# Patient Record
Sex: FEMALE | Race: WHITE | NOT HISPANIC OR LATINO
[De-identification: names, ages, dates, MRNs, and addresses within clinical notes are randomized per-mention and may not be internally consistent; named-entity substitution may affect disease eponyms.]

---

## 2018-01-10 ENCOUNTER — APPOINTMENT (OUTPATIENT)
Dept: PSYCHIATRY | Facility: CLINIC | Age: 53
End: 2018-01-10

## 2018-07-01 ENCOUNTER — OUTPATIENT (OUTPATIENT)
Dept: OUTPATIENT SERVICES | Facility: HOSPITAL | Age: 53
LOS: 1 days | End: 2018-07-01
Payer: MEDICARE

## 2018-07-10 DIAGNOSIS — Z71.89 OTHER SPECIFIED COUNSELING: ICD-10-CM

## 2018-12-01 PROCEDURE — G9005: CPT

## 2019-01-01 ENCOUNTER — OUTPATIENT (OUTPATIENT)
Dept: OUTPATIENT SERVICES | Facility: HOSPITAL | Age: 54
LOS: 1 days | End: 2019-01-01
Payer: MEDICARE

## 2019-02-01 PROCEDURE — G9005: CPT

## 2019-04-12 DIAGNOSIS — Z71.89 OTHER SPECIFIED COUNSELING: ICD-10-CM

## 2020-11-03 ENCOUNTER — INPATIENT (INPATIENT)
Facility: HOSPITAL | Age: 55
LOS: 15 days | Discharge: ANOTHER HOSPITAL | End: 2020-11-19
Attending: PSYCHIATRY & NEUROLOGY | Admitting: PSYCHIATRY & NEUROLOGY
Payer: MEDICARE

## 2020-11-03 VITALS
HEART RATE: 115 BPM | HEIGHT: 65 IN | RESPIRATION RATE: 18 BRPM | OXYGEN SATURATION: 100 % | DIASTOLIC BLOOD PRESSURE: 63 MMHG | TEMPERATURE: 98 F | SYSTOLIC BLOOD PRESSURE: 144 MMHG

## 2020-11-03 DIAGNOSIS — F20.1 DISORGANIZED SCHIZOPHRENIA: ICD-10-CM

## 2020-11-03 DIAGNOSIS — F25.9 SCHIZOAFFECTIVE DISORDER, UNSPECIFIED: ICD-10-CM

## 2020-11-03 LAB
ALBUMIN SERPL ELPH-MCNC: 5 G/DL — SIGNIFICANT CHANGE UP (ref 3.3–5)
ALP SERPL-CCNC: 97 U/L — SIGNIFICANT CHANGE UP (ref 40–120)
ALT FLD-CCNC: 19 U/L — SIGNIFICANT CHANGE UP (ref 4–33)
ANION GAP SERPL CALC-SCNC: 18 MMO/L — HIGH (ref 7–14)
APAP SERPL-MCNC: < 15 UG/ML — LOW (ref 15–25)
AST SERPL-CCNC: 12 U/L — SIGNIFICANT CHANGE UP (ref 4–32)
BASOPHILS # BLD AUTO: 0.03 K/UL — SIGNIFICANT CHANGE UP (ref 0–0.2)
BASOPHILS NFR BLD AUTO: 0.3 % — SIGNIFICANT CHANGE UP (ref 0–2)
BILIRUB SERPL-MCNC: 0.8 MG/DL — SIGNIFICANT CHANGE UP (ref 0.2–1.2)
BUN SERPL-MCNC: 13 MG/DL — SIGNIFICANT CHANGE UP (ref 7–23)
CALCIUM SERPL-MCNC: 10.1 MG/DL — SIGNIFICANT CHANGE UP (ref 8.4–10.5)
CHLORIDE SERPL-SCNC: 96 MMOL/L — LOW (ref 98–107)
CO2 SERPL-SCNC: 22 MMOL/L — SIGNIFICANT CHANGE UP (ref 22–31)
CREAT SERPL-MCNC: 0.5 MG/DL — SIGNIFICANT CHANGE UP (ref 0.5–1.3)
EOSINOPHIL # BLD AUTO: 0 K/UL — SIGNIFICANT CHANGE UP (ref 0–0.5)
EOSINOPHIL NFR BLD AUTO: 0 % — SIGNIFICANT CHANGE UP (ref 0–6)
ETHANOL BLD-MCNC: < 10 MG/DL — SIGNIFICANT CHANGE UP
GLUCOSE SERPL-MCNC: 296 MG/DL — HIGH (ref 70–99)
HCT VFR BLD CALC: 40.6 % — SIGNIFICANT CHANGE UP (ref 34.5–45)
HGB BLD-MCNC: 13 G/DL — SIGNIFICANT CHANGE UP (ref 11.5–15.5)
IMM GRANULOCYTES NFR BLD AUTO: 0.7 % — SIGNIFICANT CHANGE UP (ref 0–1.5)
LYMPHOCYTES # BLD AUTO: 2.12 K/UL — SIGNIFICANT CHANGE UP (ref 1–3.3)
LYMPHOCYTES # BLD AUTO: 20.3 % — SIGNIFICANT CHANGE UP (ref 13–44)
MCHC RBC-ENTMCNC: 27 PG — SIGNIFICANT CHANGE UP (ref 27–34)
MCHC RBC-ENTMCNC: 32 % — SIGNIFICANT CHANGE UP (ref 32–36)
MCV RBC AUTO: 84.2 FL — SIGNIFICANT CHANGE UP (ref 80–100)
MONOCYTES # BLD AUTO: 0.44 K/UL — SIGNIFICANT CHANGE UP (ref 0–0.9)
MONOCYTES NFR BLD AUTO: 4.2 % — SIGNIFICANT CHANGE UP (ref 2–14)
NEUTROPHILS # BLD AUTO: 7.8 K/UL — HIGH (ref 1.8–7.4)
NEUTROPHILS NFR BLD AUTO: 74.5 % — SIGNIFICANT CHANGE UP (ref 43–77)
NRBC # FLD: 0 K/UL — SIGNIFICANT CHANGE UP (ref 0–0)
PLATELET # BLD AUTO: 304 K/UL — SIGNIFICANT CHANGE UP (ref 150–400)
PMV BLD: 9.2 FL — SIGNIFICANT CHANGE UP (ref 7–13)
POTASSIUM SERPL-MCNC: 4 MMOL/L — SIGNIFICANT CHANGE UP (ref 3.5–5.3)
POTASSIUM SERPL-SCNC: 4 MMOL/L — SIGNIFICANT CHANGE UP (ref 3.5–5.3)
PROT SERPL-MCNC: 8.4 G/DL — HIGH (ref 6–8.3)
RBC # BLD: 4.82 M/UL — SIGNIFICANT CHANGE UP (ref 3.8–5.2)
RBC # FLD: 13 % — SIGNIFICANT CHANGE UP (ref 10.3–14.5)
SALICYLATES SERPL-MCNC: < 5 MG/DL — LOW (ref 15–30)
SARS-COV-2 RNA SPEC QL NAA+PROBE: SIGNIFICANT CHANGE UP
SODIUM SERPL-SCNC: 136 MMOL/L — SIGNIFICANT CHANGE UP (ref 135–145)
T3 SERPL-MCNC: 88.3 NG/DL — SIGNIFICANT CHANGE UP (ref 80–200)
T4 AB SER-ACNC: 8.19 UG/DL — SIGNIFICANT CHANGE UP (ref 5.1–13)
TSH SERPL-MCNC: 4.86 UIU/ML — HIGH (ref 0.27–4.2)
VALPROATE SERPL-MCNC: 33.9 UG/ML — LOW (ref 50–100)
WBC # BLD: 10.46 K/UL — SIGNIFICANT CHANGE UP (ref 3.8–10.5)
WBC # FLD AUTO: 10.46 K/UL — SIGNIFICANT CHANGE UP (ref 3.8–10.5)

## 2020-11-03 PROCEDURE — 99285 EMERGENCY DEPT VISIT HI MDM: CPT

## 2020-11-03 RX ORDER — CLOZAPINE 150 MG/1
250 TABLET, ORALLY DISINTEGRATING ORAL AT BEDTIME
Refills: 0 | Status: DISCONTINUED | OUTPATIENT
Start: 2020-11-04 | End: 2020-11-04

## 2020-11-03 RX ORDER — HALOPERIDOL DECANOATE 100 MG/ML
5 INJECTION INTRAMUSCULAR EVERY 6 HOURS
Refills: 0 | Status: DISCONTINUED | OUTPATIENT
Start: 2020-11-04 | End: 2020-11-19

## 2020-11-03 RX ORDER — RISPERIDONE 4 MG/1
2 TABLET ORAL AT BEDTIME
Refills: 0 | Status: DISCONTINUED | OUTPATIENT
Start: 2020-11-04 | End: 2020-11-06

## 2020-11-03 RX ORDER — BENZTROPINE MESYLATE 1 MG
1 TABLET ORAL AT BEDTIME
Refills: 0 | Status: DISCONTINUED | OUTPATIENT
Start: 2020-11-04 | End: 2020-11-09

## 2020-11-03 RX ORDER — DIVALPROEX SODIUM 500 MG/1
1000 TABLET, DELAYED RELEASE ORAL AT BEDTIME
Refills: 0 | Status: DISCONTINUED | OUTPATIENT
Start: 2020-11-04 | End: 2020-11-04

## 2020-11-03 RX ORDER — ATORVASTATIN CALCIUM 80 MG/1
80 TABLET, FILM COATED ORAL AT BEDTIME
Refills: 0 | Status: DISCONTINUED | OUTPATIENT
Start: 2020-11-04 | End: 2020-11-19

## 2020-11-03 RX ORDER — METFORMIN HYDROCHLORIDE 850 MG/1
1000 TABLET ORAL
Refills: 0 | Status: DISCONTINUED | OUTPATIENT
Start: 2020-11-04 | End: 2020-11-19

## 2020-11-03 RX ADMIN — Medication 1 MILLIGRAM(S): at 17:44

## 2020-11-03 NOTE — ED BEHAVIORAL HEALTH ASSESSMENT NOTE - PSYCHIATRIC ISSUES AND PLAN (INCLUDE STANDING AND PRN MEDICATION)
Defer medication changes to inpatient team; continue Clozaril 250 mg po QHS, Risperdal 2 mg po QHS, Depakote ER 1000 mg po QHS, Cogentin 1 mg po QHS; use Haldol 5 mg, Ativan 2 mg PO/IM prn agitation

## 2020-11-03 NOTE — ED BEHAVIORAL HEALTH NOTE - BEHAVIORAL HEALTH NOTE
.        *In the past 14 days, have you been around anyone with a positive COVID-19 test?*     (  ) Yes   (  x) No   (  ) Unknown- Reason (e.g. patient uncertain, sedated, refusing to answer, etc.):  ______    IF YES PROCEED TO QUESTION #2. IF NO or UNKNOWN, PLEASE SKIP TO QUESTION #7    2.        Were you within 6 feet of them for at least 15 minutes? (  ) Yes   (  ) No   (  ) Unknown- Reason: ______      3.        Have you provided care for them? (  ) Yes   (  ) No   (  ) Unknown- Reason: ______      4.        Have you had direct physical contact with them (touched, hugged, or kissed them)? (  ) Yes   (  ) No    (  ) Unknown- Reason: ______      5.        Have you shared eating or drinking utensils with them? (  ) Yes   (  ) No    (  ) Unknown- Reason: ______      6.        Have they sneezed, coughed, or somehow got respiratory droplets on you? (  ) Yes   (  ) No    (  ) Unknown- Reason: ______      7.        *Have you been out of [place] within the past 14 days?*    (  ) Yes   (x  ) No   (  ) Unknown- Reason (e.g. patient uncertain, sedated, refusing to answer, etc.): _______    IF YES PLEASE ANSWER THE FOLLOWING QUESTIONS:    8.        Which state/country have you been to? ______     9.        Were you there over 24 hours? (  ) Yes   (  ) No    (  ) Unknown- Reason: ______      10.     What date did you return to [facility]? ______

## 2020-11-03 NOTE — ED BEHAVIORAL HEALTH ASSESSMENT NOTE - CURRENT MEDICATION
Clozapine 250 mg po QHS, Risperdal 2 mg po QHS, Cogentin 1 mg po QHS, Metformin 1000 mg po BID, Depakote 1000 mg po QHS, Atorvastatin 80 mg po QHS

## 2020-11-03 NOTE — ED PROVIDER NOTE - PROGRESS NOTE DETAILS
NP Lula- pt during her er stay , calm and cooperative with care, no out burst, po fluid encouraged and tolerated well, psych inpatient tx.

## 2020-11-03 NOTE — ED PROVIDER NOTE - CLINICAL SUMMARY MEDICAL DECISION MAKING FREE TEXT BOX
This is a 55 yr old F, pmh schizoaffective disorder with c/ o depression , bizarre behaviour. 911 activated by . As per ems pt was depressed.  Pt appears disheveled, very disorganized, derealization, poor historian. Pt states her  got lost today and called 911, but she can not engage why she is here.

## 2020-11-03 NOTE — ED ADULT TRIAGE NOTE - CHIEF COMPLAINT QUOTE
Pt was brought in because  called EMS as pt has been depressed, PMH: Depression, Pt refusing to speak, shakes her head no when asked if she has SI:HI, Unable to obtain hx from pt.

## 2020-11-03 NOTE — ED BEHAVIORAL HEALTH ASSESSMENT NOTE - NS ED BHA MED ROS GENITOURINARY
Pt arrived to unit via transport with belongings. Settled into bed and oriented to room/call light. Dual skin assessment completed with MAYURI Zuluaga.   No complaints

## 2020-11-03 NOTE — ED BEHAVIORAL HEALTH ASSESSMENT NOTE - SUMMARY
54 y/o female, single, noncaregiver, disabled, lives with lizy, history of Schizophrenia, multiple past admissions at [facility] (most recent 2015), most recent psych admission at [facility] several months ago for psychosis, followed by psychiatrist Dr. Love at [facility] (pt has no h/o developmental disability), prescribed Clozaril, Risperdal, Depakote, Cogentin, no h/o self-injurious behavior or suicide attempts, no h/o violence or legal issues, PMH Type II DM, HLD, asthma, no h/o substance abuse, BIBEMS activated by francisco (Rahat) for acute psychosis.   Pt not tending to ADL's, exhibits disorganized thought process, talking to self, paranoid. She is unable to care for self at this time due to severity of psychotic symptoms. She requires inpatient psychiatric admission for safety and stabilization.

## 2020-11-03 NOTE — ED ADULT NURSE NOTE - NSIMPLEMENTINTERV_GEN_ALL_ED
Implemented All Universal Safety Interventions:  [place] to call system. Call bell, personal items and telephone within reach. Instruct patient to call for assistance. Room bathroom lighting operational. Non-slip footwear when patient is off stretcher. Physically safe environment: no spills, clutter or unnecessary equipment. Stretcher in lowest position, wheels locked, appropriate side rails in place.

## 2020-11-03 NOTE — ED ADULT NURSE NOTE - OBJECTIVE STATEMENT
A & O to place only. Denies SI/HI, VH.  Endorses AH at the moment.  Reports that she was brought here because her " got lost on the way [facility]." Denies pain, drug use, and alcohol.

## 2020-11-03 NOTE — ED BEHAVIORAL HEALTH ASSESSMENT NOTE - OTHER
francisco CVM superficially cooperative did not assess "I don't remember" impoverished paranoid denies but fiance states pt has been talking to herself chronic mental illness

## 2020-11-03 NOTE — ED ADULT NURSE NOTE - PRIMARY CARE PROVIDER
Size Of Lesion In Cm (Optional): 2.2 Detail Level: Detailed unknown X Size Of Lesion In Cm (Optional): 0

## 2020-11-03 NOTE — ED BEHAVIORAL HEALTH ASSESSMENT NOTE - HPI (INCLUDE ILLNESS QUALITY, SEVERITY, DURATION, TIMING, CONTEXT, MODIFYING FACTORS, ASSOCIATED SIGNS AND SYMPTOMS)
Rahat (francisco): [phone]  Pt was so depressed, lying in bed for days, urinating on herself in bed. She won't shower, keeps staring at floor and muttering things in broken Scottish and Khmer. No SI. About 1 month, got worse. Hypersomnia, reduced appetite. Not making sense at times. "I'm scared." She says she doesn't know. No h/o violence. Most recent admission at [facility] (a few months ago). At baseline, upbeat, she cooks.   Hasn't been able to see her grandchildren for a while.   Psychiatrist is Dr. Love at [facility] but has no developmental disability. Has Schizophrenia.   Meds: Clozapine 250 mg po QHS (she skipped two nights about but otherwise is compliant), Risperdal 2 mg po QHS, Cogentin 1 mg po QHS, Metformin 1000 mg po BID, Depakote 1000 mg po QHS, Atorvastatin 80 mg po QHS  DM II, HLD 56 y/o female, single, noncaregiver, disabled, lives with francisco, history of Schizophrenia, multiple past admissions at [facility] (most recent 2015), most recent psych admission at [facility] several months ago for psychosis, followed by psychiatrist Dr. Love at [facility] (pt has no h/o developmental disability), prescribed Clozaril, Risperdal, Depakote, Cogentin, no h/o self-injurious behavior or suicide attempts, no h/o violence or legal issues, PMH Type II DM, HLD, asthma, no h/o substance abuse, BIBEMS activated by francisco (Rahat) for acute psychosis.     Collateral obtained from pt's Rahat singh. Pt carries diagnosis of Schizophrenia. Informant called 911 today because pt has been decompensating for the past month, with acute worsening over the past few days.  He states that pt appears depressed (she has not reported feeling depressed), has been lying in bed for days, urinated on herself in bed, refusing to shower. She keeps staring at the floor and muttering things in broken Guamanian and Kazakh. No SI. Hypersomnia, reduced appetite. She is not making sense at times. She repeatedly states "I'm scared" but doesn't know what she is scared of. Multiple past psych admissions. Most recent admission was at [facility] a few months ago. This was first hospitalization since 2015 [facility] admission. At baseline, pt is "upbeat", cooks, not disorganized, not paranoid. No h/o suicide attempts, no h/o violence.   Psychiatrist is Dr. Love at [facility] (she has no developmental disability).   Meds: Clozapine 250 mg po QHS (she skipped dose two nights about but otherwise is compliant--she took last night's dose), Risperdal 2 mg po QHS, Cogentin 1 mg po QHS, Metformin 1000 mg po BID, Depakote 1000 mg po QHS, Atorvastatin 80 mg po QHS. She has DM II, HLD, asthma. No substance use. Informant advocates for psychiatric admission. 56 y/o female, single, noncaregiver, disabled, lives with francisco, history of Schizophrenia, multiple past admissions at [facility] (most recent 2015), most recent psych admission at [facility] several months ago for psychosis, followed by psychiatrist Dr. Love at [facility] (pt has no h/o developmental disability), prescribed Clozaril, Risperdal, Depakote, Cogentin, no h/o self-injurious behavior or suicide attempts, no h/o violence or legal issues, PMH Type II DM, HLD, asthma, no h/o substance abuse, BIBEMS activated by francisco (Rahat) for acute psychosis.     Interview is limited significantly by disorganized/impoverished thought process. Pt says "I don't remember" in response to multiple questions. She is disheveled with poor eye contact. Pt states her "" Rahat sent her to the ED but she doesn't remember why. She says "I don't remember" when asked how she is feeling/if anything is bothering her. She then says that the sound of ambulances scares her. She says "I feel safe here." She says she doesn't feel safe at home but is unable to say why. When asked if anyone is trying to harm her, she responds "I watch TV but people are jealous of me." She denies SI/HI, denies AH/VH. States she takes medications but responds "I don't remember" when asked for names of meds. She responds "I don't remember" when asked about her mood. She denies substance use.   Collateral obtained from pt's Rahat singh. Pt carries diagnosis of Schizophrenia. Informant called 911 today because pt has been decompensating for the past month, with acute worsening over the past few days.  He states that pt appears depressed (she has not reported feeling depressed), has been lying in bed for days, urinated on herself in bed, refusing to shower. She keeps staring at the floor and muttering things in broken Sinhala and Citizen of [place]. No SI. Hypersomnia, reduced appetite. She is not making sense at times. She repeatedly states "I'm scared" but doesn't know what she is scared of. Multiple past psych admissions. Most recent admission was at [facility] a few months ago. This was first hospitalization since 2015 [facility] admission. At baseline, pt is "upbeat", cooks, not disorganized, not paranoid. No h/o suicide attempts, no h/o violence.   Psychiatrist is Dr. Love at [facility] (she has no developmental disability).   Meds: Clozapine 250 mg po QHS (she skipped dose two nights about but otherwise is compliant--she took last night's dose), Risperdal 2 mg po QHS, Cogentin 1 mg po QHS, Metformin 1000 mg po BID, Depakote 1000 mg po QHS, Atorvastatin 80 mg po QHS. Informant gives pt her medications. She has DM II, HLD, asthma. No substance use. Informant advocates for psychiatric admission.

## 2020-11-03 NOTE — ED ADULT NURSE REASSESSMENT NOTE - NS ED NURSE REASSESS COMMENT FT1
Pt admitted to [place] [id number], escorted by EMS.  Pt still oddly related but calm and cooperative.

## 2020-11-03 NOTE — ED BEHAVIORAL HEALTH ASSESSMENT NOTE - DESCRIPTION
superficially cooperative, remains in good behavioral control    Vital Signs Last 24 Hrs  T(C): 36.8 (03 Nov 2020 15:05), Max: 36.8 (03 Nov 2020 15:05)  T(F): 98.2 (03 Nov 2020 15:05), Max: 98.2 (03 Nov 2020 15:05)  HR: 115 (03 Nov 2020 15:05) (115 - 115)  BP: 144/63 (03 Nov 2020 15:05) (144/63 - 144/63)  BP(mean): --  RR: 18 (03 Nov 2020 15:05) (18 - 18)  SpO2: 100% (03 Nov 2020 15:05) (100% - 100%) Type II DM, HLD, asthma see HPI

## 2020-11-04 LAB
GLUCOSE BLDC GLUCOMTR-MCNC: 296 MG/DL — HIGH (ref 70–99)
SARS-COV-2 IGG SERPL QL IA: POSITIVE
SARS-COV-2 IGM SERPL IA-ACNC: 5.25 INDEX — HIGH

## 2020-11-04 PROCEDURE — 99222 1ST HOSP IP/OBS MODERATE 55: CPT | Mod: GC

## 2020-11-04 RX ORDER — DIVALPROEX SODIUM 500 MG/1
1000 TABLET, DELAYED RELEASE ORAL AT BEDTIME
Refills: 0 | Status: DISCONTINUED | OUTPATIENT
Start: 2020-11-04 | End: 2020-11-07

## 2020-11-04 RX ORDER — INSULIN LISPRO 100/ML
VIAL (ML) SUBCUTANEOUS
Refills: 0 | Status: DISCONTINUED | OUTPATIENT
Start: 2020-11-04 | End: 2020-11-19

## 2020-11-04 RX ORDER — INSULIN LISPRO 100/ML
VIAL (ML) SUBCUTANEOUS AT BEDTIME
Refills: 0 | Status: DISCONTINUED | OUTPATIENT
Start: 2020-11-04 | End: 2020-11-19

## 2020-11-04 RX ORDER — DEXTROSE 50 % IN WATER 50 %
15 SYRINGE (ML) INTRAVENOUS ONCE
Refills: 0 | Status: DISCONTINUED | OUTPATIENT
Start: 2020-11-04 | End: 2020-11-19

## 2020-11-04 RX ORDER — GLUCAGON INJECTION, SOLUTION 0.5 MG/.1ML
1 INJECTION, SOLUTION SUBCUTANEOUS ONCE
Refills: 0 | Status: DISCONTINUED | OUTPATIENT
Start: 2020-11-04 | End: 2020-11-19

## 2020-11-04 RX ORDER — CLOZAPINE 150 MG/1
25 TABLET, ORALLY DISINTEGRATING ORAL AT BEDTIME
Refills: 0 | Status: DISCONTINUED | OUTPATIENT
Start: 2020-11-04 | End: 2020-11-06

## 2020-11-04 RX ORDER — HALOPERIDOL DECANOATE 100 MG/ML
5 INJECTION INTRAMUSCULAR ONCE
Refills: 0 | Status: DISCONTINUED | OUTPATIENT
Start: 2020-11-04 | End: 2020-11-04

## 2020-11-04 RX ORDER — DIVALPROEX SODIUM 500 MG/1
1500 TABLET, DELAYED RELEASE ORAL AT BEDTIME
Refills: 0 | Status: DISCONTINUED | OUTPATIENT
Start: 2020-11-04 | End: 2020-11-04

## 2020-11-04 RX ADMIN — METFORMIN HYDROCHLORIDE 1000 MILLIGRAM(S): 850 TABLET ORAL at 20:35

## 2020-11-04 RX ADMIN — ATORVASTATIN CALCIUM 80 MILLIGRAM(S): 80 TABLET, FILM COATED ORAL at 20:35

## 2020-11-04 RX ADMIN — RISPERIDONE 2 MILLIGRAM(S): 4 TABLET ORAL at 20:35

## 2020-11-04 RX ADMIN — Medication 1 MILLIGRAM(S): at 20:35

## 2020-11-04 RX ADMIN — METFORMIN HYDROCHLORIDE 1000 MILLIGRAM(S): 850 TABLET ORAL at 08:10

## 2020-11-04 RX ADMIN — ATORVASTATIN CALCIUM 80 MILLIGRAM(S): 80 TABLET, FILM COATED ORAL at 01:24

## 2020-11-04 RX ADMIN — HALOPERIDOL DECANOATE 5 MILLIGRAM(S): 100 INJECTION INTRAMUSCULAR at 01:24

## 2020-11-04 RX ADMIN — Medication 2 MILLIGRAM(S): at 01:24

## 2020-11-04 RX ADMIN — CLOZAPINE 25 MILLIGRAM(S): 150 TABLET, ORALLY DISINTEGRATING ORAL at 20:35

## 2020-11-04 RX ADMIN — HALOPERIDOL DECANOATE 5 MILLIGRAM(S): 100 INJECTION INTRAMUSCULAR at 08:10

## 2020-11-04 RX ADMIN — DIVALPROEX SODIUM 1000 MILLIGRAM(S): 500 TABLET, DELAYED RELEASE ORAL at 20:35

## 2020-11-05 LAB
CHOLEST SERPL-MCNC: 146 MG/DL — SIGNIFICANT CHANGE UP (ref 120–199)
DIRECT LDL: 79 MG/DL — SIGNIFICANT CHANGE UP
GLUCOSE BLDC GLUCOMTR-MCNC: 244 MG/DL — HIGH (ref 70–99)
GLUCOSE BLDC GLUCOMTR-MCNC: 259 MG/DL — HIGH (ref 70–99)
GLUCOSE BLDC GLUCOMTR-MCNC: 280 MG/DL — HIGH (ref 70–99)
GLUCOSE BLDC GLUCOMTR-MCNC: 281 MG/DL — HIGH (ref 70–99)
HBA1C BLD-MCNC: 9.5 % — HIGH (ref 4–5.6)
HDLC SERPL-MCNC: 31 MG/DL — LOW (ref 45–65)
TRIGL SERPL-MCNC: 355 MG/DL — HIGH (ref 10–149)

## 2020-11-05 PROCEDURE — 99232 SBSQ HOSP IP/OBS MODERATE 35: CPT | Mod: GC

## 2020-11-05 RX ADMIN — METFORMIN HYDROCHLORIDE 1000 MILLIGRAM(S): 850 TABLET ORAL at 08:45

## 2020-11-05 RX ADMIN — Medication 3: at 17:08

## 2020-11-05 RX ADMIN — Medication 1 MILLIGRAM(S): at 20:40

## 2020-11-05 RX ADMIN — METFORMIN HYDROCHLORIDE 1000 MILLIGRAM(S): 850 TABLET ORAL at 20:41

## 2020-11-05 RX ADMIN — DIVALPROEX SODIUM 1000 MILLIGRAM(S): 500 TABLET, DELAYED RELEASE ORAL at 20:40

## 2020-11-05 RX ADMIN — Medication 3: at 12:11

## 2020-11-05 RX ADMIN — CLOZAPINE 25 MILLIGRAM(S): 150 TABLET, ORALLY DISINTEGRATING ORAL at 20:40

## 2020-11-05 RX ADMIN — RISPERIDONE 2 MILLIGRAM(S): 4 TABLET ORAL at 20:41

## 2020-11-05 RX ADMIN — Medication 3: at 08:08

## 2020-11-05 RX ADMIN — ATORVASTATIN CALCIUM 80 MILLIGRAM(S): 80 TABLET, FILM COATED ORAL at 20:40

## 2020-11-06 LAB
GLUCOSE BLDC GLUCOMTR-MCNC: 224 MG/DL — HIGH (ref 70–99)
GLUCOSE BLDC GLUCOMTR-MCNC: 237 MG/DL — HIGH (ref 70–99)
GLUCOSE BLDC GLUCOMTR-MCNC: 269 MG/DL — HIGH (ref 70–99)

## 2020-11-06 PROCEDURE — 99232 SBSQ HOSP IP/OBS MODERATE 35: CPT | Mod: GC

## 2020-11-06 RX ORDER — CLOZAPINE 150 MG/1
50 TABLET, ORALLY DISINTEGRATING ORAL AT BEDTIME
Refills: 0 | Status: COMPLETED | OUTPATIENT
Start: 2020-11-06 | End: 2020-11-07

## 2020-11-06 RX ORDER — CLOZAPINE 150 MG/1
75 TABLET, ORALLY DISINTEGRATING ORAL AT BEDTIME
Refills: 0 | Status: DISCONTINUED | OUTPATIENT
Start: 2020-11-08 | End: 2020-11-10

## 2020-11-06 RX ORDER — RISPERIDONE 4 MG/1
3 TABLET ORAL AT BEDTIME
Refills: 0 | Status: DISCONTINUED | OUTPATIENT
Start: 2020-11-06 | End: 2020-11-19

## 2020-11-06 RX ORDER — GLIMEPIRIDE 1 MG
2 TABLET ORAL DAILY
Refills: 0 | Status: DISCONTINUED | OUTPATIENT
Start: 2020-11-06 | End: 2020-11-13

## 2020-11-06 RX ADMIN — Medication 3: at 08:09

## 2020-11-06 RX ADMIN — METFORMIN HYDROCHLORIDE 1000 MILLIGRAM(S): 850 TABLET ORAL at 08:35

## 2020-11-06 RX ADMIN — Medication 3: at 12:49

## 2020-11-06 RX ADMIN — RISPERIDONE 3 MILLIGRAM(S): 4 TABLET ORAL at 20:24

## 2020-11-06 RX ADMIN — METFORMIN HYDROCHLORIDE 1000 MILLIGRAM(S): 850 TABLET ORAL at 20:24

## 2020-11-06 RX ADMIN — DIVALPROEX SODIUM 1000 MILLIGRAM(S): 500 TABLET, DELAYED RELEASE ORAL at 20:24

## 2020-11-06 RX ADMIN — Medication 1 MILLIGRAM(S): at 20:24

## 2020-11-06 RX ADMIN — CLOZAPINE 50 MILLIGRAM(S): 150 TABLET, ORALLY DISINTEGRATING ORAL at 20:24

## 2020-11-06 RX ADMIN — Medication 2: at 17:07

## 2020-11-06 RX ADMIN — ATORVASTATIN CALCIUM 80 MILLIGRAM(S): 80 TABLET, FILM COATED ORAL at 20:24

## 2020-11-07 LAB
GLUCOSE BLDC GLUCOMTR-MCNC: 146 MG/DL — HIGH (ref 70–99)
GLUCOSE BLDC GLUCOMTR-MCNC: 170 MG/DL — HIGH (ref 70–99)
GLUCOSE BLDC GLUCOMTR-MCNC: 241 MG/DL — HIGH (ref 70–99)
GLUCOSE BLDC GLUCOMTR-MCNC: 269 MG/DL — HIGH (ref 70–99)
VALPROATE SERPL-MCNC: 41.7 UG/ML — LOW (ref 50–100)

## 2020-11-07 PROCEDURE — 99232 SBSQ HOSP IP/OBS MODERATE 35: CPT

## 2020-11-07 RX ORDER — DIVALPROEX SODIUM 500 MG/1
1500 TABLET, DELAYED RELEASE ORAL AT BEDTIME
Refills: 0 | Status: DISCONTINUED | OUTPATIENT
Start: 2020-11-07 | End: 2020-11-19

## 2020-11-07 RX ADMIN — ATORVASTATIN CALCIUM 80 MILLIGRAM(S): 80 TABLET, FILM COATED ORAL at 20:59

## 2020-11-07 RX ADMIN — METFORMIN HYDROCHLORIDE 1000 MILLIGRAM(S): 850 TABLET ORAL at 08:13

## 2020-11-07 RX ADMIN — Medication 2 MILLIGRAM(S): at 08:13

## 2020-11-07 RX ADMIN — RISPERIDONE 3 MILLIGRAM(S): 4 TABLET ORAL at 20:59

## 2020-11-07 RX ADMIN — Medication 1 MILLIGRAM(S): at 20:59

## 2020-11-07 RX ADMIN — Medication 1: at 12:08

## 2020-11-07 RX ADMIN — CLOZAPINE 50 MILLIGRAM(S): 150 TABLET, ORALLY DISINTEGRATING ORAL at 20:59

## 2020-11-07 RX ADMIN — DIVALPROEX SODIUM 1500 MILLIGRAM(S): 500 TABLET, DELAYED RELEASE ORAL at 20:59

## 2020-11-07 RX ADMIN — Medication 3: at 07:50

## 2020-11-07 RX ADMIN — METFORMIN HYDROCHLORIDE 1000 MILLIGRAM(S): 850 TABLET ORAL at 20:59

## 2020-11-08 LAB
GLUCOSE BLDC GLUCOMTR-MCNC: 153 MG/DL — HIGH (ref 70–99)
GLUCOSE BLDC GLUCOMTR-MCNC: 193 MG/DL — HIGH (ref 70–99)
GLUCOSE BLDC GLUCOMTR-MCNC: 235 MG/DL — HIGH (ref 70–99)
GLUCOSE BLDC GLUCOMTR-MCNC: 242 MG/DL — HIGH (ref 70–99)

## 2020-11-08 PROCEDURE — 99232 SBSQ HOSP IP/OBS MODERATE 35: CPT

## 2020-11-08 RX ADMIN — Medication 2: at 08:02

## 2020-11-08 RX ADMIN — METFORMIN HYDROCHLORIDE 1000 MILLIGRAM(S): 850 TABLET ORAL at 20:43

## 2020-11-08 RX ADMIN — Medication 1: at 16:25

## 2020-11-08 RX ADMIN — Medication 1 MILLIGRAM(S): at 20:43

## 2020-11-08 RX ADMIN — Medication 1: at 11:48

## 2020-11-08 RX ADMIN — CLOZAPINE 75 MILLIGRAM(S): 150 TABLET, ORALLY DISINTEGRATING ORAL at 20:43

## 2020-11-08 RX ADMIN — Medication 2 MILLIGRAM(S): at 08:43

## 2020-11-08 RX ADMIN — ATORVASTATIN CALCIUM 80 MILLIGRAM(S): 80 TABLET, FILM COATED ORAL at 20:43

## 2020-11-08 RX ADMIN — METFORMIN HYDROCHLORIDE 1000 MILLIGRAM(S): 850 TABLET ORAL at 08:43

## 2020-11-08 RX ADMIN — RISPERIDONE 3 MILLIGRAM(S): 4 TABLET ORAL at 20:43

## 2020-11-08 RX ADMIN — Medication 0: at 20:34

## 2020-11-08 RX ADMIN — DIVALPROEX SODIUM 1500 MILLIGRAM(S): 500 TABLET, DELAYED RELEASE ORAL at 20:43

## 2020-11-09 LAB
GLUCOSE BLDC GLUCOMTR-MCNC: 165 MG/DL — HIGH (ref 70–99)
GLUCOSE BLDC GLUCOMTR-MCNC: 192 MG/DL — HIGH (ref 70–99)
GLUCOSE BLDC GLUCOMTR-MCNC: 198 MG/DL — HIGH (ref 70–99)
GLUCOSE BLDC GLUCOMTR-MCNC: 212 MG/DL — HIGH (ref 70–99)

## 2020-11-09 PROCEDURE — 99232 SBSQ HOSP IP/OBS MODERATE 35: CPT | Mod: GC

## 2020-11-09 RX ADMIN — METFORMIN HYDROCHLORIDE 1000 MILLIGRAM(S): 850 TABLET ORAL at 20:04

## 2020-11-09 RX ADMIN — Medication 2: at 11:44

## 2020-11-09 RX ADMIN — Medication 1: at 16:49

## 2020-11-09 RX ADMIN — CLOZAPINE 75 MILLIGRAM(S): 150 TABLET, ORALLY DISINTEGRATING ORAL at 20:04

## 2020-11-09 RX ADMIN — METFORMIN HYDROCHLORIDE 1000 MILLIGRAM(S): 850 TABLET ORAL at 08:18

## 2020-11-09 RX ADMIN — Medication 2 MILLIGRAM(S): at 08:18

## 2020-11-09 RX ADMIN — DIVALPROEX SODIUM 1500 MILLIGRAM(S): 500 TABLET, DELAYED RELEASE ORAL at 20:04

## 2020-11-09 RX ADMIN — ATORVASTATIN CALCIUM 80 MILLIGRAM(S): 80 TABLET, FILM COATED ORAL at 20:04

## 2020-11-09 RX ADMIN — RISPERIDONE 3 MILLIGRAM(S): 4 TABLET ORAL at 20:04

## 2020-11-09 RX ADMIN — Medication 0: at 20:24

## 2020-11-09 RX ADMIN — Medication 1: at 08:04

## 2020-11-10 LAB
GLUCOSE BLDC GLUCOMTR-MCNC: 181 MG/DL — HIGH (ref 70–99)
GLUCOSE BLDC GLUCOMTR-MCNC: 191 MG/DL — HIGH (ref 70–99)
GLUCOSE BLDC GLUCOMTR-MCNC: 198 MG/DL — HIGH (ref 70–99)
GLUCOSE BLDC GLUCOMTR-MCNC: 199 MG/DL — HIGH (ref 70–99)

## 2020-11-10 PROCEDURE — 99232 SBSQ HOSP IP/OBS MODERATE 35: CPT | Mod: GC

## 2020-11-10 RX ORDER — HALOPERIDOL DECANOATE 100 MG/ML
5 INJECTION INTRAMUSCULAR ONCE
Refills: 0 | Status: DISCONTINUED | OUTPATIENT
Start: 2020-11-10 | End: 2020-11-19

## 2020-11-10 RX ORDER — CLOZAPINE 150 MG/1
100 TABLET, ORALLY DISINTEGRATING ORAL AT BEDTIME
Refills: 0 | Status: DISCONTINUED | OUTPATIENT
Start: 2020-11-10 | End: 2020-11-11

## 2020-11-10 RX ADMIN — CLOZAPINE 100 MILLIGRAM(S): 150 TABLET, ORALLY DISINTEGRATING ORAL at 20:32

## 2020-11-10 RX ADMIN — Medication 1: at 17:14

## 2020-11-10 RX ADMIN — ATORVASTATIN CALCIUM 80 MILLIGRAM(S): 80 TABLET, FILM COATED ORAL at 20:32

## 2020-11-10 RX ADMIN — DIVALPROEX SODIUM 1500 MILLIGRAM(S): 500 TABLET, DELAYED RELEASE ORAL at 20:31

## 2020-11-10 RX ADMIN — METFORMIN HYDROCHLORIDE 1000 MILLIGRAM(S): 850 TABLET ORAL at 08:12

## 2020-11-10 RX ADMIN — Medication 0: at 20:59

## 2020-11-10 RX ADMIN — Medication 1: at 12:23

## 2020-11-10 RX ADMIN — RISPERIDONE 3 MILLIGRAM(S): 4 TABLET ORAL at 20:32

## 2020-11-10 RX ADMIN — METFORMIN HYDROCHLORIDE 1000 MILLIGRAM(S): 850 TABLET ORAL at 20:32

## 2020-11-10 RX ADMIN — Medication 2 MILLIGRAM(S): at 08:12

## 2020-11-10 RX ADMIN — Medication 1: at 07:49

## 2020-11-11 LAB
BASOPHILS # BLD AUTO: 0.03 K/UL — SIGNIFICANT CHANGE UP (ref 0–0.2)
BASOPHILS NFR BLD AUTO: 0.3 % — SIGNIFICANT CHANGE UP (ref 0–2)
EOSINOPHIL # BLD AUTO: 0 K/UL — SIGNIFICANT CHANGE UP (ref 0–0.5)
EOSINOPHIL NFR BLD AUTO: 0 % — SIGNIFICANT CHANGE UP (ref 0–6)
GLUCOSE BLDC GLUCOMTR-MCNC: 149 MG/DL — HIGH (ref 70–99)
GLUCOSE BLDC GLUCOMTR-MCNC: 165 MG/DL — HIGH (ref 70–99)
GLUCOSE BLDC GLUCOMTR-MCNC: 172 MG/DL — HIGH (ref 70–99)
GLUCOSE BLDC GLUCOMTR-MCNC: 200 MG/DL — HIGH (ref 70–99)
HCT VFR BLD CALC: 36 % — SIGNIFICANT CHANGE UP (ref 34.5–45)
HGB BLD-MCNC: 11.6 G/DL — SIGNIFICANT CHANGE UP (ref 11.5–15.5)
IMM GRANULOCYTES NFR BLD AUTO: 1 % — SIGNIFICANT CHANGE UP (ref 0–1.5)
LYMPHOCYTES # BLD AUTO: 2.4 K/UL — SIGNIFICANT CHANGE UP (ref 1–3.3)
LYMPHOCYTES # BLD AUTO: 24.7 % — SIGNIFICANT CHANGE UP (ref 13–44)
MCHC RBC-ENTMCNC: 26.9 PG — LOW (ref 27–34)
MCHC RBC-ENTMCNC: 32.2 % — SIGNIFICANT CHANGE UP (ref 32–36)
MCV RBC AUTO: 83.3 FL — SIGNIFICANT CHANGE UP (ref 80–100)
MONOCYTES # BLD AUTO: 0.53 K/UL — SIGNIFICANT CHANGE UP (ref 0–0.9)
MONOCYTES NFR BLD AUTO: 5.5 % — SIGNIFICANT CHANGE UP (ref 2–14)
NEUTROPHILS # BLD AUTO: 6.64 K/UL — SIGNIFICANT CHANGE UP (ref 1.8–7.4)
NEUTROPHILS NFR BLD AUTO: 68.5 % — SIGNIFICANT CHANGE UP (ref 43–77)
NRBC # FLD: 0 K/UL — SIGNIFICANT CHANGE UP (ref 0–0)
PLATELET # BLD AUTO: 275 K/UL — SIGNIFICANT CHANGE UP (ref 150–400)
PMV BLD: 8.7 FL — SIGNIFICANT CHANGE UP (ref 7–13)
RBC # BLD: 4.32 M/UL — SIGNIFICANT CHANGE UP (ref 3.8–5.2)
RBC # FLD: 13.6 % — SIGNIFICANT CHANGE UP (ref 10.3–14.5)
WBC # BLD: 9.7 K/UL — SIGNIFICANT CHANGE UP (ref 3.8–10.5)
WBC # FLD AUTO: 9.7 K/UL — SIGNIFICANT CHANGE UP (ref 3.8–10.5)

## 2020-11-11 PROCEDURE — 99232 SBSQ HOSP IP/OBS MODERATE 35: CPT | Mod: GC

## 2020-11-11 RX ORDER — CLOZAPINE 150 MG/1
125 TABLET, ORALLY DISINTEGRATING ORAL AT BEDTIME
Refills: 0 | Status: DISCONTINUED | OUTPATIENT
Start: 2020-11-11 | End: 2020-11-12

## 2020-11-11 RX ADMIN — Medication 1: at 17:14

## 2020-11-11 RX ADMIN — METFORMIN HYDROCHLORIDE 1000 MILLIGRAM(S): 850 TABLET ORAL at 20:28

## 2020-11-11 RX ADMIN — RISPERIDONE 3 MILLIGRAM(S): 4 TABLET ORAL at 20:28

## 2020-11-11 RX ADMIN — Medication 1: at 08:34

## 2020-11-11 RX ADMIN — CLOZAPINE 125 MILLIGRAM(S): 150 TABLET, ORALLY DISINTEGRATING ORAL at 20:28

## 2020-11-11 RX ADMIN — ATORVASTATIN CALCIUM 80 MILLIGRAM(S): 80 TABLET, FILM COATED ORAL at 20:28

## 2020-11-11 RX ADMIN — DIVALPROEX SODIUM 1500 MILLIGRAM(S): 500 TABLET, DELAYED RELEASE ORAL at 20:28

## 2020-11-11 RX ADMIN — Medication 2 MILLIGRAM(S): at 08:48

## 2020-11-11 RX ADMIN — Medication 1: at 12:14

## 2020-11-11 RX ADMIN — METFORMIN HYDROCHLORIDE 1000 MILLIGRAM(S): 850 TABLET ORAL at 08:48

## 2020-11-12 LAB
GLUCOSE BLDC GLUCOMTR-MCNC: 153 MG/DL — HIGH (ref 70–99)
GLUCOSE BLDC GLUCOMTR-MCNC: 161 MG/DL — HIGH (ref 70–99)
GLUCOSE BLDC GLUCOMTR-MCNC: 171 MG/DL — HIGH (ref 70–99)
GLUCOSE BLDC GLUCOMTR-MCNC: 239 MG/DL — HIGH (ref 70–99)

## 2020-11-12 PROCEDURE — 99232 SBSQ HOSP IP/OBS MODERATE 35: CPT | Mod: GC

## 2020-11-12 RX ORDER — CLOZAPINE 150 MG/1
150 TABLET, ORALLY DISINTEGRATING ORAL AT BEDTIME
Refills: 0 | Status: DISCONTINUED | OUTPATIENT
Start: 2020-11-12 | End: 2020-11-13

## 2020-11-12 RX ADMIN — HALOPERIDOL DECANOATE 5 MILLIGRAM(S): 100 INJECTION INTRAMUSCULAR at 12:14

## 2020-11-12 RX ADMIN — RISPERIDONE 3 MILLIGRAM(S): 4 TABLET ORAL at 20:31

## 2020-11-12 RX ADMIN — METFORMIN HYDROCHLORIDE 1000 MILLIGRAM(S): 850 TABLET ORAL at 20:31

## 2020-11-12 RX ADMIN — ATORVASTATIN CALCIUM 80 MILLIGRAM(S): 80 TABLET, FILM COATED ORAL at 20:31

## 2020-11-12 RX ADMIN — DIVALPROEX SODIUM 1500 MILLIGRAM(S): 500 TABLET, DELAYED RELEASE ORAL at 20:31

## 2020-11-12 RX ADMIN — Medication 1: at 07:57

## 2020-11-12 RX ADMIN — METFORMIN HYDROCHLORIDE 1000 MILLIGRAM(S): 850 TABLET ORAL at 09:12

## 2020-11-12 RX ADMIN — CLOZAPINE 150 MILLIGRAM(S): 150 TABLET, ORALLY DISINTEGRATING ORAL at 20:31

## 2020-11-12 RX ADMIN — Medication 2 MILLIGRAM(S): at 09:13

## 2020-11-13 LAB
GLUCOSE BLDC GLUCOMTR-MCNC: 148 MG/DL — HIGH (ref 70–99)
GLUCOSE BLDC GLUCOMTR-MCNC: 183 MG/DL — HIGH (ref 70–99)
GLUCOSE BLDC GLUCOMTR-MCNC: 200 MG/DL — HIGH (ref 70–99)
GLUCOSE BLDC GLUCOMTR-MCNC: 224 MG/DL — HIGH (ref 70–99)

## 2020-11-13 PROCEDURE — 99232 SBSQ HOSP IP/OBS MODERATE 35: CPT | Mod: GC

## 2020-11-13 RX ORDER — CLOZAPINE 150 MG/1
200 TABLET, ORALLY DISINTEGRATING ORAL AT BEDTIME
Refills: 0 | Status: COMPLETED | OUTPATIENT
Start: 2020-11-14 | End: 2020-11-14

## 2020-11-13 RX ORDER — CLOZAPINE 150 MG/1
225 TABLET, ORALLY DISINTEGRATING ORAL AT BEDTIME
Refills: 0 | Status: DISCONTINUED | OUTPATIENT
Start: 2020-11-15 | End: 2020-11-16

## 2020-11-13 RX ORDER — GLIMEPIRIDE 1 MG
4 TABLET ORAL DAILY
Refills: 0 | Status: DISCONTINUED | OUTPATIENT
Start: 2020-11-14 | End: 2020-11-19

## 2020-11-13 RX ORDER — CLOZAPINE 150 MG/1
175 TABLET, ORALLY DISINTEGRATING ORAL AT BEDTIME
Refills: 0 | Status: COMPLETED | OUTPATIENT
Start: 2020-11-13 | End: 2020-11-13

## 2020-11-13 RX ADMIN — CLOZAPINE 175 MILLIGRAM(S): 150 TABLET, ORALLY DISINTEGRATING ORAL at 21:03

## 2020-11-13 RX ADMIN — Medication 1: at 12:28

## 2020-11-13 RX ADMIN — Medication 2 MILLIGRAM(S): at 08:13

## 2020-11-13 RX ADMIN — ATORVASTATIN CALCIUM 80 MILLIGRAM(S): 80 TABLET, FILM COATED ORAL at 21:03

## 2020-11-13 RX ADMIN — Medication 1: at 08:13

## 2020-11-13 RX ADMIN — METFORMIN HYDROCHLORIDE 1000 MILLIGRAM(S): 850 TABLET ORAL at 21:03

## 2020-11-13 RX ADMIN — METFORMIN HYDROCHLORIDE 1000 MILLIGRAM(S): 850 TABLET ORAL at 08:13

## 2020-11-13 RX ADMIN — RISPERIDONE 3 MILLIGRAM(S): 4 TABLET ORAL at 21:03

## 2020-11-13 RX ADMIN — DIVALPROEX SODIUM 1500 MILLIGRAM(S): 500 TABLET, DELAYED RELEASE ORAL at 21:03

## 2020-11-14 LAB
GLUCOSE BLDC GLUCOMTR-MCNC: 152 MG/DL — HIGH (ref 70–99)
GLUCOSE BLDC GLUCOMTR-MCNC: 177 MG/DL — HIGH (ref 70–99)
GLUCOSE BLDC GLUCOMTR-MCNC: 191 MG/DL — HIGH (ref 70–99)
GLUCOSE BLDC GLUCOMTR-MCNC: 212 MG/DL — HIGH (ref 70–99)

## 2020-11-14 RX ADMIN — Medication 1: at 12:22

## 2020-11-14 RX ADMIN — RISPERIDONE 3 MILLIGRAM(S): 4 TABLET ORAL at 21:25

## 2020-11-14 RX ADMIN — Medication 1: at 16:45

## 2020-11-14 RX ADMIN — CLOZAPINE 200 MILLIGRAM(S): 150 TABLET, ORALLY DISINTEGRATING ORAL at 21:25

## 2020-11-14 RX ADMIN — DIVALPROEX SODIUM 1500 MILLIGRAM(S): 500 TABLET, DELAYED RELEASE ORAL at 21:25

## 2020-11-14 RX ADMIN — ATORVASTATIN CALCIUM 80 MILLIGRAM(S): 80 TABLET, FILM COATED ORAL at 21:25

## 2020-11-14 RX ADMIN — METFORMIN HYDROCHLORIDE 1000 MILLIGRAM(S): 850 TABLET ORAL at 08:33

## 2020-11-14 RX ADMIN — Medication 1: at 08:14

## 2020-11-14 RX ADMIN — METFORMIN HYDROCHLORIDE 1000 MILLIGRAM(S): 850 TABLET ORAL at 21:25

## 2020-11-14 RX ADMIN — Medication 4 MILLIGRAM(S): at 08:33

## 2020-11-15 LAB
GLUCOSE BLDC GLUCOMTR-MCNC: 151 MG/DL — HIGH (ref 70–99)
GLUCOSE BLDC GLUCOMTR-MCNC: 175 MG/DL — HIGH (ref 70–99)
GLUCOSE BLDC GLUCOMTR-MCNC: 179 MG/DL — HIGH (ref 70–99)
GLUCOSE BLDC GLUCOMTR-MCNC: 279 MG/DL — HIGH (ref 70–99)

## 2020-11-15 RX ADMIN — Medication 1: at 20:21

## 2020-11-15 RX ADMIN — ATORVASTATIN CALCIUM 80 MILLIGRAM(S): 80 TABLET, FILM COATED ORAL at 20:25

## 2020-11-15 RX ADMIN — Medication 1: at 07:41

## 2020-11-15 RX ADMIN — Medication 4 MILLIGRAM(S): at 08:12

## 2020-11-15 RX ADMIN — METFORMIN HYDROCHLORIDE 1000 MILLIGRAM(S): 850 TABLET ORAL at 08:12

## 2020-11-15 RX ADMIN — METFORMIN HYDROCHLORIDE 1000 MILLIGRAM(S): 850 TABLET ORAL at 20:25

## 2020-11-15 RX ADMIN — Medication 1: at 16:46

## 2020-11-15 RX ADMIN — RISPERIDONE 3 MILLIGRAM(S): 4 TABLET ORAL at 20:25

## 2020-11-15 RX ADMIN — DIVALPROEX SODIUM 1500 MILLIGRAM(S): 500 TABLET, DELAYED RELEASE ORAL at 20:25

## 2020-11-15 RX ADMIN — CLOZAPINE 225 MILLIGRAM(S): 150 TABLET, ORALLY DISINTEGRATING ORAL at 20:25

## 2020-11-15 RX ADMIN — Medication 1: at 11:52

## 2020-11-16 DIAGNOSIS — E78.5 HYPERLIPIDEMIA, UNSPECIFIED: ICD-10-CM

## 2020-11-16 DIAGNOSIS — E11.9 TYPE 2 DIABETES MELLITUS WITHOUT COMPLICATIONS: ICD-10-CM

## 2020-11-16 LAB
GLUCOSE BLDC GLUCOMTR-MCNC: 204 MG/DL — HIGH (ref 70–99)
GLUCOSE BLDC GLUCOMTR-MCNC: 207 MG/DL — HIGH (ref 70–99)
GLUCOSE BLDC GLUCOMTR-MCNC: 227 MG/DL — HIGH (ref 70–99)
GLUCOSE BLDC GLUCOMTR-MCNC: 245 MG/DL — HIGH (ref 70–99)

## 2020-11-16 PROCEDURE — 99223 1ST HOSP IP/OBS HIGH 75: CPT

## 2020-11-16 PROCEDURE — 99232 SBSQ HOSP IP/OBS MODERATE 35: CPT | Mod: GC

## 2020-11-16 RX ORDER — CLOZAPINE 150 MG/1
250 TABLET, ORALLY DISINTEGRATING ORAL AT BEDTIME
Refills: 0 | Status: DISCONTINUED | OUTPATIENT
Start: 2020-11-16 | End: 2020-11-18

## 2020-11-16 RX ADMIN — Medication 2: at 08:12

## 2020-11-16 RX ADMIN — METFORMIN HYDROCHLORIDE 1000 MILLIGRAM(S): 850 TABLET ORAL at 09:04

## 2020-11-16 RX ADMIN — DIVALPROEX SODIUM 1500 MILLIGRAM(S): 500 TABLET, DELAYED RELEASE ORAL at 20:25

## 2020-11-16 RX ADMIN — Medication 4 MILLIGRAM(S): at 09:04

## 2020-11-16 RX ADMIN — CLOZAPINE 250 MILLIGRAM(S): 150 TABLET, ORALLY DISINTEGRATING ORAL at 20:26

## 2020-11-16 RX ADMIN — RISPERIDONE 3 MILLIGRAM(S): 4 TABLET ORAL at 20:26

## 2020-11-16 RX ADMIN — Medication 0: at 20:25

## 2020-11-16 RX ADMIN — METFORMIN HYDROCHLORIDE 1000 MILLIGRAM(S): 850 TABLET ORAL at 20:26

## 2020-11-16 RX ADMIN — Medication 2: at 17:04

## 2020-11-16 RX ADMIN — ATORVASTATIN CALCIUM 80 MILLIGRAM(S): 80 TABLET, FILM COATED ORAL at 20:27

## 2020-11-16 RX ADMIN — Medication 2: at 11:52

## 2020-11-16 NOTE — CONSULT NOTE ADULT - SUBJECTIVE AND OBJECTIVE BOX
This is a 55 yr old F, [facility] schizoaffective disorder with c/ o depression , bizarre behaviour. currently at [facility], being seen for uncontrolled DM2 - pt is poor historian can not provide any details about her DM2, was seen by diabetes educator and felt unable to manage insulin, currently on oral hypoglycemics and Amaryl was added lynnette metformin for uncontrolled FS on 11/6. no hypoglycemic events, compliant with meds     PMHX: DM2, HLD, schizo  Social: NC  FHX: Denies     Allergies: NKDA      ROS:  No HA/DZ  No Vision changes   No CP, SOB  No N/V/D  No Edema  No Rash  NO weakness, numbness    MEDICATIONS  (STANDING):  atorvastatin 80 milliGRAM(s) Oral at bedtime  cloZAPine 225 milliGRAM(s) Oral at bedtime  diVALproex ER 1500 milliGRAM(s) Oral at bedtime  glimepiride. 4 milliGRAM(s) Oral daily  insulin lispro (ADMELOG) corrective regimen sliding scale   SubCutaneous three times a day before meals  insulin lispro (ADMELOG) corrective regimen sliding scale   SubCutaneous at bedtime  metFORMIN 1000 milliGRAM(s) Oral two times a day  risperiDONE   Tablet 3 milliGRAM(s) Oral at bedtime    MEDICATIONS  (PRN):  dextrose 40% Gel 15 Gram(s) Oral once PRN Blood Glucose LESS THAN 70 milliGRAM(s)/deciliter  glucagon  Injectable 1 milliGRAM(s) IntraMuscular once PRN Glucose LESS THAN 70 milligrams/deciliter  haloperidol     Tablet 5 milliGRAM(s) Oral every 6 hours PRN agitation  haloperidol    Injectable 5 milliGRAM(s) IntraMuscular once PRN severe agitation  LORazepam     Tablet 2 milliGRAM(s) Oral every 6 hours PRN Agitation  LORazepam   Injectable 2 milliGRAM(s) IntraMuscular once PRN severe agitation      T(C): 36.4 (11-16-20 @ 08:34)  HR: 96 (11-16-20 @ 08:34)  BP: 139/63 (11-16-20 @ 08:34)  RR: --  SpO2: --12  CAPILLARY BLOOD GLUCOSE      POCT Blood Glucose.: 207 mg/dL (16 Nov 2020 11:24)  POCT Blood Glucose.: 204 mg/dL (16 Nov 2020 07:57)  POCT Blood Glucose.: 279 mg/dL (15 Nov 2020 20:11)  POCT Blood Glucose.: 175 mg/dL (15 Nov 2020 16:34)    I&O's Summary      PHYSICAL EXAM:  GENERAL: NAD, well-developed  HEAD:  Atraumatic, Normocephalic  EYES: EOMI, PERRL, conjunctiva and sclera clear  NECK: Supple, No JVD  CHEST/LUNG: Clear to auscultation bilaterally  HEART: Regular rate and rhythm; No murmurs, rubs, or gallops, No Edema  ABDOMEN: Soft, Nontender, Nondistended; Bowel sounds present  EXTREMITIES:  2+ Peripheral Pulses, No clubbing, cyanosis  PSYCH: Disorganized   NEUROLOGY: non-focal  SKIN: No rashes or lesions    LABS:                        RADIOLOGY & ADDITIONAL TESTS:    Imaging Personally Reviewed:    Consultant(s) Notes Reviewed:      Care Discussed with Consultants/Other Providers:

## 2020-11-16 NOTE — CONSULT NOTE ADULT - ASSESSMENT
This is a 55 yr old F, [facility] schizoaffective disorder with c/ o depression , bizarre behaviour. currently at [facility], being seen for uncontrolled DM2 - pt is poor historian can not provide any details about her DM2, was seen by diabetes educator and felt unable to manage insulin, currently on oral hypoglycemics and Amaryl was added lynnette metformin for uncontrolled FS on 11/6. no hypoglycemic events, compliant with meds

## 2020-11-16 NOTE — CONSULT NOTE ADULT - PROBLEM SELECTOR RECOMMENDATION 9
FS improved on Amaryl  on metformin  sporadic FS in 200s. if indicated will add Januvia  cw sliding scale and diabetic diet Clear

## 2020-11-17 LAB
GLUCOSE BLDC GLUCOMTR-MCNC: 153 MG/DL — HIGH (ref 70–99)
GLUCOSE BLDC GLUCOMTR-MCNC: 190 MG/DL — HIGH (ref 70–99)
GLUCOSE BLDC GLUCOMTR-MCNC: 255 MG/DL — HIGH (ref 70–99)
GLUCOSE BLDC GLUCOMTR-MCNC: 81 MG/DL — SIGNIFICANT CHANGE UP (ref 70–99)

## 2020-11-17 PROCEDURE — 99233 SBSQ HOSP IP/OBS HIGH 50: CPT

## 2020-11-17 PROCEDURE — 99232 SBSQ HOSP IP/OBS MODERATE 35: CPT | Mod: GC

## 2020-11-17 RX ADMIN — CLOZAPINE 250 MILLIGRAM(S): 150 TABLET, ORALLY DISINTEGRATING ORAL at 20:28

## 2020-11-17 RX ADMIN — Medication 1: at 12:11

## 2020-11-17 RX ADMIN — ATORVASTATIN CALCIUM 80 MILLIGRAM(S): 80 TABLET, FILM COATED ORAL at 20:28

## 2020-11-17 RX ADMIN — Medication 4 MILLIGRAM(S): at 09:10

## 2020-11-17 RX ADMIN — METFORMIN HYDROCHLORIDE 1000 MILLIGRAM(S): 850 TABLET ORAL at 20:28

## 2020-11-17 RX ADMIN — RISPERIDONE 3 MILLIGRAM(S): 4 TABLET ORAL at 20:28

## 2020-11-17 RX ADMIN — Medication 1: at 09:03

## 2020-11-17 RX ADMIN — Medication 1: at 20:40

## 2020-11-17 RX ADMIN — DIVALPROEX SODIUM 1500 MILLIGRAM(S): 500 TABLET, DELAYED RELEASE ORAL at 20:28

## 2020-11-17 RX ADMIN — METFORMIN HYDROCHLORIDE 1000 MILLIGRAM(S): 850 TABLET ORAL at 09:10

## 2020-11-17 NOTE — PROGRESS NOTE ADULT - SUBJECTIVE AND OBJECTIVE BOX
Patient is a 55y old  Female who presents with a chief complaint of DM2     SUBJECTIVE / OVERNIGHT EVENTS: no events     ROS:  No HA/DZ  No Vision changes   No CP, SOB  No N/V/D  No Edema  No Rash  NO weakness, numbness    MEDICATIONS  (STANDING):  atorvastatin 80 milliGRAM(s) Oral at bedtime  cloZAPine 250 milliGRAM(s) Oral at bedtime  diVALproex ER 1500 milliGRAM(s) Oral at bedtime  glimepiride. 4 milliGRAM(s) Oral daily  insulin lispro (ADMELOG) corrective regimen sliding scale   SubCutaneous at bedtime  insulin lispro (ADMELOG) corrective regimen sliding scale   SubCutaneous three times a day before meals  metFORMIN 1000 milliGRAM(s) Oral two times a day  risperiDONE   Tablet 3 milliGRAM(s) Oral at bedtime    MEDICATIONS  (PRN):  dextrose 40% Gel 15 Gram(s) Oral once PRN Blood Glucose LESS THAN 70 milliGRAM(s)/deciliter  glucagon  Injectable 1 milliGRAM(s) IntraMuscular once PRN Glucose LESS THAN 70 milligrams/deciliter  haloperidol     Tablet 5 milliGRAM(s) Oral every 6 hours PRN agitation  haloperidol    Injectable 5 milliGRAM(s) IntraMuscular once PRN severe agitation  LORazepam     Tablet 2 milliGRAM(s) Oral every 6 hours PRN Agitation  LORazepam   Injectable 2 milliGRAM(s) IntraMuscular once PRN severe agitation      T(C): 37.2 (11-16-20 @ 19:02)  HR: 118 (11-16-20 @ 19:02)  BP: 137/87 (11-16-20 @ 19:02)  RR: --12  SpO2: --  CAPILLARY BLOOD GLUCOSE      POCT Blood Glucose.: 153 mg/dL (17 Nov 2020 08:25)  POCT Blood Glucose.: 245 mg/dL (16 Nov 2020 20:17)  POCT Blood Glucose.: 227 mg/dL (16 Nov 2020 16:31)  POCT Blood Glucose.: 207 mg/dL (16 Nov 2020 11:24)    I&O's Summary      PHYSICAL EXAM:  GENERAL: NAD, well-developed  HEAD:  Atraumatic, Normocephalic  EYES: EOMI, PERRL, conjunctiva and sclera clear  NECK: Supple, No JVD  CHEST/LUNG: Clear to auscultation bilaterally  HEART: Regular rate and rhythm; No murmurs, rubs, or gallops, No Edema  ABDOMEN: Soft, Nontender, Nondistended; Bowel sounds present  EXTREMITIES:  2+ Peripheral Pulses, No clubbing, cyanosis  PSYCH: No SI/HI  NEUROLOGY: non-focal  SKIN: No rashes or lesions    LABS:                        RADIOLOGY & ADDITIONAL TESTS:    Imaging Personally Reviewed:    Consultant(s) Notes Reviewed:      Care Discussed with Consultants/Other Providers:

## 2020-11-17 NOTE — PROGRESS NOTE ADULT - PROBLEM SELECTOR PLAN 1
reading in mid 200s noted  will add Januvia 50 mg daily  cw metformin and Amaryl - monitor FS - diet control

## 2020-11-17 NOTE — PROGRESS NOTE ADULT - ASSESSMENT
This is a 55 yr old F, [facility] schizoaffective disorder with c/ o depression , bizarre behaviour. currently at [facility], being seen for uncontrolled DM2 - pt is poor historian can not provide any details about her DM2, was seen by diabetes educator and felt unable to manage insulin, currently on oral hypoglycemics and Amaryl was added lynnette metformin for uncontrolled FS on 11/6. no hypoglycemic events, compliant with meds

## 2020-11-18 LAB
ALBUMIN SERPL ELPH-MCNC: 3.7 G/DL — SIGNIFICANT CHANGE UP (ref 3.3–5)
ALP SERPL-CCNC: 62 U/L — SIGNIFICANT CHANGE UP (ref 40–120)
ALT FLD-CCNC: 10 U/L — SIGNIFICANT CHANGE UP (ref 4–33)
AMMONIA BLD-MCNC: 32 UMOL/L — SIGNIFICANT CHANGE UP (ref 11–55)
ANION GAP SERPL CALC-SCNC: 15 MMO/L — HIGH (ref 7–14)
AST SERPL-CCNC: 25 U/L — SIGNIFICANT CHANGE UP (ref 4–32)
B PERT DNA SPEC QL NAA+PROBE: SIGNIFICANT CHANGE UP
BASOPHILS # BLD AUTO: 0.02 K/UL — SIGNIFICANT CHANGE UP (ref 0–0.2)
BASOPHILS NFR BLD AUTO: 0.2 % — SIGNIFICANT CHANGE UP (ref 0–2)
BILIRUB SERPL-MCNC: 0.6 MG/DL — SIGNIFICANT CHANGE UP (ref 0.2–1.2)
BUN SERPL-MCNC: 8 MG/DL — SIGNIFICANT CHANGE UP (ref 7–23)
C PNEUM DNA SPEC QL NAA+PROBE: SIGNIFICANT CHANGE UP
CALCIUM SERPL-MCNC: 10 MG/DL — SIGNIFICANT CHANGE UP (ref 8.4–10.5)
CHLORIDE SERPL-SCNC: 96 MMOL/L — LOW (ref 98–107)
CK MB BLD-MCNC: 0.6 — SIGNIFICANT CHANGE UP (ref 0–2.5)
CK MB BLD-MCNC: 1.43 NG/ML — SIGNIFICANT CHANGE UP (ref 1–4.7)
CK SERPL-CCNC: 231 U/L — HIGH (ref 25–170)
CO2 SERPL-SCNC: 26 MMOL/L — SIGNIFICANT CHANGE UP (ref 22–31)
CREAT SERPL-MCNC: 0.63 MG/DL — SIGNIFICANT CHANGE UP (ref 0.5–1.3)
CRP SERPL-MCNC: 194.5 MG/L — HIGH
EOSINOPHIL # BLD AUTO: 0 K/UL — SIGNIFICANT CHANGE UP (ref 0–0.5)
EOSINOPHIL NFR BLD AUTO: 0 % — SIGNIFICANT CHANGE UP (ref 0–6)
FLUAV H1 2009 PAND RNA SPEC QL NAA+PROBE: SIGNIFICANT CHANGE UP
FLUAV H1 RNA SPEC QL NAA+PROBE: SIGNIFICANT CHANGE UP
FLUAV H3 RNA SPEC QL NAA+PROBE: SIGNIFICANT CHANGE UP
FLUAV SUBTYP SPEC NAA+PROBE: SIGNIFICANT CHANGE UP
FLUBV RNA SPEC QL NAA+PROBE: SIGNIFICANT CHANGE UP
GLUCOSE BLDC GLUCOMTR-MCNC: 146 MG/DL — HIGH (ref 70–99)
GLUCOSE BLDC GLUCOMTR-MCNC: 193 MG/DL — HIGH (ref 70–99)
GLUCOSE BLDC GLUCOMTR-MCNC: 196 MG/DL — HIGH (ref 70–99)
GLUCOSE BLDC GLUCOMTR-MCNC: 240 MG/DL — HIGH (ref 70–99)
GLUCOSE SERPL-MCNC: 180 MG/DL — HIGH (ref 70–99)
HADV DNA SPEC QL NAA+PROBE: SIGNIFICANT CHANGE UP
HCOV PNL SPEC NAA+PROBE: SIGNIFICANT CHANGE UP
HCT VFR BLD CALC: 36.7 % — SIGNIFICANT CHANGE UP (ref 34.5–45)
HGB BLD-MCNC: 11.6 G/DL — SIGNIFICANT CHANGE UP (ref 11.5–15.5)
HMPV RNA SPEC QL NAA+PROBE: SIGNIFICANT CHANGE UP
HPIV1 RNA SPEC QL NAA+PROBE: SIGNIFICANT CHANGE UP
HPIV2 RNA SPEC QL NAA+PROBE: SIGNIFICANT CHANGE UP
HPIV3 RNA SPEC QL NAA+PROBE: SIGNIFICANT CHANGE UP
HPIV4 RNA SPEC QL NAA+PROBE: SIGNIFICANT CHANGE UP
IMM GRANULOCYTES NFR BLD AUTO: 1 % — SIGNIFICANT CHANGE UP (ref 0–1.5)
LYMPHOCYTES # BLD AUTO: 0.99 K/UL — LOW (ref 1–3.3)
LYMPHOCYTES # BLD AUTO: 9.3 % — LOW (ref 13–44)
MCHC RBC-ENTMCNC: 26.8 PG — LOW (ref 27–34)
MCHC RBC-ENTMCNC: 31.6 % — LOW (ref 32–36)
MCV RBC AUTO: 84.8 FL — SIGNIFICANT CHANGE UP (ref 80–100)
MONOCYTES # BLD AUTO: 1.2 K/UL — HIGH (ref 0–0.9)
MONOCYTES NFR BLD AUTO: 11.3 % — SIGNIFICANT CHANGE UP (ref 2–14)
NEUTROPHILS # BLD AUTO: 8.29 K/UL — HIGH (ref 1.8–7.4)
NEUTROPHILS NFR BLD AUTO: 78.2 % — HIGH (ref 43–77)
NRBC # FLD: 0 K/UL — SIGNIFICANT CHANGE UP (ref 0–0)
PLATELET # BLD AUTO: 328 K/UL — SIGNIFICANT CHANGE UP (ref 150–400)
PMV BLD: 8.9 FL — SIGNIFICANT CHANGE UP (ref 7–13)
POTASSIUM SERPL-MCNC: 4.4 MMOL/L — SIGNIFICANT CHANGE UP (ref 3.5–5.3)
POTASSIUM SERPL-SCNC: 4.4 MMOL/L — SIGNIFICANT CHANGE UP (ref 3.5–5.3)
PROT SERPL-MCNC: 7.2 G/DL — SIGNIFICANT CHANGE UP (ref 6–8.3)
RAPID RVP RESULT: SIGNIFICANT CHANGE UP
RBC # BLD: 4.33 M/UL — SIGNIFICANT CHANGE UP (ref 3.8–5.2)
RBC # FLD: 13.7 % — SIGNIFICANT CHANGE UP (ref 10.3–14.5)
RSV RNA SPEC QL NAA+PROBE: SIGNIFICANT CHANGE UP
RV+EV RNA SPEC QL NAA+PROBE: SIGNIFICANT CHANGE UP
SARS-COV-2 RNA SPEC QL NAA+PROBE: SIGNIFICANT CHANGE UP
SODIUM SERPL-SCNC: 137 MMOL/L — SIGNIFICANT CHANGE UP (ref 135–145)
TROPONIN T, HIGH SENSITIVITY: 17 NG/L — SIGNIFICANT CHANGE UP (ref ?–14)
WBC # BLD: 10.61 K/UL — HIGH (ref 3.8–10.5)
WBC # FLD AUTO: 10.61 K/UL — HIGH (ref 3.8–10.5)

## 2020-11-18 PROCEDURE — 93010 ELECTROCARDIOGRAM REPORT: CPT

## 2020-11-18 RX ORDER — ACETAMINOPHEN 500 MG
650 TABLET ORAL EVERY 6 HOURS
Refills: 0 | Status: DISCONTINUED | OUTPATIENT
Start: 2020-11-18 | End: 2020-11-19

## 2020-11-18 RX ADMIN — DIVALPROEX SODIUM 1500 MILLIGRAM(S): 500 TABLET, DELAYED RELEASE ORAL at 20:25

## 2020-11-18 RX ADMIN — METFORMIN HYDROCHLORIDE 1000 MILLIGRAM(S): 850 TABLET ORAL at 20:25

## 2020-11-18 RX ADMIN — Medication 650 MILLIGRAM(S): at 17:58

## 2020-11-18 RX ADMIN — ATORVASTATIN CALCIUM 80 MILLIGRAM(S): 80 TABLET, FILM COATED ORAL at 20:25

## 2020-11-18 RX ADMIN — Medication 650 MILLIGRAM(S): at 08:26

## 2020-11-18 RX ADMIN — Medication 1: at 08:00

## 2020-11-18 RX ADMIN — METFORMIN HYDROCHLORIDE 1000 MILLIGRAM(S): 850 TABLET ORAL at 08:03

## 2020-11-18 RX ADMIN — RISPERIDONE 3 MILLIGRAM(S): 4 TABLET ORAL at 20:25

## 2020-11-18 RX ADMIN — Medication 1: at 11:59

## 2020-11-18 RX ADMIN — Medication 4 MILLIGRAM(S): at 08:03

## 2020-11-18 NOTE — CHART NOTE - NSCHARTNOTEFT_GEN_A_CORE
Called by primary team around 4PM as pt seemingly more lethargic past 2 days. Upon assessment, pt rigoring, says she has chills and stuffy nose. Otherwise denies cp/sob, palpitations, urinary or GI symptoms. Oral temp 103.1.   Exam: NAD, non-toxic appearing female, clear lungs, tachycardic, regular rhythm, extremities warm, well perfused.   Labs from earlier today with WBC 10.9, AG 15, HsT 17, ,   Recommended fever workup with repeat COVID-19 PCR and RVP, UA, CXR when possible. Tylenol STAT.   IF pt hypotensive, tachycardic, poor PO intake, would send to ER for further eval. Called by primary team around 4PM as pt seemingly more lethargic past 2 days. Upon assessment, pt rigoring, says she has chills and stuffy nose. Otherwise denies cp/sob, palpitations, urinary or GI symptoms. Oral temp 103.1.   Exam: NAD, non-toxic appearing female, clear lungs, tachycardic, regular rhythm, extremities warm, well perfused.   Labs from earlier today with WBC 10.9, AG 15, HsT 17, .   Unclear source of infection, staff reports pts  did visit a few days ago.   Recommended fever workup with repeat COVID-19 PCR and RVP, UA, CXR when possible. Tylenol STAT.   Low threshold to transfer to ED IF pt hypotensive, tachycardic, poor PO intake.     Discussed with Dr. Bui.

## 2020-11-19 ENCOUNTER — INPATIENT (INPATIENT)
Facility: HOSPITAL | Age: 55
LOS: 7 days | Discharge: PSYCHIATRIC OR SUBSTANCE USE FACILITY | End: 2020-11-27
Attending: INTERNAL MEDICINE | Admitting: INTERNAL MEDICINE
Payer: MEDICARE

## 2020-11-19 VITALS
RESPIRATION RATE: 16 BRPM | HEART RATE: 118 BPM | OXYGEN SATURATION: 95 % | DIASTOLIC BLOOD PRESSURE: 77 MMHG | TEMPERATURE: 103 F | SYSTOLIC BLOOD PRESSURE: 120 MMHG

## 2020-11-19 VITALS
SYSTOLIC BLOOD PRESSURE: 139 MMHG | TEMPERATURE: 103 F | HEIGHT: 65 IN | DIASTOLIC BLOOD PRESSURE: 76 MMHG | HEART RATE: 111 BPM | RESPIRATION RATE: 16 BRPM | OXYGEN SATURATION: 96 %

## 2020-11-19 DIAGNOSIS — F29 UNSPECIFIED PSYCHOSIS NOT DUE TO A SUBSTANCE OR KNOWN PHYSIOLOGICAL CONDITION: ICD-10-CM

## 2020-11-19 DIAGNOSIS — E11.9 TYPE 2 DIABETES MELLITUS WITHOUT COMPLICATIONS: ICD-10-CM

## 2020-11-19 DIAGNOSIS — E78.5 HYPERLIPIDEMIA, UNSPECIFIED: ICD-10-CM

## 2020-11-19 DIAGNOSIS — N12 TUBULO-INTERSTITIAL NEPHRITIS, NOT SPECIFIED AS ACUTE OR CHRONIC: ICD-10-CM

## 2020-11-19 DIAGNOSIS — F25.9 SCHIZOAFFECTIVE DISORDER, UNSPECIFIED: ICD-10-CM

## 2020-11-19 DIAGNOSIS — Z02.9 ENCOUNTER FOR ADMINISTRATIVE EXAMINATIONS, UNSPECIFIED: ICD-10-CM

## 2020-11-19 DIAGNOSIS — R74.8 ABNORMAL LEVELS OF OTHER SERUM ENZYMES: ICD-10-CM

## 2020-11-19 DIAGNOSIS — A41.9 SEPSIS, UNSPECIFIED ORGANISM: ICD-10-CM

## 2020-11-19 DIAGNOSIS — R50.9 FEVER, UNSPECIFIED: ICD-10-CM

## 2020-11-19 DIAGNOSIS — R41.0 DISORIENTATION, UNSPECIFIED: ICD-10-CM

## 2020-11-19 DIAGNOSIS — Z29.9 ENCOUNTER FOR PROPHYLACTIC MEASURES, UNSPECIFIED: ICD-10-CM

## 2020-11-19 LAB
ALBUMIN SERPL ELPH-MCNC: 3.6 G/DL — SIGNIFICANT CHANGE UP (ref 3.3–5)
ALP SERPL-CCNC: 63 U/L — SIGNIFICANT CHANGE UP (ref 40–120)
ALT FLD-CCNC: 12 U/L — SIGNIFICANT CHANGE UP (ref 4–33)
ANION GAP SERPL CALC-SCNC: 17 MMO/L — HIGH (ref 7–14)
ANISOCYTOSIS BLD QL: SLIGHT — SIGNIFICANT CHANGE UP
APPEARANCE UR: SIGNIFICANT CHANGE UP
AST SERPL-CCNC: 31 U/L — SIGNIFICANT CHANGE UP (ref 4–32)
B PERT DNA SPEC QL NAA+PROBE: SIGNIFICANT CHANGE UP
BACTERIA # UR AUTO: HIGH
BASE EXCESS BLDV CALC-SCNC: 0.5 MMOL/L — SIGNIFICANT CHANGE UP
BASE EXCESS BLDV CALC-SCNC: 4.5 MMOL/L — SIGNIFICANT CHANGE UP
BASOPHILS # BLD AUTO: 0.02 K/UL — SIGNIFICANT CHANGE UP (ref 0–0.2)
BASOPHILS NFR BLD AUTO: 0.2 % — SIGNIFICANT CHANGE UP (ref 0–2)
BASOPHILS NFR SPEC: 0 % — SIGNIFICANT CHANGE UP (ref 0–2)
BILIRUB SERPL-MCNC: 0.5 MG/DL — SIGNIFICANT CHANGE UP (ref 0.2–1.2)
BILIRUB UR-MCNC: NEGATIVE — SIGNIFICANT CHANGE UP
BLOOD GAS VENOUS - CREATININE: 0.6 MG/DL — SIGNIFICANT CHANGE UP (ref 0.5–1.3)
BLOOD GAS VENOUS - CREATININE: 0.71 MG/DL — SIGNIFICANT CHANGE UP (ref 0.5–1.3)
BLOOD GAS VENOUS - FIO2: 21 — SIGNIFICANT CHANGE UP
BLOOD UR QL VISUAL: HIGH
BUN SERPL-MCNC: 12 MG/DL — SIGNIFICANT CHANGE UP (ref 7–23)
C PNEUM DNA SPEC QL NAA+PROBE: SIGNIFICANT CHANGE UP
CALCIUM SERPL-MCNC: 10 MG/DL — SIGNIFICANT CHANGE UP (ref 8.4–10.5)
CHLORIDE BLDV-SCNC: 102 MMOL/L — SIGNIFICANT CHANGE UP (ref 96–108)
CHLORIDE BLDV-SCNC: 108 MMOL/L — SIGNIFICANT CHANGE UP (ref 96–108)
CHLORIDE SERPL-SCNC: 94 MMOL/L — LOW (ref 98–107)
CK SERPL-CCNC: 924 U/L — HIGH (ref 25–170)
CO2 SERPL-SCNC: 25 MMOL/L — SIGNIFICANT CHANGE UP (ref 22–31)
COLOR SPEC: YELLOW — SIGNIFICANT CHANGE UP
CREAT SERPL-MCNC: 0.7 MG/DL — SIGNIFICANT CHANGE UP (ref 0.5–1.3)
E COLI DNA BLD POS QL NAA+NON-PROBE: SIGNIFICANT CHANGE UP
EOSINOPHIL # BLD AUTO: 0 K/UL — SIGNIFICANT CHANGE UP (ref 0–0.5)
EOSINOPHIL NFR BLD AUTO: 0 % — SIGNIFICANT CHANGE UP (ref 0–6)
EOSINOPHIL NFR FLD: 0 % — SIGNIFICANT CHANGE UP (ref 0–6)
FLUAV H1 2009 PAND RNA SPEC QL NAA+PROBE: SIGNIFICANT CHANGE UP
FLUAV H1 RNA SPEC QL NAA+PROBE: SIGNIFICANT CHANGE UP
FLUAV H3 RNA SPEC QL NAA+PROBE: SIGNIFICANT CHANGE UP
FLUAV SUBTYP SPEC NAA+PROBE: SIGNIFICANT CHANGE UP
FLUBV RNA SPEC QL NAA+PROBE: SIGNIFICANT CHANGE UP
GAS PNL BLDV: 133 MMOL/L — LOW (ref 136–146)
GAS PNL BLDV: 133 MMOL/L — LOW (ref 136–146)
GLUCOSE BLDC GLUCOMTR-MCNC: 148 MG/DL — HIGH (ref 70–99)
GLUCOSE BLDC GLUCOMTR-MCNC: 167 MG/DL — HIGH (ref 70–99)
GLUCOSE BLDC GLUCOMTR-MCNC: 171 MG/DL — HIGH (ref 70–99)
GLUCOSE BLDC GLUCOMTR-MCNC: 199 MG/DL — HIGH (ref 70–99)
GLUCOSE BLDV-MCNC: 177 MG/DL — HIGH (ref 70–99)
GLUCOSE BLDV-MCNC: 186 MG/DL — HIGH (ref 70–99)
GLUCOSE SERPL-MCNC: 179 MG/DL — HIGH (ref 70–99)
GLUCOSE UR-MCNC: NEGATIVE — SIGNIFICANT CHANGE UP
GRAM STN FLD: SIGNIFICANT CHANGE UP
HADV DNA SPEC QL NAA+PROBE: SIGNIFICANT CHANGE UP
HCO3 BLDV-SCNC: 25 MMOL/L — SIGNIFICANT CHANGE UP (ref 20–27)
HCO3 BLDV-SCNC: 28 MMOL/L — HIGH (ref 20–27)
HCOV PNL SPEC NAA+PROBE: SIGNIFICANT CHANGE UP
HCT VFR BLD CALC: 33 % — LOW (ref 34.5–45)
HCT VFR BLDV CALC: 26.9 % — LOW (ref 34.5–45)
HCT VFR BLDV CALC: 33.8 % — LOW (ref 34.5–45)
HGB BLD-MCNC: 10.2 G/DL — LOW (ref 11.5–15.5)
HGB BLDV-MCNC: 11 G/DL — LOW (ref 11.5–15.5)
HGB BLDV-MCNC: 8.7 G/DL — LOW (ref 11.5–15.5)
HMPV RNA SPEC QL NAA+PROBE: SIGNIFICANT CHANGE UP
HPIV1 RNA SPEC QL NAA+PROBE: SIGNIFICANT CHANGE UP
HPIV2 RNA SPEC QL NAA+PROBE: SIGNIFICANT CHANGE UP
HPIV3 RNA SPEC QL NAA+PROBE: SIGNIFICANT CHANGE UP
HPIV4 RNA SPEC QL NAA+PROBE: SIGNIFICANT CHANGE UP
HYALINE CASTS # UR AUTO: NEGATIVE — SIGNIFICANT CHANGE UP
IMM GRANULOCYTES NFR BLD AUTO: 1.1 % — SIGNIFICANT CHANGE UP (ref 0–1.5)
KETONES UR-MCNC: SIGNIFICANT CHANGE UP
LACTATE BLDV-MCNC: 2.7 MMOL/L — HIGH (ref 0.5–2)
LACTATE BLDV-MCNC: 3.5 MMOL/L — HIGH (ref 0.5–2)
LEUKOCYTE ESTERASE UR-ACNC: SIGNIFICANT CHANGE UP
LYMPHOCYTES # BLD AUTO: 0.65 K/UL — LOW (ref 1–3.3)
LYMPHOCYTES # BLD AUTO: 5 % — LOW (ref 13–44)
LYMPHOCYTES NFR SPEC AUTO: 3.6 % — LOW (ref 13–44)
MCHC RBC-ENTMCNC: 26.4 PG — LOW (ref 27–34)
MCHC RBC-ENTMCNC: 30.9 % — LOW (ref 32–36)
MCV RBC AUTO: 85.5 FL — SIGNIFICANT CHANGE UP (ref 80–100)
METHOD TYPE: SIGNIFICANT CHANGE UP
MICROCYTES BLD QL: SLIGHT — SIGNIFICANT CHANGE UP
MONOCYTES # BLD AUTO: 1.69 K/UL — HIGH (ref 0–0.9)
MONOCYTES NFR BLD AUTO: 13 % — SIGNIFICANT CHANGE UP (ref 2–14)
MONOCYTES NFR BLD: 13.4 % — HIGH (ref 2–9)
NEUTROPHIL AB SER-ACNC: 75 % — SIGNIFICANT CHANGE UP (ref 43–77)
NEUTROPHILS # BLD AUTO: 10.54 K/UL — HIGH (ref 1.8–7.4)
NEUTROPHILS NFR BLD AUTO: 80.7 % — HIGH (ref 43–77)
NEUTS BAND # BLD: 6.2 % — HIGH (ref 0–6)
NITRITE UR-MCNC: POSITIVE — HIGH
NRBC # FLD: 0 K/UL — SIGNIFICANT CHANGE UP (ref 0–0)
OVALOCYTES BLD QL SMEAR: SLIGHT — SIGNIFICANT CHANGE UP
PCO2 BLDV: 33 MMHG — LOW (ref 41–51)
PCO2 BLDV: 40 MMHG — LOW (ref 41–51)
PH BLDV: 7.46 PH — HIGH (ref 7.32–7.43)
PH BLDV: 7.48 PH — HIGH (ref 7.32–7.43)
PH UR: 6 — SIGNIFICANT CHANGE UP (ref 5–8)
PLATELET # BLD AUTO: 270 K/UL — SIGNIFICANT CHANGE UP (ref 150–400)
PLATELET COUNT - ESTIMATE: NORMAL — SIGNIFICANT CHANGE UP
PMV BLD: 8.9 FL — SIGNIFICANT CHANGE UP (ref 7–13)
PO2 BLDV: 136 MMHG — HIGH (ref 35–40)
PO2 BLDV: 47 MMHG — HIGH (ref 35–40)
POIKILOCYTOSIS BLD QL AUTO: SLIGHT — SIGNIFICANT CHANGE UP
POTASSIUM BLDV-SCNC: 3 MMOL/L — LOW (ref 3.4–4.5)
POTASSIUM BLDV-SCNC: 4.2 MMOL/L — SIGNIFICANT CHANGE UP (ref 3.4–4.5)
POTASSIUM SERPL-MCNC: 3.9 MMOL/L — SIGNIFICANT CHANGE UP (ref 3.5–5.3)
POTASSIUM SERPL-SCNC: 3.9 MMOL/L — SIGNIFICANT CHANGE UP (ref 3.5–5.3)
PROT SERPL-MCNC: 7.2 G/DL — SIGNIFICANT CHANGE UP (ref 6–8.3)
PROT UR-MCNC: 300 — HIGH
RAPID RVP RESULT: SIGNIFICANT CHANGE UP
RBC # BLD: 3.86 M/UL — SIGNIFICANT CHANGE UP (ref 3.8–5.2)
RBC # FLD: 14 % — SIGNIFICANT CHANGE UP (ref 10.3–14.5)
RBC CASTS # UR COMP ASSIST: HIGH (ref 0–?)
RSV RNA SPEC QL NAA+PROBE: SIGNIFICANT CHANGE UP
RV+EV RNA SPEC QL NAA+PROBE: SIGNIFICANT CHANGE UP
SAO2 % BLDV: 81.2 % — SIGNIFICANT CHANGE UP (ref 60–85)
SAO2 % BLDV: 99 % — HIGH (ref 60–85)
SARS-COV-2 RNA SPEC QL NAA+PROBE: SIGNIFICANT CHANGE UP
SARS-COV-2 RNA SPEC QL NAA+PROBE: SIGNIFICANT CHANGE UP
SODIUM SERPL-SCNC: 136 MMOL/L — SIGNIFICANT CHANGE UP (ref 135–145)
SP GR SPEC: 1.03 — SIGNIFICANT CHANGE UP (ref 1–1.04)
SPECIMEN SOURCE: SIGNIFICANT CHANGE UP
SQUAMOUS # UR AUTO: SIGNIFICANT CHANGE UP
UROBILINOGEN FLD QL: NORMAL — SIGNIFICANT CHANGE UP
VALPROATE SERPL-MCNC: 93.5 UG/ML — SIGNIFICANT CHANGE UP (ref 50–100)
VARIANT LYMPHS # BLD: 1.8 % — SIGNIFICANT CHANGE UP
WBC # BLD: 13.04 K/UL — HIGH (ref 3.8–10.5)
WBC # FLD AUTO: 13.04 K/UL — HIGH (ref 3.8–10.5)
WBC UR QL: >50 — HIGH (ref 0–?)

## 2020-11-19 PROCEDURE — 93306 TTE W/DOPPLER COMPLETE: CPT | Mod: 26

## 2020-11-19 PROCEDURE — 99285 EMERGENCY DEPT VISIT HI MDM: CPT

## 2020-11-19 PROCEDURE — 71045 X-RAY EXAM CHEST 1 VIEW: CPT | Mod: 26

## 2020-11-19 PROCEDURE — 99223 1ST HOSP IP/OBS HIGH 75: CPT | Mod: AI

## 2020-11-19 PROCEDURE — 90792 PSYCH DIAG EVAL W/MED SRVCS: CPT

## 2020-11-19 PROCEDURE — 71250 CT THORAX DX C-: CPT | Mod: 26

## 2020-11-19 RX ORDER — RISPERIDONE 4 MG/1
2 TABLET ORAL AT BEDTIME
Refills: 0 | Status: DISCONTINUED | OUTPATIENT
Start: 2020-11-19 | End: 2020-11-20

## 2020-11-19 RX ORDER — CLOZAPINE 150 MG/1
250 TABLET, ORALLY DISINTEGRATING ORAL AT BEDTIME
Refills: 0 | Status: DISCONTINUED | OUTPATIENT
Start: 2020-11-19 | End: 2020-11-19

## 2020-11-19 RX ORDER — DIVALPROEX SODIUM 500 MG/1
1000 TABLET, DELAYED RELEASE ORAL AT BEDTIME
Refills: 0 | Status: DISCONTINUED | OUTPATIENT
Start: 2020-11-19 | End: 2020-11-21

## 2020-11-19 RX ORDER — SODIUM CHLORIDE 9 MG/ML
1000 INJECTION, SOLUTION INTRAVENOUS
Refills: 0 | Status: DISCONTINUED | OUTPATIENT
Start: 2020-11-19 | End: 2020-11-27

## 2020-11-19 RX ORDER — INSULIN LISPRO 100/ML
VIAL (ML) SUBCUTANEOUS
Refills: 0 | Status: DISCONTINUED | OUTPATIENT
Start: 2020-11-19 | End: 2020-11-27

## 2020-11-19 RX ORDER — DEXTROSE 50 % IN WATER 50 %
12.5 SYRINGE (ML) INTRAVENOUS ONCE
Refills: 0 | Status: DISCONTINUED | OUTPATIENT
Start: 2020-11-19 | End: 2020-11-27

## 2020-11-19 RX ORDER — ENOXAPARIN SODIUM 100 MG/ML
40 INJECTION SUBCUTANEOUS DAILY
Refills: 0 | Status: DISCONTINUED | OUTPATIENT
Start: 2020-11-19 | End: 2020-11-27

## 2020-11-19 RX ORDER — CEFTRIAXONE 500 MG/1
1000 INJECTION, POWDER, FOR SOLUTION INTRAMUSCULAR; INTRAVENOUS EVERY 24 HOURS
Refills: 0 | Status: DISCONTINUED | OUTPATIENT
Start: 2020-11-19 | End: 2020-11-20

## 2020-11-19 RX ORDER — INSULIN LISPRO 100/ML
VIAL (ML) SUBCUTANEOUS AT BEDTIME
Refills: 0 | Status: DISCONTINUED | OUTPATIENT
Start: 2020-11-19 | End: 2020-11-27

## 2020-11-19 RX ORDER — DEXTROSE 50 % IN WATER 50 %
25 SYRINGE (ML) INTRAVENOUS ONCE
Refills: 0 | Status: DISCONTINUED | OUTPATIENT
Start: 2020-11-19 | End: 2020-11-27

## 2020-11-19 RX ORDER — ACETAMINOPHEN 500 MG
1000 TABLET ORAL EVERY 8 HOURS
Refills: 0 | Status: COMPLETED | OUTPATIENT
Start: 2020-11-19 | End: 2020-11-19

## 2020-11-19 RX ORDER — PIPERACILLIN AND TAZOBACTAM 4; .5 G/20ML; G/20ML
3.38 INJECTION, POWDER, LYOPHILIZED, FOR SOLUTION INTRAVENOUS ONCE
Refills: 0 | Status: COMPLETED | OUTPATIENT
Start: 2020-11-19 | End: 2020-11-19

## 2020-11-19 RX ORDER — SODIUM CHLORIDE 9 MG/ML
1000 INJECTION INTRAMUSCULAR; INTRAVENOUS; SUBCUTANEOUS ONCE
Refills: 0 | Status: COMPLETED | OUTPATIENT
Start: 2020-11-19 | End: 2020-11-19

## 2020-11-19 RX ORDER — ACETAMINOPHEN 500 MG
650 TABLET ORAL EVERY 6 HOURS
Refills: 0 | Status: DISCONTINUED | OUTPATIENT
Start: 2020-11-19 | End: 2020-11-27

## 2020-11-19 RX ORDER — ATORVASTATIN CALCIUM 80 MG/1
80 TABLET, FILM COATED ORAL AT BEDTIME
Refills: 0 | Status: DISCONTINUED | OUTPATIENT
Start: 2020-11-19 | End: 2020-11-21

## 2020-11-19 RX ORDER — BENZTROPINE MESYLATE 1 MG
1 TABLET ORAL AT BEDTIME
Refills: 0 | Status: DISCONTINUED | OUTPATIENT
Start: 2020-11-19 | End: 2020-11-19

## 2020-11-19 RX ORDER — ACETAMINOPHEN 500 MG
650 TABLET ORAL ONCE
Refills: 0 | Status: COMPLETED | OUTPATIENT
Start: 2020-11-19 | End: 2020-11-19

## 2020-11-19 RX ORDER — DEXTROSE 50 % IN WATER 50 %
15 SYRINGE (ML) INTRAVENOUS ONCE
Refills: 0 | Status: DISCONTINUED | OUTPATIENT
Start: 2020-11-19 | End: 2020-11-27

## 2020-11-19 RX ORDER — VANCOMYCIN HCL 1 G
1000 VIAL (EA) INTRAVENOUS ONCE
Refills: 0 | Status: COMPLETED | OUTPATIENT
Start: 2020-11-19 | End: 2020-11-19

## 2020-11-19 RX ORDER — GLUCAGON INJECTION, SOLUTION 0.5 MG/.1ML
1 INJECTION, SOLUTION SUBCUTANEOUS ONCE
Refills: 0 | Status: DISCONTINUED | OUTPATIENT
Start: 2020-11-19 | End: 2020-11-27

## 2020-11-19 RX ADMIN — Medication 650 MILLIGRAM(S): at 09:00

## 2020-11-19 RX ADMIN — SODIUM CHLORIDE 1000 MILLILITER(S): 9 INJECTION INTRAMUSCULAR; INTRAVENOUS; SUBCUTANEOUS at 04:12

## 2020-11-19 RX ADMIN — RISPERIDONE 2 MILLIGRAM(S): 4 TABLET ORAL at 22:24

## 2020-11-19 RX ADMIN — Medication 650 MILLIGRAM(S): at 20:43

## 2020-11-19 RX ADMIN — Medication 1: at 14:00

## 2020-11-19 RX ADMIN — CEFTRIAXONE 100 MILLIGRAM(S): 500 INJECTION, POWDER, FOR SOLUTION INTRAMUSCULAR; INTRAVENOUS at 11:47

## 2020-11-19 RX ADMIN — Medication 650 MILLIGRAM(S): at 02:38

## 2020-11-19 RX ADMIN — Medication 400 MILLIGRAM(S): at 09:21

## 2020-11-19 RX ADMIN — Medication 1000 MILLIGRAM(S): at 09:38

## 2020-11-19 RX ADMIN — Medication 650 MILLIGRAM(S): at 00:15

## 2020-11-19 RX ADMIN — PIPERACILLIN AND TAZOBACTAM 200 GRAM(S): 4; .5 INJECTION, POWDER, LYOPHILIZED, FOR SOLUTION INTRAVENOUS at 04:10

## 2020-11-19 RX ADMIN — DIVALPROEX SODIUM 1000 MILLIGRAM(S): 500 TABLET, DELAYED RELEASE ORAL at 22:25

## 2020-11-19 RX ADMIN — ENOXAPARIN SODIUM 40 MILLIGRAM(S): 100 INJECTION SUBCUTANEOUS at 11:47

## 2020-11-19 RX ADMIN — Medication 250 MILLIGRAM(S): at 06:00

## 2020-11-19 RX ADMIN — Medication 650 MILLIGRAM(S): at 21:46

## 2020-11-19 RX ADMIN — ATORVASTATIN CALCIUM 80 MILLIGRAM(S): 80 TABLET, FILM COATED ORAL at 22:25

## 2020-11-19 RX ADMIN — PIPERACILLIN AND TAZOBACTAM 3.38 GRAM(S): 4; .5 INJECTION, POWDER, LYOPHILIZED, FOR SOLUTION INTRAVENOUS at 06:00

## 2020-11-19 RX ADMIN — Medication 650 MILLIGRAM(S): at 00:10

## 2020-11-19 RX ADMIN — SODIUM CHLORIDE 1000 MILLILITER(S): 9 INJECTION INTRAMUSCULAR; INTRAVENOUS; SUBCUTANEOUS at 02:38

## 2020-11-19 RX ADMIN — Medication 1: at 18:00

## 2020-11-19 RX ADMIN — Medication 650 MILLIGRAM(S): at 04:35

## 2020-11-19 NOTE — H&P ADULT - PROBLEM SELECTOR PLAN 6
Dispo:   1.  Name of PCP:   2.  PCP Contacted on Admission: [ ] Y    [ ] N    3.  PCP contacted at Discharge: [ ] Y    [ ] N    [ ] N/A  4.  Post-Discharge Appointment Date and Location:  5.  Summary of Handoff given to PCP: DVT ppx: PPS score of 4: Lovenox 40mg QD  Meal: Consistent carb

## 2020-11-19 NOTE — H&P ADULT - NSHPSOCIALHISTORY_GEN_ALL_CORE
Smoking -   Alcohol -   Drugs -   Occupation -   Living Disposition - 188.9 Smoking - denies  Alcohol - denies  Drugs - denies  Living Disposition - currently at [facility]

## 2020-11-19 NOTE — H&P ADULT - PROBLEM SELECTOR PLAN 5
DVT ppx:  Meal: DVT ppx: PPS score of 4: Lovenox 40mg QD  Meal: Consistent carb Patient with history of schizoaffective disorder and bizarre behaviour.  -c/xw inpatient meds  - Benztropine 1mg, Clozapine 250mg, Depakote 1000mg, Risperidone 2mg. Patient with history of schizoaffective disorder and bizarre behaviour.  -c/w [facility] inpatient meds  - c/w Benztropine 1mg, Depakote 1000mg, Risperidone 2mg.  -hold Clonazepam as per Psychiatry until myocarditis can be r/o

## 2020-11-19 NOTE — H&P ADULT - NSHPLABSRESULTS_GEN_ALL_CORE
10.2   13.04 )-----------( 270      ( 2020 03:00 )             33.0   11-    136  |  94<L>  |  12  ----------------------------<  179<H>  3.9   |  25  |  0.70    Ca    10.0      2020 02:25    TPro  7.2  /  Alb  3.6  /  TBili  0.5  /  DBili  x   /  AST  31  /  ALT  12  /  AlkPhos  63  11-19  Urinalysis Basic - ( 2020 05:36 )    Color: YELLOW / Appearance: Lt TURBID / S.026 / pH: 6.0  Gluc: NEGATIVE / Ketone: SMALL  / Bili: NEGATIVE / Urobili: NORMAL   Blood: LARGE / Protein: 300 / Nitrite: POSITIVE   Leuk Esterase: LARGE / RBC: 6-10 / WBC >50   Sq Epi: OCC / Non Sq Epi: x / Bacteria: MANY 10.2   13.04 )-----------( 270      ( 2020 03:00 )             33.0       136  |  94<L>  |  12  ----------------------------<  179<H>  3.9   |  25  |  0.70    Ca    10.0      2020 02:25    TPro  7.2  /  Alb  3.6  /  TBili  0.5  /  DBili  x   /  AST  31  /  ALT  12  /  AlkPhos  63    Urinalysis Basic - ( 2020 05:36 )    Color: YELLOW / Appearance: Lt TURBID / S.026 / pH: 6.0  Gluc: NEGATIVE / Ketone: SMALL  / Bili: NEGATIVE / Urobili: NORMAL   Blood: LARGE / Protein: 300 / Nitrite: POSITIVE   Leuk Esterase: LARGE / RBC: 6-10 / WBC >50   Sq Epi: OCC / Non Sq Epi: x / Bacteria: MANY    < from: CT Chest No Cont (20 @ 04:29) >    IMPRESSION:  No evidence for pneumonia or effusion.    Partially visualized somewhat asymmetric left perinephric stranding in the upper abdomen. Please correlate clinically with urinalysis and urine culture as infectious etiology cannot be excluded.      < from: Xray Chest 1 View- PORTABLE-Urgent (20 @ 03:32) >    IMPRESSION:  No focal consolidation.

## 2020-11-19 NOTE — ED ADULT NURSE NOTE - CHIEF COMPLAINT QUOTE
from [facility] for fevers/chills x 2 days and vomiting. Denies CP/SOB/Cough/HA/dysuria. Aid at bedside. Patient states had covid in past, +COVID antibodies 11/3. Hx Schizoaffective

## 2020-11-19 NOTE — H&P ADULT - PROBLEM SELECTOR PLAN 1
Patient presenting from [facility] with >24h of fever, rigors, and chills. UA+ and perinephric stranding visualized near right kidney.   -likely 2/2 to UTI/pyelonephritis  -UCx pending  -S/p zosyn, vanc, 2L fluid in ED  -will start Ceftriaxone 1g for 7 days.   - will consider Kidney US if symptoms don't resolve. Patient presenting from [facility] with >24h of fever, rigors, and chills. UA+ and perinephric stranding visualized near right kidney.   -likely 2/2 to UTI/pyelonephritis  -UCx pending  -S/p zosyn, vanc, 2L fluid in ED  -will start Ceftriaxone 1g for 7 days.   -Switch to carbapenem if not responsive  - will consider Kidney US if symptoms don't resolve. Patient presenting from [facility] with >24h of fever, rigors, and chills. UA+ and perinephric stranding visualized near right kidney.   -likely 2/2 to UTI/pyelonephritis  -UCx pending  -S/p zosyn, vanc, 2L fluid in ED  -will start Ceftriaxone 1g for 7 days.   -Switch to carbapenem if not responsive  -will consider Kidney US if symptoms don't resolve.  -trend lactate

## 2020-11-19 NOTE — BEHAVIORAL HEALTH ASSESSMENT NOTE - NSBHCHARTREVIEWLAB_PSY_A_CORE FT
10.2   13.04 )-----------( 270      ( 2020 03:00 )             33.0     11-19    136  |  94<L>  |  12  ----------------------------<  179<H>  3.9   |  25  |  0.70    Ca    10.0      2020 02:25    TPro  7.2  /  Alb  3.6  /  TBili  0.5  /  DBili  x   /  AST  31  /  ALT  12  /  AlkPhos  63  11-19    Urinalysis Basic - ( 2020 05:36 )    Color: YELLOW / Appearance: Lt TURBID / S.026 / pH: 6.0  Gluc: NEGATIVE / Ketone: SMALL  / Bili: NEGATIVE / Urobili: NORMAL   Blood: LARGE / Protein: 300 / Nitrite: POSITIVE   Leuk Esterase: LARGE / RBC: 6-10 / WBC >50   Sq Epi: OCC / Non Sq Epi: x / Bacteria: MANY      LIVER FUNCTIONS - ( 2020 02:25 )  Alb: 3.6 g/dL / Pro: 7.2 g/dL / ALK PHOS: 63 u/L / ALT: 12 u/L / AST: 31 u/L / GGT: x

## 2020-11-19 NOTE — ED PROVIDER NOTE - PHYSICAL EXAMINATION
Gen: NAD, alert  HEENT: No pharyngeal erythema or exudates. Normocephalic and atraumatic. No nasal discharge, mucous membranes moist, no scleral icterus.  CV: Regular rate and rhythm, +S1/S2, no M/R/G. + 1 LE edema bilaterally. Radial and DP pulses present and symmetrical. Capillary refill less than 2 seconds.  Resp: CTAB, no rales, rhonchi, or wheezes.  GI: Abdomen soft non-distended, non tender to palpation. No masses appreciated. + bowel sounds.  MSK: Moving extremities spontaneously. No ulcers, open wounds, or excessive bruising.  No obvious skin infections appreciated.  Neuro: Moving extremities spontaneously. EOMI. No facial droop.  Psych: Appropriate mood. Confused.

## 2020-11-19 NOTE — BEHAVIORAL HEALTH ASSESSMENT NOTE - RISK ASSESSMENT
Low Acute Suicide Risk Risks: Schizophrenia, lack of insight, acute medical condition  Protective: Good family support (fiance), domiciled

## 2020-11-19 NOTE — H&P ADULT - ASSESSMENT
YOSHI MCCOY is a 55y with pmhx schizoaffective disorder, DMT2, HLD, now presenting with fever for 24hours duration from [facility] likely 2/2 to UTI.

## 2020-11-19 NOTE — BEHAVIORAL HEALTH ASSESSMENT NOTE - CASE SUMMARY
Chart reviewed. I personally saw and examined the patient with DANNI Fraire. I agree with his history, MSE, A/P with below additions. Patient shows poor attention. She has increased speech latency, decreased spontaneity and is lethargic. She is not rigid, not cataleptic, myoclonic, or negative. Her thought process is impoverished and does not appear internally preoccupied.  She likely is delirious.  Would ensure patient does not have cardiomyopathy and therefore workup with echo, trop and daily CPK. Continue to hold clozapine. Furthermore, would check NH3 and lower VPA if needed (and start l-carnitine). For now can continue VPA 1000mg HS if can take PO- or switch to 250mg IV q8h. Can use ativan prn for now- careful monitoring for resp depression. Continue risperidone as per above. Psych to continue to closely follow.

## 2020-11-19 NOTE — BEHAVIORAL HEALTH ASSESSMENT NOTE - OTHER
not assessed - pt in bed tired impoverished transferred to [facility] for infection

## 2020-11-19 NOTE — H&P ADULT - PROBLEM SELECTOR PLAN 2
-on metformin, hayes Matamoros at [facility], will hold  -will continue inpatient with LDSS. Pt with elevated Ck to 924 on admission.  -likely 2/2 to uncontrolled rigors for >24h  -will trend in am

## 2020-11-19 NOTE — H&P ADULT - HISTORY OF PRESENT ILLNESS
YOSHI MCCOY is a 55y with pmhx schizoaffective disorder, DMT2, HLD, now presenting with fever for 24hours duration from [facility]. Patient is currently unable to answer questions and is AAOX1. As per [facility] notes she was seen on 11/18 after being found more lethargic for 2 days duration. Upon assessment she had rigors, chills, and nasal congestion with an oral temperature of 103.1. She was w/u with RVP, COVID-19 PCR, UA, and CXR. Negative throughout aside from UA which late returned grossly positive. She was treated with Tylenol and transferred to the ED when resolution of her symptoms did not occur.   Throughout her stay at [facility] she has been a poor historian and unable to communicate details about her illness.    Denies any nausea, vomiting, seizures, lightheadedness, weakness, dizziness, headache, chest pain, SOB, abdominal pain or changes in urinary or bowel habits. Denies any recent sick contacts or travel history.     Of noter she initially presented to the ED on 11/3 with bizarre behavior depression, disorganization, derealization, leading to 911 activation by her                     YOSHI MCCOY is a 55y with pmhx schizoaffective disorder, DMT2, HLD, now presenting with fever for 24hours duration from [facility]. Patient is currently unable to answer questions and is AAOX1. As per [facility] notes she was seen on 11/18 after being found more lethargic for 2 days duration. Upon assessment she had rigors, chills, and nasal congestion with an oral temperature of 103.1. She was w/u with RVP, COVID-19 PCR, UA, and CXR. Negative throughout aside from UA which late returned grossly positive. She was treated with Tylenol and transferred to the ED when resolution of her symptoms did not occur.   Throughout her stay at [facility] she has been a poor historian and unable to communicate details about her illness.  As per her [facility] notes and reported symptoms she denies any nausea, vomiting, seizures, lightheadedness, weakness, dizziness, headache, chest pain, SOB, abdominal pain or changes in urinary or bowel habits. Denies any recent sick contacts or travel history.     In the ED: she presented febrile to 102.7 with rigors and diaphoresis. She was treated with Zosyn 3.375, Vancomycin 1g, and Tylenol. CT chest showed No evidence for pneumonia or effusion and partially visualized somewhat asymmetric left perinephric stranding in the upper abdomen.      Of noter she initially presented to the ED on 11/3 with bizarre behavior depression, disorganization, derealization, leading to 911 activation by her

## 2020-11-19 NOTE — ED ADULT NURSE NOTE - OBJECTIVE STATEMENT
56 y/o female arrives to E.D. from [facility] for fever, weakness, nausea x1 day. A&Ox4, poor historian, neg SOB, respirations even & unlabored, denies chest pain, endorses nausea, denies difficulty urinating. Denies SI/HI, denies auditory/visual hallucinations. [facility] aide at bedside. Awaiting orders. Will continue to monitor. -Break RN

## 2020-11-19 NOTE — H&P ADULT - PROBLEM SELECTOR PLAN 4
Patient with history of schizoaffective disorder and bizarre behaviour.  -c/xw inpatient meds  - Benztropine 1mg, Clozapine 250mg, Depakote 1000mg, Risperidone 2mg. -c/w atorvastatin 80mg

## 2020-11-19 NOTE — BEHAVIORAL HEALTH ASSESSMENT NOTE - PAST PSYCHOTROPIC MEDICATION
-Clozapine 250mg PO qHS  -Risperdal 2mg PO qHS  -Cogentin 1mg PO qHS  -Depakote 1000mg PO qHS  -Divalproex Sodium ER 1500mg qHS

## 2020-11-19 NOTE — ED PROVIDER NOTE - OBJECTIVE STATEMENT
56 yo F w/ hx of schizoaffective disorder, A&Ox1 at baseline, DM, seen here in ED previously on 11/3 w/ bizzare behavior and admitted to [facility], now presenting for 24 hrs of fever of unknown etiology (Tmax 103) and episode of N/V today. Pt able to answer questions, but unable to provide coherent description of events preceding ED arrival, and does not seen to be a reliable historian. Per review of pt chart from [facility], pt denies cough, SOB, and CP. Pt states she is not having problems with urination or BM's. Chart further states pt had negative flu and covid PCR prior to ED arrival tonight.

## 2020-11-19 NOTE — ED ADULT TRIAGE NOTE - CHIEF COMPLAINT QUOTE
from [facility] for fevers/chills x 2 days and vomiting. Denies CP/SOB/Cough/HA/dysuria. Aid at bedside. Patient states had covid in past, +COVID antibodies 11/3. Hx Schizoaffective

## 2020-11-19 NOTE — BEHAVIORAL HEALTH ASSESSMENT NOTE - VIOLENCE RISK FACTORS:
Lack of insight into violence risk/need for treatment Feeling of being under threat and being unable to control threat

## 2020-11-19 NOTE — ED PROVIDER NOTE - ATTENDING CONTRIBUTION TO CARE
55F with hx of schizoaffective disorder, aaox1 at baseline, dm, sent from [facility] for fever and vomiting. patient states she is feeling better but currently febrile. admitted to [facility] for bizarre behavior on 11/3. limited hx, patient is a poor historian.    ***GEN - NAD; well appearing; A+O x1 ***HEAD - NC/AT ***EYES/NOSE - PERRL, EOMI, mucous membranes moist, no discharge ***THROAT: Oral cavity and pharynx normal. No inflammation, swelling, exudate, or lesions.  ***NECK: Neck supple, non-tender without lymphadenopathy, no masses, no thyromegaly.   ***PULMONARY - CTA b/l, symmetric breath sounds. ***CARDIAC -s1s2, tachycardia, no M,G,R  ***ABDOMEN - +BS, ND, NT, soft, no guarding, no rebound, no masses   ***BACK - no CVA tenderness, Normal  spine ***EXTREMITIES - symmetric pulses, 2+ dp, capillary refill < 2 seconds, no clubbing, no cyanosis, no edema ***SKIN - no rash or bruising   ***NEUROLOGIC - alert, CN 2-12 intact, gait nl, ***PSYCH - cooperative, calm    MDM: 55F with fever and vomiting. sepsis w/u

## 2020-11-19 NOTE — CHART NOTE - NSCHARTNOTEFT_GEN_A_CORE
[facility] Inpatient to ED Transfer Summary    Reason for Transfer/Medical Summary:  55 year old female with admitting diagnosis of Schizoaffective disorder with PMh of DM and HLD presenting today febrile 103 oral temp and one episode of vomiting. Pt has been feeling sick (malaise, sleeping a lot, not eating much. Denies cough, chest pain, shortness of breath. Pt noted to be febrile to 103, and vomiting. Recent Labs WBC 10.61, negative influenza flu panel, negative COVID19 PCR.  Pt being sent to ED to r/o infectious etiology and IV fluids for dehydration.    PAST MEDICAL & SURGICAL HISTORY:  Schizoaffective disorder        Allergies    No Known Allergies    Intolerances        MEDICATIONS  (STANDING):  atorvastatin 80 milliGRAM(s) Oral at bedtime  diVALproex ER 1500 milliGRAM(s) Oral at bedtime  glimepiride. 4 milliGRAM(s) Oral daily  insulin lispro (ADMELOG) corrective regimen sliding scale   SubCutaneous three times a day before meals  insulin lispro (ADMELOG) corrective regimen sliding scale   SubCutaneous at bedtime  metFORMIN 1000 milliGRAM(s) Oral two times a day  risperiDONE   Tablet 3 milliGRAM(s) Oral at bedtime  sitaGLIPtin 50 milliGRAM(s) Oral daily    MEDICATIONS  (PRN):  acetaminophen   Tablet .. 650 milliGRAM(s) Oral every 6 hours PRN Temp greater or equal to 38C (100.4F), Mild Pain (1 - 3), Moderate Pain (4 - 6), Severe Pain (7 - 10)  dextrose 40% Gel 15 Gram(s) Oral once PRN Blood Glucose LESS THAN 70 milliGRAM(s)/deciliter  glucagon  Injectable 1 milliGRAM(s) IntraMuscular once PRN Glucose LESS THAN 70 milligrams/deciliter  haloperidol     Tablet 5 milliGRAM(s) Oral every 6 hours PRN agitation  haloperidol    Injectable 5 milliGRAM(s) IntraMuscular once PRN severe agitation  LORazepam     Tablet 2 milliGRAM(s) Oral every 6 hours PRN Agitation  LORazepam   Injectable 2 milliGRAM(s) IntraMuscular once PRN severe agitation      Vital Signs Last 24 Hrs  T(C): 37.6 (18 Nov 2020 21:00), Max: 39.5 (18 Nov 2020 16:15)  T(F): 99.7 (18 Nov 2020 21:00), Max: 103.1 (18 Nov 2020 16:15)  HR: 105 (18 Nov 2020 19:00) (99 - 112)  BP: 102/81 (18 Nov 2020 19:00) (102/81 - 138/82)  BP(mean): --  RR: --  SpO2: 99% (18 Nov 2020 16:15) (99% - 99%)  CAPILLARY BLOOD GLUCOSE      POCT Blood Glucose.: 148 mg/dL (19 Nov 2020 00:23)  POCT Blood Glucose.: 240 mg/dL (18 Nov 2020 20:13)  POCT Blood Glucose.: 146 mg/dL (18 Nov 2020 17:51)  POCT Blood Glucose.: 193 mg/dL (18 Nov 2020 11:27)  POCT Blood Glucose.: 196 mg/dL (18 Nov 2020 07:42)        PHYSICAL EXAM:  GENERAL: NAD, well-developed  HEAD:  Atraumatic, Normocephalic  EYES: EOMI, PERRLA, conjunctiva and sclera clear  NECK: Supple, No JVD  CHEST/LUNG: Clear to auscultation bilaterally; No wheeze  HEART: Regular rate and rhythm; No murmurs, rubs, or gallops  ABDOMEN: Soft, Nontender, Nondistended; Bowel sounds present  EXTREMITIES:  2+ Peripheral Pulses, No clubbing, cyanosis, or edema  PSYCH: AAOx3  NEUROLOGY: non-focal  SKIN: No rashes or lesions    LABS:                        11.6   10.61 )-----------( 328      ( 18 Nov 2020 10:03 )             36.7     11-18    137  |  96<L>  |  8   ----------------------------<  180<H>  4.4   |  26  |  0.63    Ca    10.0      18 Nov 2020 10:03    TPro  7.2  /  Alb  3.7  /  TBili  0.6  /  DBili  x   /  AST  25  /  ALT  10  /  AlkPhos  62  11-18      CARDIAC MARKERS ( 18 Nov 2020 10:03 )  x     / x     / 231 u/L / 1.43 ng/mL / x              Psychiatry Section:  Psychiatric Summary/[facility] admitting diagnosis:    Psychiatric Recommendations:    Observation status (check one):   ( ) Constant Observation  ( ) Enhanced care  ( ) Routine checks    Risk Status (check all that apply if present):  ( ) at risk for suicide/self-injury  ( ) at risk for aggressive behavior  ( ) at risk for elopement  ( ) other risk: [facility] Inpatient to ED Transfer Summary    Reason for Transfer/Medical Summary:  55 year old female baseline AOx1 with admitting diagnosis of Schizoaffective disorder with PMH of DM and HLD presenting today febrile 103 temp and one episode of vomiting. Pt has been feeling sick (malaise, sleeping a lot, not eating much. Denies cough, chest pain, shortness of breath. Pt noted to be febrile to 103, tachy 118, and pulse ox 95% and vomiting 4oz of undigested food. Tylenol given at 12:10 but vomited 5 minutes after. Recent Labs WBC 10.61, negative influenza flu panel, negative COVID19 PCR.  Pt being sent to ED for chest x-ray to r/o infectious etiology. IV fluids for dehydration.    PAST MEDICAL & SURGICAL HISTORY:  Schizoaffective disorder        Allergies    No Known Allergies    Intolerances        MEDICATIONS  (STANDING):  atorvastatin 80 milliGRAM(s) Oral at bedtime  diVALproex ER 1500 milliGRAM(s) Oral at bedtime  glimepiride. 4 milliGRAM(s) Oral daily  insulin lispro (ADMELOG) corrective regimen sliding scale   SubCutaneous three times a day before meals  insulin lispro (ADMELOG) corrective regimen sliding scale   SubCutaneous at bedtime  metFORMIN 1000 milliGRAM(s) Oral two times a day  risperiDONE   Tablet 3 milliGRAM(s) Oral at bedtime  sitaGLIPtin 50 milliGRAM(s) Oral daily    MEDICATIONS  (PRN):  acetaminophen   Tablet .. 650 milliGRAM(s) Oral every 6 hours PRN Temp greater or equal to 38C (100.4F), Mild Pain (1 - 3), Moderate Pain (4 - 6), Severe Pain (7 - 10)  dextrose 40% Gel 15 Gram(s) Oral once PRN Blood Glucose LESS THAN 70 milliGRAM(s)/deciliter  glucagon  Injectable 1 milliGRAM(s) IntraMuscular once PRN Glucose LESS THAN 70 milligrams/deciliter  haloperidol     Tablet 5 milliGRAM(s) Oral every 6 hours PRN agitation  haloperidol    Injectable 5 milliGRAM(s) IntraMuscular once PRN severe agitation  LORazepam     Tablet 2 milliGRAM(s) Oral every 6 hours PRN Agitation  LORazepam   Injectable 2 milliGRAM(s) IntraMuscular once PRN severe agitation      Vital Signs Last 24 Hrs  T(C): 37.6 (18 Nov 2020 21:00), Max: 39.5 (18 Nov 2020 16:15)  T(F): 103  HR: 118  BP: 120/77  BP(mean): --  RR: 20  SpO2: 95%%  CAPILLARY BLOOD GLUCOSE    POCT Blood Glucose.: 148 mg/dL (19 Nov 2020 00:23)  POCT Blood Glucose.: 240 mg/dL (18 Nov 2020 20:13)  POCT Blood Glucose.: 146 mg/dL (18 Nov 2020 17:51)  POCT Blood Glucose.: 193 mg/dL (18 Nov 2020 11:27)  POCT Blood Glucose.: 196 mg/dL (18 Nov 2020 07:42)        PHYSICAL EXAM:  GENERAL: NAD, well-developed  HEAD:  Atraumatic, Normocephalic  EYES: EOMI, PERRLA, conjunctiva and sclera clear  NECK: Supple.  CHEST/LUNG: wheezes noted to LLL.  HEART: Regular rate and rhythm; No murmurs, rubs, or gallops  ABDOMEN: Soft, Nontender, Nondistended; Bowel sounds present  EXTREMITIES:  2+ Peripheral Pulses, No clubbing, cyanosis, or edema  PSYCH: AAOx3  NEUROLOGY: non-focal  SKIN: No rashes or lesions    LABS:                        11.6   10.61 )-----------( 328      ( 18 Nov 2020 10:03 )             36.7     11-18    137  |  96<L>  |  8   ----------------------------<  180<H>  4.4   |  26  |  0.63    Ca    10.0      18 Nov 2020 10:03    TPro  7.2  /  Alb  3.7  /  TBili  0.6  /  DBili  x   /  AST  25  /  ALT  10  /  AlkPhos  62  11-18      CARDIAC MARKERS ( 18 Nov 2020 10:03 )  x     / x     / 231 u/L / 1.43 ng/mL / x              Psychiatry Section:  Psychiatric Summary/[facility] admitting diagnosis: Schizoaffective disorder    Psychiatric Recommendations:    Observation status (check one):   (x) Constant Observation  ( ) Enhanced care  ( ) Routine checks    Risk Status (check all that apply if present):  (x) at risk for suicide/self-injury  ( ) at risk for aggressive behavior  ( ) at risk for elopement  ( ) other risk:

## 2020-11-19 NOTE — BEHAVIORAL HEALTH ASSESSMENT NOTE - NSBHCHARTREVIEWVS_PSY_A_CORE FT
Vital Signs Last 24 Hrs  T(C): 37.8 (19 Nov 2020 10:30), Max: 39.5 (18 Nov 2020 16:15)  T(F): 100 (19 Nov 2020 10:30), Max: 103.1 (18 Nov 2020 16:15)  HR: 95 (19 Nov 2020 10:30) (86 - 118)  BP: 114/55 (19 Nov 2020 10:30) (102/81 - 141/60)  BP(mean): --  RR: 18 (19 Nov 2020 10:30) (16 - 18)  SpO2: 95% (19 Nov 2020 10:30) (95% - 100%)

## 2020-11-19 NOTE — H&P ADULT - NSHPPHYSICALEXAM_GEN_ALL_CORE
T(C): 37.1 (11-19-20 @ 07:24), Max: 39.5 (11-18-20 @ 16:15)  HR: 96 (11-19-20 @ 07:24) (86 - 118)  BP: 122/65 (11-19-20 @ 07:24) (102/81 - 139/76)  RR: 18 (11-19-20 @ 07:24) (16 - 18)  SpO2: 100% (11-19-20 @ 07:24) (95% - 100%)    PHYSICAL EXAM:  GENERAL: NAD, well-groomed, well-developed  HEAD:  Atraumatic, Normocephalic  EYES: EOMI, PERRLA, conjunctiva and sclera clear, no jaundice   ENMT: No tonsillar erythema, exudates, or enlargement; Moist mucous membranes  NECK: Supple, non tender, No JVD, Normal thyroid  HEART: Regular rate and rhythm; No murmurs, rubs, or gallops. S1/S2 present  RESPIRATORY: CTA B/L, No W/R/R  ABDOMEN: Soft, Nontender, Nondistended; Bowel sounds present. No hepatosplenomegally  NEUROLOGY: A&Ox3, nonfocal, moving all extremities,  EXTREMITIES:  2+ Peripheral Pulses, No clubbing, cyanosis, or edema. 5/5 muscle tone in UE/LE  SKIN: warm, dry, normal color, no rash or abnormal lesions T(C): 37.1 (11-19-20 @ 07:24), Max: 39.5 (11-18-20 @ 16:15)  HR: 96 (11-19-20 @ 07:24) (86 - 118)  BP: 122/65 (11-19-20 @ 07:24) (102/81 - 139/76)  RR: 18 (11-19-20 @ 07:24) (16 - 18)  SpO2: 100% (11-19-20 @ 07:24) (95% - 100%)    PHYSICAL EXAM:  GENERAL: appears lethagic, rigoring, diphoretic but otherwise well-groomed, well-developed  HEAD:  Atraumatic, Normocephalic  EYES: EOMI, PERRLA, conjunctiva and sclera clear, no jaundice   ENMT: No tonsillar erythema, exudates, or enlargement; Moist mucous membranes  NECK: Supple, non tender, No JVD, Normal thyroid  HEART: Regular rate and rhythm; No murmurs, rubs, or gallops. S1/S2 present  RESPIRATORY: CTA B/L, No W/R/R  ABDOMEN: Soft, Nontender, Nondistended; Bowel sounds present. No hepatosplenomegally. No CVA tenderness or suprapubic tenderness.   NEUROLOGY: A&Ox3, nonfocal, moving all extremities,  EXTREMITIES:  2+ Peripheral Pulses, No clubbing, cyanosis, or edema. 5/5 muscle tone in UE/LE  SKIN: warm, pallor, dry, normal color, no rash or abnormal lesions

## 2020-11-19 NOTE — BEHAVIORAL HEALTH ASSESSMENT NOTE - HPI (INCLUDE ILLNESS QUALITY, SEVERITY, DURATION, TIMING, CONTEXT, MODIFYING FACTORS, ASSOCIATED SIGNS AND SYMPTOMS)
55F with PPH of Schizophrenia and PMH of DMT2, HLD, presented on 11/19/20 with fever for 24hours duration from [facility].     Chart reviewed. Patient seen at bedside, lethargic, AAOx1. As per [facility] notes she was seen on 11/18 after being found more lethargic for 2 days duration. Upon assessment she had rigors, chills, and nasal congestion with an oral temperature of 103.1. She was w/u with RVP, COVID-19 PCR, UA, and CXR. UA grossly positive. Patient lethargic, although arousable. Patient shows no signs of catatonia or rigidity. Patient shows no signs of negativism, myoclonus, or hyperreflexia. No asterixis present on exam. Patient shows evidence of poor attention, as evidenced by gazing off during interview, falling asleep during interview, and inability to follow simple commands. Patient denies any GI discomfort. Denies SI/I/P. Denies AH/VH, paranoias, or delusions. Patient feels safe in the hospital. 55F with PPH of Schizophrenia and PMH of DMT2, HLD, presented on 11/19/20 with fever for 24hours duration from [facility].     Chart reviewed. Patient seen at bedside, lethargic, AAOx1. As per [facility] notes, she was seen on 11/18 after being found more lethargic for 2 days duration. Upon assessment she had rigors, chills, and nasal congestion with an oral temperature of 103.1. She was w/u with RVP, COVID-19 PCR, UA, and CXR. UA grossly positive. Patient lethargic, although arousable. Patient shows no signs of catatonia or rigidity. Patient shows no signs of negativism, myoclonus, or hyperreflexia. No asterixis present on exam. Patient demonstrates poor attention, as evidenced by gazing off during interview, falling asleep during interview, and inability to follow simple commands. Patient denies any GI discomfort. Denies SI/I/P. Denies AH/VH, paranoias, or delusions. Patient feels safe in the hospital. 55F with PPH of Schizophrenia and PMH of DMT2, HLD, presented on 11/19/20 with fever for 24hours duration from [facility]. Early workup suggesting infection as UA is positive and there is concern for possible pyelonephritis. CPK is elevated. Psych CL consulted for continuity of care as patient transferred from [facility] for psychosis.    Chart reviewed. Patient seen at bedside. Patient lethargic, although arousable. Patient shows no signs of catatonia or rigidity. Patient shows no signs of negativism, myoclonus, or hyperreflexia. No asterixis present on exam. Patient demonstrates poor attention, as evidenced by gazing off during interview, falling asleep during interview, and inability to follow simple commands. Patient denies any GI discomfort. Denies SI/I/P. Denies AH/VH, paranoias, or delusions. Patient feels safe in the hospital.    Interview limited due to poor attention.

## 2020-11-19 NOTE — H&P ADULT - PROBLEM SELECTOR PLAN 3
-c/w atorvastatin 80mg -on metformin, hayes Matamoros at [facility], will hold  -will continue inpatient with LDSS.

## 2020-11-19 NOTE — BEHAVIORAL HEALTH ASSESSMENT NOTE - NSBHCHARTREVIEWIMAGING_PSY_A_CORE FT
< from: CT Chest No Cont (11.19.20 @ 04:29) >    IMPRESSION:  No evidence for pneumonia or effusion.    Partially visualized somewhat asymmetric left perinephric stranding in the upper abdomen. Please correlate clinically with urinalysis and urine culture as infectious etiology cannot be excluded.    < end of copied text >    < from: Xray Chest 1 View- PORTABLE-Urgent (11.19.20 @ 03:32) >    IMPRESSION:  No focal consolidation.    < end of copied text >

## 2020-11-19 NOTE — H&P ADULT - NSHPREVIEWOFSYSTEMS_GEN_ALL_CORE
REVIEW OF SYSTEMS:    CONSTITUTIONAL: + weakness, fevers and chills  EYES/ENT: No visual changes;  No vertigo or throat pain   NECK: No pain or stiffness  RESPIRATORY: No cough, wheezing, hemoptysis; No shortness of breath  CARDIOVASCULAR: No chest pain or palpitations  GASTROINTESTINAL: No abdominal or epigastric pain. No nausea, vomiting, or hematemesis; No diarrhea or constipation. No melena or hematochezia.  GENITOURINARY: No dysuria, frequency or hematuria  NEUROLOGICAL: No numbness or weakness  SKIN: No itching, rashes  ENDO: No polydipsia or increased thirst  HEME: no increased bruising or bleeding

## 2020-11-19 NOTE — BEHAVIORAL HEALTH ASSESSMENT NOTE - SUMMARY
55F with PPH of Schizophrenia and PMH of DMT2, HLD, presented on 11/19/20 with fever for 24hours duration from [facility].     Patient lethargic and AAOx1. Patient demonstrates poor attention, likely d/t delirium 2/2 infection. Although patient is febrile, diaphoretic, on 2 antipsychotics (risperidone and clozapine), was rigid at [facility], and has a most recent CK of 924, patient does not currently demonstrate rigidity; therefore, NMS is less likely on the differential. However, if patient becomes rigid, stopping both antipsychotics may be necessary. Additionally, patient does not show signs of catatonia. Patient also does not show signs of myoclonus or hyperreflexia, which makes serotonin syndrome less likely. Although pyelonephritis vs UTI is likely cause of delirium, clozapine may cause myocarditis leading to fever, so clozapine should be discontinued in hospital setting. If clozapine discontinued for greater than 48 hours, clozapine regimen must be slowly titrated to current dose. Patient does not appear to be in acute psychosis, as evidenced by the lack of AH/VH, paranoias or delusions.     Plan:  -DECREASE Risperidone 2mg PO qHS to 1mg PO qHS (hold if QTc >500)  -Begin Ativan 2mg PO/IV  q6 PRN for Agitation   -CONTINUE Depakote 1000mg PO qHS  -CONTINUE Cogentin 1mg PO qHS   -Echo to identify possible myocarditis 2/2 clozapine   -CO 1:1  -Monitor EKG regularly 55F with PPH of Schizophrenia and PMH of DMT2, HLD, presented on 11/19/20 with fever for 24hours duration from [facility].     Patient lethargic and AAOx1. Patient demonstrates poor attention, likely d/t delirium 2/2 infection. Although patient is febrile, diaphoretic, was on 2 antipsychotics (risperidone and clozapine), was rigid at [facility], and has a most recent CK of 924, patient does not currently demonstrate rigidity; therefore, NMS is a less likely diagnosis; however, still must be on the differential. However, if patient becomes rigid, stopping risperidone may be necessary. Additionally, patient does not show signs of catatonia. Although infection is likely cause of delirium, clozapine may cause myocarditis leading to fever, so clozapine should be discontinued in hospital setting. If clozapine discontinued for greater than 48 hours, clozapine regimen must be slowly titrated to current dose. Patient does not appear to be in acute psychosis, as evidenced by the lack of AH/VH, paranoias or delusions.     Plan:  -CONTINUE Risperidone 2mg PO qHS (hold if QTc >500)  -Begin Ativan 2mg PO/IV q4 PRN for Agitation (hold for sedation or respiratory depression)   -CONTINUE Depakote 1000mg PO qHS  -STOP Cogentin 1mg PO qHS   -Echo to r/o cardiomyopathy 2/2 clozapine   -CO 1:1 for AMS  -Monitor EKG regularly for QTc >500   -Monitor LFTs daily d/t Depakote usage  -Monitor ammonia daily, if elevated, decrease Depakote to 500mg PO qHS  -Monitor CPK, troponin daily d/t possibility of cardiomyopathy 2/2 clozapine use  -DO NOT restart clozapine 55F with PPH of Schizophrenia and PMH of DMT2, HLD, presented on 11/19/20 with fever for 24hours duration from [facility].     Patient lethargic and AAOx1. Patient demonstrates poor attention, likely d/t delirium 2/2 infection. Although patient is febrile, diaphoretic, was on 2 antipsychotics (risperidone and clozapine), was rigid at [facility], and has a most recent CK of 924, patient does not currently demonstrate rigidity; therefore, NMS is a less likely diagnosis. However, if patient becomes rigid, stopping risperidone may be necessary. Additionally, patient does not show signs of catatonia. Although infection is likely cause of delirium, clozapine may cause myocarditis leading to fever, so clozapine should be discontinued in hospital setting. until myocarditis and cardiomyopathy are ruled out.    Plan:  -CONTINUE Risperidone 2mg PO qHS (hold if QTc >500) (of note, her dose at [facility] was 3mg HS, however can continue with written 2mg HS for now)  -Begin Ativan 1mg PO/IV q4 PRN for Agitation (hold for sedation or respiratory depression)       appreciate that we usually avoid ativan in delirious patients, but given rise of CPK, would like to ensure NMS is completely out of the picture and/or patient does not develop NMS  -CONTINUE Depakote 1000mg PO qHS if can take PO- otherwise switch to VPA 250mg IV q8h      - please check NH3, if elevated, would further reduce VPA to 500mg HS PO (or 150mg q8h IV) and start L-carnitine  -STOP Cogentin 1mg PO qHS   -Echo to r/o cardiomyopathy 2/2 clozapine   -CO 1:1 for AMS  -Monitor EKG regularly for QTc >500   -Monitor LFTs daily d/t Depakote usage  -Monitor CPK, troponin daily d/t possibility of cardiomyopathy 2/2 clozapine use  -DO NOT restart clozapine

## 2020-11-20 LAB
-  AMIKACIN: SIGNIFICANT CHANGE UP
-  AMOXICILLIN/CLAVULANIC ACID: SIGNIFICANT CHANGE UP
-  AMPICILLIN/SULBACTAM: SIGNIFICANT CHANGE UP
-  AMPICILLIN: SIGNIFICANT CHANGE UP
-  AZTREONAM: SIGNIFICANT CHANGE UP
-  CEFAZOLIN: SIGNIFICANT CHANGE UP
-  CEFEPIME: SIGNIFICANT CHANGE UP
-  CEFOXITIN: SIGNIFICANT CHANGE UP
-  CEFTRIAXONE: SIGNIFICANT CHANGE UP
-  CIPROFLOXACIN: SIGNIFICANT CHANGE UP
-  ERTAPENEM: SIGNIFICANT CHANGE UP
-  GENTAMICIN: SIGNIFICANT CHANGE UP
-  IMIPENEM: SIGNIFICANT CHANGE UP
-  LEVOFLOXACIN: SIGNIFICANT CHANGE UP
-  MEROPENEM: SIGNIFICANT CHANGE UP
-  NITROFURANTOIN: SIGNIFICANT CHANGE UP
-  PIPERACILLIN/TAZOBACTAM: SIGNIFICANT CHANGE UP
-  TIGECYCLINE: SIGNIFICANT CHANGE UP
-  TOBRAMYCIN: SIGNIFICANT CHANGE UP
-  TRIMETHOPRIM/SULFAMETHOXAZOLE: SIGNIFICANT CHANGE UP
ALBUMIN SERPL ELPH-MCNC: 3.3 G/DL — SIGNIFICANT CHANGE UP (ref 3.3–5)
ALP SERPL-CCNC: 67 U/L — SIGNIFICANT CHANGE UP (ref 40–120)
ALT FLD-CCNC: 25 U/L — SIGNIFICANT CHANGE UP (ref 4–33)
AMMONIA BLD-MCNC: 42 UMOL/L — SIGNIFICANT CHANGE UP (ref 11–55)
ANION GAP SERPL CALC-SCNC: 12 MMO/L — SIGNIFICANT CHANGE UP (ref 7–14)
AST SERPL-CCNC: 38 U/L — HIGH (ref 4–32)
BASOPHILS # BLD AUTO: 0.02 K/UL — SIGNIFICANT CHANGE UP (ref 0–0.2)
BASOPHILS NFR BLD AUTO: 0.2 % — SIGNIFICANT CHANGE UP (ref 0–2)
BILIRUB SERPL-MCNC: 0.4 MG/DL — SIGNIFICANT CHANGE UP (ref 0.2–1.2)
BUN SERPL-MCNC: 15 MG/DL — SIGNIFICANT CHANGE UP (ref 7–23)
CALCIUM SERPL-MCNC: 8.9 MG/DL — SIGNIFICANT CHANGE UP (ref 8.4–10.5)
CHLORIDE SERPL-SCNC: 99 MMOL/L — SIGNIFICANT CHANGE UP (ref 98–107)
CK SERPL-CCNC: 583 U/L — HIGH (ref 25–170)
CO2 SERPL-SCNC: 27 MMOL/L — SIGNIFICANT CHANGE UP (ref 22–31)
CREAT SERPL-MCNC: 0.58 MG/DL — SIGNIFICANT CHANGE UP (ref 0.5–1.3)
CULTURE RESULTS: SIGNIFICANT CHANGE UP
EOSINOPHIL # BLD AUTO: 0 K/UL — SIGNIFICANT CHANGE UP (ref 0–0.5)
EOSINOPHIL NFR BLD AUTO: 0 % — SIGNIFICANT CHANGE UP (ref 0–6)
GLUCOSE BLDC GLUCOMTR-MCNC: 170 MG/DL — HIGH (ref 70–99)
GLUCOSE BLDC GLUCOMTR-MCNC: 192 MG/DL — HIGH (ref 70–99)
GLUCOSE BLDC GLUCOMTR-MCNC: 196 MG/DL — HIGH (ref 70–99)
GLUCOSE BLDC GLUCOMTR-MCNC: 199 MG/DL — HIGH (ref 70–99)
GLUCOSE SERPL-MCNC: 149 MG/DL — HIGH (ref 70–99)
GRAM STN FLD: SIGNIFICANT CHANGE UP
HCT VFR BLD CALC: 30.1 % — LOW (ref 34.5–45)
HCV AB S/CO SERPL IA: 0.46 S/CO — SIGNIFICANT CHANGE UP (ref 0–0.99)
HCV AB SERPL-IMP: SIGNIFICANT CHANGE UP
HGB BLD-MCNC: 9.7 G/DL — LOW (ref 11.5–15.5)
IMM GRANULOCYTES NFR BLD AUTO: 1 % — SIGNIFICANT CHANGE UP (ref 0–1.5)
LYMPHOCYTES # BLD AUTO: 0.76 K/UL — LOW (ref 1–3.3)
LYMPHOCYTES # BLD AUTO: 7.6 % — LOW (ref 13–44)
MAGNESIUM SERPL-MCNC: 1.9 MG/DL — SIGNIFICANT CHANGE UP (ref 1.6–2.6)
MCHC RBC-ENTMCNC: 27.2 PG — SIGNIFICANT CHANGE UP (ref 27–34)
MCHC RBC-ENTMCNC: 32.2 % — SIGNIFICANT CHANGE UP (ref 32–36)
MCV RBC AUTO: 84.3 FL — SIGNIFICANT CHANGE UP (ref 80–100)
METHOD TYPE: SIGNIFICANT CHANGE UP
MONOCYTES # BLD AUTO: 1.28 K/UL — HIGH (ref 0–0.9)
MONOCYTES NFR BLD AUTO: 12.8 % — SIGNIFICANT CHANGE UP (ref 2–14)
NEUTROPHILS # BLD AUTO: 7.82 K/UL — HIGH (ref 1.8–7.4)
NEUTROPHILS NFR BLD AUTO: 78.4 % — HIGH (ref 43–77)
NRBC # FLD: 0 K/UL — SIGNIFICANT CHANGE UP (ref 0–0)
ORGANISM # SPEC MICROSCOPIC CNT: SIGNIFICANT CHANGE UP
ORGANISM # SPEC MICROSCOPIC CNT: SIGNIFICANT CHANGE UP
PHOSPHATE SERPL-MCNC: 2.4 MG/DL — LOW (ref 2.5–4.5)
PLATELET # BLD AUTO: 241 K/UL — SIGNIFICANT CHANGE UP (ref 150–400)
PMV BLD: 9.1 FL — SIGNIFICANT CHANGE UP (ref 7–13)
POTASSIUM SERPL-MCNC: 3.4 MMOL/L — LOW (ref 3.5–5.3)
POTASSIUM SERPL-SCNC: 3.4 MMOL/L — LOW (ref 3.5–5.3)
PROT SERPL-MCNC: 6.6 G/DL — SIGNIFICANT CHANGE UP (ref 6–8.3)
RBC # BLD: 3.57 M/UL — LOW (ref 3.8–5.2)
RBC # FLD: 14.2 % — SIGNIFICANT CHANGE UP (ref 10.3–14.5)
SODIUM SERPL-SCNC: 138 MMOL/L — SIGNIFICANT CHANGE UP (ref 135–145)
SPECIMEN SOURCE: SIGNIFICANT CHANGE UP
SPECIMEN SOURCE: SIGNIFICANT CHANGE UP
TROPONIN T, HIGH SENSITIVITY: 26 NG/L — SIGNIFICANT CHANGE UP (ref ?–14)
WBC # BLD: 9.98 K/UL — SIGNIFICANT CHANGE UP (ref 3.8–10.5)
WBC # FLD AUTO: 9.98 K/UL — SIGNIFICANT CHANGE UP (ref 3.8–10.5)

## 2020-11-20 PROCEDURE — 71045 X-RAY EXAM CHEST 1 VIEW: CPT | Mod: 26

## 2020-11-20 PROCEDURE — 99233 SBSQ HOSP IP/OBS HIGH 50: CPT

## 2020-11-20 RX ORDER — POTASSIUM CHLORIDE 20 MEQ
20 PACKET (EA) ORAL
Refills: 0 | Status: COMPLETED | OUTPATIENT
Start: 2020-11-20 | End: 2020-11-20

## 2020-11-20 RX ORDER — RISPERIDONE 4 MG/1
3 TABLET ORAL AT BEDTIME
Refills: 0 | Status: DISCONTINUED | OUTPATIENT
Start: 2020-11-20 | End: 2020-11-27

## 2020-11-20 RX ORDER — SODIUM,POTASSIUM PHOSPHATES 278-250MG
1 POWDER IN PACKET (EA) ORAL ONCE
Refills: 0 | Status: COMPLETED | OUTPATIENT
Start: 2020-11-20 | End: 2020-11-20

## 2020-11-20 RX ORDER — CEFTRIAXONE 500 MG/1
2000 INJECTION, POWDER, FOR SOLUTION INTRAMUSCULAR; INTRAVENOUS EVERY 24 HOURS
Refills: 0 | Status: DISCONTINUED | OUTPATIENT
Start: 2020-11-20 | End: 2020-11-22

## 2020-11-20 RX ADMIN — Medication 1: at 09:01

## 2020-11-20 RX ADMIN — RISPERIDONE 3 MILLIGRAM(S): 4 TABLET ORAL at 22:11

## 2020-11-20 RX ADMIN — ATORVASTATIN CALCIUM 80 MILLIGRAM(S): 80 TABLET, FILM COATED ORAL at 21:34

## 2020-11-20 RX ADMIN — Medication 20 MILLIEQUIVALENT(S): at 10:58

## 2020-11-20 RX ADMIN — ENOXAPARIN SODIUM 40 MILLIGRAM(S): 100 INJECTION SUBCUTANEOUS at 12:43

## 2020-11-20 RX ADMIN — Medication 650 MILLIGRAM(S): at 06:19

## 2020-11-20 RX ADMIN — Medication 650 MILLIGRAM(S): at 05:16

## 2020-11-20 RX ADMIN — DIVALPROEX SODIUM 1000 MILLIGRAM(S): 500 TABLET, DELAYED RELEASE ORAL at 21:34

## 2020-11-20 RX ADMIN — Medication 20 MILLIEQUIVALENT(S): at 13:58

## 2020-11-20 RX ADMIN — Medication 1: at 17:21

## 2020-11-20 RX ADMIN — Medication 1 TABLET(S): at 10:58

## 2020-11-20 RX ADMIN — Medication 20 MILLIEQUIVALENT(S): at 12:43

## 2020-11-20 RX ADMIN — CEFTRIAXONE 100 MILLIGRAM(S): 500 INJECTION, POWDER, FOR SOLUTION INTRAMUSCULAR; INTRAVENOUS at 12:43

## 2020-11-20 NOTE — PROGRESS NOTE ADULT - PROBLEM SELECTOR PLAN 1
Patient presenting from [facility] with >24h of fever, rigors, and chills. UA+ and perinephric stranding visualized near right kidney.   -likely 2/2 to UTI/pyelonephritis  -UCx shows E.coli  -BCx - E.coli  -S/p zosyn, vanc, 2L fluid in ED  -will start Ceftriaxone 1g for 7 days.   -Switch to carbapenem if not responsive  -will consider Kidney US if symptoms don't resolve.  -trend lactate Patient presenting from [facility] with >24h of fever, rigors, and chills. UA+ and perinephric stranding visualized near right kidney.   -likely 2/2 to UTI/pyelonephritis  -UCx shows E.coli  -BCx - E.coli  -S/p zosyn, vanc, 2L fluid in ED  -will start Ceftriaxone 2g for 7 days.   -Switch to carbapenem if not responsive  -will consider Kidney US if symptoms don't resolve.  -trend lactate

## 2020-11-20 NOTE — PROGRESS NOTE ADULT - SUBJECTIVE AND OBJECTIVE BOX
Shreyas Carpenter, PGY-1  Pager [phone] (NS), [phone](DEYANIRAJ)    CHIEF COMPLAINT: Patient is a 55y old  Female who presents with a chief complaint of Fever (2020 07:48)    INTERVAL HPI/OVERNIGHT EVENTS:  -no acute events overnight  patient continues to be febrile  -BCx returned with G - Rods. PCR indicating E. Coli  -UCx returning with E.Coli  -Awaiting sensitivities will continue with Ceftriaxone       MEDICATIONS (STANDING):  atorvastatin 80 milliGRAM(s) Oral at bedtime  cefTRIAXone   IVPB 1000 milliGRAM(s) IV Intermittent every 24 hours  dextrose 40% Gel 15 Gram(s) Oral once  dextrose 5%. 1000 milliLiter(s) IV Continuous <Continuous>  dextrose 5%. 1000 milliLiter(s) IV Continuous <Continuous>  dextrose 50% Injectable 25 Gram(s) IV Push once  dextrose 50% Injectable 12.5 Gram(s) IV Push once  dextrose 50% Injectable 25 Gram(s) IV Push once  diVALproex ER 1000 milliGRAM(s) Oral at bedtime  enoxaparin Injectable 40 milliGRAM(s) SubCutaneous daily  glucagon  Injectable 1 milliGRAM(s) IntraMuscular once  insulin lispro (ADMELOG) corrective regimen sliding scale   SubCutaneous three times a day before meals  insulin lispro (ADMELOG) corrective regimen sliding scale   SubCutaneous at bedtime  risperiDONE   Tablet 2 milliGRAM(s) Oral at bedtime    MEDICATIONS  (PRN):  acetaminophen   Tablet .. 650 milliGRAM(s) Oral every 6 hours PRN  LORazepam   Injectable 1 milliGRAM(s) IV Push every 4 hours PRN      REVIEW OF SYSTEMS:  CONSTITUTIONAL: + fever  EYES: No eye pain  ENMT: No sinus or throat pain  NECK: No pain  RESPIRATORY: No shortness of breath  CARDIOVASCULAR: No chest pain, dizziness  GASTROINTESTINAL: No abdominal or epigastric pain. No diarrhea or constipation. No melena or hematochezia.  GENITOURINARY: No dysuria, hematuria  NEUROLOGICAL: No headaches  SKIN: No itching, burning, rashes, or lesions   MUSCULOSKELETAL: No muscle or joint pain    T(F): 98.6 (20 @ 06:41), Max: 103.1 (20 @ 20:25)  HR: 92 (20 @ 04:43) (90 - 98)  BP: 105/57 (20 @ 04:43) (103/60 - 141/60)  RR: 17 (20 @ 04:43) (17 - 18)  SpO2: 99% (20 @ 04:43) (94% - 100%)  Wt(kg): --  CAPILLARY BLOOD GLUCOSE      POCT Blood Glucose.: 199 mg/dL (2020 22:41)  POCT Blood Glucose.: 167 mg/dL (2020 13:56)  POCT Blood Glucose.: 171 mg/dL (2020 12:11)    I&O's Summary    2020 07:01  -  2020 07:00  --------------------------------------------------------  IN: 720 mL / OUT: 0 mL / NET: 720 mL        PHYSICAL EXAM:  GENERAL: NAD, well-groomed, well-developed  HEAD:  Atraumatic, Normocephalic  EYES: PERRLA, conjunctiva and sclera clear  ENMT: No tonsillar erythema, exudates, or enlargement; Moist mucous membranes  NECK: Supple  NERVOUS SYSTEM:  Alert & Oriented X3, Good concentration; Motor Strength 5/5 B/L upper and lower extremities  CHEST/LUNG: Clear to auscultation bilaterally; No rales, rhonchi, wheezing, or rubs  HEART: Regular rate and rhythm; No murmurs, rubs, or gallops  ABDOMEN: Soft, Nontender, Nondistended; Bowel sounds present  EXTREMITIES:  2+ Peripheral Pulses, No edema  SKIN: No rashes or lesions    LABS:                        x      9.98  )-----------( x        ( 2020 05:43 )             x        11-19    136  |  94<L>  |  12  ----------------------------<  179<H>  3.9   |  25  |  0.70    Ca    10.0      2020 02:25    TPro  7.2  /  Alb  3.6  /  TBili  0.5  /  DBili  x   /  AST  31  /  ALT  12  /  AlkPhos  63        Urinalysis Basic - ( 2020 05:36 )    Color: YELLOW / Appearance: Lt TURBID / S.026 / pH: 6.0  Gluc: NEGATIVE / Ketone: SMALL  / Bili: NEGATIVE / Urobili: NORMAL   Blood: LARGE / Protein: 300 / Nitrite: POSITIVE   Leuk Esterase: LARGE / RBC: 6-10 / WBC >50   Sq Epi: OCC / Non Sq Epi: x / Bacteria: MANY    RADIOLOGY & ADDITIONAL TESTS:  < from: CT Chest No Cont (20 @ 04:29) >    IMPRESSION:  No evidence for pneumonia or effusion.< from: Xray Chest 1 View- PORTABLE-Urgent (20 @ 03:32) >  IMPRESSION:  No focal consolidation.    CXR:  Partially visualized somewhat asymmetric left perinephric stranding in the upper abdomen. Please correlate clinically with urinalysis and urine culture as infectious etiology cannot be excluded.       Shreyas Carpenter, PGY-1  Pager [phone] (NS), [phone](DEYANIRAJ)    CHIEF COMPLAINT: Patient is a 55y old  Female who presents with a chief complaint of Fever (2020 07:48)    INTERVAL HPI/OVERNIGHT EVENTS:  -no acute events overnight  patient continues to be febrile  -BCx returned with G - Rods. PCR indicating E. Coli  -UCx returning with E.Coli  -Awaiting sensitivities will continue with Ceftriaxone       MEDICATIONS (STANDING):  atorvastatin 80 milliGRAM(s) Oral at bedtime  cefTRIAXone   IVPB 1000 milliGRAM(s) IV Intermittent every 24 hours  dextrose 40% Gel 15 Gram(s) Oral once  dextrose 5%. 1000 milliLiter(s) IV Continuous <Continuous>  dextrose 5%. 1000 milliLiter(s) IV Continuous <Continuous>  dextrose 50% Injectable 25 Gram(s) IV Push once  dextrose 50% Injectable 12.5 Gram(s) IV Push once  dextrose 50% Injectable 25 Gram(s) IV Push once  diVALproex ER 1000 milliGRAM(s) Oral at bedtime  enoxaparin Injectable 40 milliGRAM(s) SubCutaneous daily  glucagon  Injectable 1 milliGRAM(s) IntraMuscular once  insulin lispro (ADMELOG) corrective regimen sliding scale   SubCutaneous three times a day before meals  insulin lispro (ADMELOG) corrective regimen sliding scale   SubCutaneous at bedtime  risperiDONE   Tablet 2 milliGRAM(s) Oral at bedtime    MEDICATIONS  (PRN):  acetaminophen   Tablet .. 650 milliGRAM(s) Oral every 6 hours PRN  LORazepam   Injectable 1 milliGRAM(s) IV Push every 4 hours PRN      REVIEW OF SYSTEMS:  CONSTITUTIONAL: + fever  EYES: No eye pain  ENMT: No sinus or throat pain  NECK: No pain  RESPIRATORY: No shortness of breath  CARDIOVASCULAR: No chest pain, dizziness  GASTROINTESTINAL: No abdominal or epigastric pain. No diarrhea or constipation. No melena or hematochezia.  GENITOURINARY: No dysuria, hematuria  NEUROLOGICAL: No headaches  SKIN: No itching, burning, rashes, or lesions   MUSCULOSKELETAL: No muscle or joint pain    T(F): 98.6 (20 @ 06:41), Max: 103.1 (20 @ 20:25)  HR: 92 (20 @ 04:43) (90 - 98)  BP: 105/57 (20 @ 04:43) (103/60 - 141/60)  RR: 17 (20 @ 04:43) (17 - 18)  SpO2: 99% (20 @ 04:43) (94% - 100%)  Wt(kg): --  CAPILLARY BLOOD GLUCOSE      POCT Blood Glucose.: 199 mg/dL (2020 22:41)  POCT Blood Glucose.: 167 mg/dL (2020 13:56)  POCT Blood Glucose.: 171 mg/dL (2020 12:11)    I&O's Summary    2020 07:01  -  2020 07:00  --------------------------------------------------------  IN: 720 mL / OUT: 0 mL / NET: 720 mL    PHYSICAL EXAM:  GENERAL: NAD, well-groomed, well-developed  HEAD:  Atraumatic, Normocephalic  EYES: PERRLA, conjunctiva and sclera clear  ENMT: No tonsillar erythema, exudates, or enlargement; Moist mucous membranes  NECK: Supple  NERVOUS SYSTEM:  Alert & Oriented X3, Good concentration; Motor Strength 5/5 B/L upper and lower extremities  CHEST/LUNG: Clear to auscultation bilaterally; No rales, rhonchi, wheezing, or rubs  HEART: Regular rate and rhythm; No murmurs, rubs, or gallops  ABDOMEN: Soft, Nontender, Nondistended; Bowel sounds present  EXTREMITIES:  2+ Peripheral Pulses, No edema  SKIN: No rashes or lesions    LABS:                        x      9.98  )-----------( x        ( 2020 05:43 )             x        11-19    136  |  94<L>  |  12  ----------------------------<  179<H>  3.9   |  25  |  0.70    Ca    10.0      2020 02:25    TPro  7.2  /  Alb  3.6  /  TBili  0.5  /  DBili  x   /  AST  31  /  ALT  12  /  AlkPhos  63        Urinalysis Basic - ( 2020 05:36 )    Color: YELLOW / Appearance: Lt TURBID / S.026 / pH: 6.0  Gluc: NEGATIVE / Ketone: SMALL  / Bili: NEGATIVE / Urobili: NORMAL   Blood: LARGE / Protein: 300 / Nitrite: POSITIVE   Leuk Esterase: LARGE / RBC: 6-10 / WBC >50   Sq Epi: OCC / Non Sq Epi: x / Bacteria: MANY    RADIOLOGY & ADDITIONAL TESTS:  < from: CT Chest No Cont (20 @ 04:29) >    IMPRESSION:  No evidence for pneumonia or effusion.< from: Xray Chest 1 View- PORTABLE-Urgent (20 @ 03:32) >  IMPRESSION:  No focal consolidation.    CXR:  Partially visualized somewhat asymmetric left perinephric stranding in the upper abdomen. Please correlate clinically with urinalysis and urine culture as infectious etiology cannot be excluded.

## 2020-11-20 NOTE — PROGRESS NOTE BEHAVIORAL HEALTH - OTHER
possible mild cogwheel in right wrist- unclear if volitional resistance played a part. no cogwheel in left. no rigidity, no stiffness not assessed - pt in bed goal directed improving

## 2020-11-20 NOTE — PROGRESS NOTE ADULT - PROBLEM SELECTOR PLAN 2
Pt with elevated Ck to 924 on admission.  -likely 2/2 to uncontrolled rigors for >24h  -will trend in am

## 2020-11-20 NOTE — PROGRESS NOTE ADULT - PROBLEM SELECTOR PLAN 3
-on metformin, hayes Matamoros at [facility], will hold  -will continue inpatient with LDSS.

## 2020-11-20 NOTE — PROGRESS NOTE BEHAVIORAL HEALTH - NSBHFUPINTERVALHXFT_PSY_A_CORE
Chart reviewed. Patient found sleeping this AM but easily awoke to voice.  Her attention and alertness is improved- though still has some difficulty following commands (demonstrating whether she has asterixis). She denies SI/HI. Denies AH/VH. Knows that it is November - and week before thanksgiving. Discussed importance of repeat echo.

## 2020-11-20 NOTE — PROGRESS NOTE ADULT - PROBLEM SELECTOR PLAN 5
Patient with history of schizoaffective disorder and bizarre behaviour.  -c/w [facility] inpatient meds  - c/w Benztropine 1mg, Depakote 1000mg, Risperidone 2mg.  -hold Clonazepam as per Psychiatry until myocarditis can be r/o Patient with history of schizoaffective disorder and bizarre behaviour.  -c/w [facility] inpatient meds  -c/w Benztropine 1mg, Depakote 1000mg, Risperidone 3mg.  -hold Clozapine as per Psychiatry until myocarditis can be r/o via echo  -if ECHO clear then can start 12.5 Clozapine

## 2020-11-20 NOTE — PROGRESS NOTE ADULT - ATTENDING COMMENTS
Patient reports feeling better overall. Reports mild suprapubic pain/discomfort. Denies other complaints. Requesting soda.    F/u UCx/BCx for full sensitivities.

## 2020-11-21 ENCOUNTER — TRANSCRIPTION ENCOUNTER (OUTPATIENT)
Age: 55
End: 2020-11-21

## 2020-11-21 DIAGNOSIS — R74.8 ABNORMAL LEVELS OF OTHER SERUM ENZYMES: ICD-10-CM

## 2020-11-21 DIAGNOSIS — R06.02 SHORTNESS OF BREATH: ICD-10-CM

## 2020-11-21 LAB
-  AMIKACIN: SIGNIFICANT CHANGE UP
-  AMPICILLIN/SULBACTAM: SIGNIFICANT CHANGE UP
-  AMPICILLIN: SIGNIFICANT CHANGE UP
-  AZTREONAM: SIGNIFICANT CHANGE UP
-  CEFAZOLIN: SIGNIFICANT CHANGE UP
-  CEFEPIME: SIGNIFICANT CHANGE UP
-  CEFOXITIN: SIGNIFICANT CHANGE UP
-  CEFTRIAXONE: SIGNIFICANT CHANGE UP
-  CIPROFLOXACIN: SIGNIFICANT CHANGE UP
-  ERTAPENEM: SIGNIFICANT CHANGE UP
-  GENTAMICIN: SIGNIFICANT CHANGE UP
-  IMIPENEM: SIGNIFICANT CHANGE UP
-  LEVOFLOXACIN: SIGNIFICANT CHANGE UP
-  MEROPENEM: SIGNIFICANT CHANGE UP
-  PIPERACILLIN/TAZOBACTAM: SIGNIFICANT CHANGE UP
-  TOBRAMYCIN: SIGNIFICANT CHANGE UP
-  TRIMETHOPRIM/SULFAMETHOXAZOLE: SIGNIFICANT CHANGE UP
ALBUMIN SERPL ELPH-MCNC: 3.1 G/DL — LOW (ref 3.3–5)
ALP SERPL-CCNC: 141 U/L — HIGH (ref 40–120)
ALT FLD-CCNC: 550 U/L — HIGH (ref 4–33)
ANION GAP SERPL CALC-SCNC: 11 MMO/L — SIGNIFICANT CHANGE UP (ref 7–14)
AST SERPL-CCNC: 686 U/L — HIGH (ref 4–32)
BASOPHILS # BLD AUTO: 0.01 K/UL — SIGNIFICANT CHANGE UP (ref 0–0.2)
BASOPHILS NFR BLD AUTO: 0.1 % — SIGNIFICANT CHANGE UP (ref 0–2)
BILIRUB SERPL-MCNC: 0.3 MG/DL — SIGNIFICANT CHANGE UP (ref 0.2–1.2)
BUN SERPL-MCNC: 13 MG/DL — SIGNIFICANT CHANGE UP (ref 7–23)
CALCIUM SERPL-MCNC: 8.7 MG/DL — SIGNIFICANT CHANGE UP (ref 8.4–10.5)
CHLORIDE SERPL-SCNC: 99 MMOL/L — SIGNIFICANT CHANGE UP (ref 98–107)
CK SERPL-CCNC: 334 U/L — HIGH (ref 25–170)
CO2 SERPL-SCNC: 26 MMOL/L — SIGNIFICANT CHANGE UP (ref 22–31)
CREAT SERPL-MCNC: 0.53 MG/DL — SIGNIFICANT CHANGE UP (ref 0.5–1.3)
CULTURE RESULTS: SIGNIFICANT CHANGE UP
EOSINOPHIL # BLD AUTO: 0 K/UL — SIGNIFICANT CHANGE UP (ref 0–0.5)
EOSINOPHIL NFR BLD AUTO: 0 % — SIGNIFICANT CHANGE UP (ref 0–6)
GLUCOSE BLDC GLUCOMTR-MCNC: 147 MG/DL — HIGH (ref 70–99)
GLUCOSE BLDC GLUCOMTR-MCNC: 152 MG/DL — HIGH (ref 70–99)
GLUCOSE BLDC GLUCOMTR-MCNC: 182 MG/DL — HIGH (ref 70–99)
GLUCOSE BLDC GLUCOMTR-MCNC: 197 MG/DL — HIGH (ref 70–99)
GLUCOSE SERPL-MCNC: 151 MG/DL — HIGH (ref 70–99)
HCT VFR BLD CALC: 27.5 % — LOW (ref 34.5–45)
HGB BLD-MCNC: 8.7 G/DL — LOW (ref 11.5–15.5)
IMM GRANULOCYTES NFR BLD AUTO: 1.5 % — SIGNIFICANT CHANGE UP (ref 0–1.5)
LYMPHOCYTES # BLD AUTO: 1.33 K/UL — SIGNIFICANT CHANGE UP (ref 1–3.3)
LYMPHOCYTES # BLD AUTO: 18.3 % — SIGNIFICANT CHANGE UP (ref 13–44)
MAGNESIUM SERPL-MCNC: 2.2 MG/DL — SIGNIFICANT CHANGE UP (ref 1.6–2.6)
MCHC RBC-ENTMCNC: 26.9 PG — LOW (ref 27–34)
MCHC RBC-ENTMCNC: 31.6 % — LOW (ref 32–36)
MCV RBC AUTO: 84.9 FL — SIGNIFICANT CHANGE UP (ref 80–100)
METHOD TYPE: SIGNIFICANT CHANGE UP
MONOCYTES # BLD AUTO: 0.72 K/UL — SIGNIFICANT CHANGE UP (ref 0–0.9)
MONOCYTES NFR BLD AUTO: 9.9 % — SIGNIFICANT CHANGE UP (ref 2–14)
NEUTROPHILS # BLD AUTO: 5.1 K/UL — SIGNIFICANT CHANGE UP (ref 1.8–7.4)
NEUTROPHILS NFR BLD AUTO: 70.2 % — SIGNIFICANT CHANGE UP (ref 43–77)
NRBC # FLD: 0 K/UL — SIGNIFICANT CHANGE UP (ref 0–0)
ORGANISM # SPEC MICROSCOPIC CNT: SIGNIFICANT CHANGE UP
PHOSPHATE SERPL-MCNC: 1.9 MG/DL — LOW (ref 2.5–4.5)
PLATELET # BLD AUTO: 274 K/UL — SIGNIFICANT CHANGE UP (ref 150–400)
PMV BLD: 9.5 FL — SIGNIFICANT CHANGE UP (ref 7–13)
POTASSIUM SERPL-MCNC: 3.6 MMOL/L — SIGNIFICANT CHANGE UP (ref 3.5–5.3)
POTASSIUM SERPL-SCNC: 3.6 MMOL/L — SIGNIFICANT CHANGE UP (ref 3.5–5.3)
PROT SERPL-MCNC: 6.4 G/DL — SIGNIFICANT CHANGE UP (ref 6–8.3)
RBC # BLD: 3.24 M/UL — LOW (ref 3.8–5.2)
RBC # FLD: 14.4 % — SIGNIFICANT CHANGE UP (ref 10.3–14.5)
SODIUM SERPL-SCNC: 136 MMOL/L — SIGNIFICANT CHANGE UP (ref 135–145)
SPECIMEN SOURCE: SIGNIFICANT CHANGE UP
WBC # BLD: 7.27 K/UL — SIGNIFICANT CHANGE UP (ref 3.8–10.5)
WBC # FLD AUTO: 7.27 K/UL — SIGNIFICANT CHANGE UP (ref 3.8–10.5)

## 2020-11-21 PROCEDURE — 99233 SBSQ HOSP IP/OBS HIGH 50: CPT | Mod: GC

## 2020-11-21 PROCEDURE — 99231 SBSQ HOSP IP/OBS SF/LOW 25: CPT

## 2020-11-21 RX ORDER — FUROSEMIDE 40 MG
20 TABLET ORAL ONCE
Refills: 0 | Status: COMPLETED | OUTPATIENT
Start: 2020-11-21 | End: 2020-11-21

## 2020-11-21 RX ORDER — SODIUM,POTASSIUM PHOSPHATES 278-250MG
1 POWDER IN PACKET (EA) ORAL
Refills: 0 | Status: COMPLETED | OUTPATIENT
Start: 2020-11-21 | End: 2020-11-22

## 2020-11-21 RX ADMIN — Medication 1 TABLET(S): at 17:38

## 2020-11-21 RX ADMIN — Medication 1: at 12:44

## 2020-11-21 RX ADMIN — Medication 1: at 17:38

## 2020-11-21 RX ADMIN — CEFTRIAXONE 100 MILLIGRAM(S): 500 INJECTION, POWDER, FOR SOLUTION INTRAMUSCULAR; INTRAVENOUS at 11:59

## 2020-11-21 RX ADMIN — Medication 1: at 08:43

## 2020-11-21 RX ADMIN — ENOXAPARIN SODIUM 40 MILLIGRAM(S): 100 INJECTION SUBCUTANEOUS at 11:59

## 2020-11-21 RX ADMIN — Medication 1 TABLET(S): at 10:15

## 2020-11-21 RX ADMIN — Medication 20 MILLIGRAM(S): at 11:59

## 2020-11-21 RX ADMIN — RISPERIDONE 3 MILLIGRAM(S): 4 TABLET ORAL at 21:06

## 2020-11-21 NOTE — PROGRESS NOTE ADULT - PROBLEM SELECTOR PLAN 3
-on metformin, hayes Matamoros at [facility], will hold  -will continue inpatient with LDSS. Patient had an episode of desaturation to 88% yesterday during vital check.  -spontaneously resolved and is now sating well on RA  -CXR showed b/l pulmonary edema  -IV Lasix 20mg if SOB again Patient had an episode of desaturation to 88% yesterday during vital check.  -spontaneously resolved and is now sating well on RA  -CXR showed b/l pulmonary edema  -IV Lasix 20mg given. Can repeat again if needed

## 2020-11-21 NOTE — PROGRESS NOTE ADULT - PROBLEM SELECTOR PLAN 5
Patient with history of schizoaffective disorder and bizarre behaviour.  -c/w [facility] inpatient meds  -c/w Benztropine 1mg, Depakote 1000mg, Risperidone 3mg.  -hold Clozapine as per Psychiatry until myocarditis can be r/o via echo  -if ECHO clear then can start 12.5 Clozapine -on metformin, Januvia, amryl at [facility], will hold  -will continue inpatient with LDSS.

## 2020-11-21 NOTE — DISCHARGE NOTE PROVIDER - NSDCMRMEDTOKEN_GEN_ALL_CORE_FT
atorvastatin 80 mg oral tablet: 1 tab(s) orally once a day  Clozaril: 250 milligram(s) orally once a day (at bedtime)  Cogentin 1 mg oral tablet: 1 tab(s) orally once a day (at bedtime)  divalproex sodium 500 mg oral tablet, extended release: 2 tab(s) orally once a day (at bedtime)  metFORMIN 1000 mg oral tablet: 1 tab(s) orally 2 times a day  RisperDAL 2 mg oral tablet: 1 tab(s) orally once a day (at bedtime)   atorvastatin 80 mg oral tablet: 1 tab(s) orally once a day  benztropine 2 mg oral tablet: 1 tab(s) orally once a day  cloZAPine 25 mg oral tablet: 1 tab(s) orally once a day (at bedtime)  cloZAPine 25 mg oral tablet: 1 tab(s) orally   insulin glargine: 5 unit(s) subcutaneous once a day (at bedtime)  metFORMIN 1000 mg oral tablet: 1 tab(s) orally 2 times a day  polyethylene glycol 3350 oral powder for reconstitution: 17 gram(s) orally once a day  risperiDONE 3 mg oral tablet: 1 tab(s) orally once a day (at bedtime)  senna oral tablet: 2 tab(s) orally once a day (at bedtime)

## 2020-11-21 NOTE — DISCHARGE NOTE PROVIDER - NSCORESITESY/N_GEN_A_CORE_RD
After Visit Summary   8/10/2018    Ginger Abreu    MRN: [id number]           Patient Information     Date Of Birth          1942        Visit Information        Provider Department      8/10/2018 10:15 AM Karla Novoa PA-C [facility]        Today's Diagnoses     AK (actinic keratosis)    -  1       Follow-ups after your visit        Your next 10 appointments already scheduled     Aug 20, 2018  1:30 PM CDT   NM THYroid with SHNMINJ   [facility] ([facility])    [address] [id number] [phone]            Aug 21, 2018  1:30 PM CDT   NM THYROID UPTAKE AND SCAN with SHNM2   [facility] ([facility])    [address] [id number] [phone]           Please bring a list of your medicines to the hospital. Include vitamins, minerals and over-the-counter drugs.  If you take a thyroid hormone, your doctor will tell you when to stop taking it.  Your doctor may tell you to stop taking it 3 to 6 weeks before your test or treatment.  For 3 to 4 weeks before your test or treatment, avoid foods or medicines that contain large amounts of iodine. These include seafood, iodized salt, cold medicine and IV dye (used during medical exams). Tell your doctor if you have had any of these in the last two months.  Stop eating 2 hours before your first visit. You may drink water.  No iodinated contrast for 30 days prior to exam.  Tell your doctor if you are breastfeeding or if there s any chance you may be pregnant.  Please call your Imaging Department at your exam site with any questions.            Oct 22, 2018  2:00 PM CDT   LAB with VIDAL LAB   [facility] ([facility])    [address]  [facility] [id number] [phone]           Please do not eat 10-12 hours before your appointment if you  are coming in fasting for labs on lipids, cholesterol, or glucose (sugar). This does not apply to pregnant women. Water, hot tea and black coffee (with nothing added) are okay. Do not drink other fluids, diet soda or chew gum.            Oct 22, 2018  2:45 PM CDT   Return Visit with Vaibhav Maldonado MD   [facility] ([facility])    [address] [place], [place] [id number]  [facility] [address] [phone] OPT 2              Who to contact     If you have questions or need follow up information about today's clinic visit or your schedule please contact [facility] directly at [phone].  Normal or non-critical lab and imaging results will be communicated to you by [employer], letter or phone within 4 business days after the clinic has received the results. If you do not hear from us within 7 days, please contact the clinic through [employer] or phone. If you have a critical or abnormal lab result, we will notify you by phone as soon as possible.  Submit refill requests through [employer] or call your pharmacy and they will forward the refill request to us. Please allow 3 business days for your refill to be completed.          Additional Information About Your Visit        [employer] Information     [employer] gives you secure access to your electronic health record. If you see a primary care provider, you can also send messages to your care team and make appointments. If you have questions, please call your primary care clinic.  If you do not have a primary care provider, please call [phone] and they will assist you.        Care EveryWhere ID     This is your Care EveryWhere ID. This could be used by other organizations to access your [place] medical records  [id number]        Your Vitals Were     Pulse Pulse Oximetry Breastfeeding?             57 95% No          Blood Pressure from Last 3 Encounters:   08/10/18 132/65   07/10/18 126/65   02/02/18  137/80    Weight from Last 3 Encounters:   02/02/18 75.3 kg (166 lb)   01/03/18 74.8 kg (165 lb)   12/21/17 74.4 kg (164 lb)              We Performed the Following     DESTRUCT PREMALIGNANT LESION, 2-14     DESTRUCT PREMALIGNANT LESION, FIRST        Primary Care Provider Office Phone # Fax #    Steven Fonseca [phone]       [facility] [address] [address]        Equal Access to Services     [facility]: Hadii aad ku hadasho Soomaali, waaxda luqadaha, qaybta kaalmada adeegyada, waxay fartunin hayaan kirsten ham . So [facility] [phone].    ATENCIÓN: Si habla español, tiene a decker disposición servicios gratuitos de asistencia lingüística. [facility] [phone].    We comply with applicable federal civil rights laws and [place] laws. We do not discriminate on the basis of race, color, national origin, age, disability, sex, sexual orientation, or gender identity.            Thank you!     Thank you for choosing [facility]  for your care. Our goal is always to provide you with excellent care. Hearing back from our patients is one way we can continue to improve our services. Please take a few minutes to complete the written survey that you may receive in the mail after your visit with us. Thank you!             Your Updated Medication List - Protect others around you: Learn how to safely use, store and throw away your medicines at www.disposemymeds.org.          This list is accurate as of 8/10/18 12:39 PM.  Always use your most recent med list.                   Brand Name Dispense Instructions for use Diagnosis    albuterol 108 (90 Base) MCG/ACT inhaler    PROAIR HFA/PROVENTIL HFA/VENTOLIN HFA    1 Inhaler    Inhale 2 puffs into the lungs every 6 hours INDICATION: RESCUE INHALER FOR SHORTNESS OF BREATH, CHEST TIGHTNESS AND COUGH    Mild persistent asthma without complication       ALPRAZolam 0.25 MG tablet    XANAX    30 tablet    TAKE  ONE TABLET BY MOUTH ONCE DAILY AT  NIGHT  AS  NEEDED  FOR  SEVERE  ANXIETY  ONLY    Anxiety       ascorbic acid 1000 MG Tabs    vitamin C    100 tablet    Take 1 tablet (1,000 mg) by mouth daily VITAMIN C REPLACEMENT    Scurvy       aspirin 81 MG EC tablet      Take 1 tablet (81 mg) by mouth daily    Renal artery stenosis (H)       BETA BLOCKER NOT PRESCRIBED (INTENTIONAL)      Beta Blocker not prescribed intentionally due to Intolerance    Type 2 diabetes, HbA1c goal < 7% (H), Hypertension goal BP (blood pressure) < 130/80       butalbital-acetaminophen-caffeine -40 MG per tablet    FIORICET/ESGIC    30 tablet    TAKE ONE TABLET BY MOUTH EVERY 8 HOURS AS NEEDED FOR  SEVERE  MIGRAINE  HEADACHE  ONLY    Migraine with aura and without status migrainosus, not intractable       cephALEXin 500 MG capsule    KEFLEX     Take 1 capsule by mouth 2 times daily        cloNIDine 0.1 MG tablet    CATAPRES    540 tablet    Take 2 tablets (0.2 mg) by mouth 3 times daily (with meals) INDICATION: TO LOWER BLOOD PRESSURE    Essential hypertension, benign       conjugated estrogens cream    PREMARIN    30 g    Place 0.5 g vaginally twice a week    Recurrent urinary tract infection       esomeprazole 40 MG CR capsule    nexIUM    180 capsule    Take 1 capsule (40 mg) by mouth every morning (before breakfast) TAKE  30'-60' BEFORE 1ST MEAL    Gastroesophageal reflux disease without esophagitis       furosemide 20 MG tablet    LASIX    30 tablet    Take 1 tablet (20 mg) by mouth daily    Benign essential hypertension       glimepiride 2 MG tablet    AMARYL    90 tablet    TAKE ONE TABLET (2 MG) BY MOUTH IN THE MORNING BEFORE BREAKFAST TO TREAT DIABETES    Type 2 diabetes mellitus without complication, without long-term current use of insulin (H)       HERBALS      alphalithiod - 600 mg twice daily        LACTOBACILLUS EXTRA STRENGTH Caps     30 capsule    Take 1 capsule by mouth daily INDICATION: TO RESTORE GOOD INTESTINAL BACTERIA     Dyspepsia       losartan 50 MG tablet    COZAAR    30 tablet    Take 1 tablet (50 mg) by mouth daily    Benign essential hypertension       melatonin 5 MG Caps      Take by mouth At Bedtime        nitroGLYcerin 0.4 MG sublingual tablet    NITROSTAT    25 tablet    Place 1 tablet (0.4 mg) under the tongue every 5 minutes as needed for chest pain    Exertional dyspnea       order for DME     1 Units    Equipment being ordered: Wheeled walker with basket and seat attachment    Spinal stenosis, lumbar region, without neurogenic claudication       pravastatin 80 MG tablet    PRAVACHOL    45 tablet    Take 0.5 tablets (40 mg) by mouth every evening    Hyperlipidemia LDL goal <100       triamterene-hydrochlorothiazide 37.5-25 MG per tablet    MAXZIDE-25    90 tablet    Take 1 tablet by mouth every morning INDICATION:TO LOWER BLOOD PRESSURE AND CONTROL EDEMA    Benign essential hypertension          No

## 2020-11-21 NOTE — PROGRESS NOTE BEHAVIORAL HEALTH - NSBHFUPINTERVALHXFT_PSY_A_CORE
Chart reviewed. Patient awake, seems disorganized, hospital gown up revealing her private parts. Pt denies physical complaints. She reports fair mood, sleep, appetite.. She denies SI/HI. Denies AH/VH. Tangential, delusional , thought disordered. Knows that it is November that she was in [facility]  prior to transfer.

## 2020-11-21 NOTE — DISCHARGE NOTE PROVIDER - NSFOLLOWUPCLINICS_GEN_ALL_ED_FT
[facility] at [place]  Internal Medicine  [address] [place] [address]  Phone: [phone]  Fax: [phone]  Follow Up Time:      [facility] Specialties at [place]  Internal Medicine  [address] [place] [address]  Phone: [phone]  Fax: [phone]    [facility]  Urology  [address] [place] [address] [address] [place] [address]  Phone: [phone]  Fax:   Follow Up Time:

## 2020-11-21 NOTE — PROGRESS NOTE ADULT - PROBLEM SELECTOR PLAN 1
Patient presenting from [facility] with >24h of fever, rigors, and chills. UA+ and perinephric stranding visualized near right kidney.   -likely 2/2 to UTI/pyelonephritis  -UCx shows E.coli  -BCx - E.coli  -S/p zosyn, vanc, 2L fluid in ED  -will start Ceftriaxone 2g for 7 days.   -Switch to carbapenem if not responsive  -will consider Kidney US if symptoms don't resolve.

## 2020-11-21 NOTE — PROGRESS NOTE ADULT - ATTENDING COMMENTS
Patient seen and examined, chart and labs reviewed. Case discussed with house staff.     Patient has no complaints, HPI unreliable.     #SOB - CXR showing pulmonary edema, on my exam with diffuse bibasilar crackles, will give lasix 20mg IVP x1 and reassess. Awaiting TTE for further eval. Monitor volume status. Is/Os, daily weights.    #Transaminitis - new LFT elevation, unclear etiology. Will stop statin, check Abd US, hep panel and acetaminophen level. Psych recs appreciated, hold Depakote. Trend CMP in AM.

## 2020-11-21 NOTE — PROGRESS NOTE ADULT - SUBJECTIVE AND OBJECTIVE BOX
Shreyas Carpenter, PGY-1  Pager [phone] (NS), [phone](DEYANIRAJ)    CHIEF COMPLAINT: Patient is a 55y old  Female who presents with a chief complaint of Fever (20 Nov 2020 07:55)    INTERVAL HPI/OVERNIGHT EVENTS:  -no acute events overnight       MEDICATIONS (STANDING):  atorvastatin 80 milliGRAM(s) Oral at bedtime  cefTRIAXone   IVPB 2000 milliGRAM(s) IV Intermittent every 24 hours  dextrose 40% Gel 15 Gram(s) Oral once  dextrose 5%. 1000 milliLiter(s) IV Continuous <Continuous>  dextrose 5%. 1000 milliLiter(s) IV Continuous <Continuous>  dextrose 50% Injectable 25 Gram(s) IV Push once  dextrose 50% Injectable 12.5 Gram(s) IV Push once  dextrose 50% Injectable 25 Gram(s) IV Push once  diVALproex ER 1000 milliGRAM(s) Oral at bedtime  enoxaparin Injectable 40 milliGRAM(s) SubCutaneous daily  glucagon  Injectable 1 milliGRAM(s) IntraMuscular once  insulin lispro (ADMELOG) corrective regimen sliding scale   SubCutaneous three times a day before meals  insulin lispro (ADMELOG) corrective regimen sliding scale   SubCutaneous at bedtime  risperiDONE   Tablet 3 milliGRAM(s) Oral at bedtime    MEDICATIONS  (PRN):  acetaminophen   Tablet .. 650 milliGRAM(s) Oral every 6 hours PRN  LORazepam   Injectable 1 milliGRAM(s) IV Push every 4 hours PRN      REVIEW OF SYSTEMS:  CONSTITUTIONAL: No fever  EYES: No eye pain  ENMT: No sinus or throat pain  NECK: No pain  RESPIRATORY: No shortness of breath  CARDIOVASCULAR: No chest pain, dizziness  GASTROINTESTINAL: No abdominal or epigastric pain. No diarrhea or constipation. No melena or hematochezia.  GENITOURINARY: No dysuria, hematuria  NEUROLOGICAL: No headaches  SKIN: No itching, burning, rashes, or lesions   MUSCULOSKELETAL: No muscle or joint pain    T(F): 98.3 (11-21-20 @ 05:11), Max: 100.2 (11-20-20 @ 20:49)  HR: 83 (11-21-20 @ 05:11) (83 - 97)  BP: 105/63 (11-21-20 @ 05:11) (105/63 - 117/66)  RR: 17 (11-21-20 @ 05:11) (17 - 18)  SpO2: 94% (11-21-20 @ 05:11) (91% - 95%)  Wt(kg): --  CAPILLARY BLOOD GLUCOSE  138 (20 Nov 2020 12:18)  170 (20 Nov 2020 08:17)      POCT Blood Glucose.: 192 mg/dL (20 Nov 2020 21:43)  POCT Blood Glucose.: 199 mg/dL (20 Nov 2020 17:05)  POCT Blood Glucose.: 170 mg/dL (20 Nov 2020 08:19)    I&O's Summary    20 Nov 2020 07:01  -  21 Nov 2020 07:00  --------------------------------------------------------  IN: 480 mL / OUT: 0 mL / NET: 480 mL        PHYSICAL EXAM:  GENERAL: NAD, well-groomed, well-developed  HEAD:  Atraumatic, Normocephalic  EYES: PERRLA, conjunctiva and sclera clear  ENMT: No tonsillar erythema, exudates, or enlargement; Moist mucous membranes  NECK: Supple  NERVOUS SYSTEM:  Alert & Oriented X3, Good concentration; Motor Strength 5/5 B/L upper and lower extremities  CHEST/LUNG: Clear to auscultation bilaterally; No rales, rhonchi, wheezing, or rubs  HEART: Regular rate and rhythm; No murmurs, rubs, or gallops  ABDOMEN: Soft, Nontender, Nondistended; Bowel sounds present  EXTREMITIES:  2+ Peripheral Pulses, No edema  SKIN: No rashes or lesions    LABS:                        9.7    9.98  )-----------( 241      ( 20 Nov 2020 05:43 )             30.1     11-20    138  |  99  |  15  ----------------------------<  149<H>  3.4<L>   |  27  |  0.58    Ca    8.9      20 Nov 2020 05:43  Phos  2.4     11-20  Mg     1.9     11-20    TPro  6.6  /  Alb  3.3  /  TBili  0.4  /  DBili  x   /  AST  38<H>  /  ALT  25  /  AlkPhos  67  11-20    RADIOLOGY & ADDITIONAL TESTS:  < from: CT Chest No Cont (11.19.20 @ 04:29) >    IMPRESSION:  No evidence for pneumonia or effusion.    Partially visualized somewhat asymmetric left perinephric stranding in the upper abdomen. Please correlate clinically with urinalysis and urine culture as infectious etiology cannot be excluded.      < from: Xray Chest 1 View- PORTABLE-Routine (Xray Chest 1 View- PORTABLE-Routine .) (11.20.20 @ 14:44) >  IMPRESSION:    Bilateral perihilar opacities representing pulmonary edema. No consolidation, pleural effusion or pneumothorax.       Shreyas Carpenter, PGY-1  Pager [phone] (NS), [phone](SILVANO)    CHIEF COMPLAINT: Patient is a 55y old  Female who presents with a chief complaint of Fever (20 Nov 2020 07:55)    INTERVAL HPI/OVERNIGHT EVENTS:  -no acute events overnight   -patient appeared less lethargic this morning but could maintain concentration or train of though.  -pending ECHO      MEDICATIONS (STANDING):  atorvastatin 80 milliGRAM(s) Oral at bedtime  cefTRIAXone   IVPB 2000 milliGRAM(s) IV Intermittent every 24 hours  dextrose 40% Gel 15 Gram(s) Oral once  dextrose 5%. 1000 milliLiter(s) IV Continuous <Continuous>  dextrose 5%. 1000 milliLiter(s) IV Continuous <Continuous>  dextrose 50% Injectable 25 Gram(s) IV Push once  dextrose 50% Injectable 12.5 Gram(s) IV Push once  dextrose 50% Injectable 25 Gram(s) IV Push once  diVALproex ER 1000 milliGRAM(s) Oral at bedtime  enoxaparin Injectable 40 milliGRAM(s) SubCutaneous daily  glucagon  Injectable 1 milliGRAM(s) IntraMuscular once  insulin lispro (ADMELOG) corrective regimen sliding scale   SubCutaneous three times a day before meals  insulin lispro (ADMELOG) corrective regimen sliding scale   SubCutaneous at bedtime  risperiDONE   Tablet 3 milliGRAM(s) Oral at bedtime    MEDICATIONS  (PRN):  acetaminophen   Tablet .. 650 milliGRAM(s) Oral every 6 hours PRN  LORazepam   Injectable 1 milliGRAM(s) IV Push every 4 hours PRN      REVIEW OF SYSTEMS:  CONSTITUTIONAL: No fever  EYES: No eye pain  ENMT: No sinus or throat pain  NECK: No pain  RESPIRATORY: No shortness of breath  CARDIOVASCULAR: No chest pain, dizziness  GASTROINTESTINAL: No abdominal or epigastric pain. No diarrhea or constipation. No melena or hematochezia.  GENITOURINARY: No dysuria, hematuria  NEUROLOGICAL: No headaches  SKIN: No itching, burning, rashes, or lesions   MUSCULOSKELETAL: No muscle or joint pain    T(F): 98.3 (11-21-20 @ 05:11), Max: 100.2 (11-20-20 @ 20:49)  HR: 83 (11-21-20 @ 05:11) (83 - 97)  BP: 105/63 (11-21-20 @ 05:11) (105/63 - 117/66)  RR: 17 (11-21-20 @ 05:11) (17 - 18)  SpO2: 94% (11-21-20 @ 05:11) (91% - 95%)  Wt(kg): --  CAPILLARY BLOOD GLUCOSE  138 (20 Nov 2020 12:18)  170 (20 Nov 2020 08:17)      POCT Blood Glucose.: 192 mg/dL (20 Nov 2020 21:43)  POCT Blood Glucose.: 199 mg/dL (20 Nov 2020 17:05)  POCT Blood Glucose.: 170 mg/dL (20 Nov 2020 08:19)    I&O's Summary    20 Nov 2020 07:01  -  21 Nov 2020 07:00  --------------------------------------------------------  IN: 480 mL / OUT: 0 mL / NET: 480 mL        PHYSICAL EXAM:  GENERAL: NAD, well-groomed, well-developed  HEAD:  Atraumatic, Normocephalic  EYES: PERRLA, conjunctiva and sclera clear  ENMT: No tonsillar erythema, exudates, or enlargement; Moist mucous membranes  NECK: Supple  NERVOUS SYSTEM:  Alert & Oriented X2, poor concentration; Motor Strength 5/5 B/L upper and lower extremities  CHEST/LUNG: Clear to auscultation bilaterally; No rales, rhonchi, wheezing, or rubs  HEART: Regular rate and rhythm; No murmurs, rubs, or gallops  ABDOMEN: Soft, Nontender, Nondistended; Bowel sounds present  EXTREMITIES:  2+ Peripheral Pulses, No edema  SKIN: No rashes or lesions    LABS:                        9.7    9.98  )-----------( 241      ( 20 Nov 2020 05:43 )             30.1     11-20    138  |  99  |  15  ----------------------------<  149<H>  3.4<L>   |  27  |  0.58    Ca    8.9      20 Nov 2020 05:43  Phos  2.4     11-20  Mg     1.9     11-20    TPro  6.6  /  Alb  3.3  /  TBili  0.4  /  DBili  x   /  AST  38<H>  /  ALT  25  /  AlkPhos  67  11-20    RADIOLOGY & ADDITIONAL TESTS:  < from: CT Chest No Cont (11.19.20 @ 04:29) >    IMPRESSION:  No evidence for pneumonia or effusion.    Partially visualized somewhat asymmetric left perinephric stranding in the upper abdomen. Please correlate clinically with urinalysis and urine culture as infectious etiology cannot be excluded.      < from: Xray Chest 1 View- PORTABLE-Routine (Xray Chest 1 View- PORTABLE-Routine .) (11.20.20 @ 14:44) >  IMPRESSION:    Bilateral perihilar opacities representing pulmonary edema. No consolidation, pleural effusion or pneumothorax.       Shreyas Carpenter, PGY-1  Pager [phone] (NS), [phone](DEYANIRAJ)    CHIEF COMPLAINT: Patient is a 55y old  Female who presents with a chief complaint of Fever (20 Nov 2020 07:55)    INTERVAL HPI/OVERNIGHT EVENTS:  -no acute events overnight   -patient appeared less lethargic this morning but could maintain concentration or train of though.  -pending ECHO  -abd US ordered for increasing LFTS and statin held        MEDICATIONS (STANDING):  atorvastatin 80 milliGRAM(s) Oral at bedtime  cefTRIAXone   IVPB 2000 milliGRAM(s) IV Intermittent every 24 hours  dextrose 40% Gel 15 Gram(s) Oral once  dextrose 5%. 1000 milliLiter(s) IV Continuous <Continuous>  dextrose 5%. 1000 milliLiter(s) IV Continuous <Continuous>  dextrose 50% Injectable 25 Gram(s) IV Push once  dextrose 50% Injectable 12.5 Gram(s) IV Push once  dextrose 50% Injectable 25 Gram(s) IV Push once  diVALproex ER 1000 milliGRAM(s) Oral at bedtime  enoxaparin Injectable 40 milliGRAM(s) SubCutaneous daily  glucagon  Injectable 1 milliGRAM(s) IntraMuscular once  insulin lispro (ADMELOG) corrective regimen sliding scale   SubCutaneous three times a day before meals  insulin lispro (ADMELOG) corrective regimen sliding scale   SubCutaneous at bedtime  risperiDONE   Tablet 3 milliGRAM(s) Oral at bedtime    MEDICATIONS  (PRN):  acetaminophen   Tablet .. 650 milliGRAM(s) Oral every 6 hours PRN  LORazepam   Injectable 1 milliGRAM(s) IV Push every 4 hours PRN      REVIEW OF SYSTEMS:  CONSTITUTIONAL: +fever  EYES: No eye pain  ENMT: No sinus or throat pain  NECK: No pain  RESPIRATORY: No shortness of breath  CARDIOVASCULAR: No chest pain, dizziness  GASTROINTESTINAL: No abdominal or epigastric pain. No diarrhea or constipation. No melena or hematochezia.  GENITOURINARY: No dysuria, hematuria  NEUROLOGICAL: No headaches  SKIN: No itching, burning, rashes, or lesions   MUSCULOSKELETAL: No muscle or joint pain    T(F): 98.3 (11-21-20 @ 05:11), Max: 100.2 (11-20-20 @ 20:49)  HR: 83 (11-21-20 @ 05:11) (83 - 97)  BP: 105/63 (11-21-20 @ 05:11) (105/63 - 117/66)  RR: 17 (11-21-20 @ 05:11) (17 - 18)  SpO2: 94% (11-21-20 @ 05:11) (91% - 95%)  Wt(kg): --  CAPILLARY BLOOD GLUCOSE  138 (20 Nov 2020 12:18)  170 (20 Nov 2020 08:17)      POCT Blood Glucose.: 192 mg/dL (20 Nov 2020 21:43)  POCT Blood Glucose.: 199 mg/dL (20 Nov 2020 17:05)  POCT Blood Glucose.: 170 mg/dL (20 Nov 2020 08:19)    I&O's Summary    20 Nov 2020 07:01  -  21 Nov 2020 07:00  --------------------------------------------------------  IN: 480 mL / OUT: 0 mL / NET: 480 mL        PHYSICAL EXAM:  GENERAL: NAD, well-groomed, well-developed  HEAD:  Atraumatic, Normocephalic  EYES: PERRLA, conjunctiva and sclera clear  ENMT: No tonsillar erythema, exudates, or enlargement; Moist mucous membranes  NECK: Supple  NERVOUS SYSTEM:  Alert & Oriented X2, poor concentration; Motor Strength 5/5 B/L upper and lower extremities  CHEST/LUNG: Clear to auscultation bilaterally; No rales, rhonchi, wheezing, or rubs  HEART: Regular rate and rhythm; No murmurs, rubs, or gallops  ABDOMEN: Soft, Nontender, Nondistended; Bowel sounds present  EXTREMITIES:  2+ Peripheral Pulses, No edema  SKIN: No rashes or lesions    LABS:                        9.7    9.98  )-----------( 241      ( 20 Nov 2020 05:43 )             30.1     11-20    138  |  99  |  15  ----------------------------<  149<H>  3.4<L>   |  27  |  0.58    Ca    8.9      20 Nov 2020 05:43  Phos  2.4     11-20  Mg     1.9     11-20    TPro  6.6  /  Alb  3.3  /  TBili  0.4  /  DBili  x   /  AST  38<H>  /  ALT  25  /  AlkPhos  67  11-20    RADIOLOGY & ADDITIONAL TESTS:  < from: CT Chest No Cont (11.19.20 @ 04:29) >    IMPRESSION:  No evidence for pneumonia or effusion.    Partially visualized somewhat asymmetric left perinephric stranding in the upper abdomen. Please correlate clinically with urinalysis and urine culture as infectious etiology cannot be excluded.      < from: Xray Chest 1 View- PORTABLE-Routine (Xray Chest 1 View- PORTABLE-Routine .) (11.20.20 @ 14:44) >  IMPRESSION:    Bilateral perihilar opacities representing pulmonary edema. No consolidation, pleural effusion or pneumothorax.       Shreyas Carpenter, PGY-1  Pager [phone] (NS), [phone](SILVANO)    CHIEF COMPLAINT: Patient is a 55y old  Female who presents with a chief complaint of Fever (20 Nov 2020 07:55)    INTERVAL HPI/OVERNIGHT EVENTS:  -no acute events overnight   -patient appeared less lethargic this morning but could maintain concentration or train of though.  -pending ECHO  -abd US ordered for increasing LFTS. Depakote ( as per psych) and statin held. AM ammonia ordered  -20 IV lasix given for pulmonary edema on CXR and crackles on exam         MEDICATIONS (STANDING):  atorvastatin 80 milliGRAM(s) Oral at bedtime  cefTRIAXone   IVPB 2000 milliGRAM(s) IV Intermittent every 24 hours  dextrose 40% Gel 15 Gram(s) Oral once  dextrose 5%. 1000 milliLiter(s) IV Continuous <Continuous>  dextrose 5%. 1000 milliLiter(s) IV Continuous <Continuous>  dextrose 50% Injectable 25 Gram(s) IV Push once  dextrose 50% Injectable 12.5 Gram(s) IV Push once  dextrose 50% Injectable 25 Gram(s) IV Push once  diVALproex ER 1000 milliGRAM(s) Oral at bedtime  enoxaparin Injectable 40 milliGRAM(s) SubCutaneous daily  glucagon  Injectable 1 milliGRAM(s) IntraMuscular once  insulin lispro (ADMELOG) corrective regimen sliding scale   SubCutaneous three times a day before meals  insulin lispro (ADMELOG) corrective regimen sliding scale   SubCutaneous at bedtime  risperiDONE   Tablet 3 milliGRAM(s) Oral at bedtime    MEDICATIONS  (PRN):  acetaminophen   Tablet .. 650 milliGRAM(s) Oral every 6 hours PRN  LORazepam   Injectable 1 milliGRAM(s) IV Push every 4 hours PRN      REVIEW OF SYSTEMS:  CONSTITUTIONAL: +fever  EYES: No eye pain  ENMT: No sinus or throat pain  NECK: No pain  RESPIRATORY: No shortness of breath  CARDIOVASCULAR: No chest pain, dizziness  GASTROINTESTINAL: No abdominal or epigastric pain. No diarrhea or constipation. No melena or hematochezia.  GENITOURINARY: No dysuria, hematuria  NEUROLOGICAL: No headaches  SKIN: No itching, burning, rashes, or lesions   MUSCULOSKELETAL: No muscle or joint pain    T(F): 98.3 (11-21-20 @ 05:11), Max: 100.2 (11-20-20 @ 20:49)  HR: 83 (11-21-20 @ 05:11) (83 - 97)  BP: 105/63 (11-21-20 @ 05:11) (105/63 - 117/66)  RR: 17 (11-21-20 @ 05:11) (17 - 18)  SpO2: 94% (11-21-20 @ 05:11) (91% - 95%)  Wt(kg): --  CAPILLARY BLOOD GLUCOSE  138 (20 Nov 2020 12:18)  170 (20 Nov 2020 08:17)      POCT Blood Glucose.: 192 mg/dL (20 Nov 2020 21:43)  POCT Blood Glucose.: 199 mg/dL (20 Nov 2020 17:05)  POCT Blood Glucose.: 170 mg/dL (20 Nov 2020 08:19)    I&O's Summary    20 Nov 2020 07:01  -  21 Nov 2020 07:00  --------------------------------------------------------  IN: 480 mL / OUT: 0 mL / NET: 480 mL        PHYSICAL EXAM:  GENERAL: NAD, well-groomed, well-developed  HEAD:  Atraumatic, Normocephalic  EYES: PERRLA, conjunctiva and sclera clear  ENMT: No tonsillar erythema, exudates, or enlargement; Moist mucous membranes  NECK: Supple  NERVOUS SYSTEM:  Alert & Oriented X2, poor concentration; Motor Strength 5/5 B/L upper and lower extremities  CHEST/LUNG: Crackles present b/l in posterior lung fields No rales, rhonchi, wheezing, or rubs  HEART: Regular rate and rhythm; No murmurs, rubs, or gallops  ABDOMEN: Soft, Nontender, Nondistended; Bowel sounds present  EXTREMITIES:  2+ Peripheral Pulses, No edema  SKIN: No rashes or lesions    LABS:                        9.7    9.98  )-----------( 241      ( 20 Nov 2020 05:43 )             30.1     11-20    138  |  99  |  15  ----------------------------<  149<H>  3.4<L>   |  27  |  0.58    Ca    8.9      20 Nov 2020 05:43  Phos  2.4     11-20  Mg     1.9     11-20    TPro  6.6  /  Alb  3.3  /  TBili  0.4  /  DBili  x   /  AST  38<H>  /  ALT  25  /  AlkPhos  67  11-20    RADIOLOGY & ADDITIONAL TESTS:  < from: CT Chest No Cont (11.19.20 @ 04:29) >    IMPRESSION:  No evidence for pneumonia or effusion.    Partially visualized somewhat asymmetric left perinephric stranding in the upper abdomen. Please correlate clinically with urinalysis and urine culture as infectious etiology cannot be excluded.      < from: Xray Chest 1 View- PORTABLE-Routine (Xray Chest 1 View- PORTABLE-Routine .) (11.20.20 @ 14:44) >  IMPRESSION:    Bilateral perihilar opacities representing pulmonary edema. No consolidation, pleural effusion or pneumothorax.

## 2020-11-21 NOTE — PROGRESS NOTE ADULT - PROBLEM SELECTOR PLAN 2
Pt with elevated Ck to 924 on admission.  -likely 2/2 to uncontrolled rigors for >24h  -will trend in am New elevated liver enzymes. , , .  -yesterday Risperidone and ceftriaxone were increased  -abd US ordered  -Atorvastatin held  -Hep panel pending  -Tylenol lvl pending. Will keep below 2g daily until back New elevated liver enzymes. , , .  -yesterday Risperidone and ceftriaxone were increased  -abd US ordered  -Atorvastatin and Depakote held  -Hep panel pending  -Tylenol lvl pending. Will keep below 2g daily until back

## 2020-11-21 NOTE — DISCHARGE NOTE PROVIDER - REASON FOR ADMISSION
Had faxed Rx in earlier, confirmation was not received. Did confirm with pharmacy they did not receive. Called in Rx for Soma (Carisoprodol) with read back. Fever

## 2020-11-21 NOTE — DISCHARGE NOTE PROVIDER - HOSPITAL COURSE
YOSHI MCCOY is a 55y with pmhx schizoaffective disorder, DMT2, HLD, now presenting with fever for 24hours duration from [facility]. Patient is currently unable to answer questions and is AAOX1. As per [facility] notes she was seen on 11/18 after being found more lethargic for 2 days duration. Upon assessment she had rigors, chills, and nasal congestion with an oral temperature of 103.1. UA and BCx returned positive for E.coli sensitive to CTX. CT chest managed to capture stranding of the left kidney 2/2 to pyelonephritis.  She was started on CTX 2g QD. During her admission she was seen desaturating and CXR showed b/l pulmonary edema. She was given 20IV Lasix. Echo showed_____ YOSHI MCCOY is a 55y with pmhx schizoaffective disorder, DMT2, HLD, now presenting with fever for 24hours duration from [facility]. Patient is currently unable to answer questions and is AAOX1. As per [facility] notes she was seen on 11/18 after being found more lethargic for 2 days duration. Upon assessment she had rigors, chills, and nasal congestion with an oral temperature of 103.1. UA and BCx returned positive for E.coli sensitive to CTX. CT chest managed to capture stranding of the left kidney 2/2 to pyelonephritis.  She was started on CTX 2g QD but due to rising LFTs and concern for hepatic injury she was switched to Zosyn. During her admission she was seen desaturating and CXR showed b/l pulmonary edema. She was given 20IV Lasix. Echo was difficult to perform 2/2 to non-compliance however he exam that was visible looked grossly normal.  Clozapine was initially held during admission and then restarted  Depakote was held due to rising LFTS.  She was started on Lantus 2u at bedtime which will be continued upon dc with her Metformin 1000mg QD to be further managed by [facility] team.   QTC was 432 but should be monitored closely. YOSHI MCCOY is a 55y with pmhx schizoaffective disorder, DMT2, HLD, now presenting with fever for 24hours duration from [facility]. Patient is currently unable to answer questions and is AAOX1. As per [facility] notes she was seen on 11/18 after being found more lethargic for 2 days duration. Upon assessment she had rigors, chills, and nasal congestion with an oral temperature of 103.1. UA and BCx returned positive for E.coli sensitive to CTX. CT chest managed to capture stranding of the left kidney 2/2 to pyelonephritis.  She was started on CTX 2g QD but due to rising LFTs and concern for hepatic injury she was switched to Zosyn. During her admission she was seen desaturating and CXR showed b/l pulmonary edema. She was given 20IV Lasix. Echo was difficult to perform 2/2 to non-compliance however he exam that was visible looked grossly normal. However, this exam should be repeated formally outpatient for a proper assessment.   Clozapine was initially held during admission and then restarted  Depakote was held due to rising LFTS.  She was started on Lantus 5u at bedtime which will be continued upon dc with her Metformin 1000mg QD to be further managed by [facility] team.   QTC was 432 but should be monitored closely.

## 2020-11-21 NOTE — DISCHARGE NOTE PROVIDER - NSDCCPCAREPLAN_GEN_ALL_CORE_FT
PRINCIPAL DISCHARGE DIAGNOSIS  Diagnosis: Pyelonephritis  Assessment and Plan of Treatment: You presented to the hospital as a transfer from [facility]. You were found to have a UTI that spread to your kideny and blood. You were started on antibiotics to treat your infection. It is important that you maintain proper hygiene and have your diapers changed frequently especially after urinating to prevent this happening again.       PRINCIPAL DISCHARGE DIAGNOSIS  Diagnosis: Pyelonephritis  Assessment and Plan of Treatment: You presented to the hospital as a transfer from [facility]. You were found to have a UTI that spread to your kideny and blood. You were started on antibiotics to treat your infection. It is important that you maintain proper hygiene and have your diapers changed frequently especially after urinating to prevent this happening again.      SECONDARY DISCHARGE DIAGNOSES  Diagnosis: Shortness of breath  Assessment and Plan of Treatment: During your addmission it was noticed you were a bit short of breath. Chest xray showed some swelling in your lungs with some fluid. We gave you Lasix which helped resolve your symptoms. You should report to your doctor if you notice shortness of breath again.        PRINCIPAL DISCHARGE DIAGNOSIS  Diagnosis: Pyelonephritis  Assessment and Plan of Treatment: You presented to the hospital as a transfer from [facility]. You were found to have a UTI that spread to your kideny and blood. You were started on antibiotics to treat your infection. It is important that you maintain proper hygiene and have your diapers changed frequently especially after urinating and defacating to prevent this from happening again.      SECONDARY DISCHARGE DIAGNOSES  Diagnosis: Shortness of breath  Assessment and Plan of Treatment: During your addmission it was noticed you were a bit short of breath. Chest xray showed some swelling in your lungs with some fluid. We gave you Lasix which helped resolve your symptoms. You should report to your doctor if you notice shortness of breath again.

## 2020-11-21 NOTE — PROGRESS NOTE ADULT - PROBLEM SELECTOR PLAN 7
Dispo:   1.  Name of PCP:   2.  PCP Contacted on Admission: [ ] Y    [ ] N    3.  PCP contacted at Discharge: [ ] Y    [ ] N    [ ] N/A  4.  Post-Discharge Appointment Date and Location:  5.  Summary of Handoff given to PCP: Patient with history of schizoaffective disorder and bizarre behaviour.  -appreciate psych recs   -c/w [facility] inpatient meds  -c/w Benztropine 1mg, Depakote 1000mg, Risperidone 3mg.  -hold Clozapine as per Psychiatry until myocarditis can be r/o via echo  -if ECHO clear then can start 12.5 Clozapine

## 2020-11-21 NOTE — PROGRESS NOTE ADULT - PROBLEM SELECTOR PLAN 6
DVT ppx: PPS score of 4: Lovenox 40mg QD  Meal: Consistent carb -home meds; atorvastatin 80mg. Will hold in light of rising LFTs

## 2020-11-22 LAB
ALBUMIN SERPL ELPH-MCNC: 2.9 G/DL — LOW (ref 3.3–5)
ALP SERPL-CCNC: 284 U/L — HIGH (ref 40–120)
ALT FLD-CCNC: 713 U/L — HIGH (ref 4–33)
AMMONIA BLD-MCNC: 50 UMOL/L — SIGNIFICANT CHANGE UP (ref 11–55)
ANION GAP SERPL CALC-SCNC: 11 MMO/L — SIGNIFICANT CHANGE UP (ref 7–14)
APAP SERPL-MCNC: < 15 UG/ML — LOW (ref 15–25)
APTT BLD: 39.7 SEC — HIGH (ref 27–36.3)
AST SERPL-CCNC: 801 U/L — HIGH (ref 4–32)
BASOPHILS # BLD AUTO: 0.04 K/UL — SIGNIFICANT CHANGE UP (ref 0–0.2)
BASOPHILS NFR BLD AUTO: 0.7 % — SIGNIFICANT CHANGE UP (ref 0–2)
BILIRUB SERPL-MCNC: 0.4 MG/DL — SIGNIFICANT CHANGE UP (ref 0.2–1.2)
BUN SERPL-MCNC: 10 MG/DL — SIGNIFICANT CHANGE UP (ref 7–23)
CALCIUM SERPL-MCNC: 8.7 MG/DL — SIGNIFICANT CHANGE UP (ref 8.4–10.5)
CHLORIDE SERPL-SCNC: 99 MMOL/L — SIGNIFICANT CHANGE UP (ref 98–107)
CO2 SERPL-SCNC: 26 MMOL/L — SIGNIFICANT CHANGE UP (ref 22–31)
CREAT SERPL-MCNC: 0.45 MG/DL — LOW (ref 0.5–1.3)
EOSINOPHIL # BLD AUTO: 0.05 K/UL — SIGNIFICANT CHANGE UP (ref 0–0.5)
EOSINOPHIL NFR BLD AUTO: 0.9 % — SIGNIFICANT CHANGE UP (ref 0–6)
GLUCOSE BLDC GLUCOMTR-MCNC: 173 MG/DL — HIGH (ref 70–99)
GLUCOSE BLDC GLUCOMTR-MCNC: 218 MG/DL — HIGH (ref 70–99)
GLUCOSE BLDC GLUCOMTR-MCNC: 250 MG/DL — HIGH (ref 70–99)
GLUCOSE BLDC GLUCOMTR-MCNC: 274 MG/DL — HIGH (ref 70–99)
GLUCOSE SERPL-MCNC: 162 MG/DL — HIGH (ref 70–99)
HAV IGM SER-ACNC: NONREACTIVE — SIGNIFICANT CHANGE UP
HBV CORE IGM SER-ACNC: NONREACTIVE — SIGNIFICANT CHANGE UP
HBV SURFACE AG SER-ACNC: NONREACTIVE — SIGNIFICANT CHANGE UP
HCT VFR BLD CALC: 30.5 % — LOW (ref 34.5–45)
HCV AB S/CO SERPL IA: 0.46 S/CO — SIGNIFICANT CHANGE UP (ref 0–0.99)
HCV AB SERPL-IMP: SIGNIFICANT CHANGE UP
HGB BLD-MCNC: 9.7 G/DL — LOW (ref 11.5–15.5)
IMM GRANULOCYTES NFR BLD AUTO: 2.9 % — HIGH (ref 0–1.5)
INR BLD: 1.23 — HIGH (ref 0.88–1.16)
LYMPHOCYTES # BLD AUTO: 1.12 K/UL — SIGNIFICANT CHANGE UP (ref 1–3.3)
LYMPHOCYTES # BLD AUTO: 19.2 % — SIGNIFICANT CHANGE UP (ref 13–44)
MAGNESIUM SERPL-MCNC: 2.1 MG/DL — SIGNIFICANT CHANGE UP (ref 1.6–2.6)
MCHC RBC-ENTMCNC: 26.6 PG — LOW (ref 27–34)
MCHC RBC-ENTMCNC: 31.8 % — LOW (ref 32–36)
MCV RBC AUTO: 83.8 FL — SIGNIFICANT CHANGE UP (ref 80–100)
MONOCYTES # BLD AUTO: 0.54 K/UL — SIGNIFICANT CHANGE UP (ref 0–0.9)
MONOCYTES NFR BLD AUTO: 9.3 % — SIGNIFICANT CHANGE UP (ref 2–14)
NEUTROPHILS # BLD AUTO: 3.9 K/UL — SIGNIFICANT CHANGE UP (ref 1.8–7.4)
NEUTROPHILS NFR BLD AUTO: 67 % — SIGNIFICANT CHANGE UP (ref 43–77)
NRBC # FLD: 0 K/UL — SIGNIFICANT CHANGE UP (ref 0–0)
PHOSPHATE SERPL-MCNC: 2.6 MG/DL — SIGNIFICANT CHANGE UP (ref 2.5–4.5)
PLATELET # BLD AUTO: 334 K/UL — SIGNIFICANT CHANGE UP (ref 150–400)
PMV BLD: 8.9 FL — SIGNIFICANT CHANGE UP (ref 7–13)
POTASSIUM SERPL-MCNC: 3.5 MMOL/L — SIGNIFICANT CHANGE UP (ref 3.5–5.3)
POTASSIUM SERPL-SCNC: 3.5 MMOL/L — SIGNIFICANT CHANGE UP (ref 3.5–5.3)
PROT SERPL-MCNC: 6.7 G/DL — SIGNIFICANT CHANGE UP (ref 6–8.3)
PROTHROM AB SERPL-ACNC: 13.9 SEC — HIGH (ref 10.6–13.6)
RBC # BLD: 3.64 M/UL — LOW (ref 3.8–5.2)
RBC # FLD: 14.2 % — SIGNIFICANT CHANGE UP (ref 10.3–14.5)
SODIUM SERPL-SCNC: 136 MMOL/L — SIGNIFICANT CHANGE UP (ref 135–145)
WBC # BLD: 5.82 K/UL — SIGNIFICANT CHANGE UP (ref 3.8–10.5)
WBC # FLD AUTO: 5.82 K/UL — SIGNIFICANT CHANGE UP (ref 3.8–10.5)

## 2020-11-22 PROCEDURE — 99233 SBSQ HOSP IP/OBS HIGH 50: CPT | Mod: GC

## 2020-11-22 PROCEDURE — 76705 ECHO EXAM OF ABDOMEN: CPT | Mod: 26

## 2020-11-22 PROCEDURE — 99232 SBSQ HOSP IP/OBS MODERATE 35: CPT

## 2020-11-22 PROCEDURE — 99232 SBSQ HOSP IP/OBS MODERATE 35: CPT | Mod: GC

## 2020-11-22 RX ORDER — PIPERACILLIN AND TAZOBACTAM 4; .5 G/20ML; G/20ML
3.38 INJECTION, POWDER, LYOPHILIZED, FOR SOLUTION INTRAVENOUS EVERY 8 HOURS
Refills: 0 | Status: DISCONTINUED | OUTPATIENT
Start: 2020-11-22 | End: 2020-11-27

## 2020-11-22 RX ORDER — PIPERACILLIN AND TAZOBACTAM 4; .5 G/20ML; G/20ML
3.38 INJECTION, POWDER, LYOPHILIZED, FOR SOLUTION INTRAVENOUS ONCE
Refills: 0 | Status: COMPLETED | OUTPATIENT
Start: 2020-11-22 | End: 2020-11-22

## 2020-11-22 RX ORDER — SENNA PLUS 8.6 MG/1
2 TABLET ORAL AT BEDTIME
Refills: 0 | Status: DISCONTINUED | OUTPATIENT
Start: 2020-11-22 | End: 2020-11-27

## 2020-11-22 RX ORDER — POLYETHYLENE GLYCOL 3350 17 G/17G
17 POWDER, FOR SOLUTION ORAL DAILY
Refills: 0 | Status: DISCONTINUED | OUTPATIENT
Start: 2020-11-22 | End: 2020-11-27

## 2020-11-22 RX ORDER — LACTULOSE 10 G/15ML
20 SOLUTION ORAL ONCE
Refills: 0 | Status: COMPLETED | OUTPATIENT
Start: 2020-11-22 | End: 2020-11-22

## 2020-11-22 RX ADMIN — Medication 1 TABLET(S): at 05:14

## 2020-11-22 RX ADMIN — CEFTRIAXONE 100 MILLIGRAM(S): 500 INJECTION, POWDER, FOR SOLUTION INTRAMUSCULAR; INTRAVENOUS at 11:00

## 2020-11-22 RX ADMIN — Medication 2: at 12:44

## 2020-11-22 RX ADMIN — LACTULOSE 20 GRAM(S): 10 SOLUTION ORAL at 11:52

## 2020-11-22 RX ADMIN — SENNA PLUS 2 TABLET(S): 8.6 TABLET ORAL at 21:45

## 2020-11-22 RX ADMIN — RISPERIDONE 3 MILLIGRAM(S): 4 TABLET ORAL at 21:45

## 2020-11-22 RX ADMIN — PIPERACILLIN AND TAZOBACTAM 200 GRAM(S): 4; .5 INJECTION, POWDER, LYOPHILIZED, FOR SOLUTION INTRAVENOUS at 18:02

## 2020-11-22 RX ADMIN — ENOXAPARIN SODIUM 40 MILLIGRAM(S): 100 INJECTION SUBCUTANEOUS at 11:00

## 2020-11-22 RX ADMIN — Medication 1: at 09:01

## 2020-11-22 RX ADMIN — POLYETHYLENE GLYCOL 3350 17 GRAM(S): 17 POWDER, FOR SOLUTION ORAL at 11:00

## 2020-11-22 RX ADMIN — Medication 3: at 18:02

## 2020-11-22 NOTE — PROGRESS NOTE ADULT - PROBLEM SELECTOR PLAN 5
-on metformin, Januvia, amryl at [facility], will hold  -will continue inpatient with LDSS. -SSI

## 2020-11-22 NOTE — PROGRESS NOTE ADULT - PROBLEM SELECTOR PLAN 4
Pt with elevated Ck to 924 on admission.  -likely 2/2 to uncontrolled rigors for >24h  -trending downwards

## 2020-11-22 NOTE — PROGRESS NOTE ADULT - ATTENDING COMMENTS
Patient seen and examined, chart and labs reviewed. Case discussed with house staff.     Patient has no complaints, HPI unreliable as she is very tangential, delusional.   #Transaminitis - new LFT elevation, unclear etiology. Continues to uptrend today. Discontinued statin and depakote. Abd US is within normal limits and acute viral hep panel and acetaminophen level are normal. Hepatology consulted, will follow recs. Trend CMP. Check Coags.     #SOB - CXR showing pulmonary edema s/p lasix 20mg IVP x1 on 11/21 with improvement in pulmonary exam today. Awaiting TTE for further eval. Monitor volume status. Is/Os, daily weights.

## 2020-11-22 NOTE — PROGRESS NOTE ADULT - PROBLEM SELECTOR PLAN 7
Patient with history of schizoaffective disorder and bizarre behaviour.  -appreciate psych recs   -c/w [facility] inpatient meds  -c/w Benztropine 1mg, Depakote 1000mg, Risperidone 3mg.  -hold Clozapine as per Psychiatry until myocarditis can be r/o via echo  -if ECHO clear then can start 12.5 Clozapine -appreciate psych recs   -c/w Benztropine 1mg, Depakote 1000mg, Risperidone 3mg.  -hold Clozapine as per Psychiatry until myocarditis can be r/o via echo  -if ECHO clear then can start 12.5 Clozapine

## 2020-11-22 NOTE — CONSULT NOTE ADULT - ASSESSMENT
55F w/ hx of schizoaffective disorder, HTN, HLD, presenting w/ fever x 1 day, being tx for pyelonephritis w/ CTX, found to have uptrending liver enzymes in hepatocellular pattern.    Impression:  #Hepatocellular liver injury - likely iatrogenic given timing, possibly due to CTX (though pattern is atypical) vs tylenol vs other medication (though pt does not appear to have received any new medications other than those); RUQ U/S wnl, viral hepatitis panel neg  #Elevated CK - attributed to rigors, downtrending    Recommendations:  - please check INR ASAP to gauge liver function  - trend liver enzymes  - 55F w/ hx of schizoaffective disorder, HTN, HLD, presenting w/ fever x 1 day, being tx for pyelonephritis w/ CTX, found to have uptrending liver enzymes in hepatocellular pattern.    Impression:  #Hepatocellular liver injury - likely iatrogenic given timing, possibly due to CTX (though pattern is atypical) vs tylenol vs other medication (though pt does not appear to have received any new medications other than those); RUQ U/S wnl, viral hepatitis panel neg  #Elevated CK - attributed to rigors, downtrending    Recommendations:  - please check INR ASAP to gauge liver function  - trend liver enzymes  - consider switching antibiotics to zosyn  - hold statin  - discontinue all non-essential medications  - f/u BCx  - hepatology will follow    Guanako Shay  Gastroenterology Fellow  NON-URGENT CONSULTS:  Please email [email or web address]  [email or web address]  AT NIGHT AND ON WEEKENDS:  Contact on-call GI fellow via answering service ([phone]) from 5pm-8am and on weekends/holidays  MONDAY-FRIDAY 8AM-5PM:  Available via [employer] Teams  Call [phone] ([facility]) or Page [phone] ([facility]) from 8am-5pm M-F

## 2020-11-22 NOTE — PROGRESS NOTE ADULT - PROBLEM SELECTOR PLAN 2
-abd US ordered  -Atorvastatin and Depakote held  -Hep C negative, rest of panel pending -abd US ordered  -ammonia elevated this AM; miralax ordered for bowel regimen to reduce levels  -Atorvastatin and Depakote held  -Hep C negative, rest of panel pending

## 2020-11-22 NOTE — PROGRESS NOTE BEHAVIORAL HEALTH - NSBHFUPINTERVALHXFT_PSY_A_CORE
Pt was disoriented to time and situation. She was able to tell the team that she was in a hospital but was unable to say which hospital. She also was unaware who the president was. Pt was tangential and difficult to interrupt. She started off the interview by stating [place] and that she couldn't understand that team because the team was from countries "where people don't take showers."     Per treatment team, pt had no episodes of aggression or agitation overnight. She had one episode of desaturation but was able to receive supplemental oxygen without removing it.     Of note pt was flat on her back, attempting to drink hot chocolate and needed to be redirected for her safety.

## 2020-11-22 NOTE — PROGRESS NOTE ADULT - PROBLEM SELECTOR PLAN 1
-likely 2/2 to UTI/pyelonephritis  -UCx/BCx with E coli, mostly pan-sensitive  -on ceftriaxone, day 4/7

## 2020-11-22 NOTE — CONSULT NOTE ADULT - SUBJECTIVE AND OBJECTIVE BOX
Chief Complaint:  Patient is a 55y old  Female who presents with a chief complaint of Fever (2020 08:37)    HPI:YOSHI MCCOY is a 55y Female w/ hx of schizoaffective disorder, DMII, HLD, presented from [facility] for fever x 24h. Pt unable to provide history due to disorganized behavior. She was diagnosed w/ pyelonephritis and started on CTX on .     Her liver enzymes were normal on admission. Pt has no hx of liver disease.     She received 3.6g of tylenol on .     Her liver enzymes were normal on , and are now T bili 0.4, AlkP 284, /. No INR.     Tylenol level neg.     PMHX/PSHX:  Diabetes mellitus  HLD (hyperlipidemia)  Schizoaffective disorder  No significant past surgical history    Allergies:  No Known Allergies    Home Medications: reviewed    Hospital Medications:  acetaminophen   Tablet .. 650 milliGRAM(s) Oral every 6 hours PRN  cefTRIAXone   IVPB 2000 milliGRAM(s) IV Intermittent every 24 hours  dextrose 40% Gel 15 Gram(s) Oral once  dextrose 5%. 1000 milliLiter(s) IV Continuous <Continuous>  dextrose 5%. 1000 milliLiter(s) IV Continuous <Continuous>  dextrose 50% Injectable 25 Gram(s) IV Push once  dextrose 50% Injectable 12.5 Gram(s) IV Push once  dextrose 50% Injectable 25 Gram(s) IV Push once  enoxaparin Injectable 40 milliGRAM(s) SubCutaneous daily  glucagon  Injectable 1 milliGRAM(s) IntraMuscular once  insulin lispro (ADMELOG) corrective regimen sliding scale   SubCutaneous three times a day before meals  insulin lispro (ADMELOG) corrective regimen sliding scale   SubCutaneous at bedtime  LORazepam   Injectable 1 milliGRAM(s) IV Push every 4 hours PRN  polyethylene glycol 3350 17 Gram(s) Oral daily  risperiDONE   Tablet 3 milliGRAM(s) Oral at bedtime  senna 2 Tablet(s) Oral at bedtime    Social History:   Tob: Denies  EtOH: Denies  Illicit Drugs: Denies    Family history:  No pertinent family history in first degree relatives    Denies family history of colon cancer/polyps, stomach cancer/polyps, pancreatic cancer/masses, liver cancer/disease, ovarian cancer and endometrial cancer.    ROS:   General:  No  fevers, chills, night sweats, fatigue  Eyes:  Good vision, no reported pain  ENT:  No sore throat, pain, runny nose  CV:  No pain, palpitations  Pulm:  No dyspnea, cough  GI:  See HPI, otherwise negative  :  No  incontinence, nocturia  Muscle:  No pain, weakness  Neuro:  No memory problems  Psych:  No insomnia, mood problems, depression  Endocrine:  No polyuria, polydipsia, cold/heat intolerance  Heme:  No petechiae, ecchymosis, easy bruisability  Skin:  No rash    PHYSICAL EXAM:   Vital Signs:  Vital Signs Last 24 Hrs  T(C): 36.6 (2020 12:42), Max: 37.1 (2020 21:03)  T(F): 97.8 (2020 12:42), Max: 98.8 (2020 21:03)  HR: 82 (2020 12:42) (80 - 93)  BP: 135/62 (2020 12:42) (126/76 - 135/62)  BP(mean): --  RR: 16 (2020 12:42) (16 - 17)  SpO2: 94% (2020 12:42) (93% - 94%)  Daily     Daily Weight in k.9 (2020 05:12)    GENERAL: no acute distress  NEURO: alert, did not cooperate w/ exam  HEENT: anicteric sclera, no conjunctival pallor appreciated  CHEST: no respiratory distress, no accessory muscle use  CARDIAC: regular rate, rhythm  ABDOMEN: soft, non-tender, non-distended, no rebound or guarding  EXTREMITIES: warm, well perfused, no edema  SKIN: no lesions noted    LABS: reviewed                        9.7    5.82  )-----------( 334      ( 2020 06:35 )             30.5         136  |  99  |  10  ----------------------------<  162<H>  3.5   |  26  |  0.45<L>    Ca    8.7      2020 06:35  Phos  2.6       Mg     2.1         TPro  6.7  /  Alb  2.9<L>  /  TBili  0.4  /  DBili  x   /  AST  801<H>  /  ALT  713<H>  /  AlkPhos  284<H>  11-22    LIVER FUNCTIONS - ( 2020 06:35 )  Alb: 2.9 g/dL / Pro: 6.7 g/dL / ALK PHOS: 284 u/L / ALT: 713 u/L / AST: 801 u/L / GGT: x               Diagnostic Studies: see sunrise for full report

## 2020-11-22 NOTE — PROGRESS NOTE ADULT - PROBLEM SELECTOR PLAN 8
DVT ppx: PPS score of 4: Lovenox 40mg QD  Meal: Consistent carb DVT: lovenox  Diet: CC  Dispo: back to [facility] when clinically improved

## 2020-11-22 NOTE — PROGRESS NOTE ADULT - SUBJECTIVE AND OBJECTIVE BOX
PROGRESS NOTE:     Anjana Estes MD/PhD PGY-2  Internal Medicine [facility]  Pager: [phone]  **After 7pm, please contact night float**    Patient is a 55y old  Female who presents with a chief complaint of Fever (21 Nov 2020 12:31)      SUBJECTIVE / OVERNIGHT EVENTS: No acute events ON.     ADDITIONAL REVIEW OF SYSTEMS:    MEDICATIONS  (STANDING):  cefTRIAXone   IVPB 2000 milliGRAM(s) IV Intermittent every 24 hours  dextrose 40% Gel 15 Gram(s) Oral once  dextrose 5%. 1000 milliLiter(s) (50 mL/Hr) IV Continuous <Continuous>  dextrose 5%. 1000 milliLiter(s) (100 mL/Hr) IV Continuous <Continuous>  dextrose 50% Injectable 25 Gram(s) IV Push once  dextrose 50% Injectable 12.5 Gram(s) IV Push once  dextrose 50% Injectable 25 Gram(s) IV Push once  enoxaparin Injectable 40 milliGRAM(s) SubCutaneous daily  glucagon  Injectable 1 milliGRAM(s) IntraMuscular once  insulin lispro (ADMELOG) corrective regimen sliding scale   SubCutaneous three times a day before meals  insulin lispro (ADMELOG) corrective regimen sliding scale   SubCutaneous at bedtime  risperiDONE   Tablet 3 milliGRAM(s) Oral at bedtime    MEDICATIONS  (PRN):  acetaminophen   Tablet .. 650 milliGRAM(s) Oral every 6 hours PRN Temp greater or equal to 38C (100.4F)  LORazepam   Injectable 1 milliGRAM(s) IV Push every 4 hours PRN Agitation      CAPILLARY BLOOD GLUCOSE      POCT Blood Glucose.: 147 mg/dL (21 Nov 2020 22:13)  POCT Blood Glucose.: 182 mg/dL (21 Nov 2020 17:20)  POCT Blood Glucose.: 197 mg/dL (21 Nov 2020 12:24)    I&O's Summary    21 Nov 2020 07:01  -  22 Nov 2020 07:00  --------------------------------------------------------  IN: 500 mL / OUT: 0 mL / NET: 500 mL        PHYSICAL EXAM:  Vital Signs Last 24 Hrs  T(C): 36.5 (22 Nov 2020 05:12), Max: 37.1 (21 Nov 2020 21:03)  T(F): 97.7 (22 Nov 2020 05:12), Max: 98.8 (21 Nov 2020 21:03)  HR: 80 (22 Nov 2020 05:12) (80 - 93)  BP: 126/76 (22 Nov 2020 05:12) (118/65 - 133/70)  BP(mean): --  RR: 17 (22 Nov 2020 05:12) (17 - 18)  SpO2: 93% (22 Nov 2020 05:12) (92% - 94%)    CONSTITUTIONAL: NAD, well-developed  RESPIRATORY: CTAB, no wheezes, rales, rhonchi. Good inspiratory effort  CARDIOVASCULAR: RRR, no murmurs, rubs, gallops. 2+ brachioradial and pedal pulses. Limbs warm and well-perfused. No edema.  ABDOMEN: Soft, nontender, nondistended. Normoactive bowel sounds. No HSM.  MUSCULOSKELETAL: Normal tone and strength. No trauma.  PSYCH: AOx3. Appropriate affect.    LABS:                        9.7    5.82  )-----------( 334      ( 22 Nov 2020 06:35 )             30.5     11-22    136  |  99  |  10  ----------------------------<  162<H>  3.5   |  26  |  0.45<L>    Ca    8.7      22 Nov 2020 06:35  Phos  2.6     11-22  Mg     2.1     11-22    TPro  6.7  /  Alb  2.9<L>  /  TBili  0.4  /  DBili  x   /  AST  x   /  ALT  x   /  AlkPhos  284<H>  11-22      CARDIAC MARKERS ( 21 Nov 2020 08:15 )  x     / x     / 334 u/L / x     / x            COORDINATION OF CARE:  Care Discussed with Consultants/Other Providers [Y/N]: Y  Prior or Outpatient Records Reviewed [Y/N]: MULU   PROGRESS NOTE:     Anjana Estes MD/PhD PGY-2  Internal Medicine [facility]  Pager: [phone]  **After 7pm, please contact night float**    Patient is a 55y old  Female who presents with a chief complaint of Fever (21 Nov 2020 12:31)      SUBJECTIVE / OVERNIGHT EVENTS: No acute events ON.     ADDITIONAL REVIEW OF SYSTEMS: Reports feeling well, tolerating PO. Denies pain.     MEDICATIONS  (STANDING):  cefTRIAXone   IVPB 2000 milliGRAM(s) IV Intermittent every 24 hours  dextrose 40% Gel 15 Gram(s) Oral once  dextrose 5%. 1000 milliLiter(s) (50 mL/Hr) IV Continuous <Continuous>  dextrose 5%. 1000 milliLiter(s) (100 mL/Hr) IV Continuous <Continuous>  dextrose 50% Injectable 25 Gram(s) IV Push once  dextrose 50% Injectable 12.5 Gram(s) IV Push once  dextrose 50% Injectable 25 Gram(s) IV Push once  enoxaparin Injectable 40 milliGRAM(s) SubCutaneous daily  glucagon  Injectable 1 milliGRAM(s) IntraMuscular once  insulin lispro (ADMELOG) corrective regimen sliding scale   SubCutaneous three times a day before meals  insulin lispro (ADMELOG) corrective regimen sliding scale   SubCutaneous at bedtime  risperiDONE   Tablet 3 milliGRAM(s) Oral at bedtime    MEDICATIONS  (PRN):  acetaminophen   Tablet .. 650 milliGRAM(s) Oral every 6 hours PRN Temp greater or equal to 38C (100.4F)  LORazepam   Injectable 1 milliGRAM(s) IV Push every 4 hours PRN Agitation      CAPILLARY BLOOD GLUCOSE      POCT Blood Glucose.: 147 mg/dL (21 Nov 2020 22:13)  POCT Blood Glucose.: 182 mg/dL (21 Nov 2020 17:20)  POCT Blood Glucose.: 197 mg/dL (21 Nov 2020 12:24)    I&O's Summary    21 Nov 2020 07:01  -  22 Nov 2020 07:00  --------------------------------------------------------  IN: 500 mL / OUT: 0 mL / NET: 500 mL        PHYSICAL EXAM:  Vital Signs Last 24 Hrs  T(C): 36.5 (22 Nov 2020 05:12), Max: 37.1 (21 Nov 2020 21:03)  T(F): 97.7 (22 Nov 2020 05:12), Max: 98.8 (21 Nov 2020 21:03)  HR: 80 (22 Nov 2020 05:12) (80 - 93)  BP: 126/76 (22 Nov 2020 05:12) (118/65 - 133/70)  BP(mean): --  RR: 17 (22 Nov 2020 05:12) (17 - 18)  SpO2: 93% (22 Nov 2020 05:12) (92% - 94%)    CONSTITUTIONAL: NAD, well-developed  RESPIRATORY: CTAB, no wheezes, rales, rhonchi. Good inspiratory effort  CARDIOVASCULAR: RRR, no murmurs, rubs, gallops. 2+ brachioradial and pedal pulses. Limbs warm and well-perfused. No edema.  ABDOMEN: Soft, nontender, nondistended. Normoactive bowel sounds. No HSM.  MUSCULOSKELETAL: Normal tone and strength. No trauma.  PSYCH: Highly tangential and delusional in answering, but alert and can answer basic yes/no questions about health status.     LABS:                        9.7    5.82  )-----------( 334      ( 22 Nov 2020 06:35 )             30.5     11-22    136  |  99  |  10  ----------------------------<  162<H>  3.5   |  26  |  0.45<L>    Ca    8.7      22 Nov 2020 06:35  Phos  2.6     11-22  Mg     2.1     11-22    TPro  6.7  /  Alb  2.9<L>  /  TBili  0.4  /  DBili  x   /  AST  x   /  ALT  x   /  AlkPhos  284<H>  11-22      CARDIAC MARKERS ( 21 Nov 2020 08:15 )  x     / x     / 334 u/L / x     / x            COORDINATION OF CARE:  Care Discussed with Consultants/Other Providers [Y/N]: Y  Prior or Outpatient Records Reviewed [Y/N]: Y

## 2020-11-22 NOTE — CONSULT NOTE ADULT - ATTENDING COMMENTS
Liver enzyme elevation in the setting of gram negative bacteremia now improving. Continue to monitor

## 2020-11-22 NOTE — PROGRESS NOTE ADULT - PROBLEM SELECTOR PLAN 3
Patient had an episode of desaturation to 88% yesterday during vital check.  -spontaneously resolved and is now sating well on RA  -CXR showed b/l pulmonary edema  -IV Lasix 20mg given. Can repeat again if needed -prior episode, self-resolving  -CXR with pulmonary edema, s/p lasix  -lasix PRN for SOB

## 2020-11-23 LAB
ALBUMIN SERPL ELPH-MCNC: 3.1 G/DL — LOW (ref 3.3–5)
ALP SERPL-CCNC: 302 U/L — HIGH (ref 40–120)
ALT FLD-CCNC: 593 U/L — HIGH (ref 4–33)
ANION GAP SERPL CALC-SCNC: 15 MMO/L — HIGH (ref 7–14)
APTT BLD: 38.1 SEC — HIGH (ref 27–36.3)
AST SERPL-CCNC: 198 U/L — HIGH (ref 4–32)
BASOPHILS # BLD AUTO: 0.05 K/UL — SIGNIFICANT CHANGE UP (ref 0–0.2)
BASOPHILS NFR BLD AUTO: 0.6 % — SIGNIFICANT CHANGE UP (ref 0–2)
BILIRUB SERPL-MCNC: 0.4 MG/DL — SIGNIFICANT CHANGE UP (ref 0.2–1.2)
BUN SERPL-MCNC: 8 MG/DL — SIGNIFICANT CHANGE UP (ref 7–23)
CALCIUM SERPL-MCNC: 8.9 MG/DL — SIGNIFICANT CHANGE UP (ref 8.4–10.5)
CHLORIDE SERPL-SCNC: 97 MMOL/L — LOW (ref 98–107)
CO2 SERPL-SCNC: 26 MMOL/L — SIGNIFICANT CHANGE UP (ref 22–31)
CREAT SERPL-MCNC: 0.52 MG/DL — SIGNIFICANT CHANGE UP (ref 0.5–1.3)
EOSINOPHIL # BLD AUTO: 0 K/UL — SIGNIFICANT CHANGE UP (ref 0–0.5)
EOSINOPHIL NFR BLD AUTO: 0 % — SIGNIFICANT CHANGE UP (ref 0–6)
GLUCOSE BLDC GLUCOMTR-MCNC: 139 MG/DL — HIGH (ref 70–99)
GLUCOSE BLDC GLUCOMTR-MCNC: 241 MG/DL — HIGH (ref 70–99)
GLUCOSE BLDC GLUCOMTR-MCNC: 242 MG/DL — HIGH (ref 70–99)
GLUCOSE BLDC GLUCOMTR-MCNC: 303 MG/DL — HIGH (ref 70–99)
GLUCOSE BLDC GLUCOMTR-MCNC: 327 MG/DL — HIGH (ref 70–99)
GLUCOSE SERPL-MCNC: 209 MG/DL — HIGH (ref 70–99)
HCT VFR BLD CALC: 32.1 % — LOW (ref 34.5–45)
HGB BLD-MCNC: 10.1 G/DL — LOW (ref 11.5–15.5)
IMM GRANULOCYTES NFR BLD AUTO: 3.6 % — HIGH (ref 0–1.5)
INR BLD: 1.2 — HIGH (ref 0.88–1.16)
LYMPHOCYTES # BLD AUTO: 1.72 K/UL — SIGNIFICANT CHANGE UP (ref 1–3.3)
LYMPHOCYTES # BLD AUTO: 20.7 % — SIGNIFICANT CHANGE UP (ref 13–44)
MAGNESIUM SERPL-MCNC: 2 MG/DL — SIGNIFICANT CHANGE UP (ref 1.6–2.6)
MCHC RBC-ENTMCNC: 26.4 PG — LOW (ref 27–34)
MCHC RBC-ENTMCNC: 31.5 % — LOW (ref 32–36)
MCV RBC AUTO: 83.8 FL — SIGNIFICANT CHANGE UP (ref 80–100)
MONOCYTES # BLD AUTO: 0.78 K/UL — SIGNIFICANT CHANGE UP (ref 0–0.9)
MONOCYTES NFR BLD AUTO: 9.4 % — SIGNIFICANT CHANGE UP (ref 2–14)
NEUTROPHILS # BLD AUTO: 5.45 K/UL — SIGNIFICANT CHANGE UP (ref 1.8–7.4)
NEUTROPHILS NFR BLD AUTO: 65.7 % — SIGNIFICANT CHANGE UP (ref 43–77)
NRBC # FLD: 0 K/UL — SIGNIFICANT CHANGE UP (ref 0–0)
PHOSPHATE SERPL-MCNC: 2.7 MG/DL — SIGNIFICANT CHANGE UP (ref 2.5–4.5)
PLATELET # BLD AUTO: 370 K/UL — SIGNIFICANT CHANGE UP (ref 150–400)
PMV BLD: 8.9 FL — SIGNIFICANT CHANGE UP (ref 7–13)
POTASSIUM SERPL-MCNC: 3.4 MMOL/L — LOW (ref 3.5–5.3)
POTASSIUM SERPL-SCNC: 3.4 MMOL/L — LOW (ref 3.5–5.3)
PROT SERPL-MCNC: 6.8 G/DL — SIGNIFICANT CHANGE UP (ref 6–8.3)
PROTHROM AB SERPL-ACNC: 13.6 SEC — SIGNIFICANT CHANGE UP (ref 10.6–13.6)
RBC # BLD: 3.83 M/UL — SIGNIFICANT CHANGE UP (ref 3.8–5.2)
RBC # FLD: 14.2 % — SIGNIFICANT CHANGE UP (ref 10.3–14.5)
SODIUM SERPL-SCNC: 138 MMOL/L — SIGNIFICANT CHANGE UP (ref 135–145)
WBC # BLD: 8.3 K/UL — SIGNIFICANT CHANGE UP (ref 3.8–10.5)
WBC # FLD AUTO: 8.3 K/UL — SIGNIFICANT CHANGE UP (ref 3.8–10.5)

## 2020-11-23 PROCEDURE — 93306 TTE W/DOPPLER COMPLETE: CPT | Mod: 26

## 2020-11-23 PROCEDURE — 99233 SBSQ HOSP IP/OBS HIGH 50: CPT

## 2020-11-23 PROCEDURE — 99233 SBSQ HOSP IP/OBS HIGH 50: CPT | Mod: GC

## 2020-11-23 PROCEDURE — 99232 SBSQ HOSP IP/OBS MODERATE 35: CPT | Mod: GC

## 2020-11-23 RX ORDER — POTASSIUM CHLORIDE 20 MEQ
20 PACKET (EA) ORAL
Refills: 0 | Status: COMPLETED | OUTPATIENT
Start: 2020-11-23 | End: 2020-11-23

## 2020-11-23 RX ORDER — FUROSEMIDE 40 MG
20 TABLET ORAL DAILY
Refills: 0 | Status: COMPLETED | OUTPATIENT
Start: 2020-11-23 | End: 2020-11-23

## 2020-11-23 RX ADMIN — Medication 20 MILLIEQUIVALENT(S): at 11:44

## 2020-11-23 RX ADMIN — PIPERACILLIN AND TAZOBACTAM 25 GRAM(S): 4; .5 INJECTION, POWDER, LYOPHILIZED, FOR SOLUTION INTRAVENOUS at 17:30

## 2020-11-23 RX ADMIN — Medication 20 MILLIEQUIVALENT(S): at 13:11

## 2020-11-23 RX ADMIN — SENNA PLUS 2 TABLET(S): 8.6 TABLET ORAL at 21:18

## 2020-11-23 RX ADMIN — Medication 4: at 12:29

## 2020-11-23 RX ADMIN — Medication 1 MILLIGRAM(S): at 12:42

## 2020-11-23 RX ADMIN — ENOXAPARIN SODIUM 40 MILLIGRAM(S): 100 INJECTION SUBCUTANEOUS at 11:01

## 2020-11-23 RX ADMIN — RISPERIDONE 3 MILLIGRAM(S): 4 TABLET ORAL at 21:18

## 2020-11-23 RX ADMIN — PIPERACILLIN AND TAZOBACTAM 25 GRAM(S): 4; .5 INJECTION, POWDER, LYOPHILIZED, FOR SOLUTION INTRAVENOUS at 10:08

## 2020-11-23 RX ADMIN — PIPERACILLIN AND TAZOBACTAM 25 GRAM(S): 4; .5 INJECTION, POWDER, LYOPHILIZED, FOR SOLUTION INTRAVENOUS at 01:53

## 2020-11-23 RX ADMIN — Medication 20 MILLIGRAM(S): at 11:44

## 2020-11-23 RX ADMIN — Medication 2: at 17:30

## 2020-11-23 RX ADMIN — Medication 2: at 21:37

## 2020-11-23 RX ADMIN — Medication 2: at 08:53

## 2020-11-23 NOTE — PROGRESS NOTE ADULT - PROBLEM SELECTOR PLAN 2
-abd US ordered  -ammonia elevated this AM; miralax ordered for bowel regimen to reduce levels  -Atorvastatin and Depakote held  -Hep panel negative -abd US ordered  -ammonia elevated this AM; miralax ordered for bowel regimen to reduce levels  -Atorvastatin and Depakote held  -Hep panel negative  -appreciate hep recs

## 2020-11-23 NOTE — PROGRESS NOTE ADULT - PROBLEM SELECTOR PLAN 7
-appreciate psych recs   -c/w Benztropine 1mg, Depakote 1000mg, Risperidone 3mg.  -hold Clozapine as per Psychiatry until myocarditis can be r/o via echo  -if ECHO clear then can start 12.5 Clozapine

## 2020-11-23 NOTE — PROGRESS NOTE BEHAVIORAL HEALTH - NSBHFUPINTERVALHXFT_PSY_A_CORE
Chart reviewed.   Patient seen this AM. She is more alert, with pressured speech and disorganized thought content. She believes that she is in [place]. She talks about people wanting to kill her. She is unable to be redirected. When asked if she wants to hurt herself or hurt others, she perseverates on wanting to hurt people that are trying to hurt her. When asked if she has chest pain- she said yes because she gave birth from her chest.

## 2020-11-23 NOTE — PROGRESS NOTE ADULT - SUBJECTIVE AND OBJECTIVE BOX
Chief Complaint:  Patient is a 55y old  Female who presents with a chief complaint of Fever (2020 09:06)    Interval Events:   No acute overnight events  Confused  No specific complaints this morning    Allergies:  No Known Allergies      Hospital Medications:  acetaminophen   Tablet .. 650 milliGRAM(s) Oral every 6 hours PRN  dextrose 40% Gel 15 Gram(s) Oral once  dextrose 5%. 1000 milliLiter(s) IV Continuous <Continuous>  dextrose 5%. 1000 milliLiter(s) IV Continuous <Continuous>  dextrose 50% Injectable 25 Gram(s) IV Push once  dextrose 50% Injectable 12.5 Gram(s) IV Push once  dextrose 50% Injectable 25 Gram(s) IV Push once  enoxaparin Injectable 40 milliGRAM(s) SubCutaneous daily  glucagon  Injectable 1 milliGRAM(s) IntraMuscular once  insulin lispro (ADMELOG) corrective regimen sliding scale   SubCutaneous three times a day before meals  insulin lispro (ADMELOG) corrective regimen sliding scale   SubCutaneous at bedtime  LORazepam   Injectable 1 milliGRAM(s) IV Push every 4 hours PRN  piperacillin/tazobactam IVPB.. 3.375 Gram(s) IV Intermittent every 8 hours  polyethylene glycol 3350 17 Gram(s) Oral daily  potassium chloride    Tablet ER 20 milliEquivalent(s) Oral every 2 hours  risperiDONE   Tablet 3 milliGRAM(s) Oral at bedtime  senna 2 Tablet(s) Oral at bedtime      PMHX/PSHX:  Diabetes mellitus    HLD (hyperlipidemia)    Schizoaffective disorder    No significant past surgical history        ROS:   General:  No fevers, chills or night sweats  ENT:  No sore throat or dysphagia  CV:  No pain or palpitations  Resp:  No dyspnea, cough or  wheezing  GI:  No pain, No nausea, No vomiting, No diarrhea, No rectal bleeding, No tarry stools,  Skin:  No rash or edema    PHYSICAL EXAM:   Vital Signs:  Vital Signs Last 24 Hrs  T(C): 36.8 (2020 12:30), Max: 36.8 (2020 12:30)  T(F): 98.3 (2020 12:30), Max: 98.3 (2020 12:30)  HR: 82 (2020 12:30) (82 - 85)  BP: 136/92 (2020 12:30) (129/69 - 136/92)  BP(mean): --  RR: 18 (2020 12:30) (16 - 18)  SpO2: 95% (2020 12:30) (94% - 98%)  Daily     Daily Weight in k.7 (2020 05:24)    GENERAL:  NAD, Appears stated age  HEENT:  NC/AT,  conjunctivae clear and pink, sclera -anicteric  CHEST:  Normal Effort, Breath sounds clear  HEART:  RRR, S1 + S2, no murmurs  ABDOMEN:  Soft, non-tender, non-distended, BS+  EXTEREMITIES:  no cyanosis  SKIN:  Warm & Dry.  NEURO:  Alert, oriented x 1    LABS:                        10.1   8.30  )-----------( 370      ( 2020 06:00 )             32.1     Mean Cell Volume: 83.8 fL (- @ 06:00)        138  |  97<L>  |  8   ----------------------------<  209<H>  3.4<L>   |  26  |  0.52    Ca    8.9      2020 06:00  Phos  2.7       Mg     2.0         TPro  6.8  /  Alb  3.1<L>  /  TBili  0.4  /  DBili  x   /  AST  198<H>  /  ALT  593<H>  /  AlkPhos  302<H>      LIVER FUNCTIONS - ( 2020 06:00 )  Alb: 3.1 g/dL / Pro: 6.8 g/dL / ALK PHOS: 302 u/L / ALT: 593 u/L / AST: 198 u/L / GGT: x           PT/INR - ( 2020 06:00 )   PT: 13.6 SEC;   INR: 1.20        PTT - ( 2020 06:00 )  PTT:38.1 SEC                          10.1   8.30  )-----------( 370      ( 2020 06:00 )             32.1                         9.7    5.82  )-----------( 334      ( 2020 06:35 )             30.5                         8.7    7.27  )-----------( 274      ( 2020 08:15 )             27.5       Imaging:

## 2020-11-23 NOTE — PROGRESS NOTE ADULT - SUBJECTIVE AND OBJECTIVE BOX
Shreyas Carpenter, PGY-1  Pager [phone] (NS), [phone](DEYANIRAJ)    CHIEF COMPLAINT: Patient is a 55y old  Female who presents with a chief complaint of Fever (22 Nov 2020 15:29)    INTERVAL HPI/OVERNIGHT EVENTS:  - no acute overnight events  -patients LFTs continue to trend upwards  -RUQ US showed no abnormalities     MEDICATIONS (STANDING):  dextrose 40% Gel 15 Gram(s) Oral once  dextrose 5%. 1000 milliLiter(s) IV Continuous <Continuous>  dextrose 5%. 1000 milliLiter(s) IV Continuous <Continuous>  dextrose 50% Injectable 25 Gram(s) IV Push once  dextrose 50% Injectable 12.5 Gram(s) IV Push once  dextrose 50% Injectable 25 Gram(s) IV Push once  enoxaparin Injectable 40 milliGRAM(s) SubCutaneous daily  glucagon  Injectable 1 milliGRAM(s) IntraMuscular once  insulin lispro (ADMELOG) corrective regimen sliding scale   SubCutaneous three times a day before meals  insulin lispro (ADMELOG) corrective regimen sliding scale   SubCutaneous at bedtime  piperacillin/tazobactam IVPB.. 3.375 Gram(s) IV Intermittent every 8 hours  polyethylene glycol 3350 17 Gram(s) Oral daily  risperiDONE   Tablet 3 milliGRAM(s) Oral at bedtime  senna 2 Tablet(s) Oral at bedtime    MEDICATIONS  (PRN):  acetaminophen   Tablet .. 650 milliGRAM(s) Oral every 6 hours PRN  LORazepam   Injectable 1 milliGRAM(s) IV Push every 4 hours PRN      REVIEW OF SYSTEMS:  CONSTITUTIONAL: No fever  EYES: No eye pain  ENMT: No sinus or throat pain  NECK: No pain  RESPIRATORY: No shortness of breath  CARDIOVASCULAR: No chest pain, dizziness  GASTROINTESTINAL: No abdominal or epigastric pain. No diarrhea or constipation. No melena or hematochezia.  GENITOURINARY: No dysuria, hematuria  NEUROLOGICAL: No headaches  SKIN: No itching, burning, rashes, or lesions   MUSCULOSKELETAL: No muscle or joint pain    T(F): 97.9 (11-23-20 @ 05:24), Max: 97.9 (11-23-20 @ 05:24)  HR: 84 (11-23-20 @ 05:24) (82 - 85)  BP: 129/84 (11-23-20 @ 05:24) (129/69 - 135/62)  RR: 18 (11-23-20 @ 05:24) (16 - 18)  SpO2: 98% (11-23-20 @ 05:24) (94% - 98%)  Wt(kg): --  CAPILLARY BLOOD GLUCOSE      POCT Blood Glucose.: 218 mg/dL (22 Nov 2020 21:10)  POCT Blood Glucose.: 274 mg/dL (22 Nov 2020 17:30)  POCT Blood Glucose.: 250 mg/dL (22 Nov 2020 12:43)  POCT Blood Glucose.: 173 mg/dL (22 Nov 2020 08:48)    I&O's Summary    22 Nov 2020 07:01  -  23 Nov 2020 07:00  --------------------------------------------------------  IN: 150 mL / OUT: 0 mL / NET: 150 mL        PHYSICAL EXAM:  GENERAL: NAD,   HEAD:  Atraumatic, Normocephalic  EYES: PERRLA, conjunctiva and sclera clear  ENMT: Moist mucous membranes  NECK: Supple  NERVOUS SYSTEM:  Alert & Oriented X2, poor concentration  CHEST/LUNG: Clear to auscultation bilaterally; No rales, rhonchi, wheezing, or rubs  HEART: Regular rate and rhythm; No murmurs, rubs, or gallops  ABDOMEN: Soft, Nontender, Nondistended; Bowel sounds present  EXTREMITIES:  2+ Peripheral Pulses, No edema  SKIN: No rashes or lesions    LABS:                        9.7    5.82  )-----------( 334      ( 22 Nov 2020 06:35 )             30.5     11-22    136  |  99  |  10  ----------------------------<  162<H>  3.5   |  26  |  0.45<L>    Ca    8.7      22 Nov 2020 06:35  Phos  2.6     11-22  Mg     2.1     11-22    TPro  6.7  /  Alb  2.9<L>  /  TBili  0.4  /  DBili  x   /  AST  801<H>  /  ALT  713<H>  /  AlkPhos  284<H>  11-22    PT/INR - ( 22 Nov 2020 17:02 )   PT: 13.9 SEC;   INR: 1.23          PTT - ( 22 Nov 2020 17:02 )  PTT:39.7 SEC    RADIOLOGY & ADDITIONAL TESTS:  < from: Xray Chest 1 View- PORTABLE-Routine (Xray Chest 1 View- PORTABLE-Routine .) (11.20.20 @ 14:44) >  FINDINGS/  IMPRESSION:    Bilateral perihilar opacities representing pulmonary edema. No consolidation, pleural effusion or pneumothorax.    < from: CT Chest No Cont (11.19.20 @ 04:29) >  IMPRESSION:  No evidence for pneumonia or effusion.    Partially visualized somewhat asymmetric left perinephric stranding in the upper abdomen. Please correlate clinically with urinalysis and urine culture as infectious etiology cannot be excluded.      < from: US Abdomen Limited (11.22.20 @ 15:14) >  IMPRESSION:    Normal right upper quadrant abdominal ultrasound.       Shreyas Carpenter, PGY-1  Pager [phone] (NS), [phone](DEYANIRAJ)    CHIEF COMPLAINT: Patient is a 55y old  Female who presents with a chief complaint of Fever (22 Nov 2020 15:29)    INTERVAL HPI/OVERNIGHT EVENTS:  - no acute overnight events  -patients LFTs continue to trend upwards  -RUQ US showed no abnormalities   - ECHO pending   -IV Lasix 20 today    MEDICATIONS (STANDING):  dextrose 40% Gel 15 Gram(s) Oral once  dextrose 5%. 1000 milliLiter(s) IV Continuous <Continuous>  dextrose 5%. 1000 milliLiter(s) IV Continuous <Continuous>  dextrose 50% Injectable 25 Gram(s) IV Push once  dextrose 50% Injectable 12.5 Gram(s) IV Push once  dextrose 50% Injectable 25 Gram(s) IV Push once  enoxaparin Injectable 40 milliGRAM(s) SubCutaneous daily  glucagon  Injectable 1 milliGRAM(s) IntraMuscular once  insulin lispro (ADMELOG) corrective regimen sliding scale   SubCutaneous three times a day before meals  insulin lispro (ADMELOG) corrective regimen sliding scale   SubCutaneous at bedtime  piperacillin/tazobactam IVPB.. 3.375 Gram(s) IV Intermittent every 8 hours  polyethylene glycol 3350 17 Gram(s) Oral daily  risperiDONE   Tablet 3 milliGRAM(s) Oral at bedtime  senna 2 Tablet(s) Oral at bedtime    MEDICATIONS  (PRN):  acetaminophen   Tablet .. 650 milliGRAM(s) Oral every 6 hours PRN  LORazepam   Injectable 1 milliGRAM(s) IV Push every 4 hours PRN      REVIEW OF SYSTEMS:  CONSTITUTIONAL: No fever  EYES: No eye pain  ENMT: No sinus or throat pain  NECK: No pain  RESPIRATORY: No shortness of breath  CARDIOVASCULAR: No chest pain, dizziness  GASTROINTESTINAL: No abdominal or epigastric pain. No diarrhea or constipation. No melena or hematochezia.  GENITOURINARY: No dysuria, hematuria  NEUROLOGICAL: No headaches  SKIN: No itching, burning, rashes, or lesions   MUSCULOSKELETAL: No muscle or joint pain    T(F): 97.9 (11-23-20 @ 05:24), Max: 97.9 (11-23-20 @ 05:24)  HR: 84 (11-23-20 @ 05:24) (82 - 85)  BP: 129/84 (11-23-20 @ 05:24) (129/69 - 135/62)  RR: 18 (11-23-20 @ 05:24) (16 - 18)  SpO2: 98% (11-23-20 @ 05:24) (94% - 98%)  Wt(kg): --  CAPILLARY BLOOD GLUCOSE      POCT Blood Glucose.: 218 mg/dL (22 Nov 2020 21:10)  POCT Blood Glucose.: 274 mg/dL (22 Nov 2020 17:30)  POCT Blood Glucose.: 250 mg/dL (22 Nov 2020 12:43)  POCT Blood Glucose.: 173 mg/dL (22 Nov 2020 08:48)    I&O's Summary    22 Nov 2020 07:01  -  23 Nov 2020 07:00  --------------------------------------------------------  IN: 150 mL / OUT: 0 mL / NET: 150 mL        PHYSICAL EXAM:  GENERAL: NAD,   HEAD:  Atraumatic, Normocephalic  EYES: PERRLA, conjunctiva and sclera clear  ENMT: Moist mucous membranes  NECK: Supple  NERVOUS SYSTEM:  Alert & Oriented X2, poor concentration, flat affect, word salad,  CHEST/LUNG: Crackles at the posterior bases; No rales, rhonchi, wheezing, or rubs  HEART: Regular rate and rhythm; No murmurs, rubs, or gallops  ABDOMEN: Soft, Nontender, Nondistended; Bowel sounds present  EXTREMITIES:  2+ Peripheral Pulses, No edema  SKIN: No rashes or lesions    LABS:                        9.7    5.82  )-----------( 334      ( 22 Nov 2020 06:35 )             30.5     11-22    136  |  99  |  10  ----------------------------<  162<H>  3.5   |  26  |  0.45<L>    Ca    8.7      22 Nov 2020 06:35  Phos  2.6     11-22  Mg     2.1     11-22    TPro  6.7  /  Alb  2.9<L>  /  TBili  0.4  /  DBili  x   /  AST  801<H>  /  ALT  713<H>  /  AlkPhos  284<H>  11-22    PT/INR - ( 22 Nov 2020 17:02 )   PT: 13.9 SEC;   INR: 1.23          PTT - ( 22 Nov 2020 17:02 )  PTT:39.7 SEC    RADIOLOGY & ADDITIONAL TESTS:  < from: Xray Chest 1 View- PORTABLE-Routine (Xray Chest 1 View- PORTABLE-Routine .) (11.20.20 @ 14:44) >  FINDINGS/  IMPRESSION:    Bilateral perihilar opacities representing pulmonary edema. No consolidation, pleural effusion or pneumothorax.    < from: CT Chest No Cont (11.19.20 @ 04:29) >  IMPRESSION:  No evidence for pneumonia or effusion.    Partially visualized somewhat asymmetric left perinephric stranding in the upper abdomen. Please correlate clinically with urinalysis and urine culture as infectious etiology cannot be excluded.      < from: US Abdomen Limited (11.22.20 @ 15:14) >  IMPRESSION:    Normal right upper quadrant abdominal ultrasound.

## 2020-11-23 NOTE — PROGRESS NOTE ADULT - PROBLEM SELECTOR PLAN 3
-prior episode, self-resolving  -CXR with pulmonary edema, s/p lasix  -lasix PRN for SOB -prior episode, self-resolving  -CXR with pulmonary edema, Lasix today  -lasix PRN for SOB  -incentive spirometry

## 2020-11-23 NOTE — PROGRESS NOTE ADULT - ASSESSMENT
55 F hx of schizoaffective disorder, HTN, HLD presented with fevers (sepsis) likely secondary to pyelonephritis. Hepatology consulted for elevated liver enzymes    Impression:  # Elevated liver enzymes (cholestatic pattern): Given sepsis/rigors, etc. suspect a component of ischemic in the setting of quick resolution. Differential includes DILI (Ceftriaxone, however usually cholestatic) vs Tylenol. Lower suspicion for Viral hepatitis (Acute hepatitis panel negative) and AIH.   # Elevated CK - attributed to rigors, downtrending    Recommendations:  - Trend Liver Enzymes  - Check SAEID, ASMA and immunoglobulin panel  - Supportive care per primary team    Eun Avendano MD  Gastroenterology Fellow  [phone]  Available on [employer] Teams  Please page on call fellow on weekends and after 5pm on weekdays: [phone]   55 F hx of schizoaffective disorder, HTN, HLD presented with fevers (sepsis) likely secondary to pyelonephritis. Hepatology consulted for elevated liver enzymes    Impression:  # Elevated liver enzymes (cholestatic pattern): Given sepsis/rigors, etc. suspect a component of ischemic in the setting of quick resolution. Differential includes DILI (Ceftriaxone, however usually cholestatic) vs Tylenol. Lower suspicion for Viral hepatitis (Acute hepatitis panel negative) and AIH.   # Gram-Negative Selvin Bacteremia  # Elevated CK - attributed to rigors, downtrending    Recommendations:  - Trend Liver Enzymes  - Check SAEID, ASMA and immunoglobulin panel  - Supportive care per primary team    Eun Avendano MD  Gastroenterology Fellow  [phone]  Available on [employer] Teams  Please page on call fellow on weekends and after 5pm on weekdays: [phone]

## 2020-11-23 NOTE — PROGRESS NOTE ADULT - ASSESSMENT
YOSHI MCCOY is a 55y with pmhx schizoaffective disorder, DMT2, HLD, now presenting with fever for 24hours duration from H 2/2 to pyelonephritis.

## 2020-11-23 NOTE — PROGRESS NOTE ADULT - ATTENDING COMMENTS
Patient seen and examined, chart and labs reviewed. Case discussed with house staff.   Patient has no complaints, HPI unreliable as she remains very tangential, delusional.     #Acute Pyelonephritis: now with resolving sepsis. W/ Gram neg bacteremia. Now afebrile, no leukocytosis, UCx/BCx with E coli, mostly pan-sensitive, on Zosyn (2/2 to hepatocellular injury), day 5/7 of antibiotics.    #Transaminitis - new LFT elevation, unclear etiology. Continues to uptrend today. Discontinued Statin and Depakote. Abd US is within normal limits and acute viral hep panel and acetaminophen level are normal. Hepatology consulted, appreciate recs. Trend CMP, and Coags.     #SOB - CXR showing pulmonary edema s/p Lasix 20mg IVP x1 on 11/21 with initial improvement, will consider another dose today. Awaiting TTE for further eval. Monitor volume status. Is/Os, daily weights.     #Schizoaffective d/o- Held Depakote given LFTs, f/u  recs- consider restarting Clozapin tonight if ECHO wnl.    Dispo: likely back to [facility] when acute issues resolve.

## 2020-11-23 NOTE — PROGRESS NOTE ADULT - PROBLEM SELECTOR PLAN 8
DVT: lovenox  Diet: CC  Dispo: back to [facility] when clinically improved

## 2020-11-23 NOTE — PROGRESS NOTE ADULT - PROBLEM SELECTOR PLAN 1
-likely 2/2 to UTI/pyelonephritis  -UCx/BCx with E coli, mostly pan-sensitive  -on ceftriaxone 2g , day 5/7 -likely 2/2 to UTI/pyelonephritis  -UCx/BCx with E coli, mostly pan-sensitive  -on Zosyn (2/2 to hepatocellular injury), day 5/7 of antibiotics. -likely 2/2 to UTI/pyelonephritis  -UCx/BCx with E coli, mostly pan-sensitive  -on Zosyn (2/2 to hepatocellular injury), day 4/7 of antibiotics.

## 2020-11-24 LAB
ALBUMIN SERPL ELPH-MCNC: 3.4 G/DL — SIGNIFICANT CHANGE UP (ref 3.3–5)
ALP SERPL-CCNC: 289 U/L — HIGH (ref 40–120)
ALT FLD-CCNC: 388 U/L — HIGH (ref 4–33)
AMMONIA BLD-MCNC: 70 UMOL/L — HIGH (ref 11–55)
ANION GAP SERPL CALC-SCNC: 12 MMO/L — SIGNIFICANT CHANGE UP (ref 7–14)
APTT BLD: 40.1 SEC — HIGH (ref 27–36.3)
AST SERPL-CCNC: 73 U/L — HIGH (ref 4–32)
BASOPHILS # BLD AUTO: 0.07 K/UL — SIGNIFICANT CHANGE UP (ref 0–0.2)
BASOPHILS NFR BLD AUTO: 0.8 % — SIGNIFICANT CHANGE UP (ref 0–2)
BILIRUB SERPL-MCNC: 0.4 MG/DL — SIGNIFICANT CHANGE UP (ref 0.2–1.2)
BUN SERPL-MCNC: 11 MG/DL — SIGNIFICANT CHANGE UP (ref 7–23)
CALCIUM SERPL-MCNC: 9.6 MG/DL — SIGNIFICANT CHANGE UP (ref 8.4–10.5)
CHLORIDE SERPL-SCNC: 99 MMOL/L — SIGNIFICANT CHANGE UP (ref 98–107)
CK SERPL-CCNC: 34 U/L — SIGNIFICANT CHANGE UP (ref 25–170)
CO2 SERPL-SCNC: 26 MMOL/L — SIGNIFICANT CHANGE UP (ref 22–31)
CREAT SERPL-MCNC: 0.54 MG/DL — SIGNIFICANT CHANGE UP (ref 0.5–1.3)
EOSINOPHIL # BLD AUTO: 0 K/UL — SIGNIFICANT CHANGE UP (ref 0–0.5)
EOSINOPHIL NFR BLD AUTO: 0 % — SIGNIFICANT CHANGE UP (ref 0–6)
GLUCOSE BLDC GLUCOMTR-MCNC: 202 MG/DL — HIGH (ref 70–99)
GLUCOSE BLDC GLUCOMTR-MCNC: 221 MG/DL — HIGH (ref 70–99)
GLUCOSE BLDC GLUCOMTR-MCNC: 254 MG/DL — HIGH (ref 70–99)
GLUCOSE BLDC GLUCOMTR-MCNC: 278 MG/DL — HIGH (ref 70–99)
GLUCOSE SERPL-MCNC: 257 MG/DL — HIGH (ref 70–99)
HCT VFR BLD CALC: 33.2 % — LOW (ref 34.5–45)
HGB BLD-MCNC: 10.4 G/DL — LOW (ref 11.5–15.5)
IMM GRANULOCYTES NFR BLD AUTO: 4.7 % — HIGH (ref 0–1.5)
INR BLD: 1.2 — HIGH (ref 0.88–1.16)
LYMPHOCYTES # BLD AUTO: 1.99 K/UL — SIGNIFICANT CHANGE UP (ref 1–3.3)
LYMPHOCYTES # BLD AUTO: 23 % — SIGNIFICANT CHANGE UP (ref 13–44)
MAGNESIUM SERPL-MCNC: 1.8 MG/DL — SIGNIFICANT CHANGE UP (ref 1.6–2.6)
MCHC RBC-ENTMCNC: 26.5 PG — LOW (ref 27–34)
MCHC RBC-ENTMCNC: 31.3 % — LOW (ref 32–36)
MCV RBC AUTO: 84.5 FL — SIGNIFICANT CHANGE UP (ref 80–100)
MONOCYTES # BLD AUTO: 0.87 K/UL — SIGNIFICANT CHANGE UP (ref 0–0.9)
MONOCYTES NFR BLD AUTO: 10 % — SIGNIFICANT CHANGE UP (ref 2–14)
NEUTROPHILS # BLD AUTO: 5.32 K/UL — SIGNIFICANT CHANGE UP (ref 1.8–7.4)
NEUTROPHILS NFR BLD AUTO: 61.5 % — SIGNIFICANT CHANGE UP (ref 43–77)
NRBC # FLD: 0 K/UL — SIGNIFICANT CHANGE UP (ref 0–0)
NT-PROBNP SERPL-SCNC: 60.23 PG/ML — SIGNIFICANT CHANGE UP
PHOSPHATE SERPL-MCNC: 3.3 MG/DL — SIGNIFICANT CHANGE UP (ref 2.5–4.5)
PLATELET # BLD AUTO: 415 K/UL — HIGH (ref 150–400)
PMV BLD: 8.4 FL — SIGNIFICANT CHANGE UP (ref 7–13)
POTASSIUM SERPL-MCNC: 3.9 MMOL/L — SIGNIFICANT CHANGE UP (ref 3.5–5.3)
POTASSIUM SERPL-SCNC: 3.9 MMOL/L — SIGNIFICANT CHANGE UP (ref 3.5–5.3)
PROT SERPL-MCNC: 7.2 G/DL — SIGNIFICANT CHANGE UP (ref 6–8.3)
PROTHROM AB SERPL-ACNC: 13.7 SEC — HIGH (ref 10.6–13.6)
RBC # BLD: 3.93 M/UL — SIGNIFICANT CHANGE UP (ref 3.8–5.2)
RBC # FLD: 14.1 % — SIGNIFICANT CHANGE UP (ref 10.3–14.5)
SODIUM SERPL-SCNC: 137 MMOL/L — SIGNIFICANT CHANGE UP (ref 135–145)
WBC # BLD: 8.66 K/UL — SIGNIFICANT CHANGE UP (ref 3.8–10.5)
WBC # FLD AUTO: 8.66 K/UL — SIGNIFICANT CHANGE UP (ref 3.8–10.5)

## 2020-11-24 PROCEDURE — 99233 SBSQ HOSP IP/OBS HIGH 50: CPT | Mod: GC

## 2020-11-24 PROCEDURE — 99232 SBSQ HOSP IP/OBS MODERATE 35: CPT

## 2020-11-24 PROCEDURE — 99232 SBSQ HOSP IP/OBS MODERATE 35: CPT | Mod: GC

## 2020-11-24 RX ORDER — INSULIN GLARGINE 100 [IU]/ML
3 INJECTION, SOLUTION SUBCUTANEOUS AT BEDTIME
Refills: 0 | Status: DISCONTINUED | OUTPATIENT
Start: 2020-11-24 | End: 2020-11-27

## 2020-11-24 RX ORDER — INSULIN GLARGINE 100 [IU]/ML
10 INJECTION, SOLUTION SUBCUTANEOUS AT BEDTIME
Refills: 0 | Status: DISCONTINUED | OUTPATIENT
Start: 2020-11-24 | End: 2020-11-24

## 2020-11-24 RX ORDER — CLOZAPINE 150 MG/1
12.5 TABLET, ORALLY DISINTEGRATING ORAL AT BEDTIME
Refills: 0 | Status: DISCONTINUED | OUTPATIENT
Start: 2020-11-24 | End: 2020-11-25

## 2020-11-24 RX ADMIN — SENNA PLUS 2 TABLET(S): 8.6 TABLET ORAL at 22:04

## 2020-11-24 RX ADMIN — CLOZAPINE 12.5 MILLIGRAM(S): 150 TABLET, ORALLY DISINTEGRATING ORAL at 22:04

## 2020-11-24 RX ADMIN — PIPERACILLIN AND TAZOBACTAM 25 GRAM(S): 4; .5 INJECTION, POWDER, LYOPHILIZED, FOR SOLUTION INTRAVENOUS at 17:27

## 2020-11-24 RX ADMIN — Medication 3: at 12:43

## 2020-11-24 RX ADMIN — PIPERACILLIN AND TAZOBACTAM 25 GRAM(S): 4; .5 INJECTION, POWDER, LYOPHILIZED, FOR SOLUTION INTRAVENOUS at 01:53

## 2020-11-24 RX ADMIN — PIPERACILLIN AND TAZOBACTAM 25 GRAM(S): 4; .5 INJECTION, POWDER, LYOPHILIZED, FOR SOLUTION INTRAVENOUS at 09:16

## 2020-11-24 RX ADMIN — Medication 2: at 17:29

## 2020-11-24 RX ADMIN — ENOXAPARIN SODIUM 40 MILLIGRAM(S): 100 INJECTION SUBCUTANEOUS at 11:11

## 2020-11-24 RX ADMIN — INSULIN GLARGINE 3 UNIT(S): 100 INJECTION, SOLUTION SUBCUTANEOUS at 22:14

## 2020-11-24 RX ADMIN — RISPERIDONE 3 MILLIGRAM(S): 4 TABLET ORAL at 22:04

## 2020-11-24 RX ADMIN — Medication 3: at 08:47

## 2020-11-24 NOTE — PROGRESS NOTE ADULT - ASSESSMENT
YOSHI MCCOY is a 55y with pmhx schizoaffective disorder, DMT2, HLD, now presenting with fever for 24hours duration from [facility] 2/2 to pyelonephritis and bacteremia now on CTX.

## 2020-11-24 NOTE — PROGRESS NOTE ADULT - PROBLEM SELECTOR PLAN 2
-abd US ordered  -ammonia elevated this AM; miralax ordered for bowel regimen to reduce levels  -Atorvastatin and Depakote held  -Hep panel negative  -appreciate hep recs  -labs trending down overtime

## 2020-11-24 NOTE — PROGRESS NOTE ADULT - ATTENDING COMMENTS
Patient seen and examined, chart and labs reviewed. Case discussed with house staff.   Patient has no complaints, HPI unreliable as she remains very tangential, delusional.     #Acute Pyelonephritis: now with resolving sepsis. W/ Gram neg bacteremia. Now afebrile, no leukocytosis, UCx/BCx with E coli, mostly pan-sensitive, on Zosyn (2/2 to hepatocellular injury), day 6/7 of antibiotics.    #Transaminitis - new LFT elevation, unclear etiology. Discontinued Statin and Depakote. Abd US is within normal limits and acute viral hep panel and acetaminophen level are normal. Hepatology consulted, appreciate recs. Peaked, and now downtrending. Trend CMP, and Coags.     #SOB since resolved, ECHO limited as patient did not allow complete study- currently euvolemic, no SOB, saturating well.     #Schizoaffective d/o- Held Depakote given LFTs, f/u BH recs- consider restarting Clozapin tonight if ECHO wnl. Echo grossly normal although patient did not allow for complete study.    Dispo: likely back to [facility] when acute issues resolve.

## 2020-11-24 NOTE — PROGRESS NOTE ADULT - PROBLEM SELECTOR PLAN 3
-prior episode, self-resolving  -CXR with pulmonary edema, Lasix today  -lasix PRN for SOB  -incentive spirometry

## 2020-11-24 NOTE — PROGRESS NOTE BEHAVIORAL HEALTH - NSBHFUPINTERVALHXFT_PSY_A_CORE
Patient irritable on exam, does not acknowledge her name as Shirley, confirmed on name tag and by staff that patient has been making up various aliases, irritable, mumbling nonsensical thing    No signs of EPS on exam

## 2020-11-24 NOTE — PROGRESS NOTE ADULT - PROBLEM SELECTOR PLAN 8
DVT: lovenox  Diet: CC  Dispo: back to [facility] when clinically improved

## 2020-11-24 NOTE — PROGRESS NOTE ADULT - PROBLEM SELECTOR PLAN 7
-appreciate psych recs   -c/w Benztropine 1mg, Depakote 1000mg, Risperidone 3mg.  -hold Clozapine as per Psychiatry until myocarditis can be r/o via echo  -if ECHO clear then can start 12.5 Clozapine -appreciate psych recs   -c/w Benztropine 1mg, Depakote 1000mg, Risperidone 3mg.  -CK negative, troponin low, echo without wall motion abnormalities although limited study, EKG without suggestive changes - at this time, unlikely to have myocarditis  -clozapine 12.5 mg (11/24 - )

## 2020-11-24 NOTE — PROGRESS NOTE ADULT - PROBLEM SELECTOR PLAN 1
-likely 2/2 to UTI/pyelonephritis  -UCx/BCx with E coli, mostly pan-sensitive  -on Zosyn (2/2 to hepatocellular injury), day 5/7 of antibiotics. -likely 2/2 to UTI/pyelonephritis  -UCx/BCx with E coli, mostly pan-sensitive  -on Zosyn (2/2 to hepatocellular injury), day 4/7 of antibiotics.

## 2020-11-24 NOTE — PROGRESS NOTE ADULT - SUBJECTIVE AND OBJECTIVE BOX
Chief Complaint:  Patient is a 55y old  Female who presents with a chief complaint of Fever (2020 07:25)    Interval Events:   No acute overnight events  No fevers, chills, nausea, vomiting, diarrhea     Allergies:  No Known Allergies      Hospital Medications:  acetaminophen   Tablet .. 650 milliGRAM(s) Oral every 6 hours PRN  dextrose 40% Gel 15 Gram(s) Oral once  dextrose 5%. 1000 milliLiter(s) IV Continuous <Continuous>  dextrose 5%. 1000 milliLiter(s) IV Continuous <Continuous>  dextrose 50% Injectable 25 Gram(s) IV Push once  dextrose 50% Injectable 12.5 Gram(s) IV Push once  dextrose 50% Injectable 25 Gram(s) IV Push once  enoxaparin Injectable 40 milliGRAM(s) SubCutaneous daily  glucagon  Injectable 1 milliGRAM(s) IntraMuscular once  insulin lispro (ADMELOG) corrective regimen sliding scale   SubCutaneous three times a day before meals  insulin lispro (ADMELOG) corrective regimen sliding scale   SubCutaneous at bedtime  LORazepam   Injectable 1 milliGRAM(s) IV Push every 4 hours PRN  piperacillin/tazobactam IVPB.. 3.375 Gram(s) IV Intermittent every 8 hours  polyethylene glycol 3350 17 Gram(s) Oral daily  risperiDONE   Tablet 3 milliGRAM(s) Oral at bedtime  senna 2 Tablet(s) Oral at bedtime      PMHX/PSHX:  Diabetes mellitus    HLD (hyperlipidemia)    Schizoaffective disorder    No significant past surgical history        ROS:   General:  No fevers, chills or night sweats  ENT:  No sore throat or dysphagia  CV:  No pain or palpitations  Resp:  No dyspnea, cough or  wheezing  GI:  No pain, No nausea, No vomiting, No diarrhea, No rectal bleeding, No tarry stools,  Skin:  No rash or edema    PHYSICAL EXAM:   Vital Signs:  Vital Signs Last 24 Hrs  T(C): 36.5 (2020 05:17), Max: 36.8 (2020 12:30)  T(F): 97.7 (2020 05:17), Max: 98.3 (2020 12:30)  HR: 82 (2020 05:17) (81 - 82)  BP: 133/74 (2020 05:17) (122/76 - 136/82)  BP(mean): --  RR: 18 (2020 05:17) (18 - 18)  SpO2: 95% (2020 05:17) (95% - 95%)  Daily     Daily Weight in k.5 (2020 05:17)    GENERAL:  NAD, Appears stated age  HEENT:  NC/AT,  conjunctivae clear and pink, sclera -anicteric  CHEST:  Normal Effort, Breath sounds clear  HEART:  RRR, S1 + S2, no murmurs  ABDOMEN:  Soft, non-tender, non-distended, BS+  EXTEREMITIES:  no cyanosis  SKIN:  Warm & Dry.  NEURO:  Alert, oriented    LABS:                        10.4   8.66  )-----------( 415      ( 2020 05:45 )             33.2     Mean Cell Volume: 84.5 fL (- @ 05:45)        137  |  99  |  11  ----------------------------<  257<H>  3.9   |  26  |  0.54    Ca    9.6      2020 05:45  Phos  3.3       Mg     1.8         TPro  7.2  /  Alb  3.4  /  TBili  0.4  /  DBili  x   /  AST  73<H>  /  ALT  388<H>  /  AlkPhos  289<H>      LIVER FUNCTIONS - ( 2020 05:45 )  Alb: 3.4 g/dL / Pro: 7.2 g/dL / ALK PHOS: 289 u/L / ALT: 388 u/L / AST: 73 u/L / GGT: x           PT/INR - ( 2020 05:45 )   PT: 13.7 SEC;   INR: 1.20          PTT - ( 2020 05:45 )  PTT:40.1 SEC    Amylase Serum--      Lipase serum--       [id number]                          10.4   8.66  )-----------( 415      ( 2020 05:45 )             33.2                         10.1   8.30  )-----------( 370      ( 2020 06:00 )             32.1                         9.7    5.82  )-----------( 334      ( 2020 06:35 )             30.5       Imaging:

## 2020-11-24 NOTE — PROGRESS NOTE ADULT - ASSESSMENT
55 F hx of schizoaffective disorder, HTN, HLD presented with fevers (sepsis) likely secondary to pyelonephritis. Hepatology consulted for elevated liver enzymes    Impression:  # Elevated liver enzymes (cholestatic pattern): Given sepsis/rigors, etc. suspect a component of ischemic in the setting of quick resolution. Differential includes DILI (Ceftriaxone, however usually cholestatic) vs Tylenol. Lower suspicion for Viral hepatitis (Acute hepatitis panel negative) and AIH.   # Gram-Negative Selvin Bacteremia  # Elevated CK - attributed to rigors, downtrending    Recommendations:  - Trend Liver Enzymes  - Check SAEID  - Follow up ASMA and immunoglobulin panel  - Supportive care per primary team    - Hepatology will sign off    Eun Avendano MD  Gastroenterology Fellow  [phone]  Available on [employer] Teams  Please page on call fellow on weekends and after 5pm on weekdays: [phone]

## 2020-11-25 DIAGNOSIS — N12 TUBULO-INTERSTITIAL NEPHRITIS, NOT SPECIFIED AS ACUTE OR CHRONIC: ICD-10-CM

## 2020-11-25 LAB
ALBUMIN SERPL ELPH-MCNC: 3.6 G/DL — SIGNIFICANT CHANGE UP (ref 3.3–5)
ALP SERPL-CCNC: 284 U/L — HIGH (ref 40–120)
ALT FLD-CCNC: 308 U/L — HIGH (ref 4–33)
AMMONIA BLD-MCNC: 34 UMOL/L — SIGNIFICANT CHANGE UP (ref 11–55)
ANION GAP SERPL CALC-SCNC: 13 MMO/L — SIGNIFICANT CHANGE UP (ref 7–14)
APTT BLD: 41.2 SEC — HIGH (ref 27–36.3)
AST SERPL-CCNC: 58 U/L — HIGH (ref 4–32)
BASOPHILS # BLD AUTO: 0.07 K/UL — SIGNIFICANT CHANGE UP (ref 0–0.2)
BASOPHILS NFR BLD AUTO: 0.7 % — SIGNIFICANT CHANGE UP (ref 0–2)
BILIRUB SERPL-MCNC: 0.6 MG/DL — SIGNIFICANT CHANGE UP (ref 0.2–1.2)
BLD GP AB SCN SERPL QL: NEGATIVE — SIGNIFICANT CHANGE UP
BUN SERPL-MCNC: 12 MG/DL — SIGNIFICANT CHANGE UP (ref 7–23)
CALCIUM SERPL-MCNC: 10 MG/DL — SIGNIFICANT CHANGE UP (ref 8.4–10.5)
CHLORIDE SERPL-SCNC: 99 MMOL/L — SIGNIFICANT CHANGE UP (ref 98–107)
CK SERPL-CCNC: 39 U/L — SIGNIFICANT CHANGE UP (ref 25–170)
CO2 SERPL-SCNC: 25 MMOL/L — SIGNIFICANT CHANGE UP (ref 22–31)
CREAT SERPL-MCNC: 0.47 MG/DL — LOW (ref 0.5–1.3)
CULTURE RESULTS: SIGNIFICANT CHANGE UP
EOSINOPHIL # BLD AUTO: 0 K/UL — SIGNIFICANT CHANGE UP (ref 0–0.5)
EOSINOPHIL NFR BLD AUTO: 0 % — SIGNIFICANT CHANGE UP (ref 0–6)
GLUCOSE BLDC GLUCOMTR-MCNC: 202 MG/DL — HIGH (ref 70–99)
GLUCOSE BLDC GLUCOMTR-MCNC: 233 MG/DL — HIGH (ref 70–99)
GLUCOSE BLDC GLUCOMTR-MCNC: 235 MG/DL — HIGH (ref 70–99)
GLUCOSE BLDC GLUCOMTR-MCNC: 264 MG/DL — HIGH (ref 70–99)
GLUCOSE BLDC GLUCOMTR-MCNC: 276 MG/DL — HIGH (ref 70–99)
GLUCOSE SERPL-MCNC: 222 MG/DL — HIGH (ref 70–99)
HCT VFR BLD CALC: 34.9 % — SIGNIFICANT CHANGE UP (ref 34.5–45)
HGB BLD-MCNC: 11 G/DL — LOW (ref 11.5–15.5)
IMM GRANULOCYTES NFR BLD AUTO: 5 % — HIGH (ref 0–1.5)
INR BLD: 1.19 — HIGH (ref 0.88–1.16)
LYMPHOCYTES # BLD AUTO: 2.65 K/UL — SIGNIFICANT CHANGE UP (ref 1–3.3)
LYMPHOCYTES # BLD AUTO: 27.7 % — SIGNIFICANT CHANGE UP (ref 13–44)
MAGNESIUM SERPL-MCNC: 2 MG/DL — SIGNIFICANT CHANGE UP (ref 1.6–2.6)
MCHC RBC-ENTMCNC: 26.4 PG — LOW (ref 27–34)
MCHC RBC-ENTMCNC: 31.5 % — LOW (ref 32–36)
MCV RBC AUTO: 83.9 FL — SIGNIFICANT CHANGE UP (ref 80–100)
MONOCYTES # BLD AUTO: 0.74 K/UL — SIGNIFICANT CHANGE UP (ref 0–0.9)
MONOCYTES NFR BLD AUTO: 7.7 % — SIGNIFICANT CHANGE UP (ref 2–14)
NEUTROPHILS # BLD AUTO: 5.62 K/UL — SIGNIFICANT CHANGE UP (ref 1.8–7.4)
NEUTROPHILS NFR BLD AUTO: 58.9 % — SIGNIFICANT CHANGE UP (ref 43–77)
NRBC # FLD: 0 K/UL — SIGNIFICANT CHANGE UP (ref 0–0)
PHOSPHATE SERPL-MCNC: 3.6 MG/DL — SIGNIFICANT CHANGE UP (ref 2.5–4.5)
PLATELET # BLD AUTO: 476 K/UL — HIGH (ref 150–400)
PMV BLD: 8.2 FL — SIGNIFICANT CHANGE UP (ref 7–13)
POTASSIUM SERPL-MCNC: 4.3 MMOL/L — SIGNIFICANT CHANGE UP (ref 3.5–5.3)
POTASSIUM SERPL-SCNC: 4.3 MMOL/L — SIGNIFICANT CHANGE UP (ref 3.5–5.3)
PROT SERPL-MCNC: 7.8 G/DL — SIGNIFICANT CHANGE UP (ref 6–8.3)
PROTHROM AB SERPL-ACNC: 13.5 SEC — SIGNIFICANT CHANGE UP (ref 10.6–13.6)
RBC # BLD: 4.16 M/UL — SIGNIFICANT CHANGE UP (ref 3.8–5.2)
RBC # FLD: 14.1 % — SIGNIFICANT CHANGE UP (ref 10.3–14.5)
RH IG SCN BLD-IMP: POSITIVE — SIGNIFICANT CHANGE UP
SMOOTH MUSCLE AB SER-ACNC: SIGNIFICANT CHANGE UP
SODIUM SERPL-SCNC: 137 MMOL/L — SIGNIFICANT CHANGE UP (ref 135–145)
SPECIMEN SOURCE: SIGNIFICANT CHANGE UP
TROPONIN T, HIGH SENSITIVITY: 13 NG/L — SIGNIFICANT CHANGE UP (ref ?–14)
WBC # BLD: 9.56 K/UL — SIGNIFICANT CHANGE UP (ref 3.8–10.5)
WBC # FLD AUTO: 9.56 K/UL — SIGNIFICANT CHANGE UP (ref 3.8–10.5)

## 2020-11-25 PROCEDURE — 99233 SBSQ HOSP IP/OBS HIGH 50: CPT

## 2020-11-25 RX ORDER — CLOZAPINE 150 MG/1
12.5 TABLET, ORALLY DISINTEGRATING ORAL ONCE
Refills: 0 | Status: COMPLETED | OUTPATIENT
Start: 2020-11-25 | End: 2020-11-25

## 2020-11-25 RX ORDER — CLOZAPINE 150 MG/1
12.5 TABLET, ORALLY DISINTEGRATING ORAL
Refills: 0 | Status: DISCONTINUED | OUTPATIENT
Start: 2020-11-26 | End: 2020-11-26

## 2020-11-25 RX ORDER — CLOZAPINE 150 MG/1
12.5 TABLET, ORALLY DISINTEGRATING ORAL ONCE
Refills: 0 | Status: DISCONTINUED | OUTPATIENT
Start: 2020-11-25 | End: 2020-11-25

## 2020-11-25 RX ADMIN — Medication 3: at 17:36

## 2020-11-25 RX ADMIN — CLOZAPINE 12.5 MILLIGRAM(S): 150 TABLET, ORALLY DISINTEGRATING ORAL at 22:43

## 2020-11-25 RX ADMIN — ENOXAPARIN SODIUM 40 MILLIGRAM(S): 100 INJECTION SUBCUTANEOUS at 13:27

## 2020-11-25 RX ADMIN — Medication 2: at 09:14

## 2020-11-25 RX ADMIN — Medication 1 MILLIGRAM(S): at 13:27

## 2020-11-25 RX ADMIN — PIPERACILLIN AND TAZOBACTAM 25 GRAM(S): 4; .5 INJECTION, POWDER, LYOPHILIZED, FOR SOLUTION INTRAVENOUS at 17:35

## 2020-11-25 RX ADMIN — SENNA PLUS 2 TABLET(S): 8.6 TABLET ORAL at 22:44

## 2020-11-25 RX ADMIN — CLOZAPINE 12.5 MILLIGRAM(S): 150 TABLET, ORALLY DISINTEGRATING ORAL at 16:01

## 2020-11-25 RX ADMIN — Medication 2: at 13:26

## 2020-11-25 RX ADMIN — Medication 1 MILLIGRAM(S): at 09:03

## 2020-11-25 RX ADMIN — PIPERACILLIN AND TAZOBACTAM 25 GRAM(S): 4; .5 INJECTION, POWDER, LYOPHILIZED, FOR SOLUTION INTRAVENOUS at 01:54

## 2020-11-25 RX ADMIN — POLYETHYLENE GLYCOL 3350 17 GRAM(S): 17 POWDER, FOR SOLUTION ORAL at 13:27

## 2020-11-25 RX ADMIN — RISPERIDONE 3 MILLIGRAM(S): 4 TABLET ORAL at 22:43

## 2020-11-25 RX ADMIN — PIPERACILLIN AND TAZOBACTAM 25 GRAM(S): 4; .5 INJECTION, POWDER, LYOPHILIZED, FOR SOLUTION INTRAVENOUS at 09:14

## 2020-11-25 RX ADMIN — INSULIN GLARGINE 3 UNIT(S): 100 INJECTION, SOLUTION SUBCUTANEOUS at 21:57

## 2020-11-25 NOTE — PROGRESS NOTE ADULT - PROBLEM SELECTOR PLAN 8
DVT: lovenox  Diet: CC  Dispo: back to [facility] when clinically improved

## 2020-11-25 NOTE — PROGRESS NOTE ADULT - PROBLEM SELECTOR PLAN 7
-appreciate psych recs   -c/w Benztropine 1mg, Depakote 1000mg, Risperidone 3mg.  -CK negative, troponin low, echo without wall motion abnormalities although limited study, EKG without suggestive changes - at this time, unlikely to have myocarditis  -clozapine 12.5 mg (11/24 - )

## 2020-11-25 NOTE — PROGRESS NOTE ADULT - ATTENDING COMMENTS
Patient seen and examined, chart and labs reviewed. Case discussed with house staff.   Patient has no complaints, HPI unreliable as she remains very tangential, delusional.     #Acute Pyelonephritis: now with resolving sepsis. W/ Gram neg bacteremia. Now afebrile, no leukocytosis, UCx/BCx with E coli, mostly pan-sensitive, on Zosyn (2/2 to hepatocellular injury), day 5/7 of antibiotics.     #Transaminitis - new LFT elevation, unclear etiology. Discontinued Statin and Depakote. Abd US is within normal limits and acute viral hep panel and acetaminophen level are normal. Hepatology consulted, appreciate recs. Peaked, and now downtrending. Trend CMP.     #SOB since resolved, ECHO limited as patient did not allow complete study- currently euvolemic, no SOB, saturating well, ProBNP wnl.     #Schizoaffective d/o- Held Depakote given LFTs, f/u  recs- restarted Clozapine per  recs, titrate dose per recs.     Dispo: likely back to [facility] on Saturday (if accepted) after completion of antibiotics.
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
## 2020-11-25 NOTE — PROGRESS NOTE ADULT - PROBLEM SELECTOR PLAN 1
-initially presenting with sepsis likely 2/2 to UTI/pyelonephritis  -UCx/BCx with E coli, mostly pan-sensitive  -on Zosyn (2/2 to hepatocellular injury CTX was dc'd), day 5/7 of antibiotics.

## 2020-11-25 NOTE — PROGRESS NOTE ADULT - ASSESSMENT
YOSHI MCCOY is a 55y with pmhx schizoaffective disorder, DMT2, HLD, now presenting with fever for 24hours duration from [facility] 2/2 to pyelonephritis and bacteremia now on CTX. YOSHI MCCOY is a 55y with pmhx schizoaffective disorder, DMT2, HLD, now presenting with fever for 24hours duration from [facility] 2/2 to pyelonephritis and bacteremia now on Zosyn 2/2 to rising LFTs.
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
## 2020-11-25 NOTE — PROGRESS NOTE ADULT - PROBLEM SELECTOR PLAN 2
-abd US ordered  -ammonia elevated this AM; miralax ordered for bowel regimen to reduce levels  -Atorvastatin and Depakote held  -Hep panel negative  -appreciate hep recs  -labs trending down overtime -abd US ordered  -ammonia normal this am; miralax ordered for bowel regimen to reduce levels  -Atorvastatin and Depakote held  -Hep panel negative  -labs trending down overtime

## 2020-11-26 LAB
ALBUMIN SERPL ELPH-MCNC: 3.6 G/DL — SIGNIFICANT CHANGE UP (ref 3.3–5)
ALP SERPL-CCNC: 209 U/L — HIGH (ref 40–120)
ALT FLD-CCNC: 174 U/L — HIGH (ref 4–33)
AMMONIA BLD-MCNC: 38 UMOL/L — SIGNIFICANT CHANGE UP (ref 11–55)
ANION GAP SERPL CALC-SCNC: 14 MMO/L — SIGNIFICANT CHANGE UP (ref 7–14)
APTT BLD: 40.6 SEC — HIGH (ref 27–36.3)
AST SERPL-CCNC: 25 U/L — SIGNIFICANT CHANGE UP (ref 4–32)
BASOPHILS # BLD AUTO: 0.03 K/UL — SIGNIFICANT CHANGE UP (ref 0–0.2)
BASOPHILS NFR BLD AUTO: 0.4 % — SIGNIFICANT CHANGE UP (ref 0–2)
BASOPHILS NFR SPEC: 0 % — SIGNIFICANT CHANGE UP (ref 0–2)
BASOPHILS NFR SPEC: 0 % — SIGNIFICANT CHANGE UP (ref 0–2)
BILIRUB SERPL-MCNC: 0.5 MG/DL — SIGNIFICANT CHANGE UP (ref 0.2–1.2)
BUN SERPL-MCNC: 13 MG/DL — SIGNIFICANT CHANGE UP (ref 7–23)
CALCIUM SERPL-MCNC: 9.7 MG/DL — SIGNIFICANT CHANGE UP (ref 8.4–10.5)
CHLORIDE SERPL-SCNC: 100 MMOL/L — SIGNIFICANT CHANGE UP (ref 98–107)
CK SERPL-CCNC: 20 U/L — LOW (ref 25–170)
CO2 SERPL-SCNC: 24 MMOL/L — SIGNIFICANT CHANGE UP (ref 22–31)
CREAT SERPL-MCNC: 0.59 MG/DL — SIGNIFICANT CHANGE UP (ref 0.5–1.3)
EOSINOPHIL # BLD AUTO: 0 K/UL — SIGNIFICANT CHANGE UP (ref 0–0.5)
EOSINOPHIL NFR BLD AUTO: 0 % — SIGNIFICANT CHANGE UP (ref 0–6)
EOSINOPHIL NFR FLD: 0.9 % — SIGNIFICANT CHANGE UP (ref 0–6)
EOSINOPHIL NFR FLD: 0.9 % — SIGNIFICANT CHANGE UP (ref 0–6)
GIANT PLATELETS BLD QL SMEAR: PRESENT — SIGNIFICANT CHANGE UP
GIANT PLATELETS BLD QL SMEAR: PRESENT — SIGNIFICANT CHANGE UP
GLUCOSE BLDC GLUCOMTR-MCNC: 214 MG/DL — HIGH (ref 70–99)
GLUCOSE BLDC GLUCOMTR-MCNC: 222 MG/DL — HIGH (ref 70–99)
GLUCOSE BLDC GLUCOMTR-MCNC: 241 MG/DL — HIGH (ref 70–99)
GLUCOSE BLDC GLUCOMTR-MCNC: 257 MG/DL — HIGH (ref 70–99)
GLUCOSE SERPL-MCNC: 224 MG/DL — HIGH (ref 70–99)
HCT VFR BLD CALC: 33.6 % — LOW (ref 34.5–45)
HCT VFR BLD CALC: 33.6 % — LOW (ref 34.5–45)
HGB BLD-MCNC: 10.6 G/DL — LOW (ref 11.5–15.5)
HGB BLD-MCNC: 10.6 G/DL — LOW (ref 11.5–15.5)
IMM GRANULOCYTES NFR BLD AUTO: 4.5 % — HIGH (ref 0–1.5)
INR BLD: 1.22 — HIGH (ref 0.88–1.16)
LYMPHOCYTES # BLD AUTO: 2.37 K/UL — SIGNIFICANT CHANGE UP (ref 1–3.3)
LYMPHOCYTES # BLD AUTO: 30.3 % — SIGNIFICANT CHANGE UP (ref 13–44)
LYMPHOCYTES NFR SPEC AUTO: 27.1 % — SIGNIFICANT CHANGE UP (ref 13–44)
LYMPHOCYTES NFR SPEC AUTO: 27.1 % — SIGNIFICANT CHANGE UP (ref 13–44)
MAGNESIUM SERPL-MCNC: 1.9 MG/DL — SIGNIFICANT CHANGE UP (ref 1.6–2.6)
MCHC RBC-ENTMCNC: 26.4 PG — LOW (ref 27–34)
MCHC RBC-ENTMCNC: 26.4 PG — LOW (ref 27–34)
MCHC RBC-ENTMCNC: 31.5 % — LOW (ref 32–36)
MCHC RBC-ENTMCNC: 31.5 % — LOW (ref 32–36)
MCV RBC AUTO: 83.6 FL — SIGNIFICANT CHANGE UP (ref 80–100)
MCV RBC AUTO: 83.6 FL — SIGNIFICANT CHANGE UP (ref 80–100)
METAMYELOCYTES # FLD: 1.7 % — HIGH (ref 0–1)
METAMYELOCYTES # FLD: 1.7 % — HIGH (ref 0–1)
MONOCYTES # BLD AUTO: 0.62 K/UL — SIGNIFICANT CHANGE UP (ref 0–0.9)
MONOCYTES NFR BLD AUTO: 7.9 % — SIGNIFICANT CHANGE UP (ref 2–14)
MONOCYTES NFR BLD: 5.9 % — SIGNIFICANT CHANGE UP (ref 2–9)
MONOCYTES NFR BLD: 5.9 % — SIGNIFICANT CHANGE UP (ref 2–9)
MYELOCYTES NFR BLD: 2.6 % — HIGH (ref 0–0)
MYELOCYTES NFR BLD: 2.6 % — HIGH (ref 0–0)
NEUTROPHIL AB SER-ACNC: 51.7 % — SIGNIFICANT CHANGE UP (ref 43–77)
NEUTROPHIL AB SER-ACNC: 51.7 % — SIGNIFICANT CHANGE UP (ref 43–77)
NEUTROPHILS # BLD AUTO: 4.44 K/UL — SIGNIFICANT CHANGE UP (ref 1.8–7.4)
NEUTROPHILS NFR BLD AUTO: 56.9 % — SIGNIFICANT CHANGE UP (ref 43–77)
NEUTS BAND # BLD: 5.9 % — SIGNIFICANT CHANGE UP (ref 0–6)
NEUTS BAND # BLD: 5.9 % — SIGNIFICANT CHANGE UP (ref 0–6)
NRBC # FLD: 0 K/UL — SIGNIFICANT CHANGE UP (ref 0–0)
NRBC # FLD: 0 K/UL — SIGNIFICANT CHANGE UP (ref 0–0)
PHOSPHATE SERPL-MCNC: 3.9 MG/DL — SIGNIFICANT CHANGE UP (ref 2.5–4.5)
PLATELET # BLD AUTO: 464 K/UL — HIGH (ref 150–400)
PLATELET # BLD AUTO: 464 K/UL — HIGH (ref 150–400)
PLATELET COUNT - ESTIMATE: NORMAL — SIGNIFICANT CHANGE UP
PLATELET COUNT - ESTIMATE: NORMAL — SIGNIFICANT CHANGE UP
PMV BLD: 8.1 FL — SIGNIFICANT CHANGE UP (ref 7–13)
PMV BLD: 8.1 FL — SIGNIFICANT CHANGE UP (ref 7–13)
POLYCHROMASIA BLD QL SMEAR: SIGNIFICANT CHANGE UP
POLYCHROMASIA BLD QL SMEAR: SIGNIFICANT CHANGE UP
POTASSIUM SERPL-MCNC: 4 MMOL/L — SIGNIFICANT CHANGE UP (ref 3.5–5.3)
POTASSIUM SERPL-SCNC: 4 MMOL/L — SIGNIFICANT CHANGE UP (ref 3.5–5.3)
PROT SERPL-MCNC: 7.3 G/DL — SIGNIFICANT CHANGE UP (ref 6–8.3)
PROTHROM AB SERPL-ACNC: 13.9 SEC — HIGH (ref 10.6–13.6)
RBC # BLD: 4.02 M/UL — SIGNIFICANT CHANGE UP (ref 3.8–5.2)
RBC # BLD: 4.02 M/UL — SIGNIFICANT CHANGE UP (ref 3.8–5.2)
RBC # FLD: 13.9 % — SIGNIFICANT CHANGE UP (ref 10.3–14.5)
RBC # FLD: 13.9 % — SIGNIFICANT CHANGE UP (ref 10.3–14.5)
SODIUM SERPL-SCNC: 138 MMOL/L — SIGNIFICANT CHANGE UP (ref 135–145)
VARIANT LYMPHS # BLD: 4.2 % — SIGNIFICANT CHANGE UP
VARIANT LYMPHS # BLD: 4.2 % — SIGNIFICANT CHANGE UP
WBC # BLD: 7.7 K/UL — SIGNIFICANT CHANGE UP (ref 3.8–10.5)
WBC # BLD: 7.7 K/UL — SIGNIFICANT CHANGE UP (ref 3.8–10.5)
WBC # FLD AUTO: 7.7 K/UL — SIGNIFICANT CHANGE UP (ref 3.8–10.5)
WBC # FLD AUTO: 7.7 K/UL — SIGNIFICANT CHANGE UP (ref 3.8–10.5)

## 2020-11-26 PROCEDURE — 99232 SBSQ HOSP IP/OBS MODERATE 35: CPT

## 2020-11-26 PROCEDURE — 99233 SBSQ HOSP IP/OBS HIGH 50: CPT

## 2020-11-26 RX ORDER — CLOZAPINE 150 MG/1
25 TABLET, ORALLY DISINTEGRATING ORAL
Refills: 0 | Status: DISCONTINUED | OUTPATIENT
Start: 2020-11-27 | End: 2020-11-27

## 2020-11-26 RX ORDER — CLOZAPINE 150 MG/1
25 TABLET, ORALLY DISINTEGRATING ORAL AT BEDTIME
Refills: 0 | Status: DISCONTINUED | OUTPATIENT
Start: 2020-11-26 | End: 2020-11-27

## 2020-11-26 RX ADMIN — RISPERIDONE 3 MILLIGRAM(S): 4 TABLET ORAL at 21:19

## 2020-11-26 RX ADMIN — POLYETHYLENE GLYCOL 3350 17 GRAM(S): 17 POWDER, FOR SOLUTION ORAL at 16:52

## 2020-11-26 RX ADMIN — Medication 2: at 13:10

## 2020-11-26 RX ADMIN — PIPERACILLIN AND TAZOBACTAM 25 GRAM(S): 4; .5 INJECTION, POWDER, LYOPHILIZED, FOR SOLUTION INTRAVENOUS at 10:35

## 2020-11-26 RX ADMIN — SENNA PLUS 2 TABLET(S): 8.6 TABLET ORAL at 21:19

## 2020-11-26 RX ADMIN — PIPERACILLIN AND TAZOBACTAM 25 GRAM(S): 4; .5 INJECTION, POWDER, LYOPHILIZED, FOR SOLUTION INTRAVENOUS at 01:00

## 2020-11-26 RX ADMIN — Medication 2: at 09:03

## 2020-11-26 RX ADMIN — Medication 3: at 17:31

## 2020-11-26 RX ADMIN — CLOZAPINE 25 MILLIGRAM(S): 150 TABLET, ORALLY DISINTEGRATING ORAL at 21:19

## 2020-11-26 RX ADMIN — PIPERACILLIN AND TAZOBACTAM 25 GRAM(S): 4; .5 INJECTION, POWDER, LYOPHILIZED, FOR SOLUTION INTRAVENOUS at 16:52

## 2020-11-26 RX ADMIN — INSULIN GLARGINE 3 UNIT(S): 100 INJECTION, SOLUTION SUBCUTANEOUS at 21:19

## 2020-11-26 RX ADMIN — ENOXAPARIN SODIUM 40 MILLIGRAM(S): 100 INJECTION SUBCUTANEOUS at 16:52

## 2020-11-26 NOTE — PROGRESS NOTE BEHAVIORAL HEALTH - NSBHCONSFOLLOWNEEDS_PSY_A_CORE
Patient needs further psychiatric safety assessment prior to discharge

## 2020-11-26 NOTE — PROGRESS NOTE ADULT - ASSESSMENT
YOSHI MCCOY is a 55y with pmhx schizoaffective disorder, DMT2, HLD, now presenting with fever for 24hours duration from [facility] 2/2 to pyelonephritis and bacteremia now on Zosyn 2/2 to rising LFTs.

## 2020-11-26 NOTE — PROGRESS NOTE BEHAVIORAL HEALTH - NSBHFUPINTERVALHXFT_PSY_A_CORE
Patient seen and assessed at bedside. Patient is very difficult to follow what she is saying due to loosening of associations, disorganized thought, clang associations noted. Patient is laying in her bed eating eggs and states she is eating sugar. Patient is oriented x 0. Says she is a luan, is paranoid about Black people hurting others, has thoughts to harm others by kicking them, denied SI. States she does not want to hurt me because of "miranda miranda freedom."     Discussed with patient  and JUNE Simmons - patient has been in good behavioral control overnight and this morning, no agitation or harm to others, has not been targeting any individual. As such, they believe she is appropriate to remain on enhanced observation despite being floridly psychotic, they are staying close to her room and making frequent checks, pulling back the curtain to be able to observe her from the hallway. Patient seen and assessed at bedside. No acute events overnight, no PRNs required. Patient is very difficult to follow what she is saying due to loosening of associations, disorganized thought, clang associations noted. Patient is laying in her bed eating eggs and states she is eating sugar. Patient is oriented x 0. Says she is a luan, is paranoid about Black people hurting others, has thoughts to harm others by kicking them, denied SI. States she does not want to hurt me because of "miranda miranda freedom."     Discussed with patient  and JUNE Simmons - patient has been in good behavioral control overnight and this morning, no agitation or harm to others, has not been targeting any individual. As such, they believe she is appropriate to remain on enhanced observation despite being floridly psychotic, they are staying close to her room and making frequent checks, pulling back the curtain to be able to observe her from the hallway. Patient seen and assessed at bedside. No acute events overnight, no PRNs required. It is very difficult to follow what pt is saying due to loosening of associations, disorganized thought, clang associations noted. Patient is laying in her bed eating eggs and states she is eating sugar. Patient is oriented x 0. Says she is a luan, is paranoid about Black people hurting others, has thoughts to harm others by kicking them, denied SI. States she does not want to hurt me because of "miranda miranda freedom."     Discussed with patient  and JUNE Simmons - patient has been in good behavioral control overnight and this morning, no agitation or harm to others, has not been targeting any individual. As such, they believe she is appropriate to remain on enhanced observation despite being floridly psychotic, they are staying close to her room and making frequent checks, pulling back the curtain to be able to observe her from the hallway. Patient seen and assessed at bedside. No acute events overnight, no PRNs required. It is very difficult to follow what pt is saying due to loosening of associations, disorganized thought, clang associations noted. Patient is laying in her bed eating eggs and states she is eating sugar. Patient is oriented x 0. Says she is a luan, is paranoid about Black people hurting others, has thoughts to harm others by kicking them, denied SI. States she does not want to hurt me because of "miranda miranda freedom."     Discussed with patient  and JUNE Simmons - patient has been in good behavioral control overnight and this morning, no agitation or harm to others, has not been targeting any individual. As such, they believe she is appropriate to remain on enhanced observation, they are staying close to her room and making frequent checks, pulling back the curtain to be able to observe her from the hallway.

## 2020-11-26 NOTE — PROGRESS NOTE BEHAVIORAL HEALTH - NSBHFUPVIOLFT_PSY_A_CORE
agitated towards this writer
Patient reports having thoughts of kicking people "where it hurts" but has not specific target and has no plans on acting on those thoughts now, has been in good behavioral control overnight and this morning.

## 2020-11-26 NOTE — PROGRESS NOTE ADULT - SUBJECTIVE AND OBJECTIVE BOX
Shreyas Carpenter, PGY-1  Pager [phone] (NS), [phone](DEYANIRAJ)    CHIEF COMPLAINT: Patient is a 55y old  Female who presents with a chief complaint of Fever (25 Nov 2020 07:13)    INTERVAL HPI/OVERNIGHT EVENTS:  -no acute events overnight  -Clozapine increased to 12.5 BID  -Day 6/7 of antibiotics       MEDICATIONS (STANDING):  cloZAPine 12.5 milliGRAM(s) Oral two times a day  dextrose 40% Gel 15 Gram(s) Oral once  dextrose 5%. 1000 milliLiter(s) IV Continuous <Continuous>  dextrose 5%. 1000 milliLiter(s) IV Continuous <Continuous>  dextrose 50% Injectable 25 Gram(s) IV Push once  dextrose 50% Injectable 12.5 Gram(s) IV Push once  dextrose 50% Injectable 25 Gram(s) IV Push once  enoxaparin Injectable 40 milliGRAM(s) SubCutaneous daily  glucagon  Injectable 1 milliGRAM(s) IntraMuscular once  insulin glargine Injectable (LANTUS) 3 Unit(s) SubCutaneous at bedtime  insulin lispro (ADMELOG) corrective regimen sliding scale   SubCutaneous three times a day before meals  insulin lispro (ADMELOG) corrective regimen sliding scale   SubCutaneous at bedtime  piperacillin/tazobactam IVPB.. 3.375 Gram(s) IV Intermittent every 8 hours  polyethylene glycol 3350 17 Gram(s) Oral daily  risperiDONE   Tablet 3 milliGRAM(s) Oral at bedtime  senna 2 Tablet(s) Oral at bedtime    MEDICATIONS  (PRN):  acetaminophen   Tablet .. 650 milliGRAM(s) Oral every 6 hours PRN  LORazepam   Injectable 1 milliGRAM(s) IV Push every 4 hours PRN      REVIEW OF SYSTEMS:  CONSTITUTIONAL: No fever  EYES: No eye pain  ENMT: No sinus or throat pain  NECK: No pain  RESPIRATORY: No shortness of breath  CARDIOVASCULAR: No chest pain, dizziness  GASTROINTESTINAL: No abdominal or epigastric pain. No diarrhea or constipation. No melena or hematochezia.  GENITOURINARY: No dysuria, hematuria  NEUROLOGICAL: No headaches  SKIN: No itching, burning, rashes, or lesions   MUSCULOSKELETAL: No muscle or joint pain    T(F): 98.2 (11-26-20 @ 05:56), Max: 98.6 (11-25-20 @ 22:41)  HR: 71 (11-26-20 @ 05:56) (71 - 94)  BP: 110/85 (11-26-20 @ 05:56) (110/85 - 140/84)  RR: 18 (11-26-20 @ 05:56) (17 - 18)  SpO2: 97% (11-26-20 @ 05:56) (96% - 100%)  Wt(kg): --  CAPILLARY BLOOD GLUCOSE      POCT Blood Glucose.: 202 mg/dL (25 Nov 2020 21:47)  POCT Blood Glucose.: 264 mg/dL (25 Nov 2020 17:28)  POCT Blood Glucose.: 235 mg/dL (25 Nov 2020 13:06)  POCT Blood Glucose.: 276 mg/dL (25 Nov 2020 11:51)  POCT Blood Glucose.: 233 mg/dL (25 Nov 2020 08:37)    I&O's Summary    25 Nov 2020 07:01  -  26 Nov 2020 07:00  --------------------------------------------------------  IN: 240 mL / OUT: 0 mL / NET: 240 mL        PHYSICAL EXAM:  GENERAL: NAD, well-developed  HEAD:  Atraumatic, Normocephalic  EYES: PERRLA, conjunctiva and sclera clear  ENMT: Moist mucous membranes  NECK: Supple  NERVOUS SYSTEM:  Alert & Oriented X2, poor concentration;   CHEST/LUNG: Clear to auscultation bilaterally; No rales, rhonchi, wheezing, or rubs  HEART: Regular rate and rhythm; No murmurs, rubs, or gallops  ABDOMEN: Soft, Nontender, Nondistended; Bowel sounds present  EXTREMITIES:  2+ Peripheral Pulses, No edema  SKIN: No rashes or lesions    LABS:                        11.0   9.56  )-----------( 476      ( 25 Nov 2020 02:22 )             34.9     11-25    137  |  99  |  12  ----------------------------<  222<H>  4.3   |  25  |  0.47<L>    Ca    10.0      25 Nov 2020 02:29  Phos  3.6     11-25  Mg     2.0     11-25    TPro  7.8  /  Alb  3.6  /  TBili  0.6  /  DBili  x   /  AST  58<H>  /  ALT  308<H>  /  AlkPhos  284<H>  11-25    PT/INR - ( 25 Nov 2020 02:29 )   PT: 13.5 SEC;   INR: 1.19          PTT - ( 25 Nov 2020 02:29 )  PTT:41.2 SEC

## 2020-11-26 NOTE — PROGRESS NOTE BEHAVIORAL HEALTH - CASE SUMMARY
pt is psychotic due to schizophrenia and likely delirious due to underlaying medical condition. See detailed recommendation above. Will require psych inpatient transfer once medically cleared.
see pt with resident. Pt is floridly psychotic, making statements about kicking someone where it's most painful. Making racist comments. But didn't display aggressive behavior or agitation in the past 24 hrs per staff. c/w enhanced supervision. low threshold for CO 1:1. meds see above.

## 2020-11-26 NOTE — PROGRESS NOTE ADULT - PROBLEM SELECTOR PLAN 2
-abd US ordered  -ammonia normal this am; miralax ordered for bowel regimen to reduce levels  -Atorvastatin and Depakote held  -Hep panel negative  -labs trending down overtime

## 2020-11-26 NOTE — PROGRESS NOTE ADULT - PROBLEM SELECTOR PLAN 3
-prior episode, self-resolving  -CXR with pulmonary edema, Lasix administered  -lasix PRN for SOB  -incentive spirometry

## 2020-11-26 NOTE — PROGRESS NOTE ADULT - PROBLEM SELECTOR PLAN 1
-initially presenting with sepsis likely 2/2 to UTI/pyelonephritis  -UCx/BCx with E coli, mostly pan-sensitive  -on Zosyn (2/2 to hepatocellular injury CTX was dc'd), day 6/7 of antibiotics.

## 2020-11-26 NOTE — PROGRESS NOTE ADULT - ATTENDING COMMENTS
Patient seen and examined, chart and labs reviewed. Case discussed with house staff.   Patient has no complaints, HPI unreliable as she remains very tangential, delusional.     #Acute Pyelonephritis: now with resolving sepsis. W/ Gram neg bacteremia. Now afebrile, no leukocytosis, UCx/BCx with E coli, mostly pan-sensitive, on Zosyn (2/2 to hepatocellular injury), day 5/7 of antibiotics.     #Transaminitis - new LFT elevation this admission. Discontinued Statin and Depakote. Abd US is within normal limits and acute viral hep panel and acetaminophen level are normal. Hepatology consulted, appreciate recs. Thought drug iduced injury secondary to ceftriaxone so switched to zosyn. LFTs now downtrending. Trend CMP.     #SOB since resolved, ECHO limited as patient did not allow complete study- currently euvolemic, no SOB, saturating well, ProBNP wnl.     #Schizoaffective d/o- Held Depakote given LFTs, f/u  recs- restarted Clozapine per  recs, titrate dose per recs.     Dispo: likely back to [facility] on Saturday (if accepted) after completion of antibiotics.

## 2020-11-27 ENCOUNTER — TRANSCRIPTION ENCOUNTER (OUTPATIENT)
Age: 55
End: 2020-11-27

## 2020-11-27 ENCOUNTER — INPATIENT (INPATIENT)
Facility: HOSPITAL | Age: 55
LOS: 33 days | Discharge: HOME | End: 2020-12-31
Attending: PSYCHIATRY & NEUROLOGY | Admitting: PSYCHIATRY & NEUROLOGY
Payer: MEDICARE

## 2020-11-27 VITALS — RESPIRATION RATE: 18 BRPM | TEMPERATURE: 98 F | WEIGHT: 143.3 LBS | HEIGHT: 62 IN

## 2020-11-27 VITALS
SYSTOLIC BLOOD PRESSURE: 108 MMHG | HEART RATE: 75 BPM | TEMPERATURE: 98 F | DIASTOLIC BLOOD PRESSURE: 60 MMHG | RESPIRATION RATE: 17 BRPM | OXYGEN SATURATION: 99 %

## 2020-11-27 DIAGNOSIS — F25.0 SCHIZOAFFECTIVE DISORDER, BIPOLAR TYPE: ICD-10-CM

## 2020-11-27 PROBLEM — E11.9 TYPE 2 DIABETES MELLITUS WITHOUT COMPLICATIONS: Chronic | Status: ACTIVE | Noted: 2020-11-19

## 2020-11-27 PROBLEM — E78.5 HYPERLIPIDEMIA, UNSPECIFIED: Chronic | Status: ACTIVE | Noted: 2020-11-19

## 2020-11-27 LAB
ALBUMIN SERPL ELPH-MCNC: 3.4 G/DL — SIGNIFICANT CHANGE UP (ref 3.3–5)
ALP SERPL-CCNC: 180 U/L — HIGH (ref 40–120)
ALT FLD-CCNC: 118 U/L — HIGH (ref 4–33)
AMMONIA BLD-MCNC: 29 UMOL/L — SIGNIFICANT CHANGE UP (ref 11–55)
ANION GAP SERPL CALC-SCNC: 11 MMO/L — SIGNIFICANT CHANGE UP (ref 7–14)
AST SERPL-CCNC: 20 U/L — SIGNIFICANT CHANGE UP (ref 4–32)
BASOPHILS # BLD AUTO: 0.03 K/UL — SIGNIFICANT CHANGE UP (ref 0–0.2)
BASOPHILS NFR BLD AUTO: 0.5 % — SIGNIFICANT CHANGE UP (ref 0–2)
BILIRUB SERPL-MCNC: 0.5 MG/DL — SIGNIFICANT CHANGE UP (ref 0.2–1.2)
BUN SERPL-MCNC: 13 MG/DL — SIGNIFICANT CHANGE UP (ref 7–23)
CALCIUM SERPL-MCNC: 9.7 MG/DL — SIGNIFICANT CHANGE UP (ref 8.4–10.5)
CHLORIDE SERPL-SCNC: 101 MMOL/L — SIGNIFICANT CHANGE UP (ref 98–107)
CK SERPL-CCNC: 66 U/L — SIGNIFICANT CHANGE UP (ref 25–170)
CO2 SERPL-SCNC: 23 MMOL/L — SIGNIFICANT CHANGE UP (ref 22–31)
CREAT SERPL-MCNC: 0.56 MG/DL — SIGNIFICANT CHANGE UP (ref 0.5–1.3)
CULTURE RESULTS: SIGNIFICANT CHANGE UP
CULTURE RESULTS: SIGNIFICANT CHANGE UP
EOSINOPHIL # BLD AUTO: 0 K/UL — SIGNIFICANT CHANGE UP (ref 0–0.5)
EOSINOPHIL NFR BLD AUTO: 0 % — SIGNIFICANT CHANGE UP (ref 0–6)
GLUCOSE BLDC GLUCOMTR-MCNC: 220 MG/DL — HIGH (ref 70–99)
GLUCOSE BLDC GLUCOMTR-MCNC: 230 MG/DL — HIGH (ref 70–99)
GLUCOSE SERPL-MCNC: 241 MG/DL — HIGH (ref 70–99)
HCT VFR BLD CALC: 32.7 % — LOW (ref 34.5–45)
HGB BLD-MCNC: 10.3 G/DL — LOW (ref 11.5–15.5)
IMM GRANULOCYTES NFR BLD AUTO: 4.3 % — HIGH (ref 0–1.5)
LYMPHOCYTES # BLD AUTO: 1.9 K/UL — SIGNIFICANT CHANGE UP (ref 1–3.3)
LYMPHOCYTES # BLD AUTO: 30.3 % — SIGNIFICANT CHANGE UP (ref 13–44)
MAGNESIUM SERPL-MCNC: 1.7 MG/DL — SIGNIFICANT CHANGE UP (ref 1.6–2.6)
MCHC RBC-ENTMCNC: 26.1 PG — LOW (ref 27–34)
MCHC RBC-ENTMCNC: 31.5 % — LOW (ref 32–36)
MCV RBC AUTO: 83 FL — SIGNIFICANT CHANGE UP (ref 80–100)
MONOCYTES # BLD AUTO: 0.36 K/UL — SIGNIFICANT CHANGE UP (ref 0–0.9)
MONOCYTES NFR BLD AUTO: 5.7 % — SIGNIFICANT CHANGE UP (ref 2–14)
NEUTROPHILS # BLD AUTO: 3.71 K/UL — SIGNIFICANT CHANGE UP (ref 1.8–7.4)
NEUTROPHILS NFR BLD AUTO: 59.2 % — SIGNIFICANT CHANGE UP (ref 43–77)
NRBC # FLD: 0 K/UL — SIGNIFICANT CHANGE UP (ref 0–0)
PHOSPHATE SERPL-MCNC: 3.9 MG/DL — SIGNIFICANT CHANGE UP (ref 2.5–4.5)
PLATELET # BLD AUTO: 446 K/UL — HIGH (ref 150–400)
PMV BLD: 8.2 FL — SIGNIFICANT CHANGE UP (ref 7–13)
POTASSIUM SERPL-MCNC: 4.3 MMOL/L — SIGNIFICANT CHANGE UP (ref 3.5–5.3)
POTASSIUM SERPL-SCNC: 4.3 MMOL/L — SIGNIFICANT CHANGE UP (ref 3.5–5.3)
PROT SERPL-MCNC: 7 G/DL — SIGNIFICANT CHANGE UP (ref 6–8.3)
RBC # BLD: 3.94 M/UL — SIGNIFICANT CHANGE UP (ref 3.8–5.2)
RBC # FLD: 13.8 % — SIGNIFICANT CHANGE UP (ref 10.3–14.5)
SODIUM SERPL-SCNC: 135 MMOL/L — SIGNIFICANT CHANGE UP (ref 135–145)
SPECIMEN SOURCE: SIGNIFICANT CHANGE UP
SPECIMEN SOURCE: SIGNIFICANT CHANGE UP
WBC # BLD: 6.27 K/UL — SIGNIFICANT CHANGE UP (ref 3.8–10.5)
WBC # FLD AUTO: 6.27 K/UL — SIGNIFICANT CHANGE UP (ref 3.8–10.5)

## 2020-11-27 PROCEDURE — 99222 1ST HOSP IP/OBS MODERATE 55: CPT

## 2020-11-27 PROCEDURE — 99233 SBSQ HOSP IP/OBS HIGH 50: CPT

## 2020-11-27 PROCEDURE — 99239 HOSP IP/OBS DSCHRG MGMT >30: CPT

## 2020-11-27 RX ORDER — SENNA PLUS 8.6 MG/1
2 TABLET ORAL AT BEDTIME
Refills: 0 | Status: DISCONTINUED | OUTPATIENT
Start: 2020-11-27 | End: 2020-12-31

## 2020-11-27 RX ORDER — ACETAMINOPHEN 500 MG
650 TABLET ORAL EVERY 6 HOURS
Refills: 0 | Status: DISCONTINUED | OUTPATIENT
Start: 2020-11-27 | End: 2020-12-31

## 2020-11-27 RX ORDER — RISPERIDONE 4 MG/1
1 TABLET ORAL
Qty: 0 | Refills: 0 | DISCHARGE

## 2020-11-27 RX ORDER — DEXTROSE 50 % IN WATER 50 %
15 SYRINGE (ML) INTRAVENOUS ONCE
Refills: 0 | Status: DISCONTINUED | OUTPATIENT
Start: 2020-11-27 | End: 2020-12-31

## 2020-11-27 RX ORDER — RISPERIDONE 4 MG/1
1 TABLET ORAL
Qty: 0 | Refills: 0 | DISCHARGE
Start: 2020-11-27

## 2020-11-27 RX ORDER — SENNA PLUS 8.6 MG/1
2 TABLET ORAL
Qty: 0 | Refills: 0 | DISCHARGE
Start: 2020-11-27

## 2020-11-27 RX ORDER — GLUCAGON INJECTION, SOLUTION 0.5 MG/.1ML
1 INJECTION, SOLUTION SUBCUTANEOUS ONCE
Refills: 0 | Status: DISCONTINUED | OUTPATIENT
Start: 2020-11-27 | End: 2020-12-31

## 2020-11-27 RX ORDER — CLOZAPINE 150 MG/1
25 TABLET, ORALLY DISINTEGRATING ORAL
Refills: 0 | Status: DISCONTINUED | OUTPATIENT
Start: 2020-11-27 | End: 2020-11-30

## 2020-11-27 RX ORDER — INSULIN GLARGINE 100 [IU]/ML
5 INJECTION, SOLUTION SUBCUTANEOUS AT BEDTIME
Refills: 0 | Status: DISCONTINUED | OUTPATIENT
Start: 2020-11-27 | End: 2020-11-27

## 2020-11-27 RX ORDER — CLOZAPINE 150 MG/1
250 TABLET, ORALLY DISINTEGRATING ORAL
Qty: 0 | Refills: 0 | DISCHARGE

## 2020-11-27 RX ORDER — INSULIN LISPRO 100/ML
VIAL (ML) SUBCUTANEOUS AT BEDTIME
Refills: 0 | Status: DISCONTINUED | OUTPATIENT
Start: 2020-11-27 | End: 2020-12-31

## 2020-11-27 RX ORDER — INSULIN LISPRO 100/ML
VIAL (ML) SUBCUTANEOUS
Refills: 0 | Status: DISCONTINUED | OUTPATIENT
Start: 2020-11-27 | End: 2020-12-31

## 2020-11-27 RX ORDER — BENZTROPINE MESYLATE 1 MG
1 TABLET ORAL
Qty: 0 | Refills: 0 | DISCHARGE
Start: 2020-11-27

## 2020-11-27 RX ORDER — CLOZAPINE 150 MG/1
1 TABLET, ORALLY DISINTEGRATING ORAL
Qty: 0 | Refills: 0 | DISCHARGE
Start: 2020-11-27

## 2020-11-27 RX ORDER — BENZTROPINE MESYLATE 1 MG
1 TABLET ORAL
Qty: 0 | Refills: 0 | DISCHARGE

## 2020-11-27 RX ORDER — INSULIN GLARGINE 100 [IU]/ML
5 INJECTION, SOLUTION SUBCUTANEOUS AT BEDTIME
Refills: 0 | Status: DISCONTINUED | OUTPATIENT
Start: 2020-11-27 | End: 2020-12-16

## 2020-11-27 RX ORDER — POLYETHYLENE GLYCOL 3350 17 G/17G
17 POWDER, FOR SOLUTION ORAL
Qty: 0 | Refills: 0 | DISCHARGE
Start: 2020-11-27

## 2020-11-27 RX ORDER — RISPERIDONE 4 MG/1
3 TABLET ORAL AT BEDTIME
Refills: 0 | Status: DISCONTINUED | OUTPATIENT
Start: 2020-11-27 | End: 2020-12-03

## 2020-11-27 RX ORDER — POLYETHYLENE GLYCOL 3350 17 G/17G
17 POWDER, FOR SOLUTION ORAL DAILY
Refills: 0 | Status: DISCONTINUED | OUTPATIENT
Start: 2020-11-27 | End: 2020-12-31

## 2020-11-27 RX ORDER — HALOPERIDOL DECANOATE 100 MG/ML
5 INJECTION INTRAMUSCULAR EVERY 6 HOURS
Refills: 0 | Status: DISCONTINUED | OUTPATIENT
Start: 2020-11-27 | End: 2020-12-31

## 2020-11-27 RX ORDER — INSULIN GLARGINE 100 [IU]/ML
5 INJECTION, SOLUTION SUBCUTANEOUS
Qty: 0 | Refills: 0 | DISCHARGE
Start: 2020-11-27

## 2020-11-27 RX ORDER — HALOPERIDOL DECANOATE 100 MG/ML
5 INJECTION INTRAMUSCULAR ONCE
Refills: 0 | Status: DISCONTINUED | OUTPATIENT
Start: 2020-11-27 | End: 2020-12-31

## 2020-11-27 RX ADMIN — SENNA PLUS 2 TABLET(S): 8.6 TABLET ORAL at 21:07

## 2020-11-27 RX ADMIN — INSULIN GLARGINE 5 UNIT(S): 100 INJECTION, SOLUTION SUBCUTANEOUS at 21:23

## 2020-11-27 RX ADMIN — CLOZAPINE 25 MILLIGRAM(S): 150 TABLET, ORALLY DISINTEGRATING ORAL at 05:21

## 2020-11-27 RX ADMIN — Medication 2: at 09:16

## 2020-11-27 RX ADMIN — RISPERIDONE 3 MILLIGRAM(S): 4 TABLET ORAL at 21:07

## 2020-11-27 RX ADMIN — CLOZAPINE 25 MILLIGRAM(S): 150 TABLET, ORALLY DISINTEGRATING ORAL at 21:06

## 2020-11-27 RX ADMIN — ENOXAPARIN SODIUM 40 MILLIGRAM(S): 100 INJECTION SUBCUTANEOUS at 13:15

## 2020-11-27 RX ADMIN — POLYETHYLENE GLYCOL 3350 17 GRAM(S): 17 POWDER, FOR SOLUTION ORAL at 13:14

## 2020-11-27 RX ADMIN — PIPERACILLIN AND TAZOBACTAM 25 GRAM(S): 4; .5 INJECTION, POWDER, LYOPHILIZED, FOR SOLUTION INTRAVENOUS at 09:17

## 2020-11-27 RX ADMIN — Medication 2: at 13:14

## 2020-11-27 RX ADMIN — PIPERACILLIN AND TAZOBACTAM 25 GRAM(S): 4; .5 INJECTION, POWDER, LYOPHILIZED, FOR SOLUTION INTRAVENOUS at 02:25

## 2020-11-27 NOTE — PROGRESS NOTE BEHAVIORAL HEALTH - NSBHCHARTREVIEWIMAGING_PSY_A_CORE FT
11/22/20 IMPRESSION:    Normal right upper quadrant abdominal ultrasound.
< from: Transthoracic Echocardiogram (11.19.20 @ 11:51) >    CONCLUSIONS:  1. Normal mitral valve.  2. The anteroseptal and inferolateral walls have normal  wall motion. Remaining walls are not seen.  Patient refused study.    < end of copied text >
< from: Transthoracic Echocardiogram (11.23.20 @ 13:24) >    CONCLUSIONS:  1. The anteroseptum and inferolateral walls have normal  wall motion. Remaining walls are not well visualized.  Patient refused completion of study.  ------------------------------------------------------    < end of copied text >
no new

## 2020-11-27 NOTE — PROGRESS NOTE ADULT - SUBJECTIVE AND OBJECTIVE BOX
Shreyas Carpenter, PGY-1  Pager [phone] (NS), [phone](DEYANIRAJ)    CHIEF COMPLAINT: Patient is a 55y old  Female who presents with a chief complaint of Fever (26 Nov 2020 07:15)      INTERVAL HPI/OVERNIGHT EVENTS:  - no acute events overnight  -Clozapine increased to 25mg BID    MEDICATIONS (STANDING):  cloZAPine 25 milliGRAM(s) Oral at bedtime  cloZAPine 25 milliGRAM(s) Oral <User Schedule>  dextrose 40% Gel 15 Gram(s) Oral once  dextrose 5%. 1000 milliLiter(s) IV Continuous <Continuous>  dextrose 5%. 1000 milliLiter(s) IV Continuous <Continuous>  dextrose 50% Injectable 25 Gram(s) IV Push once  dextrose 50% Injectable 12.5 Gram(s) IV Push once  dextrose 50% Injectable 25 Gram(s) IV Push once  enoxaparin Injectable 40 milliGRAM(s) SubCutaneous daily  glucagon  Injectable 1 milliGRAM(s) IntraMuscular once  insulin glargine Injectable (LANTUS) 3 Unit(s) SubCutaneous at bedtime  insulin lispro (ADMELOG) corrective regimen sliding scale   SubCutaneous three times a day before meals  insulin lispro (ADMELOG) corrective regimen sliding scale   SubCutaneous at bedtime  piperacillin/tazobactam IVPB.. 3.375 Gram(s) IV Intermittent every 8 hours  polyethylene glycol 3350 17 Gram(s) Oral daily  risperiDONE   Tablet 3 milliGRAM(s) Oral at bedtime  senna 2 Tablet(s) Oral at bedtime    MEDICATIONS  (PRN):  acetaminophen   Tablet .. 650 milliGRAM(s) Oral every 6 hours PRN      REVIEW OF SYSTEMS:  CONSTITUTIONAL: No fever  EYES: No eye pain  ENMT: No sinus or throat pain  NECK: No pain  RESPIRATORY: No shortness of breath  CARDIOVASCULAR: No chest pain, dizziness  GASTROINTESTINAL: No abdominal or epigastric pain  GENITOURINARY: No dysuria, hematuria  NEUROLOGICAL: No headaches  SKIN: No itching, burning, rashes, or lesions   MUSCULOSKELETAL: No muscle or joint pain    T(F): 98 (11-27-20 @ 05:24), Max: 98.3 (11-26-20 @ 11:27)  HR: 70 (11-27-20 @ 05:24) (64 - 75)  BP: 130/77 (11-27-20 @ 05:24) (112/50 - 142/88)  RR: 17 (11-27-20 @ 05:24) (16 - 18)  SpO2: 97% (11-27-20 @ 05:24) (95% - 100%)  Wt(kg): --  CAPILLARY BLOOD GLUCOSE      POCT Blood Glucose.: 241 mg/dL (26 Nov 2020 21:03)  POCT Blood Glucose.: 257 mg/dL (26 Nov 2020 17:25)  POCT Blood Glucose.: 214 mg/dL (26 Nov 2020 12:46)  POCT Blood Glucose.: 222 mg/dL (26 Nov 2020 08:41)    I&O's Summary      PHYSICAL EXAM:  GENERAL: NAD, well-groomed, well-developed  HEAD:  Atraumatic, Normocephalic  EYES: PERRLA, conjunctiva and sclera clear  ENMT: Moist mucous membranes  NECK: Supple  NERVOUS SYSTEM:  Alert & Oriented X2, poor concentration; disorganized.   CHEST/LUNG: Clear to auscultation bilaterally; No rales, rhonchi, wheezing, or rubs  HEART: Regular rate and rhythm; No murmurs, rubs, or gallops  ABDOMEN: Soft, Nontender, Nondistended; Bowel sounds present  EXTREMITIES:  2+ Peripheral Pulses, No edema  SKIN: No rashes or lesions    LABS:                        10.6   7.70  )-----------( 464      ( 26 Nov 2020 05:00 )             33.6     11-26    138  |  100  |  13  ----------------------------<  224<H>  4.0   |  24  |  0.59    Ca    9.7      26 Nov 2020 05:00  Phos  3.9     11-26  Mg     1.9     11-26    TPro  7.3  /  Alb  3.6  /  TBili  0.5  /  DBili  x   /  AST  25  /  ALT  174<H>  /  AlkPhos  209<H>  11-26    PT/INR - ( 26 Nov 2020 05:00 )   PT: 13.9 SEC;   INR: 1.22          PTT - ( 26 Nov 2020 05:00 )  PTT:40.6 SEC         Shreyas Carpenter, PGY-1  Pager [phone] (NS), [phone](DEYANIRAJ)    CHIEF COMPLAINT: Patient is a 55y old  Female who presents with a chief complaint of Fever (26 Nov 2020 07:15)      INTERVAL HPI/OVERNIGHT EVENTS:  - no acute events overnight  -Clozapine increased to 25mg BID  -Lantus increased to 5u    MEDICATIONS (STANDING):  cloZAPine 25 milliGRAM(s) Oral at bedtime  cloZAPine 25 milliGRAM(s) Oral <User Schedule>  dextrose 40% Gel 15 Gram(s) Oral once  dextrose 5%. 1000 milliLiter(s) IV Continuous <Continuous>  dextrose 5%. 1000 milliLiter(s) IV Continuous <Continuous>  dextrose 50% Injectable 25 Gram(s) IV Push once  dextrose 50% Injectable 12.5 Gram(s) IV Push once  dextrose 50% Injectable 25 Gram(s) IV Push once  enoxaparin Injectable 40 milliGRAM(s) SubCutaneous daily  glucagon  Injectable 1 milliGRAM(s) IntraMuscular once  insulin glargine Injectable (LANTUS) 3 Unit(s) SubCutaneous at bedtime  insulin lispro (ADMELOG) corrective regimen sliding scale   SubCutaneous three times a day before meals  insulin lispro (ADMELOG) corrective regimen sliding scale   SubCutaneous at bedtime  piperacillin/tazobactam IVPB.. 3.375 Gram(s) IV Intermittent every 8 hours  polyethylene glycol 3350 17 Gram(s) Oral daily  risperiDONE   Tablet 3 milliGRAM(s) Oral at bedtime  senna 2 Tablet(s) Oral at bedtime    MEDICATIONS  (PRN):  acetaminophen   Tablet .. 650 milliGRAM(s) Oral every 6 hours PRN      REVIEW OF SYSTEMS:  CONSTITUTIONAL: No fever  EYES: No eye pain  ENMT: No sinus or throat pain  NECK: No pain  RESPIRATORY: No shortness of breath  CARDIOVASCULAR: No chest pain, dizziness  GASTROINTESTINAL: No abdominal or epigastric pain  GENITOURINARY: No dysuria, hematuria  NEUROLOGICAL: No headaches  SKIN: No itching, burning, rashes, or lesions   MUSCULOSKELETAL: No muscle or joint pain    T(F): 98 (11-27-20 @ 05:24), Max: 98.3 (11-26-20 @ 11:27)  HR: 70 (11-27-20 @ 05:24) (64 - 75)  BP: 130/77 (11-27-20 @ 05:24) (112/50 - 142/88)  RR: 17 (11-27-20 @ 05:24) (16 - 18)  SpO2: 97% (11-27-20 @ 05:24) (95% - 100%)  Wt(kg): --  CAPILLARY BLOOD GLUCOSE      POCT Blood Glucose.: 241 mg/dL (26 Nov 2020 21:03)  POCT Blood Glucose.: 257 mg/dL (26 Nov 2020 17:25)  POCT Blood Glucose.: 214 mg/dL (26 Nov 2020 12:46)  POCT Blood Glucose.: 222 mg/dL (26 Nov 2020 08:41)    I&O's Summary      PHYSICAL EXAM:  GENERAL: NAD, well-groomed, well-developed  HEAD:  Atraumatic, Normocephalic  EYES: PERRLA, conjunctiva and sclera clear  ENMT: Moist mucous membranes  NECK: Supple  NERVOUS SYSTEM:  Alert & Oriented X2, poor concentration; disorganized.   CHEST/LUNG: Clear to auscultation bilaterally; No rales, rhonchi, wheezing, or rubs  HEART: Regular rate and rhythm; No murmurs, rubs, or gallops  ABDOMEN: Soft, Nontender, Nondistended; Bowel sounds present  EXTREMITIES:  2+ Peripheral Pulses, No edema  SKIN: No rashes or lesions    LABS:                        10.6   7.70  )-----------( 464      ( 26 Nov 2020 05:00 )             33.6     11-26    138  |  100  |  13  ----------------------------<  224<H>  4.0   |  24  |  0.59    Ca    9.7      26 Nov 2020 05:00  Phos  3.9     11-26  Mg     1.9     11-26    TPro  7.3  /  Alb  3.6  /  TBili  0.5  /  DBili  x   /  AST  25  /  ALT  174<H>  /  AlkPhos  209<H>  11-26    PT/INR - ( 26 Nov 2020 05:00 )   PT: 13.9 SEC;   INR: 1.22          PTT - ( 26 Nov 2020 05:00 )  PTT:40.6 SEC

## 2020-11-27 NOTE — PROGRESS NOTE BEHAVIORAL HEALTH - NSBHCONSULTPRIMARYDISCUSSYES_PSY_A_CORE FT
Discussed plan with team
Discussed plan with team
Discussed plan with pt's nurse, Nohemy
Discussed with Resident
Discussed plan with team
Discussed with Resident

## 2020-11-27 NOTE — PROGRESS NOTE ADULT - PROBLEM SELECTOR PLAN 8
DVT: lovenox  Diet: CC  Dispo: back to [facility] when clinically improved DVT: lovenox  Diet: CC  Dispo: back to [facility] when clinically improved- likely today.   PT recommendations for ambulatory status.

## 2020-11-27 NOTE — PROGRESS NOTE BEHAVIORAL HEALTH - NSBHCHARTREVIEWINVESTIGATE_PSY_A_CORE FT
< from: 12 Lead ECG (11.03.20 @ 17:27) >      Ventricular Rate 114 BPM    Atrial Rate 114 BPM    P-R Interval 142 ms    QRS Duration 82 ms    Q-T Interval 314 ms    QTC Calculation(Bazett) 432 ms    P Axis 33 degrees    R Axis 28 degrees    T Axis 23 degrees    Diagnosis Line Sinus tachycardia  Anterior infarct , age undetermined  Abnormal ECG    < end of copied text >
< from: 12 Lead ECG (11.03.20 @ 17:27) >    QTC Calculation(Bazett) 432 ms    < end of copied text >
EKG 11/19 QTc 396
no new

## 2020-11-27 NOTE — PROGRESS NOTE ADULT - PROBLEM SELECTOR PLAN 5
-SSI  -Lantus 3u at bedtime -SSI  -Lantus 5u at bedtime -SSI  -Lantus 5u at bedtime, continue titration as needed.   -continue to monitor POC, ISS at [facility].

## 2020-11-27 NOTE — PROGRESS NOTE BEHAVIORAL HEALTH - SUMMARY
55F with PPH of Schizophrenia and PMH of DMT2, HLD, presented on 11/19/20 with fever for 24hours duration from [facility].     Patient's delirium seems 2/2 to UTI/pyelo. She is alert, but disorganized, paranoid, delusional and has word salad. Previously unable to tolerate echo.    Retried echo today spoke to primary team - patient refused proper positioning however on limited exam no wall motion deficits that would be indicative of myocarditis per team. This, in addition to lack of EKG changes, lack of chest pain/SOB, and stable CKMB and troponins, lack of fever, would denote that myocarditis is highly unlikely.     OK to restart clozapine at low dose as below, slow titration given medical comorbidities, keep trending LFTs. CK wnl.     Plan:  -For now, primary team can continue with enhanced supervision, but if patient becomes severely agitated and or suicidal- would recommend 1:1 CO.  - Begin Clozapine 12.5mg qHS  - c/w risperidone back to 3mg HS  - Hold DEPAKOTE 1000MG HS, due to increase in LFTs     [] daily LFTs, monitor platelets- if showing worsening transaminitis, ask psych for clarification on dosing  -Ativan 0.5-1mg PO/IV q4 PRN for Agitation (hold for sedation or respiratory depression)   -Monitor EKG regularly for QTc >500   -Monitor LFTs daily d/t Depakote usage
55F with PPH of Schizophrenia and PMH of DMT2, HLD, presented on 11/19/20 with fever for 24hours duration from [facility].     Patient's delirium seems 2/2 to UTI/pyelo. She is alert, but disorganized, paranoid, delusional and has word salad.  She was unable to tolerate echo- would ask for repeat to ensure no concern for myocarditis. As CPK is trending now, patient is not rigid- NMS is less likely. Will therefore request to increase risperidone back to 3mg HS. Given rise in LFTs will continue to recommend holding VPA.    Plan:  -For now, primary team can continue with enhanced supervision, but if patient becomes severely agitated and or suicidal- would recommend 1:1 CO.  -REPEAT echo- if echo is not showing concern for cardiomyopathy or myocarditis- can start clozapine 12.5mg BID     [] will need to monitor ANC while on med  -INCREASE risperidone back to 3mg HS  - Hold DEPAKOTE 1000MG HS, due to increase in LFTs     [] daily LFTs, monitor platelets- if showing worsening transaminitis, ask psych for clarification on dosing  -Ativan 0.5-1mg PO/IV q4 PRN for Agitation (hold for sedation or respiratory depression)      [] appreciate that we usually avoid ativan in delirious patients, but given rise of CPK, would like to ensure NMS is completely out of the picture and/or patient does not develop NMS  -Patient was not on 1:1, recommended pt be moved to room where she can received enhanced supervision given her level of disorientation   -Monitor EKG regularly for QTc >500   -Monitor LFTs daily d/t Depakote usage  - DAILY Monitoring of CPK until its wnl
55F with PPH of Schizophrenia and PMH of DMT2, HLD, presented on 11/19/20 with fever for 24hours duration from [facility].     Patient's was initially found to be delirious 2/2 to likely UTI/pyelo. She is alert, but disorganized, paranoid, delusional and has word salad. Previously unable to tolerate echo.    Retried echo and pt refused proper positioning however on limited exam no wall motion deficits that would be indicative of myocarditis per team. This, in addition to lack of EKG changes, lack of chest pain/SOB, and stable CKMB and troponins, lack of fever, would denote that myocarditis is highly unlikely.     Restarting clozapine at low dose as below, slow titration given medical comorbidities, keep trending LFTs. CK wnl.     On 11/26, patient continues to be very disorganized, delusional, paranoid, thoughts of kicking others but has not acted on these thoughts and has been in good behavioral control.  11/27- remains disroganized and illogical.    Plan:  -For now, primary team at a minimum should continue with enhanced supervision (discussed with JUNE Simmons and patient ), but if patient becomes severely agitated and or suicidal- would recommend 1:1 CO.  - Repeat EKG  - continue Clozapine to 25mg BID- defer to inpatient psych for titration  - ensure on bowel regimen  - c/w risperidone back to 3mg HS  - Hold DEPAKOTE 1000MG HS, due to increase in LFTs     [] daily LFTs, monitor platelets- if showing worsening transaminitis, ask psych for clarification on dosing  -Ativan 0.5-1mg PO/IV q4 PRN for Agitation (hold for sedation or respiratory depression)   -Monitor EKG regularly for QTc >500   -Monitor LFTs daily d/t Depakote usage.
55F with PPH of Schizophrenia and PMH of DMT2, HLD, presented on 20 with fever for 24hours duration from [facility].     Patient's delirium seems 2/2 to UTI/pyelo. She is more alert today and more attentive- though I would say remains delirious.  She was unable to tolerate echo- would ask for repeat to ensure no concern for myocarditis. As CPK is trending now, patient is not rigid- NMS is less likely. Will therefore request to increase risperidone back to 3mg HS. As she has a slight bump in AST- which likely is due to infection will hold off on increasing VPA at this point and continue to ask primary team to monitor.    Plan:  -REPEAT echo- if echo is not showing concern for cardiomyopathy or myocarditis- can start clozapine 12.5mg BID     [] will need to monitor ANC while on med  -INCREASE risperidone back to 3mg HS  -CONTINUE DEPAKOTE 1000MG HS     [] daily LFTs, monitor platelets- if showing worsening transaminitis, ask psych for clarification on dosing  -Ativan 0.5-1mg PO/IV q4 PRN for Agitation (hold for sedation or respiratory depression)      [] appreciate that we usually avoid ativan in delirious patients, but given rise of CPK, would like to ensure NMS is completely out of the picture and/or patient does not develop NMS  -STOP Cogentin 1mg PO qHS   -CO 1:1 for AMS  -Monitor EKG regularly for QTc >500   -Monitor LFTs daily d/t Depakote usage  - DAILY Monitoring of CPK until its wnl    Today : awake, alert, denies SI/HI, tangential loose thoughts, delusional, but cooperative. Knows her age and . Agree with holding clozapine.  Received info from primary team about elev LFTS, suggested holding depakote, cont Risperidone for now.  qtc 396 as of Nov 10
55F with PPH of Schizophrenia and PMH of DMT2, HLD, presented on 20 with fever for 24hours duration from [facility].     Patient's delirium seems 2/2 to UTI/pyelo. She is more alert today and more attentive- though I would say remains delirious.  She was unable to tolerate echo- would ask for repeat to ensure no concern for myocarditis. As CPK is trending now, patient is not rigid- NMS is less likely. Will therefore request to increase risperidone back to 3mg HS. As she has a slight bump in AST- which likely is due to infection will hold off on increasing VPA at this point and continue to ask primary team to monitor.    Plan:  -REPEAT echo- if echo is not showing concern for cardiomyopathy or myocarditis- can start clozapine 12.5mg BID     [] will need to monitor ANC while on med  -INCREASE risperidone back to 3mg HS  - Hold DEPAKOTE 1000MG HS, due to increase in LFTs     [] daily LFTs, monitor platelets- if showing worsening transaminitis, ask psych for clarification on dosing  -Ativan 0.5-1mg PO/IV q4 PRN for Agitation (hold for sedation or respiratory depression)      [] appreciate that we usually avoid ativan in delirious patients, but given rise of CPK, would like to ensure NMS is completely out of the picture and/or patient does not develop NMS  -Patient was not on 1:1, recommended pt be moved to room where she can received enhanced supervision given her level of disorientation   -Monitor EKG regularly for QTc >500   -Monitor LFTs daily d/t Depakote usage  - DAILY Monitoring of CPK until its wnl    : awake, alert, denies SI/HI, tangential loose thoughts, delusional, but cooperative. Knows her age and . Agree with holding clozapine.  Received info from primary team about elev LFTS, suggested holding depakote, cont Risperidone for now.  qtc 396 as of Nov 10  11/22: pt awake, alert, but continues to have tangential, delusional thoughts. Pt disorganized but redirectable. Will continue to hold clozapine.
55F with PPH of Schizophrenia and PMH of DMT2, HLD, presented on 11/19/20 with fever for 24hours duration from [facility].     Patient's delirium seems 2/2 to UTI/pyelo. She is more alert today and more attentive- though I would say remains delirious.  She was unable to tolerate echo- would ask for repeat to ensure no concern for myocarditis. As CPK is trending now, patient is not rigid- NMS is less likely. Will therefore request to increase risperidone back to 3mg HS. As she has a slight bump in AST- which likely is due to infection will hold off on increasing VPA at this point and continue to ask primary team to monitor.    Plan:  -REPEAT echo- if echo is not showing concern for cardiomyopathy or myocarditis- can start clozapine 12.5mg BID     [] will need to monitor ANC while on med  -INCREASE risperidone back to 3mg HS  -CONTINUE DEPAKOTE 1000MG HS     [] daily LFTs, monitor platelets- if showing worsening transaminitis, ask psych for clarification on dosing  -Ativan 0.5-1mg PO/IV q4 PRN for Agitation (hold for sedation or respiratory depression)      [] appreciate that we usually avoid ativan in delirious patients, but given rise of CPK, would like to ensure NMS is completely out of the picture and/or patient does not develop NMS  -STOP Cogentin 1mg PO qHS   -CO 1:1 for AMS  -Monitor EKG regularly for QTc >500   -Monitor LFTs daily d/t Depakote usage  - DAILY Monitoring of CPK until its wnl
55F with PPH of Schizophrenia and PMH of DMT2, HLD, presented on 11/19/20 with fever for 24hours duration from [facility].     Patient's was initially found to be delirious 2/2 to likely UTI/pyelo. She is alert, but disorganized, paranoid, delusional and has word salad. Previously unable to tolerate echo.    Retried echo yesterday. Patient refused proper positioning however on limited exam no wall motion deficits that would be indicative of myocarditis per team. This, in addition to lack of EKG changes, lack of chest pain/SOB, and stable CKMB and troponins, lack of fever, would denote that myocarditis is highly unlikely.     OK to restart clozapine at low dose as below, slow titration given medical comorbidities, keep trending LFTs. CK wnl.     On 11/25 found to be disorganized, delusional and paranoid and agitated.    Plan:  -For now, primary team at a minimum should continue with enhanced supervision, but if patient becomes severely agitated and or suicidal- would recommend 1:1 CO.  - Begin Clozapine 12.5mg mg BID- tomorrow (11/26) can increase to 25mg BID  - c/w risperidone back to 3mg HS  - Hold DEPAKOTE 1000MG HS, due to increase in LFTs     [] daily LFTs, monitor platelets- if showing worsening transaminitis, ask psych for clarification on dosing  -Ativan 0.5-1mg PO/IV q4 PRN for Agitation (hold for sedation or respiratory depression)   -Monitor EKG regularly for QTc >500   -Monitor LFTs daily d/t Depakote usage
55F with PPH of Schizophrenia and PMH of DMT2, HLD, presented on 11/19/20 with fever for 24hours duration from [facility].     Patient's was initially found to be delirious 2/2 to likely UTI/pyelo. She is alert, but disorganized, paranoid, delusional and has word salad. Previously unable to tolerate echo.    Retried echo and pt refused proper positioning however on limited exam no wall motion deficits that would be indicative of myocarditis per team. This, in addition to lack of EKG changes, lack of chest pain/SOB, and stable CKMB and troponins, lack of fever, would denote that myocarditis is highly unlikely.     Restarting clozapine at low dose as below, slow titration given medical comorbidities, keep trending LFTs. CK wnl.     On 11/26, patient continues to be very disorganized, delusional, paranoid, thoughts of kicking others but has not acted on these thoughts and has been in good behavioral control.    Plan:  -For now, primary team at a minimum should continue with enhanced supervision (discussed with JUNE Simmons and patient ), but if patient becomes severely agitated and or suicidal- would recommend 1:1 CO.  - Repeat EKG  - Increase Clozapine to 25mg BID  - c/w risperidone back to 3mg HS  - Hold DEPAKOTE 1000MG HS, due to increase in LFTs     [] daily LFTs, monitor platelets- if showing worsening transaminitis, ask psych for clarification on dosing  -Ativan 0.5-1mg PO/IV q4 PRN for Agitation (hold for sedation or respiratory depression)   -Monitor EKG regularly for QTc >500   -Monitor LFTs daily d/t Depakote usage.

## 2020-11-27 NOTE — DIETITIAN INITIAL EVALUATION ADULT. - PROBLEM SELECTOR PLAN 3
-on metformin, hayes [place] at [facility], will hold  -will continue inpatient with [facility].

## 2020-11-27 NOTE — PROGRESS NOTE BEHAVIORAL HEALTH - NSBHATTESTSEENBY_PSY_A_CORE
attending Psychiatrist without NP/Trainee
Attending Psychiatrist supervising NP/Trainee, meeting pt...
Attending Psychiatrist supervising NP/Trainee, meeting pt...
attending Psychiatrist without NP/Trainee

## 2020-11-27 NOTE — PHYSICAL THERAPY INITIAL EVALUATION ADULT - PERTINENT HX OF CURRENT PROBLEM, REHAB EVAL
Pt. is a 55 year old female with PMH: schizoaffective disorder, DMT2, HLD, now presenting with fever.

## 2020-11-27 NOTE — PROGRESS NOTE BEHAVIORAL HEALTH - NSBHCHARTREVIEWLAB_PSY_A_CORE FT
10.1   8.30  )-----------( 370      ( 23 Nov 2020 06:00 )             32.1   11-23    138  |  97<L>  |  8   ----------------------------<  209<H>  3.4<L>   |  26  |  0.52    Ca    8.9      23 Nov 2020 06:00  Phos  2.7     11-23  Mg     2.0     11-23    TPro  6.8  /  Alb  3.1<L>  /  TBili  0.4  /  DBili  x   /  AST  198<H>  /  ALT  593<H>  /  AlkPhos  302<H>  11-23
10.3   6.27  )-----------( 446      ( 27 Nov 2020 07:00 )             32.7   11-27    135  |  101  |  13  ----------------------------<  241<H>  4.3   |  23  |  0.56    Ca    9.7      27 Nov 2020 07:00  Phos  3.9     11-27  Mg     1.7     11-27    TPro  7.0  /  Alb  3.4  /  TBili  0.5  /  DBili  x   /  AST  20  /  ALT  118<H>  /  AlkPhos  180<H>  11-27
10.4   8.66  )-----------( 415      ( 24 Nov 2020 05:45 )             33.2     11-24    137  |  99  |  11  ----------------------------<  257<H>  3.9   |  26  |  0.54    Ca    9.6      24 Nov 2020 05:45  Phos  3.3     11-24  Mg     1.8     11-24    TPro  7.2  /  Alb  3.4  /  TBili  0.4  /  DBili  x   /  AST  73<H>  /  ALT  388<H>  /  AlkPhos  289<H>  11-24      LIVER FUNCTIONS - ( 24 Nov 2020 05:45 )  Alb: 3.4 g/dL / Pro: 7.2 g/dL / ALK PHOS: 289 u/L / ALT: 388 u/L / AST: 73 u/L / GGT: x           PT/INR - ( 24 Nov 2020 05:45 )   PT: 13.7 SEC;   INR: 1.20          PTT - ( 24 Nov 2020 05:45 )  PTT:40.1 SEC    CK Total and CKMB (11.18.20 @ 10:03)    Creatine Kinase, Serum: 231: SPECIMEN MODERATELY HEMOLYZED  CKMB is no longer reflexively performed on elevated CK  results.  To get CKMB results please order "CK AND CKMB".  Effective Linsey 15, 2016. u/L    CKMB: 1.43 ng/mL    CKMB Relative Index: 0.6    Troponin T, High Sensitivity (11.20.20 @ 05:43)    Troponin T, High Sensitivity: 26: ---------------------***PLEASE NOTE***----------------------  Rapid changes upward or downward in high-sensitivity  troponin levels strongly suggest acute myocardial injury.  Hemolysis may falsely lower results. Renal impairment may  increase results.    Normal: <6 - 14 ng/L  Indeterminate: 15 - 51 ng/L  Elevated: >51 ng/L    Please see "[email or web address]" on the [employer] [employer]  intranet for more information. ng/L
10.6   7.70  )-----------( 464      ( 26 Nov 2020 05:00 )             33.6     11-26    138  |  100  |  13  ----------------------------<  224<H>  4.0   |  24  |  0.59    Ca    9.7      26 Nov 2020 05:00  Phos  3.9     11-26  Mg     1.9     11-26    TPro  7.3  /  Alb  3.6  /  TBili  0.5  /  DBili  x   /  AST  25  /  ALT  174<H>  /  AlkPhos  209<H>  11-26      LIVER FUNCTIONS - ( 26 Nov 2020 05:00 )  Alb: 3.6 g/dL / Pro: 7.3 g/dL / ALK PHOS: 209 u/L / ALT: 174 u/L / AST: 25 u/L / GGT: x           PT/INR - ( 26 Nov 2020 05:00 )   PT: 13.9 SEC;   INR: 1.22          PTT - ( 26 Nov 2020 05:00 )  PTT:40.6 SEC
9.7    5.82  )-----------( 334      ( 22 Nov 2020 06:35 )             30.5   11-22    136  |  99  |  10  ----------------------------<  162<H>  3.5   |  26  |  0.45<L>    Ca    8.7      22 Nov 2020 06:35  Phos  2.6     11-22  Mg     2.1     11-22    TPro  6.7  /  Alb  2.9<L>  /  TBili  0.4  /  DBili  x   /  AST  801<H>  /  ALT  713<H>  /  AlkPhos  284<H>  11-22
CBC Full  -  ( 25 Nov 2020 02:22 )  WBC Count : 9.56 K/uL  RBC Count : 4.16 M/uL  Hemoglobin : 11.0 g/dL  Hematocrit : 34.9 %  Platelet Count - Automated : 476 K/uL  Mean Cell Volume : 83.9 fL  Mean Cell Hemoglobin : 26.4 pg  Mean Cell Hemoglobin Concentration : 31.5 %  Auto Neutrophil # : 5.62 K/uL  Auto Lymphocyte # : 2.65 K/uL  Auto Monocyte # : 0.74 K/uL  Auto Eosinophil # : 0.00 K/uL  Auto Basophil # : 0.07 K/uL  Auto Neutrophil % : 58.9 %  Auto Lymphocyte % : 27.7 %  Auto Monocyte % : 7.7 %  Auto Eosinophil % : 0.0 %  Auto Basophil % : 0.7 %    11-25    137  |  99  |  12  ----------------------------<  222<H>  4.3   |  25  |  0.47<L>    Ca    10.0      25 Nov 2020 02:29  Phos  3.6     11-25  Mg     2.0     11-25    TPro  7.8  /  Alb  3.6  /  TBili  0.6  /  DBili  x   /  AST  58<H>  /  ALT  308<H>  /  AlkPhos  284<H>  11-25
8.7    7.27  )-----------( 274      ( 21 Nov 2020 08:15 )             27.5   9.7    9.98  )-----------( 241      ( 20 Nov 2020 05:43 )             30.1   11-20    138  |  99  |  15  ----------------------------<  149<H>  3.4<L>   |  27  |  0.58    Ca    8.9      20 Nov 2020 05:43  Phos  2.4     11-20  Mg     1.9     11-20    TPro  6.6  /  Alb  3.3  /  TBili  0.4  /  DBili  x   /  AST  38<H>  /  ALT  25  /  AlkPhos  67  11-20
9.7    9.98  )-----------( 241      ( 20 Nov 2020 05:43 )             30.1   11-20    138  |  99  |  15  ----------------------------<  149<H>  3.4<L>   |  27  |  0.58    Ca    8.9      20 Nov 2020 05:43  Phos  2.4     11-20  Mg     1.9     11-20    TPro  6.6  /  Alb  3.3  /  TBili  0.4  /  DBili  x   /  AST  38<H>  /  ALT  25  /  AlkPhos  67  11-20

## 2020-11-27 NOTE — PROGRESS NOTE ADULT - NSHPATTENDINGPLANDISCUSS_GEN_ALL_CORE
Patient, Resident Christian Yoo MD and Intern Shreyas Carpenter MD
patient / HS2 Dr. Estes
Patient, Resident Christian Yoo MD and Intern Shreyas Carpenter MD
patient / HS2 Dr. Carpenter

## 2020-11-27 NOTE — PROGRESS NOTE ADULT - PROBLEM SELECTOR PLAN 9
Dispo:   1.  Name of PCP:   2.  PCP Contacted on Admission: [ ] Y    [ ] N    3.  PCP contacted at Discharge: [ ] Y    [ ] N    [ ] N/A  4.  Post-Discharge Appointment Date and Location:  5.  Summary of Handoff given to PCP:

## 2020-11-27 NOTE — PROGRESS NOTE BEHAVIORAL HEALTH - RISK ASSESSMENT
Patient has psychiatric illness, was recently admitted for inability to care for self. Patient is deemed to be at high risk and inability to care for self. She is currently delirious- though likely will need to be transferred back to inpatient psychiatry once medically cleared.

## 2020-11-27 NOTE — DIETITIAN INITIAL EVALUATION ADULT. - PERTINENT LABORATORY DATA
(11/27) H/H 10.3/32.7 L,  H, Glu 241 H, Albumin 3.4,  H,  H  (11/5) Triglycerides 355 H, HDL 31 L, HbA1c 9.5% H

## 2020-11-27 NOTE — PROGRESS NOTE BEHAVIORAL HEALTH - NSBHCONSULTMEDPLAN_PSY_A_CORE FT
see medicine note  [] obtain hospitalist consult  [] monitor LFTs  [] monitor EKG  [] ensure having bowel movements

## 2020-11-27 NOTE — PROGRESS NOTE ADULT - PROBLEM SELECTOR PLAN 7
-appreciate psych recs   -c/w Benztropine 1mg, Depakote 1000mg, Risperidone 3mg.  -CK negative, troponin low, echo without wall motion abnormalities although limited study, EKG without suggestive changes - at this time, unlikely to have myocarditis  -clozapine 12.5 mg (11/24 - ) -appreciate psych recs   -c/w Benztropine 2mg, Risperidone 3mg. Depakote 1000 held as per psych in light of elevated LFTS.   -CK negative, troponin low, echo without wall motion abnormalities although limited study, EKG without suggestive changes - at this time, unlikely to have myocarditis  -clozapine increased to 25mg BID -appreciate psych recs   -c/w Benztropine 2mg, Risperidone 3mg. Depakote 1000 held as per psych in light of elevated LFTS.   -CK negative, troponin low, echo without wall motion abnormalities although limited study, EKG without suggestive changes - at this time, unlikely to have myocarditis  -clozapine increased to 25mg BID  -Check EKG prior to DC.

## 2020-11-27 NOTE — DIETITIAN INITIAL EVALUATION ADULT. - OTHER INFO
Pt 54 yo female with schizoaffective disorder, DMT2, HLD from [facility] likely 2/2 to pyelonephritis noted on CT - per chart review.     At time of visit Pt awake, but appears disoriented. Pt ate <50% of lunch this afternoon per tray waste observation. Will recommend to add PO supplement: Glucerna shake - 2x daily to diet rx. No report of chewing or swallowing difficulty; no report of nausea, vomiting or diarrhea @ this time. Case discussed with nurse. Per nurse, plan for Pt to be discharged to [facility]. Of note Pt's HbA1c level 9.5% (11/5). Pt not appropriate for diet education 2/2 mental status. RDN remains available.

## 2020-11-27 NOTE — PROGRESS NOTE ADULT - PROBLEM SELECTOR PLAN 2
-abd US ordered  -ammonia normal this am; miralax ordered for bowel regimen to reduce levels  -Atorvastatin and Depakote held  -Hep panel negative  -labs trending down overtime -abd US normal  -ammonia normal this am; miralax ordered for bowel regimen to reduce levels  -Atorvastatin and Depakote held  -Hep panel negative  -labs trending down overtime -abd US normal  -ammonia normal this am; miralax ordered for bowel regimen to reduce levels  -Atorvastatin and Depakote held  -Hep panel negative  -labs trending down overtime  -Per  continue weekly CMP at [facility] - monitor LFTs.

## 2020-11-27 NOTE — PROGRESS NOTE ADULT - PROBLEM SELECTOR PLAN 1
-initially presenting with sepsis likely 2/2 to UTI/pyelonephritis  -UCx/BCx with E coli, mostly pan-sensitive  -on Zosyn (2/2 to hepatocellular injury CTX was dc'd), day 7/7 of antibiotics. -initially presenting with sepsis likely 2/2 to UTI/pyelonephritis  -UCx/BCx with E coli, mostly pan-sensitive  -on Zosyn (2/2 to hepatocellular injury CTX was dc'd), day 7/7 of antibiotics.  -Transition to PO Levaquin x1 dose today (last day of antibiotics).

## 2020-11-27 NOTE — PROGRESS NOTE ADULT - ATTENDING COMMENTS
Patient seen and examined, chart and labs reviewed. Case discussed with house staff.   Patient has no complaints, HPI unreliable as she remains very tangential, delusional.     #Acute Pyelonephritis: now with resolving sepsis. W/ Gram neg bacteremia. Now afebrile, no leukocytosis, UCx/BCx with E coli, mostly pan-sensitive, on Zosyn (2/2 to hepatocellular injury), day 7/7 of antibiotics- switched to PO Levaquin today x1 (last day) per sensitivities.     #Transaminitis - new LFT elevation this admission. Discontinued Statin and Depakote. Abd US is within normal limits and acute viral hep panel and acetaminophen level are normal. Hepatology consulted, appreciate recs. Thought drug iduced injury secondary to ceftriaxone so switched to zosyn. LFTs now downtrending. Continue to trend CMP at [facility].     #SOB since resolved, ECHO limited as patient did not allow complete study- currently euvolemic, no SOB, saturating well, ProBNP wnl. Can consider repeating ECHO as outpatient when patient more cooperative with study.     #Schizoaffective d/o- Held Depakote given LFTs, f/u  recs- restarted Clozapine per  recs, titrate dose per recs.     Dispo: likely back to [facility] on Saturday (if accepted) after completion of antibiotics.  Ambulation: limited due to acute psychiatric illness- but will attempt to get PT eval. Patient seen and examined, chart and labs reviewed. Case discussed with house staff.   Patient has no complaints, HPI unreliable as she remains very tangential, delusional.     #Acute Pyelonephritis: now with resolving sepsis. W/ Gram neg bacteremia. Now afebrile, no leukocytosis, UCx/BCx with E coli, mostly pan-sensitive, on Zosyn (2/2 to hepatocellular injury), day 7/7 of antibiotics- switched to PO Levaquin today x1 (last day) per sensitivities.     #Transaminitis - new LFT elevation this admission. Discontinued Statin and Depakote. Abd US is within normal limits and acute viral hep panel and acetaminophen level are normal. Hepatology consulted, appreciate recs. Thought drug iduced injury secondary to ceftriaxone so switched to zosyn. LFTs now downtrending. Continue to trend CMP at [facility].     #SOB since resolved, ECHO limited as patient did not allow complete study- currently euvolemic, no SOB, saturating well, ProBNP wnl. Can consider repeating ECHO as outpatient when patient more cooperative with study.     #Schizoaffective d/o- Held Depakote given LFTs, f/u  recs- restarted Clozapine per  recs, titrate dose per recs.     Dispo: likely back to [facility] on Saturday (if accepted) after completion of antibiotics.  Ambulation: limited due to acute psychiatric illness- but will attempt to get PT eval.  No wound care needs.   Needs weekly labs at [facility].   Check QTC prior to DC.   DC planning time: 34 min in coordinating DC plan with pt/team.

## 2020-11-27 NOTE — PROGRESS NOTE BEHAVIORAL HEALTH - ABNORMAL MOVEMENTS
No abnormal movements
No abnormal movements/Other
No abnormal movements/Other

## 2020-11-27 NOTE — PROGRESS NOTE BEHAVIORAL HEALTH - NSBHCONSULTOBS_PSY_A_CORE
Enhanced supervision
Routine observation
Constant observation
Enhanced supervision
Enhanced supervision

## 2020-11-27 NOTE — DISCHARGE NOTE NURSING/CASE MANAGEMENT/SOCIAL WORK - PATIENT PORTAL LINK FT
You can access the FollowMyHealth Patient Portal offered by [facility] by registering at the following website: [email or web address]. By joining [facility]’s FollowMyHealth portal, you will also be able to view your health information using other applications (apps) compatible with our system.

## 2020-11-27 NOTE — PROGRESS NOTE BEHAVIORAL HEALTH - NSBHCHARTREVIEWVS_PSY_A_CORE FT
ICU Vital Signs Last 24 Hrs  T(C): 36.5 (22 Nov 2020 05:12), Max: 37.1 (21 Nov 2020 21:03)  T(F): 97.7 (22 Nov 2020 05:12), Max: 98.8 (21 Nov 2020 21:03)  HR: 80 (22 Nov 2020 05:12) (80 - 93)  BP: 126/76 (22 Nov 2020 05:12) (118/65 - 133/70)  BP(mean): --  ABP: --  ABP(mean): --  RR: 17 (22 Nov 2020 05:12) (17 - 18)  SpO2: 93% (22 Nov 2020 05:12) (92% - 94%)
Vital Signs Last 24 Hrs  T(C): 36.6 (24 Nov 2020 13:38), Max: 36.7 (23 Nov 2020 21:12)  T(F): 97.8 (24 Nov 2020 13:38), Max: 98.1 (23 Nov 2020 21:12)  HR: 81 (24 Nov 2020 13:38) (81 - 82)  BP: 143/82 (24 Nov 2020 13:38) (122/76 - 143/82)  BP(mean): --  RR: 17 (24 Nov 2020 13:38) (17 - 18)  SpO2: 95% (24 Nov 2020 13:38) (95% - 95%)
Vital Signs Last 24 Hrs  T(C): 36.7 (25 Nov 2020 12:51), Max: 36.7 (24 Nov 2020 22:03)  T(F): 98 (25 Nov 2020 12:51), Max: 98 (24 Nov 2020 22:03)  HR: 93 (25 Nov 2020 12:51) (80 - 93)  BP: 140/84 (25 Nov 2020 12:51) (140/84 - 158/90)  BP(mean): --  RR: 18 (25 Nov 2020 12:51) (17 - 18)  SpO2: 96% (25 Nov 2020 12:51) (95% - 96%)
Vital Signs Last 24 Hrs  T(C): 36.7 (27 Nov 2020 05:24), Max: 36.8 (26 Nov 2020 20:13)  T(F): 98 (27 Nov 2020 05:24), Max: 98.2 (26 Nov 2020 20:13)  HR: 70 (27 Nov 2020 05:24) (64 - 75)  BP: 130/77 (27 Nov 2020 05:24) (112/50 - 142/88)  BP(mean): --  RR: 17 (27 Nov 2020 05:24) (17 - 18)  SpO2: 97% (27 Nov 2020 05:24) (95% - 100%)
Vital Signs Last 24 Hrs  T(C): 36.8 (23 Nov 2020 12:30), Max: 36.8 (23 Nov 2020 12:30)  T(F): 98.3 (23 Nov 2020 12:30), Max: 98.3 (23 Nov 2020 12:30)  HR: 82 (23 Nov 2020 12:30) (82 - 85)  BP: 136/82 (23 Nov 2020 12:30) (129/69 - 136/82)  BP(mean): --  RR: 18 (23 Nov 2020 12:30) (17 - 18)  SpO2: 95% (23 Nov 2020 12:30) (95% - 98%)
Vital Signs Last 24 Hrs  T(C): 36.8 (26 Nov 2020 05:56), Max: 37 (25 Nov 2020 22:41)  T(F): 98.2 (26 Nov 2020 05:56), Max: 98.6 (25 Nov 2020 22:41)  HR: 71 (26 Nov 2020 05:56) (71 - 94)  BP: 110/85 (26 Nov 2020 05:56) (110/85 - 140/84)  BP(mean): --  RR: 18 (26 Nov 2020 05:56) (17 - 18)  SpO2: 97% (26 Nov 2020 05:56) (96% - 100%)
Vital Signs Last 24 Hrs  T(C): 36.4 (21 Nov 2020 11:58), Max: 37.9 (20 Nov 2020 20:49)  T(F): 97.5 (21 Nov 2020 11:58), Max: 100.2 (20 Nov 2020 20:49)  HR: 85 (21 Nov 2020 11:58) (83 - 97)  BP: 118/65 (21 Nov 2020 11:58) (105/63 - 118/65)  BP(mean): --  RR: 18 (21 Nov 2020 11:58) (17 - 18)  SpO2: 92% (21 Nov 2020 11:58) (91% - 95%)Vital Signs Last 24 Hrs  T(C): 37 (20 Nov 2020 06:41), Max: 39.5 (19 Nov 2020 20:25)  T(F): 98.6 (20 Nov 2020 06:41), Max: 103.1 (19 Nov 2020 20:25)  HR: 92 (20 Nov 2020 04:43) (90 - 98)  BP: 105/57 (20 Nov 2020 04:43) (103/60 - 119/61)  BP(mean): --  RR: 17 (20 Nov 2020 04:43) (17 - 18)  SpO2: 99% (20 Nov 2020 04:43) (94% - 99%)
Vital Signs Last 24 Hrs  T(C): 37 (20 Nov 2020 06:41), Max: 39.5 (19 Nov 2020 20:25)  T(F): 98.6 (20 Nov 2020 06:41), Max: 103.1 (19 Nov 2020 20:25)  HR: 92 (20 Nov 2020 04:43) (90 - 98)  BP: 105/57 (20 Nov 2020 04:43) (103/60 - 119/61)  BP(mean): --  RR: 17 (20 Nov 2020 04:43) (17 - 18)  SpO2: 99% (20 Nov 2020 04:43) (94% - 99%)

## 2020-11-27 NOTE — PROGRESS NOTE BEHAVIORAL HEALTH - NSBHCONSULTOBSREASON_PSY_A_CORE FT
confusion, disorganization- but low threshold to elevated to CO (this was discussed with primary team)
confusion, disorganization- but low threshold to elevated to CO (this was discussed with primary team)
confusion, disorganization- but low threshold to elevated to CO (this was discussed with primary team)   (see above)
confusion, disorganization
pt was not on CO 1:1 when seen today. May change if clinically indicated based on primary team's decision.
confusion, disorganization- but low threshold to elevated to CO (this was discussed with primary team)   (see above)
confusion, disorganization- but low threshold to elevated to CO (this was discussed with primary team)   (see above)

## 2020-11-27 NOTE — PHYSICAL THERAPY INITIAL EVALUATION ADULT - PATIENT PROFILE REVIEW, REHAB EVAL
yes/consulted with RN Evangelist CORNEJO prior to session reports pt ok for skilled therapy session.

## 2020-11-27 NOTE — PROGRESS NOTE ADULT - PROBLEM SELECTOR PLAN 4
Pt with elevated Ck to 924 on admission.  -likely 2/2 to uncontrolled rigors for >24h  -trending downwards Pt with elevated Ck to 924 on admission.  -likely 2/2 to uncontrolled rigors for >24h  -trending downwards to normal.

## 2020-11-27 NOTE — DIETITIAN INITIAL EVALUATION ADULT. - DIET TYPE
supplement (specify)/consistent carbohydrate (no snacks)/Glucerna Therapeutic Nutrition 8oz. 2x daily (~440 Kcals, ~20 gm Protein)/DASH/TLC (sodium and cholesterol restricted diet)

## 2020-11-27 NOTE — DIETITIAN INITIAL EVALUATION ADULT. - ADD RECOMMEND
1. Encourage & assist Pt with meals; Monitor PO diet tolerance; Honor food preferences;        2. Add Multivitamins with minerals 1 tab daily for micronutrient coverage;           3. Monitor labs, hydration status;

## 2020-11-27 NOTE — PROGRESS NOTE BEHAVIORAL HEALTH - NS ED BHA MSE SPEECH RATE
Fast, difficult to interrupt
Normal

## 2020-11-27 NOTE — PROGRESS NOTE ADULT - PROBLEM SELECTOR PLAN 3
-prior episode, self-resolving  -CXR with pulmonary edema, Lasix administered  -lasix PRN for SOB  -incentive spirometry -prior episode, self-resolving, ProBNP wnl. ECHO limited as pt not cooperative.  -CXR with pulmonary edema, Lasix administered  -No need for further Lasix.   -incentive spirometry

## 2020-11-27 NOTE — PROGRESS NOTE BEHAVIORAL HEALTH - NSBHCONSULTWRKUPYES_PSY_A_CORE
LFTs, repeat echo as tolerated/labs/imaging/EKG
imaging/EKG/LFTs, repeat echo as tolerated/labs
labs/imaging/EKG/other/repeat ECHO as tolerated
labs/imaging/EKG/LFTs, repeat echo as tolerated
labs/imaging/LFTs, repeat echo as tolerated/EKG
labs/other/imaging/EKG/repeat ECHO as tolerated

## 2020-11-27 NOTE — PROGRESS NOTE BEHAVIORAL HEALTH - NSBHFUPINTERVALHXFT_PSY_A_CORE
Chart reviewed.  Patient seen this AM. She is calm, laying in her bed. She denies SI/HI. Makes a comment about elephants that is disorganized and illogical. States that she does not need medications. Otherwise, interview is limited.

## 2020-11-27 NOTE — PROGRESS NOTE BEHAVIORAL HEALTH - SECONDARY DX1
Psychosis, unspecified psychosis type

## 2020-11-27 NOTE — PHYSICAL THERAPY INITIAL EVALUATION ADULT - ADDITIONAL COMMENTS
Unable to obtain accurate social history secondary to pt. presenting with confusion during subjective history and presenting with difficulty following commands, limited social history obtained through past medical documents, please refer to social work for more attainable social history. as per documents, pt resides at [facility]. Pt. left supine in bed with all tubes/lines intact, call bell in reach and in NAD.

## 2020-11-28 LAB
GLUCOSE BLDC GLUCOMTR-MCNC: 229 MG/DL — HIGH (ref 70–99)
GLUCOSE BLDC GLUCOMTR-MCNC: 235 MG/DL — HIGH (ref 70–99)
GLUCOSE BLDC GLUCOMTR-MCNC: 271 MG/DL — HIGH (ref 70–99)
GLUCOSE BLDC GLUCOMTR-MCNC: 278 MG/DL — HIGH (ref 70–99)

## 2020-11-28 PROCEDURE — 99222 1ST HOSP IP/OBS MODERATE 55: CPT

## 2020-11-28 RX ORDER — METFORMIN HYDROCHLORIDE 850 MG/1
1000 TABLET ORAL
Refills: 0 | Status: DISCONTINUED | OUTPATIENT
Start: 2020-11-28 | End: 2020-12-31

## 2020-11-28 RX ADMIN — Medication 3: at 08:24

## 2020-11-28 RX ADMIN — SENNA PLUS 2 TABLET(S): 8.6 TABLET ORAL at 21:11

## 2020-11-28 RX ADMIN — CLOZAPINE 25 MILLIGRAM(S): 150 TABLET, ORALLY DISINTEGRATING ORAL at 08:24

## 2020-11-28 RX ADMIN — HALOPERIDOL DECANOATE 5 MILLIGRAM(S): 100 INJECTION INTRAMUSCULAR at 08:25

## 2020-11-28 RX ADMIN — Medication 2: at 12:20

## 2020-11-28 RX ADMIN — Medication 1: at 20:40

## 2020-11-28 RX ADMIN — INSULIN GLARGINE 5 UNIT(S): 100 INJECTION, SOLUTION SUBCUTANEOUS at 20:40

## 2020-11-28 RX ADMIN — CLOZAPINE 25 MILLIGRAM(S): 150 TABLET, ORALLY DISINTEGRATING ORAL at 21:11

## 2020-11-28 RX ADMIN — Medication 2: at 17:43

## 2020-11-28 RX ADMIN — RISPERIDONE 3 MILLIGRAM(S): 4 TABLET ORAL at 21:11

## 2020-11-28 RX ADMIN — METFORMIN HYDROCHLORIDE 1000 MILLIGRAM(S): 850 TABLET ORAL at 21:11

## 2020-11-28 RX ADMIN — Medication 1 MILLIGRAM(S): at 08:25

## 2020-11-28 NOTE — CONSULT NOTE ADULT - ASSESSMENT
55 year old female with Schizoaffective disorder now with exacerbation transferred to [facility] 11/18/2020 with fever 103.1, rigors, lethargy and mental status changes found to have pyelonephritis and urosepsis treated.  Pt now returns to [facility].  1.  PYELONEPHRITIS/UROSEPSIS TREATED WITH ANTIBIOTICS NOW RESOLVED.  2.  ABNORMAL LIVER FUNCTIONS TESTS SECONDARY TO CEFTRIAXONE.  REPEAT LFT IN AM.  3.  ELEVATED CPK SECONDARY TO RIGORS.  CHECK CPK TOMORROW.  4.  DMT2:  LANTUS 5 US HS.  RESTART METFORMIN 1000 MG BID. FOLLOW FINGER STICKS.  PT ON AMARYL AND JANUVIA PRIOR TO TRANSFER.  ADJUST DM MEDS AS NEEDED.  5.  SOB/CHF:  RESOLVED WITH LASIX.  PT NOW ASYMPTOMATIC.  6.  HYPERLIPIDEMIA:  HOLD STATIN WHILE LFTs elevated.  7.  PSYCH: AS PER ATTENDING 55 year old female with Schizoaffective disorder now with exacerbation transferred to [facility] 11/18/2020 with fever 103.1, rigors, lethargy and mental status changes found to have pyelonephritis and urosepsis treated.  Pt now returns to [facility].  1.  PYELONEPHRITIS/UROSEPSIS TREATED WITH ANTIBIOTICS NOW RESOLVED.  2.  ABNORMAL LIVER FUNCTIONS TESTS SECONDARY TO CEFTRIAXONE.  REPEAT LFT IN AM.  3.  ELEVATED CPK SECONDARY TO RIGORS.  CHECK CPK TOMORROW.  4.  DMT2:  LANTUS 5 US HS.  RESTART METFORMIN 1000 MG BID. FOLLOW FINGER STICKS.  PT ON AMARYL AND JANUVIA PRIOR TO TRANSFER.  ADJUST DM MEDS AS NEEDED.  5.  SOB/CHF:  RESOLVED WITH LASIX.  PT NOW ASYMPTOMATIC.  OXYGEN SAT 94% RA  6.  HYPERLIPIDEMIA:  HOLD STATIN WHILE LFTs elevated.  7.  PSYCH: AS PER ATTENDING

## 2020-11-28 NOTE — CONSULT NOTE ADULT - SUBJECTIVE AND OBJECTIVE BOX
HPI:   55 year old female with exacerbation of Schizoaffective Disorder developed temp of 103.1 with rigors and transferred to [facility] treated for urosepsis and pyelonephritis now resolved and pt returned to [facility].    Abnormal LFTs secondary to antibiotic Ceftriaxone changed to Zosyn.  +SOB with CXR positive for pulmonary edema resolved with Lasic.  +DMT2 with HGAIC 9.5 [facility] admission.   Pt originally on Metformin, Januvia and Amaryl all held at [facility].  Pt started on Lantus 5 u hs.    HDL on statin.      PAST MEDICAL & SURGICAL HISTORY:  Diabetes mellitus    HLD (hyperlipidemia)    Schizoaffective disorder    No significant past surgical history        Review of Systems:   CONSTITUTIONAL: No fever, weight loss, or fatigue  EYES: No eye pain, visual disturbances, or discharge  ENMT:  No difficulty hearing, tinnitus, vertigo; No sinus or throat pain  NECK: No pain or stiffness  BREASTS: No pain, masses, or nipple discharge  RESPIRATORY: +SOB NOW RESOLVED  CARDIOVASCULAR: No chest pain, palpitations, dizziness, or leg swelling  GASTROINTESTINAL: No abdominal or epigastric pain. No nausea, vomiting, or hematemesis; No diarrhea or constipation. No melena or hematochezia.  GENITOURINARY: No dysuria, frequency, hematuria, or incontinence  NEUROLOGICAL: No headaches, memory loss, loss of strength, numbness, or tremors  SKIN: No itching, burning, rashes, or lesions   LYMPH NODES: No enlarged glands  ENDOCRINE: No heat or cold intolerance; No hair loss  MUSCULOSKELETAL: No joint pain or swelling; No muscle, back, or extremity pain  HEME/LYMPH: No easy bruising, or bleeding gums  ALLERY AND IMMUNOLOGIC: No hives or eczema    Allergies    No Known Allergies    Social History:  -CIGS, -ETOH, -DRUGS    FAMILY HISTORY:  No pertinent family history in first degree relatives  MOTHER , FATHER       MEDICATIONS  (STANDING):  cloZAPine 25 milliGRAM(s) Oral two times a day  dextrose 40% Gel 15 Gram(s) Oral once  glucagon  Injectable 1 milliGRAM(s) IntraMuscular once  insulin glargine Injectable (LANTUS) 5 Unit(s) SubCutaneous at bedtime  insulin lispro (ADMELOG) corrective regimen sliding scale   SubCutaneous three times a day before meals  insulin lispro (ADMELOG) corrective regimen sliding scale   SubCutaneous at bedtime  metFORMIN 1000 milliGRAM(s) Oral two times a day  polyethylene glycol 3350 17 Gram(s) Oral daily  risperiDONE   Tablet 3 milliGRAM(s) Oral at bedtime  senna 2 Tablet(s) Oral at bedtime    MEDICATIONS  (PRN):  acetaminophen   Tablet .. 650 milliGRAM(s) Oral every 6 hours PRN Temp greater or equal to 38C (100.4F), Moderate Pain (4 - 6), Severe Pain (7 - 10)  haloperidol     Tablet 5 milliGRAM(s) Oral every 6 hours PRN agitation  haloperidol    Injectable 5 milliGRAM(s) IntraMuscular once PRN combative behavior  LORazepam     Tablet 1 milliGRAM(s) Oral every 6 hours PRN anxiety  LORazepam   Injectable 2 milliGRAM(s) IntraMuscular once PRN combative behavior        CAPILLARY BLOOD GLUCOSE      POCT Blood Glucose.: 229 mg/dL (2020 12:07)  POCT Blood Glucose.: 271 mg/dL (2020 07:46)    VS:  T 98.0  125/61  81      PHYSICAL EXAM:  GENERAL: NAD, well-developed  HEAD:  Atraumatic, Normocephalic  EYES: EOMI,  conjunctiva and sclera clear  NECK: Supple, No JVD  CHEST/LUNG: Clear to auscultation bilaterally; No wheeze  HEART: Regular rate and rhythm; No murmurs, rubs, or gallops  ABDOMEN: Soft, Nontender, Nondistended; Bowel sounds present  EXTREMITIES:  No clubbing, cyanosis, or edema  NEUROLOGY: non-focal  SKIN: No rashes or lesions    LABS:                        10.3   6.27  )-----------( 446      ( 2020 07:00 )             32.7         135  |  101  |  13  ----------------------------<  241<H>  4.3   |  23  |  0.56    Ca    9.7      2020 07:00  Phos  3.9       Mg     1.7         TPro  7.0  /  Alb  3.4  /  TBili  0.5  /  DBili  x   /  AST  20  /  ALT  118<H>  /  AlkPhos  180<H>        CARDIAC MARKERS ( 2020 07:00 )  x     / x     / 66 u/L / x     / x        2020 SARS COV-2 NEG    2020  CHOL 146 TRIG 355 HDL 31 LDL  79  HGAIC 9.5      2020  TSH  4.86  COVID IGG  5.25 POSITIVE      EKG:  PENDING  2020 2 - D ECHO:  PT NOT COOPERATIVE    RADIOLOGY & ADDITIONAL TESTS:    2020  CT CHEST W/O C:  NEG PNEUMONIA, NEG EFFUSION. LEFT PERINEPHRIC STRANDING ID ETIO POSSIBLE  2020  CXR:  BILATERAL PERIHILAR OPACITIES C/W PULMONARY EDEMA  2020  RUQ US:  NL RUQ US    Consultant(s) Notes Reviewed:  NOTES REVIEWED    Care Discussed with Consultants/Other Providers:  ATTENDING AND STAFF   HPI:   55 year old female with exacerbation of Schizoaffective Disorder developed temp of 103.1 with rigors and transferred to [facility] Med treated for urosepsis and pyelonephritis now resolved and pt returned to [facility].    Abnormal LFTs secondary to antibiotic Ceftriaxone changed to Zosyn.  +SOB with CXR positive for pulmonary edema resolved with Lasic.  +DMT2 with HGAIC 9.5 [facility] admission.   Pt originally on Metformin, Januvia and Amaryl all held at [facility].  Pt started on Lantus 5 u hs.    HDL on statin.      PT IS CONFUSED AND A POOR HISTORIAN    PAST MEDICAL & SURGICAL HISTORY:  Diabetes mellitus    HLD (hyperlipidemia)    Schizoaffective disorder    No significant past surgical history        Review of Systems:   CONSTITUTIONAL: No fever, weight loss, or fatigue  EYES: No eye pain, visual disturbances, or discharge  ENMT:  No difficulty hearing, tinnitus, vertigo; No sinus or throat pain  NECK: No pain or stiffness  BREASTS: No pain, masses, or nipple discharge  RESPIRATORY: +SOB NOW RESOLVED  CARDIOVASCULAR: No chest pain, palpitations, dizziness, or leg swelling  GASTROINTESTINAL: No abdominal or epigastric pain. No nausea, vomiting, or hematemesis; No diarrhea or constipation. No melena or hematochezia.  GENITOURINARY: No dysuria, frequency, hematuria, or incontinence  NEUROLOGICAL: No headaches, loss of strength, numbness, or tremors.  PT IS CONFUSED  SKIN: No itching, burning, rashes, or lesions   LYMPH NODES: No enlarged glands  ENDOCRINE: No heat or cold intolerance; No hair loss  MUSCULOSKELETAL: No joint pain or swelling; No muscle, back, or extremity pain  HEME/LYMPH: No easy bruising, or bleeding gums  ALLERY AND IMMUNOLOGIC: No hives or eczema    Allergies    No Known Allergies    Social History:  -CIGS, -ETOH, -DRUGS    FAMILY HISTORY:  No pertinent family history in first degree relatives  MOTHER , FATHER       MEDICATIONS  (STANDING):  cloZAPine 25 milliGRAM(s) Oral two times a day  dextrose 40% Gel 15 Gram(s) Oral once  glucagon  Injectable 1 milliGRAM(s) IntraMuscular once  insulin glargine Injectable (LANTUS) 5 Unit(s) SubCutaneous at bedtime  insulin lispro (ADMELOG) corrective regimen sliding scale   SubCutaneous three times a day before meals  insulin lispro (ADMELOG) corrective regimen sliding scale   SubCutaneous at bedtime  metFORMIN 1000 milliGRAM(s) Oral two times a day  polyethylene glycol 3350 17 Gram(s) Oral daily  risperiDONE   Tablet 3 milliGRAM(s) Oral at bedtime  senna 2 Tablet(s) Oral at bedtime    MEDICATIONS  (PRN):  acetaminophen   Tablet .. 650 milliGRAM(s) Oral every 6 hours PRN Temp greater or equal to 38C (100.4F), Moderate Pain (4 - 6), Severe Pain (7 - 10)  haloperidol     Tablet 5 milliGRAM(s) Oral every 6 hours PRN agitation  haloperidol    Injectable 5 milliGRAM(s) IntraMuscular once PRN combative behavior  LORazepam     Tablet 1 milliGRAM(s) Oral every 6 hours PRN anxiety  LORazepam   Injectable 2 milliGRAM(s) IntraMuscular once PRN combative behavior        CAPILLARY BLOOD GLUCOSE      POCT Blood Glucose.: 229 mg/dL (2020 12:07)  POCT Blood Glucose.: 271 mg/dL (2020 07:46)    VS:  T 98.0  125/61  81  OXYGEN SAT 94% RA    PHYSICAL EXAM:  GENERAL: NAD, well-developed  HEAD:  Atraumatic, Normocephalic  EYES: EOMI,  conjunctiva and sclera clear  NECK: Supple, No JVD  CHEST/LUNG: Clear to auscultation bilaterally; No wheeze  HEART: Regular rate and rhythm; No murmurs, rubs, or gallops  ABDOMEN: Soft, Nontender, Nondistended; Bowel sounds present  EXTREMITIES:  No clubbing, cyanosis, or edema  NEUROLOGY: non-focal  SKIN: No rashes or lesions    LABS:                        10.3   6.27  )-----------( 446      ( 2020 07:00 )             32.7         135  |  101  |  13  ----------------------------<  241<H>  4.3   |  23  |  0.56    Ca    9.7      2020 07:00  Phos  3.9       Mg     1.7         TPro  7.0  /  Alb  3.4  /  TBili  0.5  /  DBili  x   /  AST  20  /  ALT  118<H>  /  AlkPhos  180<H>        CARDIAC MARKERS ( 2020 07:00 )  x     / x     / 66 u/L / x     / x        2020 SARS COV-2 NEG    2020  CHOL 146 TRIG 355 HDL 31 LDL  79  HGAIC 9.5      2020  TSH  4.86  COVID IGG  5.25 POSITIVE      EKG:  PENDING  2020 2 - D ECHO:  PT NOT COOPERATIVE    RADIOLOGY & ADDITIONAL TESTS:    2020  CT CHEST W/O C:  NEG PNEUMONIA, NEG EFFUSION. LEFT PERINEPHRIC STRANDING ID ETIO POSSIBLE  2020  CXR:  BILATERAL PERIHILAR OPACITIES C/W PULMONARY EDEMA  2020  RUQ US:  NL RUQ US    Consultant(s) Notes Reviewed:  NOTES REVIEWED    Care Discussed with Consultants/Other Providers:  ATTENDING AND STAFF

## 2020-11-28 NOTE — CONSULT NOTE ADULT - CONSULT REASON
Asked by attending to see this 55 year old female with Schizoaffective Disorder now with exacerbation originally admitted to [facility].  On 11/18/2020 pt developed  fever to 103.1, rigors, tremors, lethargy and mental status changes.  Pt transferred to [facility] and admitted to medicine with severe urosepsis pyelonephritis E. Coli treated with ceftriazone changed to Zosyn secondary to rising LFTs.    Pt with SOB with CXR positive for pulmonary edema resolved with Lasix.  +elevated CPK secondary to rigors. +DMT2.  +HLD.  Pt now stabilized and returned to [facility]

## 2020-11-29 LAB
ALBUMIN SERPL ELPH-MCNC: 4 G/DL — SIGNIFICANT CHANGE UP (ref 3.3–5)
ALP SERPL-CCNC: 160 U/L — HIGH (ref 40–120)
ALT FLD-CCNC: 69 U/L — HIGH (ref 4–33)
ANION GAP SERPL CALC-SCNC: 14 MMO/L — SIGNIFICANT CHANGE UP (ref 7–14)
AST SERPL-CCNC: 15 U/L — SIGNIFICANT CHANGE UP (ref 4–32)
BILIRUB SERPL-MCNC: 0.4 MG/DL — SIGNIFICANT CHANGE UP (ref 0.2–1.2)
BUN SERPL-MCNC: 15 MG/DL — SIGNIFICANT CHANGE UP (ref 7–23)
CALCIUM SERPL-MCNC: 10.6 MG/DL — HIGH (ref 8.4–10.5)
CHLORIDE SERPL-SCNC: 97 MMOL/L — LOW (ref 98–107)
CK SERPL-CCNC: 40 U/L — SIGNIFICANT CHANGE UP (ref 25–170)
CO2 SERPL-SCNC: 24 MMOL/L — SIGNIFICANT CHANGE UP (ref 22–31)
CREAT SERPL-MCNC: 0.52 MG/DL — SIGNIFICANT CHANGE UP (ref 0.5–1.3)
GLUCOSE BLDC GLUCOMTR-MCNC: 165 MG/DL — HIGH (ref 70–99)
GLUCOSE BLDC GLUCOMTR-MCNC: 199 MG/DL — HIGH (ref 70–99)
GLUCOSE BLDC GLUCOMTR-MCNC: 226 MG/DL — HIGH (ref 70–99)
GLUCOSE BLDC GLUCOMTR-MCNC: 255 MG/DL — HIGH (ref 70–99)
GLUCOSE SERPL-MCNC: 222 MG/DL — HIGH (ref 70–99)
HCT VFR BLD CALC: 34.6 % — SIGNIFICANT CHANGE UP (ref 34.5–45)
HGB BLD-MCNC: 10.8 G/DL — LOW (ref 11.5–15.5)
MCHC RBC-ENTMCNC: 26.2 PG — LOW (ref 27–34)
MCHC RBC-ENTMCNC: 31.2 % — LOW (ref 32–36)
MCV RBC AUTO: 84 FL — SIGNIFICANT CHANGE UP (ref 80–100)
NRBC # FLD: 0 K/UL — SIGNIFICANT CHANGE UP (ref 0–0)
PLATELET # BLD AUTO: 465 K/UL — HIGH (ref 150–400)
PMV BLD: 8.8 FL — SIGNIFICANT CHANGE UP (ref 7–13)
POTASSIUM SERPL-MCNC: 4.3 MMOL/L — SIGNIFICANT CHANGE UP (ref 3.5–5.3)
POTASSIUM SERPL-SCNC: 4.3 MMOL/L — SIGNIFICANT CHANGE UP (ref 3.5–5.3)
PROT SERPL-MCNC: 7.9 G/DL — SIGNIFICANT CHANGE UP (ref 6–8.3)
RBC # BLD: 4.12 M/UL — SIGNIFICANT CHANGE UP (ref 3.8–5.2)
RBC # FLD: 14 % — SIGNIFICANT CHANGE UP (ref 10.3–14.5)
SODIUM SERPL-SCNC: 135 MMOL/L — SIGNIFICANT CHANGE UP (ref 135–145)
WBC # BLD: 11.18 K/UL — HIGH (ref 3.8–10.5)
WBC # FLD AUTO: 11.18 K/UL — HIGH (ref 3.8–10.5)

## 2020-11-29 PROCEDURE — 99232 SBSQ HOSP IP/OBS MODERATE 35: CPT

## 2020-11-29 RX ORDER — LOPERAMIDE HCL 2 MG
2 TABLET ORAL ONCE
Refills: 0 | Status: COMPLETED | OUTPATIENT
Start: 2020-11-29 | End: 2020-11-29

## 2020-11-29 RX ADMIN — METFORMIN HYDROCHLORIDE 1000 MILLIGRAM(S): 850 TABLET ORAL at 09:13

## 2020-11-29 RX ADMIN — Medication 1: at 20:48

## 2020-11-29 RX ADMIN — INSULIN GLARGINE 5 UNIT(S): 100 INJECTION, SOLUTION SUBCUTANEOUS at 20:41

## 2020-11-29 RX ADMIN — CLOZAPINE 25 MILLIGRAM(S): 150 TABLET, ORALLY DISINTEGRATING ORAL at 20:41

## 2020-11-29 RX ADMIN — Medication 1: at 17:15

## 2020-11-29 RX ADMIN — RISPERIDONE 3 MILLIGRAM(S): 4 TABLET ORAL at 20:42

## 2020-11-29 RX ADMIN — Medication 1: at 12:32

## 2020-11-29 RX ADMIN — METFORMIN HYDROCHLORIDE 1000 MILLIGRAM(S): 850 TABLET ORAL at 20:42

## 2020-11-29 RX ADMIN — POLYETHYLENE GLYCOL 3350 17 GRAM(S): 17 POWDER, FOR SOLUTION ORAL at 09:14

## 2020-11-29 RX ADMIN — Medication 2: at 08:58

## 2020-11-29 RX ADMIN — Medication 2 MILLIGRAM(S): at 15:03

## 2020-11-29 RX ADMIN — CLOZAPINE 25 MILLIGRAM(S): 150 TABLET, ORALLY DISINTEGRATING ORAL at 09:13

## 2020-11-30 LAB
GLUCOSE BLDC GLUCOMTR-MCNC: 156 MG/DL — HIGH (ref 70–99)
GLUCOSE BLDC GLUCOMTR-MCNC: 213 MG/DL — HIGH (ref 70–99)
GLUCOSE BLDC GLUCOMTR-MCNC: 220 MG/DL — HIGH (ref 70–99)
GLUCOSE BLDC GLUCOMTR-MCNC: 273 MG/DL — HIGH (ref 70–99)

## 2020-11-30 PROCEDURE — 99232 SBSQ HOSP IP/OBS MODERATE 35: CPT

## 2020-11-30 RX ORDER — CLOZAPINE 150 MG/1
50 TABLET, ORALLY DISINTEGRATING ORAL AT BEDTIME
Refills: 0 | Status: DISCONTINUED | OUTPATIENT
Start: 2020-11-30 | End: 2020-12-02

## 2020-11-30 RX ORDER — CLOZAPINE 150 MG/1
25 TABLET, ORALLY DISINTEGRATING ORAL DAILY
Refills: 0 | Status: DISCONTINUED | OUTPATIENT
Start: 2020-12-01 | End: 2020-12-02

## 2020-11-30 RX ADMIN — Medication 3: at 17:00

## 2020-11-30 RX ADMIN — INSULIN GLARGINE 5 UNIT(S): 100 INJECTION, SOLUTION SUBCUTANEOUS at 20:55

## 2020-11-30 RX ADMIN — RISPERIDONE 3 MILLIGRAM(S): 4 TABLET ORAL at 21:16

## 2020-11-30 RX ADMIN — POLYETHYLENE GLYCOL 3350 17 GRAM(S): 17 POWDER, FOR SOLUTION ORAL at 08:56

## 2020-11-30 RX ADMIN — CLOZAPINE 25 MILLIGRAM(S): 150 TABLET, ORALLY DISINTEGRATING ORAL at 08:56

## 2020-11-30 RX ADMIN — CLOZAPINE 50 MILLIGRAM(S): 150 TABLET, ORALLY DISINTEGRATING ORAL at 21:15

## 2020-11-30 RX ADMIN — METFORMIN HYDROCHLORIDE 1000 MILLIGRAM(S): 850 TABLET ORAL at 08:56

## 2020-11-30 RX ADMIN — METFORMIN HYDROCHLORIDE 1000 MILLIGRAM(S): 850 TABLET ORAL at 21:16

## 2020-11-30 RX ADMIN — SENNA PLUS 2 TABLET(S): 8.6 TABLET ORAL at 21:16

## 2020-12-01 LAB
GLUCOSE BLDC GLUCOMTR-MCNC: 152 MG/DL — HIGH (ref 70–99)
GLUCOSE BLDC GLUCOMTR-MCNC: 208 MG/DL — HIGH (ref 70–99)
GLUCOSE BLDC GLUCOMTR-MCNC: 223 MG/DL — HIGH (ref 70–99)
GLUCOSE BLDC GLUCOMTR-MCNC: 229 MG/DL — HIGH (ref 70–99)

## 2020-12-01 PROCEDURE — 99232 SBSQ HOSP IP/OBS MODERATE 35: CPT

## 2020-12-01 RX ADMIN — CLOZAPINE 25 MILLIGRAM(S): 150 TABLET, ORALLY DISINTEGRATING ORAL at 08:34

## 2020-12-01 RX ADMIN — INSULIN GLARGINE 5 UNIT(S): 100 INJECTION, SOLUTION SUBCUTANEOUS at 21:04

## 2020-12-01 RX ADMIN — Medication 2: at 12:16

## 2020-12-01 RX ADMIN — CLOZAPINE 50 MILLIGRAM(S): 150 TABLET, ORALLY DISINTEGRATING ORAL at 21:04

## 2020-12-01 RX ADMIN — POLYETHYLENE GLYCOL 3350 17 GRAM(S): 17 POWDER, FOR SOLUTION ORAL at 08:34

## 2020-12-01 RX ADMIN — Medication 2: at 08:34

## 2020-12-01 RX ADMIN — Medication 2: at 16:48

## 2020-12-01 RX ADMIN — METFORMIN HYDROCHLORIDE 1000 MILLIGRAM(S): 850 TABLET ORAL at 21:04

## 2020-12-01 RX ADMIN — METFORMIN HYDROCHLORIDE 1000 MILLIGRAM(S): 850 TABLET ORAL at 08:34

## 2020-12-01 RX ADMIN — RISPERIDONE 3 MILLIGRAM(S): 4 TABLET ORAL at 21:04

## 2020-12-01 RX ADMIN — SENNA PLUS 2 TABLET(S): 8.6 TABLET ORAL at 21:04

## 2020-12-02 LAB
GLUCOSE BLDC GLUCOMTR-MCNC: 151 MG/DL — HIGH (ref 70–99)
GLUCOSE BLDC GLUCOMTR-MCNC: 191 MG/DL — HIGH (ref 70–99)
GLUCOSE BLDC GLUCOMTR-MCNC: 209 MG/DL — HIGH (ref 70–99)

## 2020-12-02 PROCEDURE — 99232 SBSQ HOSP IP/OBS MODERATE 35: CPT

## 2020-12-02 RX ORDER — CLOZAPINE 150 MG/1
75 TABLET, ORALLY DISINTEGRATING ORAL AT BEDTIME
Refills: 0 | Status: DISCONTINUED | OUTPATIENT
Start: 2020-12-02 | End: 2020-12-02

## 2020-12-02 RX ORDER — CLOZAPINE 150 MG/1
100 TABLET, ORALLY DISINTEGRATING ORAL AT BEDTIME
Refills: 0 | Status: DISCONTINUED | OUTPATIENT
Start: 2020-12-03 | End: 2020-12-03

## 2020-12-02 RX ORDER — CLOZAPINE 150 MG/1
75 TABLET, ORALLY DISINTEGRATING ORAL AT BEDTIME
Refills: 0 | Status: COMPLETED | OUTPATIENT
Start: 2020-12-02 | End: 2020-12-02

## 2020-12-02 RX ADMIN — Medication 1: at 17:11

## 2020-12-02 RX ADMIN — CLOZAPINE 25 MILLIGRAM(S): 150 TABLET, ORALLY DISINTEGRATING ORAL at 09:10

## 2020-12-02 RX ADMIN — Medication 1: at 12:33

## 2020-12-02 RX ADMIN — METFORMIN HYDROCHLORIDE 1000 MILLIGRAM(S): 850 TABLET ORAL at 09:10

## 2020-12-03 LAB
BASOPHILS # BLD AUTO: 0.03 K/UL — SIGNIFICANT CHANGE UP (ref 0–0.2)
BASOPHILS NFR BLD AUTO: 0.5 % — SIGNIFICANT CHANGE UP (ref 0–2)
EOSINOPHIL # BLD AUTO: 0.15 K/UL — SIGNIFICANT CHANGE UP (ref 0–0.5)
EOSINOPHIL NFR BLD AUTO: 2.4 % — SIGNIFICANT CHANGE UP (ref 0–6)
GLUCOSE BLDC GLUCOMTR-MCNC: 167 MG/DL — HIGH (ref 70–99)
GLUCOSE BLDC GLUCOMTR-MCNC: 214 MG/DL — HIGH (ref 70–99)
GLUCOSE BLDC GLUCOMTR-MCNC: 233 MG/DL — HIGH (ref 70–99)
GLUCOSE BLDC GLUCOMTR-MCNC: 241 MG/DL — HIGH (ref 70–99)
HCT VFR BLD CALC: 32.5 % — LOW (ref 34.5–45)
HGB BLD-MCNC: 10.2 G/DL — LOW (ref 11.5–15.5)
IGG1 SER-MCNC: 483 MG/DL — SIGNIFICANT CHANGE UP (ref 248–810)
IGG2 SER-MCNC: 149 MG/DL — SIGNIFICANT CHANGE UP (ref 130–555)
IGG3 SER-MCNC: 14 MG/DL — LOW (ref 15–102)
IGG4 SER-MCNC: 12 MG/DL — SIGNIFICANT CHANGE UP (ref 2–96)
IMM GRANULOCYTES NFR BLD AUTO: 1 % — SIGNIFICANT CHANGE UP (ref 0–1.5)
LYMPHOCYTES # BLD AUTO: 2.25 K/UL — SIGNIFICANT CHANGE UP (ref 1–3.3)
LYMPHOCYTES # BLD AUTO: 36.1 % — SIGNIFICANT CHANGE UP (ref 13–44)
MCHC RBC-ENTMCNC: 26.1 PG — LOW (ref 27–34)
MCHC RBC-ENTMCNC: 31.4 % — LOW (ref 32–36)
MCV RBC AUTO: 83.1 FL — SIGNIFICANT CHANGE UP (ref 80–100)
MONOCYTES # BLD AUTO: 0.42 K/UL — SIGNIFICANT CHANGE UP (ref 0–0.9)
MONOCYTES NFR BLD AUTO: 6.7 % — SIGNIFICANT CHANGE UP (ref 2–14)
NEUTROPHILS # BLD AUTO: 3.33 K/UL — SIGNIFICANT CHANGE UP (ref 1.8–7.4)
NEUTROPHILS NFR BLD AUTO: 53.3 % — SIGNIFICANT CHANGE UP (ref 43–77)
NRBC # FLD: 0 K/UL — SIGNIFICANT CHANGE UP (ref 0–0)
PLATELET # BLD AUTO: 316 K/UL — SIGNIFICANT CHANGE UP (ref 150–400)
PMV BLD: 9 FL — SIGNIFICANT CHANGE UP (ref 7–13)
RBC # BLD: 3.91 M/UL — SIGNIFICANT CHANGE UP (ref 3.8–5.2)
RBC # FLD: 14.3 % — SIGNIFICANT CHANGE UP (ref 10.3–14.5)
WBC # BLD: 6.24 K/UL — SIGNIFICANT CHANGE UP (ref 3.8–10.5)
WBC # FLD AUTO: 6.24 K/UL — SIGNIFICANT CHANGE UP (ref 3.8–10.5)

## 2020-12-03 PROCEDURE — 99232 SBSQ HOSP IP/OBS MODERATE 35: CPT

## 2020-12-03 RX ORDER — CLOZAPINE 150 MG/1
100 TABLET, ORALLY DISINTEGRATING ORAL AT BEDTIME
Refills: 0 | Status: DISCONTINUED | OUTPATIENT
Start: 2020-12-03 | End: 2020-12-03

## 2020-12-03 RX ORDER — CLOZAPINE 150 MG/1
100 TABLET, ORALLY DISINTEGRATING ORAL AT BEDTIME
Refills: 0 | Status: DISCONTINUED | OUTPATIENT
Start: 2020-12-03 | End: 2020-12-07

## 2020-12-03 RX ORDER — RISPERIDONE 4 MG/1
3 TABLET ORAL AT BEDTIME
Refills: 0 | Status: DISCONTINUED | OUTPATIENT
Start: 2020-12-03 | End: 2020-12-31

## 2020-12-03 RX ADMIN — SENNA PLUS 2 TABLET(S): 8.6 TABLET ORAL at 21:25

## 2020-12-03 RX ADMIN — CLOZAPINE 100 MILLIGRAM(S): 150 TABLET, ORALLY DISINTEGRATING ORAL at 21:24

## 2020-12-03 RX ADMIN — INSULIN GLARGINE 5 UNIT(S): 100 INJECTION, SOLUTION SUBCUTANEOUS at 21:24

## 2020-12-03 RX ADMIN — POLYETHYLENE GLYCOL 3350 17 GRAM(S): 17 POWDER, FOR SOLUTION ORAL at 09:03

## 2020-12-03 RX ADMIN — METFORMIN HYDROCHLORIDE 1000 MILLIGRAM(S): 850 TABLET ORAL at 09:03

## 2020-12-03 RX ADMIN — Medication 2: at 08:20

## 2020-12-03 RX ADMIN — RISPERIDONE 3 MILLIGRAM(S): 4 TABLET ORAL at 21:25

## 2020-12-03 RX ADMIN — Medication 1: at 12:25

## 2020-12-03 RX ADMIN — Medication 2: at 17:07

## 2020-12-03 RX ADMIN — METFORMIN HYDROCHLORIDE 1000 MILLIGRAM(S): 850 TABLET ORAL at 21:25

## 2020-12-04 LAB
GLUCOSE BLDC GLUCOMTR-MCNC: 182 MG/DL — HIGH (ref 70–99)
GLUCOSE BLDC GLUCOMTR-MCNC: 185 MG/DL — HIGH (ref 70–99)
GLUCOSE BLDC GLUCOMTR-MCNC: 234 MG/DL — HIGH (ref 70–99)

## 2020-12-04 PROCEDURE — 99232 SBSQ HOSP IP/OBS MODERATE 35: CPT

## 2020-12-04 RX ADMIN — INSULIN GLARGINE 5 UNIT(S): 100 INJECTION, SOLUTION SUBCUTANEOUS at 21:20

## 2020-12-04 RX ADMIN — Medication 1: at 12:21

## 2020-12-04 RX ADMIN — CLOZAPINE 100 MILLIGRAM(S): 150 TABLET, ORALLY DISINTEGRATING ORAL at 21:20

## 2020-12-04 RX ADMIN — METFORMIN HYDROCHLORIDE 1000 MILLIGRAM(S): 850 TABLET ORAL at 09:00

## 2020-12-04 RX ADMIN — RISPERIDONE 3 MILLIGRAM(S): 4 TABLET ORAL at 21:19

## 2020-12-04 RX ADMIN — METFORMIN HYDROCHLORIDE 1000 MILLIGRAM(S): 850 TABLET ORAL at 21:19

## 2020-12-04 RX ADMIN — POLYETHYLENE GLYCOL 3350 17 GRAM(S): 17 POWDER, FOR SOLUTION ORAL at 09:00

## 2020-12-04 RX ADMIN — Medication 1: at 17:08

## 2020-12-04 RX ADMIN — SENNA PLUS 2 TABLET(S): 8.6 TABLET ORAL at 21:19

## 2020-12-05 LAB
GLUCOSE BLDC GLUCOMTR-MCNC: 151 MG/DL — HIGH (ref 70–99)
GLUCOSE BLDC GLUCOMTR-MCNC: 152 MG/DL — HIGH (ref 70–99)
GLUCOSE BLDC GLUCOMTR-MCNC: 168 MG/DL — HIGH (ref 70–99)

## 2020-12-05 RX ADMIN — POLYETHYLENE GLYCOL 3350 17 GRAM(S): 17 POWDER, FOR SOLUTION ORAL at 09:13

## 2020-12-05 RX ADMIN — CLOZAPINE 100 MILLIGRAM(S): 150 TABLET, ORALLY DISINTEGRATING ORAL at 21:27

## 2020-12-05 RX ADMIN — RISPERIDONE 3 MILLIGRAM(S): 4 TABLET ORAL at 21:28

## 2020-12-05 RX ADMIN — METFORMIN HYDROCHLORIDE 1000 MILLIGRAM(S): 850 TABLET ORAL at 09:13

## 2020-12-05 RX ADMIN — Medication 1: at 16:23

## 2020-12-05 RX ADMIN — INSULIN GLARGINE 5 UNIT(S): 100 INJECTION, SOLUTION SUBCUTANEOUS at 21:27

## 2020-12-05 RX ADMIN — SENNA PLUS 2 TABLET(S): 8.6 TABLET ORAL at 21:28

## 2020-12-06 LAB
GLUCOSE BLDC GLUCOMTR-MCNC: 188 MG/DL — HIGH (ref 70–99)
GLUCOSE BLDC GLUCOMTR-MCNC: 200 MG/DL — HIGH (ref 70–99)
GLUCOSE BLDC GLUCOMTR-MCNC: 203 MG/DL — HIGH (ref 70–99)
GLUCOSE BLDC GLUCOMTR-MCNC: 255 MG/DL — HIGH (ref 70–99)

## 2020-12-06 RX ADMIN — RISPERIDONE 3 MILLIGRAM(S): 4 TABLET ORAL at 21:00

## 2020-12-06 RX ADMIN — CLOZAPINE 100 MILLIGRAM(S): 150 TABLET, ORALLY DISINTEGRATING ORAL at 21:01

## 2020-12-06 RX ADMIN — INSULIN GLARGINE 5 UNIT(S): 100 INJECTION, SOLUTION SUBCUTANEOUS at 20:15

## 2020-12-06 RX ADMIN — Medication 1: at 20:15

## 2020-12-07 LAB
GLUCOSE BLDC GLUCOMTR-MCNC: 181 MG/DL — HIGH (ref 70–99)
GLUCOSE BLDC GLUCOMTR-MCNC: 223 MG/DL — HIGH (ref 70–99)

## 2020-12-07 PROCEDURE — 99232 SBSQ HOSP IP/OBS MODERATE 35: CPT

## 2020-12-07 RX ORDER — CLOZAPINE 150 MG/1
125 TABLET, ORALLY DISINTEGRATING ORAL AT BEDTIME
Refills: 0 | Status: DISCONTINUED | OUTPATIENT
Start: 2020-12-07 | End: 2020-12-08

## 2020-12-07 RX ADMIN — POLYETHYLENE GLYCOL 3350 17 GRAM(S): 17 POWDER, FOR SOLUTION ORAL at 08:46

## 2020-12-07 RX ADMIN — Medication 1: at 12:13

## 2020-12-07 RX ADMIN — INSULIN GLARGINE 5 UNIT(S): 100 INJECTION, SOLUTION SUBCUTANEOUS at 21:20

## 2020-12-07 RX ADMIN — SENNA PLUS 2 TABLET(S): 8.6 TABLET ORAL at 21:19

## 2020-12-07 RX ADMIN — Medication 2: at 17:03

## 2020-12-07 RX ADMIN — RISPERIDONE 3 MILLIGRAM(S): 4 TABLET ORAL at 21:19

## 2020-12-07 RX ADMIN — Medication 2: at 08:24

## 2020-12-07 RX ADMIN — METFORMIN HYDROCHLORIDE 1000 MILLIGRAM(S): 850 TABLET ORAL at 21:18

## 2020-12-07 RX ADMIN — CLOZAPINE 125 MILLIGRAM(S): 150 TABLET, ORALLY DISINTEGRATING ORAL at 21:17

## 2020-12-07 RX ADMIN — METFORMIN HYDROCHLORIDE 1000 MILLIGRAM(S): 850 TABLET ORAL at 08:46

## 2020-12-08 LAB
GLUCOSE BLDC GLUCOMTR-MCNC: 169 MG/DL — HIGH (ref 70–99)
GLUCOSE BLDC GLUCOMTR-MCNC: 203 MG/DL — HIGH (ref 70–99)
GLUCOSE BLDC GLUCOMTR-MCNC: 205 MG/DL — HIGH (ref 70–99)
GLUCOSE BLDC GLUCOMTR-MCNC: 216 MG/DL — HIGH (ref 70–99)
GLUCOSE BLDC GLUCOMTR-MCNC: 243 MG/DL — HIGH (ref 70–99)

## 2020-12-08 PROCEDURE — 99232 SBSQ HOSP IP/OBS MODERATE 35: CPT

## 2020-12-08 RX ORDER — CLOZAPINE 150 MG/1
150 TABLET, ORALLY DISINTEGRATING ORAL AT BEDTIME
Refills: 0 | Status: DISCONTINUED | OUTPATIENT
Start: 2020-12-08 | End: 2020-12-10

## 2020-12-08 RX ADMIN — Medication 2: at 08:56

## 2020-12-08 RX ADMIN — METFORMIN HYDROCHLORIDE 1000 MILLIGRAM(S): 850 TABLET ORAL at 09:07

## 2020-12-08 RX ADMIN — CLOZAPINE 150 MILLIGRAM(S): 150 TABLET, ORALLY DISINTEGRATING ORAL at 22:16

## 2020-12-08 RX ADMIN — SENNA PLUS 2 TABLET(S): 8.6 TABLET ORAL at 22:18

## 2020-12-08 RX ADMIN — POLYETHYLENE GLYCOL 3350 17 GRAM(S): 17 POWDER, FOR SOLUTION ORAL at 09:09

## 2020-12-08 RX ADMIN — METFORMIN HYDROCHLORIDE 1000 MILLIGRAM(S): 850 TABLET ORAL at 22:17

## 2020-12-08 RX ADMIN — INSULIN GLARGINE 5 UNIT(S): 100 INJECTION, SOLUTION SUBCUTANEOUS at 22:16

## 2020-12-08 RX ADMIN — Medication 2: at 12:29

## 2020-12-08 RX ADMIN — RISPERIDONE 3 MILLIGRAM(S): 4 TABLET ORAL at 22:17

## 2020-12-08 NOTE — BH PATIENT PROFILE - FUNCTIONAL SCREEN CURRENT LEVEL: SWALLOWING (IF SCORE 2 OR MORE FOR ANY ITEM, CONSULT REHAB SERVICES), MLM)
Pt has trouble swallowing medications and tries to spit out due to her disorganization/0 = swallows foods/liquids without difficulty

## 2020-12-08 NOTE — BH INPATIENT PSYCHIATRY PROGRESS NOTE - MSE UNSTRUCTURED FT
The patient appears stated age, fair grooming and hygiene with staff support, dressed appropriately.  She was calm, minimally cooperative with the interview due to thought disorder. She maintained appropriate eye contact.  No psychomotor agitation or retardation noted. Steady gait. The patient’s speech was normal in rate, tone and volume. The patient’s mood is “good” with constricted affect. Thought process is disorganized. She denies any AH/VH or delusions, but appears internally preoccupied.  She denies any suicidal or homicidal ideation, intent, or plan. Insight and judgment is impaired.  Impulse control has been fair on the unit.

## 2020-12-08 NOTE — BH INPATIENT PSYCHIATRY PROGRESS NOTE - NSBHADMITMEDEDUDETAILS_A_CORE FT
Patient educated on medications but unable to assess if patient understands as she is grossly disorganized and confused.

## 2020-12-08 NOTE — BH PATIENT PROFILE - HOME MEDICATIONS
senna oral tablet , 2 tab(s) orally once a day (at bedtime)  polyethylene glycol 3350 oral powder for reconstitution , 17 gram(s) orally once a day  risperiDONE 3 mg oral tablet , 1 tab(s) orally once a day (at bedtime)  cloZAPine 25 mg oral tablet , 1 tab(s) orally   cloZAPine 25 mg oral tablet , 1 tab(s) orally once a day (at bedtime)  benztropine 2 mg oral tablet , 1 tab(s) orally once a day  insulin glargine , 5 unit(s) subcutaneous once a day (at bedtime)  atorvastatin 80 mg oral tablet , 1 tab(s) orally once a day  metFORMIN 1000 mg oral tablet , 1 tab(s) orally 2 times a day

## 2020-12-09 LAB
GLUCOSE BLDC GLUCOMTR-MCNC: 146 MG/DL — HIGH (ref 70–99)
GLUCOSE BLDC GLUCOMTR-MCNC: 187 MG/DL — HIGH (ref 70–99)
GLUCOSE BLDC GLUCOMTR-MCNC: 195 MG/DL — HIGH (ref 70–99)
GLUCOSE BLDC GLUCOMTR-MCNC: 216 MG/DL — HIGH (ref 70–99)

## 2020-12-09 PROCEDURE — 99231 SBSQ HOSP IP/OBS SF/LOW 25: CPT

## 2020-12-09 RX ADMIN — METFORMIN HYDROCHLORIDE 1000 MILLIGRAM(S): 850 TABLET ORAL at 21:20

## 2020-12-09 RX ADMIN — SENNA PLUS 2 TABLET(S): 8.6 TABLET ORAL at 21:20

## 2020-12-09 RX ADMIN — POLYETHYLENE GLYCOL 3350 17 GRAM(S): 17 POWDER, FOR SOLUTION ORAL at 09:21

## 2020-12-09 RX ADMIN — METFORMIN HYDROCHLORIDE 1000 MILLIGRAM(S): 850 TABLET ORAL at 09:00

## 2020-12-09 RX ADMIN — CLOZAPINE 150 MILLIGRAM(S): 150 TABLET, ORALLY DISINTEGRATING ORAL at 21:21

## 2020-12-09 RX ADMIN — Medication 2: at 08:59

## 2020-12-09 RX ADMIN — INSULIN GLARGINE 5 UNIT(S): 100 INJECTION, SOLUTION SUBCUTANEOUS at 21:21

## 2020-12-09 RX ADMIN — Medication 1: at 16:33

## 2020-12-09 RX ADMIN — RISPERIDONE 3 MILLIGRAM(S): 4 TABLET ORAL at 21:19

## 2020-12-09 NOTE — BH INPATIENT PSYCHIATRY PROGRESS NOTE - MSE UNSTRUCTURED FT
The patient appears stated age, fair grooming and hygiene with staff support, dressed appropriately.  She was calm, minimally cooperative with the interview due to thought disorder. She maintained appropriate eye contact.  No psychomotor agitation or retardation noted. Steady gait. The patient’s speech was normal in rate, tone and volume. The patient’s mood is “tired.”  Affect constricted, but stable and appropriate. Thought process is disorganized, tangential and loose at times. She denies any AH/VH or delusions, but appears internally preoccupied.  She denies any suicidal or homicidal ideation, intent, or plan. Insight and judgment are impaired.  Impulse control has been fair on the unit.

## 2020-12-10 LAB
BASOPHILS # BLD AUTO: 0.02 K/UL — SIGNIFICANT CHANGE UP (ref 0–0.2)
BASOPHILS NFR BLD AUTO: 0.3 % — SIGNIFICANT CHANGE UP (ref 0–2)
EOSINOPHIL # BLD AUTO: 0 K/UL — SIGNIFICANT CHANGE UP (ref 0–0.5)
EOSINOPHIL NFR BLD AUTO: 0 % — SIGNIFICANT CHANGE UP (ref 0–6)
GLUCOSE BLDC GLUCOMTR-MCNC: 157 MG/DL — HIGH (ref 70–99)
GLUCOSE BLDC GLUCOMTR-MCNC: 162 MG/DL — HIGH (ref 70–99)
GLUCOSE BLDC GLUCOMTR-MCNC: 164 MG/DL — HIGH (ref 70–99)
GLUCOSE BLDC GLUCOMTR-MCNC: 191 MG/DL — HIGH (ref 70–99)
GLUCOSE BLDC GLUCOMTR-MCNC: 211 MG/DL — HIGH (ref 70–99)
HCT VFR BLD CALC: 34.8 % — SIGNIFICANT CHANGE UP (ref 34.5–45)
HGB BLD-MCNC: 10.8 G/DL — LOW (ref 11.5–15.5)
IANC: 3.96 K/UL — SIGNIFICANT CHANGE UP (ref 1.5–8.5)
IMM GRANULOCYTES NFR BLD AUTO: 0.7 % — SIGNIFICANT CHANGE UP (ref 0–1.5)
LYMPHOCYTES # BLD AUTO: 2.35 K/UL — SIGNIFICANT CHANGE UP (ref 1–3.3)
LYMPHOCYTES # BLD AUTO: 34.8 % — SIGNIFICANT CHANGE UP (ref 13–44)
MCHC RBC-ENTMCNC: 26 PG — LOW (ref 27–34)
MCHC RBC-ENTMCNC: 31 GM/DL — LOW (ref 32–36)
MCV RBC AUTO: 83.9 FL — SIGNIFICANT CHANGE UP (ref 80–100)
MONOCYTES # BLD AUTO: 0.38 K/UL — SIGNIFICANT CHANGE UP (ref 0–0.9)
MONOCYTES NFR BLD AUTO: 5.6 % — SIGNIFICANT CHANGE UP (ref 2–14)
NEUTROPHILS # BLD AUTO: 3.96 K/UL — SIGNIFICANT CHANGE UP (ref 1.8–7.4)
NEUTROPHILS NFR BLD AUTO: 58.6 % — SIGNIFICANT CHANGE UP (ref 43–77)
NRBC # BLD: 0 /100 WBCS — SIGNIFICANT CHANGE UP
NRBC # FLD: 0 K/UL — SIGNIFICANT CHANGE UP
PLATELET # BLD AUTO: 260 K/UL — SIGNIFICANT CHANGE UP (ref 150–400)
RBC # BLD: 4.15 M/UL — SIGNIFICANT CHANGE UP (ref 3.8–5.2)
RBC # FLD: 14.6 % — HIGH (ref 10.3–14.5)
WBC # BLD: 6.76 K/UL — SIGNIFICANT CHANGE UP (ref 3.8–10.5)
WBC # FLD AUTO: 6.76 K/UL — SIGNIFICANT CHANGE UP (ref 3.8–10.5)

## 2020-12-10 PROCEDURE — 99232 SBSQ HOSP IP/OBS MODERATE 35: CPT

## 2020-12-10 RX ORDER — CLOZAPINE 150 MG/1
175 TABLET, ORALLY DISINTEGRATING ORAL AT BEDTIME
Refills: 0 | Status: DISCONTINUED | OUTPATIENT
Start: 2020-12-10 | End: 2020-12-14

## 2020-12-10 RX ADMIN — POLYETHYLENE GLYCOL 3350 17 GRAM(S): 17 POWDER, FOR SOLUTION ORAL at 09:03

## 2020-12-10 RX ADMIN — METFORMIN HYDROCHLORIDE 1000 MILLIGRAM(S): 850 TABLET ORAL at 09:30

## 2020-12-10 RX ADMIN — INSULIN GLARGINE 5 UNIT(S): 100 INJECTION, SOLUTION SUBCUTANEOUS at 21:57

## 2020-12-10 RX ADMIN — RISPERIDONE 3 MILLIGRAM(S): 4 TABLET ORAL at 21:51

## 2020-12-10 RX ADMIN — CLOZAPINE 25 MILLIGRAM(S): 150 TABLET, ORALLY DISINTEGRATING ORAL at 21:52

## 2020-12-10 RX ADMIN — SENNA PLUS 1 TABLET(S): 8.6 TABLET ORAL at 21:51

## 2020-12-10 RX ADMIN — METFORMIN HYDROCHLORIDE 1000 MILLIGRAM(S): 850 TABLET ORAL at 21:51

## 2020-12-10 NOTE — BH INPATIENT PSYCHIATRY PROGRESS NOTE - MSE UNSTRUCTURED FT
The patient appears stated age, fair grooming and hygiene with staff support, dressed appropriately.  She was calm, attempts to be cooperative with the interview due to thought disorder.  She maintained appropriate eye contact.  No psychomotor agitation or retardation noted. Steady gait. The patient’s speech fluent, normal in rate, tone and volume. The patient’s mood is “okay.”  Affect constricted, but stable and appropriate. Thought process is disorganized, tangential and loose at times. She denies any AH/VH or delusions, but appears internally preoccupied.  She denies any suicidal or homicidal ideation, intent, or plan. Insight and judgment are impaired.  Impulse control has been fair on the unit.

## 2020-12-11 LAB
GLUCOSE BLDC GLUCOMTR-MCNC: 155 MG/DL — HIGH (ref 70–99)
GLUCOSE BLDC GLUCOMTR-MCNC: 185 MG/DL — HIGH (ref 70–99)
GLUCOSE BLDC GLUCOMTR-MCNC: 225 MG/DL — HIGH (ref 70–99)
GLUCOSE BLDC GLUCOMTR-MCNC: 230 MG/DL — HIGH (ref 70–99)

## 2020-12-11 PROCEDURE — 99232 SBSQ HOSP IP/OBS MODERATE 35: CPT

## 2020-12-11 RX ADMIN — POLYETHYLENE GLYCOL 3350 17 GRAM(S): 17 POWDER, FOR SOLUTION ORAL at 08:25

## 2020-12-11 RX ADMIN — RISPERIDONE 3 MILLIGRAM(S): 4 TABLET ORAL at 21:06

## 2020-12-11 RX ADMIN — METFORMIN HYDROCHLORIDE 1000 MILLIGRAM(S): 850 TABLET ORAL at 08:25

## 2020-12-11 RX ADMIN — Medication 2: at 08:26

## 2020-12-11 RX ADMIN — INSULIN GLARGINE 5 UNIT(S): 100 INJECTION, SOLUTION SUBCUTANEOUS at 21:35

## 2020-12-11 RX ADMIN — CLOZAPINE 175 MILLIGRAM(S): 150 TABLET, ORALLY DISINTEGRATING ORAL at 21:04

## 2020-12-11 RX ADMIN — Medication 2: at 16:58

## 2020-12-11 RX ADMIN — SENNA PLUS 2 TABLET(S): 8.6 TABLET ORAL at 21:04

## 2020-12-11 RX ADMIN — Medication 1: at 12:21

## 2020-12-11 NOTE — BH INPATIENT PSYCHIATRY PROGRESS NOTE - NSBHADMITMEDEDUDETAILS_PSY_A_CORE FT
Titrating Clozapine to effective dose to manage psychosis.

## 2020-12-11 NOTE — BH INPATIENT PSYCHIATRY PROGRESS NOTE - MSE UNSTRUCTURED FT
The patient appears stated age, fair grooming and hygiene with staff support, dressed appropriately.  She was calm, makes attempts to be cooperative with the interview.  She maintained appropriate eye contact.  No psychomotor agitation or retardation noted. Steady gait. The patient’s speech is fluent, normal in rate, tone and volume. The patient’s mood is “okay.”  Affect constricted, but stable and appropriate. Thought process is a little more goal directed, but still disorganized and  tangential, loose at times. She denies any AH/VH or delusions, but appears internally preoccupied.  She denies any suicidal or homicidal ideation, intent, or plan. Insight is impaired.  Judgment is impaired.  Impulse control has been fair on the unit.

## 2020-12-12 LAB
GLUCOSE BLDC GLUCOMTR-MCNC: 130 MG/DL — HIGH (ref 70–99)
GLUCOSE BLDC GLUCOMTR-MCNC: 158 MG/DL — HIGH (ref 70–99)
GLUCOSE BLDC GLUCOMTR-MCNC: 199 MG/DL — HIGH (ref 70–99)
GLUCOSE BLDC GLUCOMTR-MCNC: 217 MG/DL — HIGH (ref 70–99)

## 2020-12-12 RX ADMIN — METFORMIN HYDROCHLORIDE 1000 MILLIGRAM(S): 850 TABLET ORAL at 08:51

## 2020-12-12 RX ADMIN — RISPERIDONE 3 MILLIGRAM(S): 4 TABLET ORAL at 21:30

## 2020-12-12 RX ADMIN — METFORMIN HYDROCHLORIDE 1000 MILLIGRAM(S): 850 TABLET ORAL at 21:30

## 2020-12-12 RX ADMIN — INSULIN GLARGINE 5 UNIT(S): 100 INJECTION, SOLUTION SUBCUTANEOUS at 21:29

## 2020-12-12 RX ADMIN — CLOZAPINE 175 MILLIGRAM(S): 150 TABLET, ORALLY DISINTEGRATING ORAL at 21:29

## 2020-12-12 RX ADMIN — Medication 2: at 08:51

## 2020-12-12 RX ADMIN — Medication 1: at 17:13

## 2020-12-13 LAB
GLUCOSE BLDC GLUCOMTR-MCNC: 181 MG/DL — HIGH (ref 70–99)
GLUCOSE BLDC GLUCOMTR-MCNC: 192 MG/DL — HIGH (ref 70–99)
GLUCOSE BLDC GLUCOMTR-MCNC: 198 MG/DL — HIGH (ref 70–99)
GLUCOSE BLDC GLUCOMTR-MCNC: 219 MG/DL — HIGH (ref 70–99)

## 2020-12-13 RX ADMIN — RISPERIDONE 3 MILLIGRAM(S): 4 TABLET ORAL at 21:24

## 2020-12-13 RX ADMIN — POLYETHYLENE GLYCOL 3350 17 GRAM(S): 17 POWDER, FOR SOLUTION ORAL at 08:36

## 2020-12-13 RX ADMIN — METFORMIN HYDROCHLORIDE 1000 MILLIGRAM(S): 850 TABLET ORAL at 08:37

## 2020-12-13 RX ADMIN — Medication 1: at 08:37

## 2020-12-13 RX ADMIN — METFORMIN HYDROCHLORIDE 1000 MILLIGRAM(S): 850 TABLET ORAL at 21:23

## 2020-12-13 RX ADMIN — Medication 1: at 17:02

## 2020-12-13 RX ADMIN — INSULIN GLARGINE 5 UNIT(S): 100 INJECTION, SOLUTION SUBCUTANEOUS at 21:24

## 2020-12-13 RX ADMIN — CLOZAPINE 175 MILLIGRAM(S): 150 TABLET, ORALLY DISINTEGRATING ORAL at 21:23

## 2020-12-13 RX ADMIN — Medication 2: at 12:30

## 2020-12-14 LAB
GLUCOSE BLDC GLUCOMTR-MCNC: 146 MG/DL — HIGH (ref 70–99)
GLUCOSE BLDC GLUCOMTR-MCNC: 178 MG/DL — HIGH (ref 70–99)
GLUCOSE BLDC GLUCOMTR-MCNC: 200 MG/DL — HIGH (ref 70–99)
GLUCOSE BLDC GLUCOMTR-MCNC: 221 MG/DL — HIGH (ref 70–99)

## 2020-12-14 PROCEDURE — 99232 SBSQ HOSP IP/OBS MODERATE 35: CPT

## 2020-12-14 RX ORDER — CLOZAPINE 150 MG/1
225 TABLET, ORALLY DISINTEGRATING ORAL AT BEDTIME
Refills: 0 | Status: DISCONTINUED | OUTPATIENT
Start: 2020-12-14 | End: 2020-12-21

## 2020-12-14 RX ADMIN — POLYETHYLENE GLYCOL 3350 17 GRAM(S): 17 POWDER, FOR SOLUTION ORAL at 09:25

## 2020-12-14 RX ADMIN — RISPERIDONE 3 MILLIGRAM(S): 4 TABLET ORAL at 20:57

## 2020-12-14 RX ADMIN — CLOZAPINE 200 MILLIGRAM(S): 150 TABLET, ORALLY DISINTEGRATING ORAL at 20:58

## 2020-12-14 RX ADMIN — Medication 2: at 08:38

## 2020-12-14 RX ADMIN — INSULIN GLARGINE 5 UNIT(S): 100 INJECTION, SOLUTION SUBCUTANEOUS at 21:09

## 2020-12-14 RX ADMIN — METFORMIN HYDROCHLORIDE 1000 MILLIGRAM(S): 850 TABLET ORAL at 20:57

## 2020-12-14 RX ADMIN — METFORMIN HYDROCHLORIDE 1000 MILLIGRAM(S): 850 TABLET ORAL at 08:40

## 2020-12-14 RX ADMIN — SENNA PLUS 2 TABLET(S): 8.6 TABLET ORAL at 20:58

## 2020-12-14 RX ADMIN — Medication 1: at 17:15

## 2020-12-14 NOTE — BH INPATIENT PSYCHIATRY PROGRESS NOTE - MSE UNSTRUCTURED FT
The patient appears stated age, fair grooming and hygiene with staff support, dressed appropriately. Patient resistant to sit up for interview, laid in bed.  She maintained appropriate eye contact.  No psychomotor agitation or retardation noted. Steady gait. The patient’s speech is fluent, normal in rate, tone and volume. The patient’s mood is “okay.”  Affect constricted, but stable and appropriate. Thought process is a little more goal directed, but still disorganized and  tangential, loose at times. She denies any AH/VH or delusions, but appears internally preoccupied.  She denies any suicidal or homicidal ideation, intent, or plan. Insight is impaired.  Judgment is impaired.  Impulse control has been fair on the unit.

## 2020-12-15 LAB
GLUCOSE BLDC GLUCOMTR-MCNC: 194 MG/DL — HIGH (ref 70–99)
GLUCOSE BLDC GLUCOMTR-MCNC: 200 MG/DL — HIGH (ref 70–99)
GLUCOSE BLDC GLUCOMTR-MCNC: 209 MG/DL — HIGH (ref 70–99)
GLUCOSE BLDC GLUCOMTR-MCNC: 219 MG/DL — HIGH (ref 70–99)

## 2020-12-15 PROCEDURE — 99232 SBSQ HOSP IP/OBS MODERATE 35: CPT

## 2020-12-15 RX ADMIN — SENNA PLUS 2 TABLET(S): 8.6 TABLET ORAL at 21:04

## 2020-12-15 RX ADMIN — METFORMIN HYDROCHLORIDE 1000 MILLIGRAM(S): 850 TABLET ORAL at 09:10

## 2020-12-15 RX ADMIN — INSULIN GLARGINE 5 UNIT(S): 100 INJECTION, SOLUTION SUBCUTANEOUS at 21:06

## 2020-12-15 RX ADMIN — Medication 1: at 08:15

## 2020-12-15 RX ADMIN — RISPERIDONE 3 MILLIGRAM(S): 4 TABLET ORAL at 21:05

## 2020-12-15 RX ADMIN — CLOZAPINE 200 MILLIGRAM(S): 150 TABLET, ORALLY DISINTEGRATING ORAL at 21:05

## 2020-12-15 RX ADMIN — Medication 2: at 17:19

## 2020-12-15 RX ADMIN — Medication 1: at 12:25

## 2020-12-15 RX ADMIN — METFORMIN HYDROCHLORIDE 1000 MILLIGRAM(S): 850 TABLET ORAL at 21:05

## 2020-12-15 NOTE — BH INPATIENT PSYCHIATRY PROGRESS NOTE - MSE UNSTRUCTURED FT
The patient appears stated age, fair grooming and hygiene with staff support, dressed appropriately. She maintained appropriate eye contact.  No psychomotor agitation or retardation noted. Steady gait. The patient’s speech is fluent, normal in rate, tone and volume. The patient’s mood is "good.”  Affect constricted, but stable and appropriate. Thought process is a little more goal directed, but still disorganized and  tangential, loose at times. She denies any AH/VH or delusions, but appears internally preoccupied.  She denies any suicidal or homicidal ideation, intent, or plan. Insight is impaired.  Judgment is impaired.  Impulse control has been fair on the unit.

## 2020-12-16 LAB
GLUCOSE BLDC GLUCOMTR-MCNC: 201 MG/DL — HIGH (ref 70–99)
GLUCOSE BLDC GLUCOMTR-MCNC: 205 MG/DL — HIGH (ref 70–99)
GLUCOSE BLDC GLUCOMTR-MCNC: 205 MG/DL — HIGH (ref 70–99)
GLUCOSE BLDC GLUCOMTR-MCNC: 219 MG/DL — HIGH (ref 70–99)
SARS-COV-2 RNA SPEC QL NAA+PROBE: SIGNIFICANT CHANGE UP

## 2020-12-16 PROCEDURE — 99233 SBSQ HOSP IP/OBS HIGH 50: CPT

## 2020-12-16 PROCEDURE — 99232 SBSQ HOSP IP/OBS MODERATE 35: CPT

## 2020-12-16 RX ORDER — INSULIN GLARGINE 100 [IU]/ML
7 INJECTION, SOLUTION SUBCUTANEOUS AT BEDTIME
Refills: 0 | Status: DISCONTINUED | OUTPATIENT
Start: 2020-12-16 | End: 2020-12-21

## 2020-12-16 RX ORDER — ATORVASTATIN CALCIUM 80 MG/1
20 TABLET, FILM COATED ORAL AT BEDTIME
Refills: 0 | Status: DISCONTINUED | OUTPATIENT
Start: 2020-12-16 | End: 2020-12-31

## 2020-12-16 RX ADMIN — METFORMIN HYDROCHLORIDE 1000 MILLIGRAM(S): 850 TABLET ORAL at 09:28

## 2020-12-16 RX ADMIN — Medication 2: at 08:15

## 2020-12-16 RX ADMIN — CLOZAPINE 225 MILLIGRAM(S): 150 TABLET, ORALLY DISINTEGRATING ORAL at 20:55

## 2020-12-16 RX ADMIN — RISPERIDONE 3 MILLIGRAM(S): 4 TABLET ORAL at 21:06

## 2020-12-16 RX ADMIN — Medication 2: at 13:17

## 2020-12-16 RX ADMIN — ATORVASTATIN CALCIUM 20 MILLIGRAM(S): 80 TABLET, FILM COATED ORAL at 20:55

## 2020-12-16 RX ADMIN — METFORMIN HYDROCHLORIDE 1000 MILLIGRAM(S): 850 TABLET ORAL at 20:55

## 2020-12-16 RX ADMIN — INSULIN GLARGINE 7 UNIT(S): 100 INJECTION, SOLUTION SUBCUTANEOUS at 20:56

## 2020-12-16 RX ADMIN — POLYETHYLENE GLYCOL 3350 17 GRAM(S): 17 POWDER, FOR SOLUTION ORAL at 09:30

## 2020-12-16 NOTE — PROGRESS NOTE ADULT - ASSESSMENT
55 year old female with Schizoaffective disorder now with exacerbation transferred to [facility] 11/18/2020 with fever 103.1, rigors, lethargy and mental status changes found to have pyelonephritis and urosepsis, completed treatment.  Pt now returns to [facility].    DM2: Continue metformin.  Increase lantus to 7 units qhs. Will resume januvia, pt had taken this as outpatient. 10 year ASCVD risk 4/2%, would benefit from moderat eintensity statin, will start lipitor 20 qhs.    Transaminitis: Trended down.  Check with next lab draw.    Psychosis: Management per primary team

## 2020-12-16 NOTE — BH INPATIENT PSYCHIATRY PROGRESS NOTE - NSBHADMITIPREASONDETAILS_PSY_A_CORE FT
titrating Fazaclo, supportive care, encourage fluids and oral intake
Patient remains grossly disorganized and psychotic. Continue to titrate Fazaclo.
continue Clozapine titration
increase Fazaclo to 200mg hs
Patient remains grossly disorganized and psychotic. Continue to titrate Fazaclo.

## 2020-12-16 NOTE — BH INPATIENT PSYCHIATRY PROGRESS NOTE - MSE UNSTRUCTURED FT
The patient appears stated age, fair grooming and hygiene with staff support, dressed appropriately. She maintained appropriate eye contact.  No psychomotor agitation or retardation noted. Steady gait. The patient’s speech is fluent, normal in rate, tone and volume. The patient’s mood is "okay.”  Affect constricted, but stable and appropriate. Thought process is a little more goal directed,  tangential, loose at times. She denies any AH/VH or delusions, but appears internally preoccupied.  She denies any suicidal or homicidal ideation, intent, or plan. Insight is impaired.  Judgment is impaired.  Impulse control has been fair on the unit.

## 2020-12-17 LAB
BASOPHILS # BLD AUTO: 0.03 K/UL — SIGNIFICANT CHANGE UP (ref 0–0.2)
BASOPHILS NFR BLD AUTO: 0.4 % — SIGNIFICANT CHANGE UP (ref 0–2)
EOSINOPHIL # BLD AUTO: 0 K/UL — SIGNIFICANT CHANGE UP (ref 0–0.5)
EOSINOPHIL NFR BLD AUTO: 0 % — SIGNIFICANT CHANGE UP (ref 0–6)
GLUCOSE BLDC GLUCOMTR-MCNC: 189 MG/DL — HIGH (ref 70–99)
GLUCOSE BLDC GLUCOMTR-MCNC: 202 MG/DL — HIGH (ref 70–99)
GLUCOSE BLDC GLUCOMTR-MCNC: 203 MG/DL — HIGH (ref 70–99)
GLUCOSE BLDC GLUCOMTR-MCNC: 208 MG/DL — HIGH (ref 70–99)
HCT VFR BLD CALC: 30 % — LOW (ref 34.5–45)
HGB BLD-MCNC: 9.5 G/DL — LOW (ref 11.5–15.5)
IANC: 4.78 K/UL — SIGNIFICANT CHANGE UP (ref 1.5–8.5)
IMM GRANULOCYTES NFR BLD AUTO: 1.5 % — SIGNIFICANT CHANGE UP (ref 0–1.5)
LYMPHOCYTES # BLD AUTO: 2.17 K/UL — SIGNIFICANT CHANGE UP (ref 1–3.3)
LYMPHOCYTES # BLD AUTO: 28.7 % — SIGNIFICANT CHANGE UP (ref 13–44)
MCHC RBC-ENTMCNC: 26.2 PG — LOW (ref 27–34)
MCHC RBC-ENTMCNC: 31.7 GM/DL — LOW (ref 32–36)
MCV RBC AUTO: 82.9 FL — SIGNIFICANT CHANGE UP (ref 80–100)
MONOCYTES # BLD AUTO: 0.48 K/UL — SIGNIFICANT CHANGE UP (ref 0–0.9)
MONOCYTES NFR BLD AUTO: 6.3 % — SIGNIFICANT CHANGE UP (ref 2–14)
NEUTROPHILS # BLD AUTO: 4.78 K/UL — SIGNIFICANT CHANGE UP (ref 1.8–7.4)
NEUTROPHILS NFR BLD AUTO: 63.1 % — SIGNIFICANT CHANGE UP (ref 43–77)
NRBC # BLD: 0 /100 WBCS — SIGNIFICANT CHANGE UP
NRBC # FLD: 0 K/UL — SIGNIFICANT CHANGE UP
PLATELET # BLD AUTO: 341 K/UL — SIGNIFICANT CHANGE UP (ref 150–400)
RBC # BLD: 3.62 M/UL — LOW (ref 3.8–5.2)
RBC # FLD: 14.7 % — HIGH (ref 10.3–14.5)
WBC # BLD: 7.57 K/UL — SIGNIFICANT CHANGE UP (ref 3.8–10.5)
WBC # FLD AUTO: 7.57 K/UL — SIGNIFICANT CHANGE UP (ref 3.8–10.5)

## 2020-12-17 RX ADMIN — METFORMIN HYDROCHLORIDE 1000 MILLIGRAM(S): 850 TABLET ORAL at 08:09

## 2020-12-17 RX ADMIN — Medication 2: at 12:25

## 2020-12-17 RX ADMIN — POLYETHYLENE GLYCOL 3350 17 GRAM(S): 17 POWDER, FOR SOLUTION ORAL at 08:10

## 2020-12-17 RX ADMIN — Medication 1: at 17:25

## 2020-12-17 RX ADMIN — RISPERIDONE 3 MILLIGRAM(S): 4 TABLET ORAL at 21:19

## 2020-12-17 RX ADMIN — ATORVASTATIN CALCIUM 20 MILLIGRAM(S): 80 TABLET, FILM COATED ORAL at 21:19

## 2020-12-17 RX ADMIN — CLOZAPINE 225 MILLIGRAM(S): 150 TABLET, ORALLY DISINTEGRATING ORAL at 21:19

## 2020-12-17 RX ADMIN — INSULIN GLARGINE 7 UNIT(S): 100 INJECTION, SOLUTION SUBCUTANEOUS at 21:18

## 2020-12-17 RX ADMIN — SENNA PLUS 2 TABLET(S): 8.6 TABLET ORAL at 21:20

## 2020-12-17 RX ADMIN — Medication 2: at 08:16

## 2020-12-17 RX ADMIN — METFORMIN HYDROCHLORIDE 1000 MILLIGRAM(S): 850 TABLET ORAL at 21:19

## 2020-12-18 LAB
GLUCOSE BLDC GLUCOMTR-MCNC: 133 MG/DL — HIGH (ref 70–99)
GLUCOSE BLDC GLUCOMTR-MCNC: 180 MG/DL — HIGH (ref 70–99)
GLUCOSE BLDC GLUCOMTR-MCNC: 232 MG/DL — HIGH (ref 70–99)
GLUCOSE BLDC GLUCOMTR-MCNC: 237 MG/DL — HIGH (ref 70–99)

## 2020-12-18 PROCEDURE — 99232 SBSQ HOSP IP/OBS MODERATE 35: CPT

## 2020-12-18 RX ADMIN — METFORMIN HYDROCHLORIDE 1000 MILLIGRAM(S): 850 TABLET ORAL at 21:01

## 2020-12-18 RX ADMIN — Medication 1: at 09:09

## 2020-12-18 RX ADMIN — ATORVASTATIN CALCIUM 20 MILLIGRAM(S): 80 TABLET, FILM COATED ORAL at 21:00

## 2020-12-18 RX ADMIN — INSULIN GLARGINE 7 UNIT(S): 100 INJECTION, SOLUTION SUBCUTANEOUS at 20:56

## 2020-12-18 RX ADMIN — RISPERIDONE 3 MILLIGRAM(S): 4 TABLET ORAL at 21:00

## 2020-12-18 RX ADMIN — SENNA PLUS 1 TABLET(S): 8.6 TABLET ORAL at 21:00

## 2020-12-18 RX ADMIN — CLOZAPINE 200 MILLIGRAM(S): 150 TABLET, ORALLY DISINTEGRATING ORAL at 21:00

## 2020-12-18 RX ADMIN — METFORMIN HYDROCHLORIDE 1000 MILLIGRAM(S): 850 TABLET ORAL at 08:59

## 2020-12-18 RX ADMIN — Medication 2: at 12:34

## 2020-12-18 NOTE — BH TREATMENT PLAN - NSTXSLFCREGOAL_PSY_ALL_CORE
Will perform ADLs without assistance/prompts daily
Will perform ADLs without assistance/prompts daily

## 2020-12-18 NOTE — BH SCALES AND SCREENS - NSBPRSMANNPOST_PSY_ALL_CORE
3 = Mild – strange behavior but not obviously bizarre, e.g., infrequent head-tilting (side to side) in a rhythmic fashion, intermittent abnormal finger movements

## 2020-12-18 NOTE — BH INPATIENT PSYCHIATRY PROGRESS NOTE - MSE UNSTRUCTURED FT
The patient appears stated age, fair grooming and hygiene with staff support, dressed appropriately. She maintained appropriate eye contact.  No psychomotor agitation or retardation noted. Steady gait. The patient’s speech is fluent, normal in rate, tone and volume. The patient’s mood is "good”  Affect constricted, but stable and appropriate. Thought process is a little more goal directed,  tangential, loose at times. She denies any AH/VH or delusions, but appears internally preoccupied.  She denies any suicidal or homicidal ideation, intent, or plan. Insight is impaired.  Judgment is impaired.  Impulse control has been fair on the unit.

## 2020-12-18 NOTE — BH TREATMENT PLAN - NSTXDISORGGOAL_PSY_ALL_CORE
Will demonstrate the ability to maintain reality orientation and communicate clearly with others during 2 conversations with staff daily
Will demonstrate purposeful and predictable thoughts/behaviors by making a request

## 2020-12-18 NOTE — BH TREATMENT PLAN - NSTXPLANTHERAPYSESSIONSFT_PSY_ALL_CORE
12-17-20  Type of therapy: Cognitive behavior therapy,Coping skills,Creative arts therapy  Type of session: Individual  Level of patient participation: Engaged  Duration of participation: 15 minutes  Therapy conducted by: Psych rehab  Therapy Summary: Pt was engaged with writer during assessment.  Writer was wearing a mask and protective eye wear in response to the COVID-19 pandemic.  Pt appeared more organized than previous assessment. Pt has been observed wearing hospital gowns and it is unknown if pt has showered in the past 7 days. Pt continues to remain on CO due to disorganized behavior.

## 2020-12-18 NOTE — BH TREATMENT PLAN - NSTXDISORGINTERRN_PSY_ALL_CORE
Encourage medication compliance.
Assess mental status, mood and behavioral patterns. Redirect/reorient as needed. Encourage pt to verbalize thoughts and feelings.

## 2020-12-18 NOTE — BH TREATMENT PLAN - NSDCCRITERIA_PSY_ALL_CORE
Symptom management, safety planning, care coordination  CGI <3
Symptom management, safety planning, care coordination  CGI <3

## 2020-12-18 NOTE — BH TREATMENT PLAN - NSTXDISORGINTERMD_PSY_ALL_CORE
The patient remains psychotic, grossly disorganized.  Will continue to titrate clozapine.
The patient remains psychotic, disorganized, but is showing some small improvement.  Fazaclo increased to 225 mg hs

## 2020-12-18 NOTE — BH TREATMENT PLAN - NSTXPSYCHOINTERMD_PSY_ALL_CORE
The patient remains psychotic, grossly disorganized.  Will continue to titrate clozapine.
The patient remains psychotic, disorganized, but is showing some small improvement.  Will continue to titrate clozapine.

## 2020-12-18 NOTE — BH TREATMENT PLAN - NSTXDISORGINTERPR_PSY_ALL_CORE
Pt has been working on psych rehab goals pertaining to demonstrating improved organization. Pt has made good progress. Pt reported she feels "better" and reported improved sleep/eating.  Pt denied HI/SI and AH/VH.

## 2020-12-18 NOTE — BH TREATMENT PLAN - NSTXCAREGIVERPARTICIPATE_PSY_P_CORE
Family/Caregiver participated in identification of needs/problems/goals for treatment
Family/Caregiver participated in identification of needs/problems/goals for treatment/Family/Caregiver participated in defining interventions

## 2020-12-18 NOTE — BH TREATMENT PLAN - NSTXDCSOCINTERSW_PSY_ALL_CORE
Sw has met with patient throughout this week, in her room as patient is mostly isolative. Patient continues to presents disorganized, with some mild reduction. Patient speech is also disorganized, but noted to have some reduction in disorganization. Patient noted to eating and sleeping, with some noted catatonic sxs, r/o. Patient is compliance with meds and denies any SI/HI P/I.    Contact with guardian has been maintained.

## 2020-12-18 NOTE — BH INPATIENT PSYCHIATRY PROGRESS NOTE - NSBHASSESSSUMMFT_PSY_ALL_CORE
55 F, single, noncaregiver, disabled, lives with francisco, history of SCZ, multiple past admissions at [facility] (last 2015), most recent admission at [facility] several months ago for psychosis, followed by psychiatrist Dr. Love at [facility] (pt has no h/o developmental disability), prescribed Clozaril, Risperdal, Depakote, Cogentin, no h/o self-injurious behavior or SAs, no h/o violence or legal issues, PMH Type II DM, HLD, asthma, no h/o substance abuse, BIBEMS activated by francisco ([facility]) for acute psychosis on 11/03/2020, , transferred to [facility] for UTI on 11/16, now transferred back to [facility], less disorganized now, more goal directed in her thoughts, is delusional at times.   1. Fazaclo at 225mg hs, will hold off increase tonight to increase tolerability  2. continue to monitor incontinence  3. monitor oral intake, fluids encouraged.

## 2020-12-18 NOTE — BH TREATMENT PLAN - NSTXPSYCHOGOAL_PSY_ALL_CORE
Will ask for PRN medication to manage hallucinations
Will verbalize 1 strategy to successfully cope with psychotic symptoms

## 2020-12-18 NOTE — BH TREATMENT PLAN - NSTXPATIENTPARTICIPATE_PSY_ALL_CORE
Patient participated in identification of needs/problems/goals for treatment
Patient participated in defining interventions

## 2020-12-18 NOTE — BH SCALES AND SCREENS - NSBPRSBLUNTAFFECT_PSY_ALL_CORE
5 = Moderately Severe – e.g., flattening of affect, including at least two of the three features, severe lack of facial expression, monotonous voice, or restricted body gestures

## 2020-12-19 LAB
GLUCOSE BLDC GLUCOMTR-MCNC: 159 MG/DL — HIGH (ref 70–99)
GLUCOSE BLDC GLUCOMTR-MCNC: 194 MG/DL — HIGH (ref 70–99)
GLUCOSE BLDC GLUCOMTR-MCNC: 200 MG/DL — HIGH (ref 70–99)
GLUCOSE BLDC GLUCOMTR-MCNC: 204 MG/DL — HIGH (ref 70–99)

## 2020-12-19 RX ADMIN — POLYETHYLENE GLYCOL 3350 17 GRAM(S): 17 POWDER, FOR SOLUTION ORAL at 09:12

## 2020-12-19 RX ADMIN — Medication 30 MILLILITER(S): at 23:01

## 2020-12-19 RX ADMIN — METFORMIN HYDROCHLORIDE 1000 MILLIGRAM(S): 850 TABLET ORAL at 09:02

## 2020-12-19 RX ADMIN — ATORVASTATIN CALCIUM 20 MILLIGRAM(S): 80 TABLET, FILM COATED ORAL at 21:03

## 2020-12-19 RX ADMIN — Medication 1: at 12:15

## 2020-12-19 RX ADMIN — CLOZAPINE 200 MILLIGRAM(S): 150 TABLET, ORALLY DISINTEGRATING ORAL at 21:07

## 2020-12-19 RX ADMIN — RISPERIDONE 3 MILLIGRAM(S): 4 TABLET ORAL at 21:04

## 2020-12-19 RX ADMIN — INSULIN GLARGINE 7 UNIT(S): 100 INJECTION, SOLUTION SUBCUTANEOUS at 21:07

## 2020-12-19 RX ADMIN — METFORMIN HYDROCHLORIDE 1000 MILLIGRAM(S): 850 TABLET ORAL at 21:03

## 2020-12-19 RX ADMIN — Medication 1: at 08:28

## 2020-12-20 LAB
GLUCOSE BLDC GLUCOMTR-MCNC: 194 MG/DL — HIGH (ref 70–99)
GLUCOSE BLDC GLUCOMTR-MCNC: 195 MG/DL — HIGH (ref 70–99)
GLUCOSE BLDC GLUCOMTR-MCNC: 225 MG/DL — HIGH (ref 70–99)
GLUCOSE BLDC GLUCOMTR-MCNC: 232 MG/DL — HIGH (ref 70–99)

## 2020-12-20 RX ADMIN — ATORVASTATIN CALCIUM 20 MILLIGRAM(S): 80 TABLET, FILM COATED ORAL at 21:21

## 2020-12-20 RX ADMIN — Medication 1: at 09:27

## 2020-12-20 RX ADMIN — POLYETHYLENE GLYCOL 3350 17 GRAM(S): 17 POWDER, FOR SOLUTION ORAL at 09:32

## 2020-12-20 RX ADMIN — RISPERIDONE 3 MILLIGRAM(S): 4 TABLET ORAL at 21:22

## 2020-12-20 RX ADMIN — Medication 1: at 12:33

## 2020-12-20 RX ADMIN — METFORMIN HYDROCHLORIDE 1000 MILLIGRAM(S): 850 TABLET ORAL at 21:21

## 2020-12-20 RX ADMIN — CLOZAPINE 200 MILLIGRAM(S): 150 TABLET, ORALLY DISINTEGRATING ORAL at 21:22

## 2020-12-20 RX ADMIN — Medication 2: at 17:05

## 2020-12-20 RX ADMIN — INSULIN GLARGINE 7 UNIT(S): 100 INJECTION, SOLUTION SUBCUTANEOUS at 21:22

## 2020-12-20 RX ADMIN — METFORMIN HYDROCHLORIDE 1000 MILLIGRAM(S): 850 TABLET ORAL at 08:57

## 2020-12-21 LAB
GLUCOSE BLDC GLUCOMTR-MCNC: 136 MG/DL — HIGH (ref 70–99)
GLUCOSE BLDC GLUCOMTR-MCNC: 157 MG/DL — HIGH (ref 70–99)
GLUCOSE BLDC GLUCOMTR-MCNC: 203 MG/DL — HIGH (ref 70–99)
GLUCOSE BLDC GLUCOMTR-MCNC: 219 MG/DL — HIGH (ref 70–99)
SARS-COV-2 RNA SPEC QL NAA+PROBE: SIGNIFICANT CHANGE UP

## 2020-12-21 PROCEDURE — 99232 SBSQ HOSP IP/OBS MODERATE 35: CPT

## 2020-12-21 PROCEDURE — 99233 SBSQ HOSP IP/OBS HIGH 50: CPT

## 2020-12-21 RX ORDER — INSULIN GLARGINE 100 [IU]/ML
10 INJECTION, SOLUTION SUBCUTANEOUS AT BEDTIME
Refills: 0 | Status: DISCONTINUED | OUTPATIENT
Start: 2020-12-21 | End: 2020-12-31

## 2020-12-21 RX ORDER — CLOZAPINE 150 MG/1
250 TABLET, ORALLY DISINTEGRATING ORAL AT BEDTIME
Refills: 0 | Status: DISCONTINUED | OUTPATIENT
Start: 2020-12-21 | End: 2020-12-23

## 2020-12-21 RX ADMIN — RISPERIDONE 3 MILLIGRAM(S): 4 TABLET ORAL at 20:53

## 2020-12-21 RX ADMIN — ATORVASTATIN CALCIUM 20 MILLIGRAM(S): 80 TABLET, FILM COATED ORAL at 20:50

## 2020-12-21 RX ADMIN — CLOZAPINE 250 MILLIGRAM(S): 150 TABLET, ORALLY DISINTEGRATING ORAL at 20:51

## 2020-12-21 RX ADMIN — Medication 2: at 08:46

## 2020-12-21 RX ADMIN — Medication 1: at 12:46

## 2020-12-21 RX ADMIN — SENNA PLUS 2 TABLET(S): 8.6 TABLET ORAL at 20:54

## 2020-12-21 RX ADMIN — METFORMIN HYDROCHLORIDE 1000 MILLIGRAM(S): 850 TABLET ORAL at 20:52

## 2020-12-21 RX ADMIN — POLYETHYLENE GLYCOL 3350 17 GRAM(S): 17 POWDER, FOR SOLUTION ORAL at 12:29

## 2020-12-21 RX ADMIN — INSULIN GLARGINE 10 UNIT(S): 100 INJECTION, SOLUTION SUBCUTANEOUS at 20:50

## 2020-12-21 RX ADMIN — METFORMIN HYDROCHLORIDE 1000 MILLIGRAM(S): 850 TABLET ORAL at 08:46

## 2020-12-21 NOTE — PROGRESS NOTE ADULT - ASSESSMENT
55 year old female with Schizoaffective disorder now with exacerbation transferred to [facility] 11/18/2020 with fever 103.1, rigors, lethargy and mental status changes found to have pyelonephritis and urosepsis, completed treatment.  Pt now returns to [facility].    DM2: Continue metformin.  Increase lantus to 10 units qhs. Will resume januvia, pt had taken this as outpatient. 10 year ASCVD risk 4.2%, would benefit from moderat eintensity statin, started lipitor 20 qhs.    Transaminitis: Trended down.  Check with next lab draw.    Psychosis: Management per primary team

## 2020-12-21 NOTE — ADVANCED PRACTICE NURSE CONSULT - ASSESSMENT
patient sat in day room and ask to be referrered as R today  Patient was not able to focus on education session but she agrees on to drink water instead of drinking juice

## 2020-12-21 NOTE — BH INPATIENT PSYCHIATRY PROGRESS NOTE - MSE UNSTRUCTURED FT
Pt received visible on the unit, dressed casually, fair grooming and hygiene with staff assistance, fair eye contact, cooperative with interview, appropriate. Speech is normal in volume, rate and rhythm.  Mood is "good and you?" with constricted affect, stable, appropriate.  Thought process is less disorganized, more goal directed.  Thought content:  denies SI/HI or VH, +AH of "music" not command, + internal preoccupation.  No delusions or paranoia elicited.  Insight and judgment impaired.  Impulse control adequate on the unit.  Gait steady.  No PMR/PMA or tremor present

## 2020-12-21 NOTE — PROGRESS NOTE ADULT - SUBJECTIVE AND OBJECTIVE BOX
CC/Reason for Consult: DM2    SUBJECTIVE / OVERNIGHT EVENTS:  Patient say sshe is surprised she has DM    MEDICATIONS  (STANDING):  cloZAPine Disintegrating Tablet 225 milliGRAM(s) Oral at bedtime  dextrose 40% Gel 15 Gram(s) Oral once  glucagon  Injectable 1 milliGRAM(s) IntraMuscular once  insulin glargine Injectable (LANTUS) 7 Unit(s) SubCutaneous at bedtime  insulin lispro (ADMELOG) corrective regimen sliding scale   SubCutaneous three times a day before meals  insulin lispro (ADMELOG) corrective regimen sliding scale   SubCutaneous at bedtime  metFORMIN 1000 milliGRAM(s) Oral two times a day  polyethylene glycol 3350 17 Gram(s) Oral daily  risperiDONE  Disintegrating Tablet 3 milliGRAM(s) Oral at bedtime  senna 2 Tablet(s) Oral at bedtime    MEDICATIONS  (PRN):  acetaminophen   Tablet .. 650 milliGRAM(s) Oral every 6 hours PRN Temp greater or equal to 38C (100.4F), Moderate Pain (4 - 6), Severe Pain (7 - 10)  haloperidol     Tablet 5 milliGRAM(s) Oral every 6 hours PRN agitation  haloperidol    Injectable 5 milliGRAM(s) IntraMuscular once PRN combative behavior  LORazepam     Tablet 1 milliGRAM(s) Oral every 6 hours PRN anxiety  LORazepam   Injectable 2 milliGRAM(s) IntraMuscular once PRN agitation      Vital Signs Last 24 Hrs  T(C): 36.1 (16 Dec 2020 08:29), Max: 36.1 (16 Dec 2020 08:29)  T(F): 96.9 (16 Dec 2020 08:29), Max: 96.9 (16 Dec 2020 08:29)  HR: 77 (16 Dec 2020 08:29) (77 - 94)  BP: 110/63 (16 Dec 2020 08:29) (110/63 - 122/76)  BP(mean): --  RR: 15  SpO2: --  CAPILLARY BLOOD GLUCOSE      POCT Blood Glucose.: 201 mg/dL (16 Dec 2020 11:54)  POCT Blood Glucose.: 205 mg/dL (16 Dec 2020 08:26)  POCT Blood Glucose.: 209 mg/dL (15 Dec 2020 20:41)  POCT Blood Glucose.: 219 mg/dL (15 Dec 2020 16:43)        PHYSICAL EXAM:  GENERAL: NAD, well-developed  HEAD:  Atraumatic, Normocephalic  EYES: EOMI, conjunctiva and sclera clear  NECK: Supple, No JVD  CHEST/LUNG: Clear to auscultation bilaterally; No wheeze  HEART: Regular rate and rhythm; No murmurs, rubs, or gallops  ABDOMEN: Soft, Nontender, Nondistended; Bowel sounds present  EXTREMITIES:  2+ Peripheral Pulses, No clubbing, cyanosis, or edema  PSYCH: AAOx3  NEUROLOGY: non-focal  SKIN: No rashes or lesions    LABS:    Reviewed in sunrise            
CC/Reason for Consult: DM    SUBJECTIVE / OVERNIGHT EVENTS: Denies c/o    MEDICATIONS  (STANDING):  atorvastatin 20 milliGRAM(s) Oral at bedtime  cloZAPine Disintegrating Tablet 250 milliGRAM(s) Oral at bedtime  dextrose 40% Gel 15 Gram(s) Oral once  glucagon  Injectable 1 milliGRAM(s) IntraMuscular once  insulin glargine Injectable (LANTUS) 10 Unit(s) SubCutaneous at bedtime  insulin lispro (ADMELOG) corrective regimen sliding scale   SubCutaneous three times a day before meals  insulin lispro (ADMELOG) corrective regimen sliding scale   SubCutaneous at bedtime  metFORMIN 1000 milliGRAM(s) Oral two times a day  polyethylene glycol 3350 17 Gram(s) Oral daily  risperiDONE  Disintegrating Tablet 3 milliGRAM(s) Oral at bedtime  senna 2 Tablet(s) Oral at bedtime  sitaGLIPtin 100 milliGRAM(s) Oral daily    MEDICATIONS  (PRN):  acetaminophen   Tablet .. 650 milliGRAM(s) Oral every 6 hours PRN Temp greater or equal to 38C (100.4F), Moderate Pain (4 - 6), Severe Pain (7 - 10)  haloperidol     Tablet 5 milliGRAM(s) Oral every 6 hours PRN agitation  haloperidol    Injectable 5 milliGRAM(s) IntraMuscular once PRN combative behavior  LORazepam     Tablet 1 milliGRAM(s) Oral every 6 hours PRN anxiety  LORazepam   Injectable 2 milliGRAM(s) IntraMuscular once PRN agitation      Vital Signs Last 24 Hrs  T(C): 36.6 (21 Dec 2020 08:10), Max: 36.6 (20 Dec 2020 20:02)  T(F): 97.8 (21 Dec 2020 08:10), Max: 97.9 (20 Dec 2020 20:02)  HR: 90  BP: 117/65  BP(mean): --  RR: 15  SpO2: --  CAPILLARY BLOOD GLUCOSE      POCT Blood Glucose.: 157 mg/dL (21 Dec 2020 12:15)  POCT Blood Glucose.: 203 mg/dL (21 Dec 2020 08:14)  POCT Blood Glucose.: 232 mg/dL (20 Dec 2020 19:47)  POCT Blood Glucose.: 225 mg/dL (20 Dec 2020 16:45)        PHYSICAL EXAM:  GENERAL: NAD, well-developed  HEAD:  Atraumatic, Normocephalic  EYES: EOMI, conjunctiva and sclera clear  NECK: Supple, No JVD  CHEST/LUNG: Clear to auscultation bilaterally; No wheeze  HEART: Regular rate and rhythm; No murmurs, rubs, or gallops  ABDOMEN: Soft, Nontender, Nondistended; Bowel sounds present  EXTREMITIES:  2+ Peripheral Pulses, No clubbing, cyanosis, or edema  NEUROLOGY: non-focal  SKIN: No rashes or lesions

## 2020-12-21 NOTE — BH INPATIENT PSYCHIATRY PROGRESS NOTE - NSBHASSESSSUMMFT_PSY_ALL_CORE
55 F, single, noncaregiver, disabled, lives with francisco, history of SCZ, multiple past admissions at [facility] (last 2015), most recent admission at [facility] several months ago for psychosis, followed by psychiatrist Dr. Love at [facility] (pt has no h/o developmental disability), prescribed Clozaril, Risperdal, Depakote, Cogentin, no h/o self-injurious behavior or SAs, no h/o violence or legal issues, PMH Type II DM, HLD, asthma, no h/o substance abuse, BIBEMS activated by francisco ([facility]) for acute psychosis on 11/03/2020, , transferred to [facility] for UTI on 11/16, now transferred back to [facility], less disorganized now, more goal directed in her thoughts, appears internally preoccupied and reports AH of "music" not command.     1. Fazaclo titrated to 250mg PO at bedtime  2. Continue to monitor incontinence  3. Monitor oral intake, fluids encouraged.

## 2020-12-22 LAB
GLUCOSE BLDC GLUCOMTR-MCNC: 190 MG/DL — HIGH (ref 70–99)
GLUCOSE BLDC GLUCOMTR-MCNC: 217 MG/DL — HIGH (ref 70–99)
GLUCOSE BLDC GLUCOMTR-MCNC: 217 MG/DL — HIGH (ref 70–99)
GLUCOSE BLDC GLUCOMTR-MCNC: 218 MG/DL — HIGH (ref 70–99)

## 2020-12-22 PROCEDURE — 99232 SBSQ HOSP IP/OBS MODERATE 35: CPT

## 2020-12-22 RX ADMIN — CLOZAPINE 250 MILLIGRAM(S): 150 TABLET, ORALLY DISINTEGRATING ORAL at 21:28

## 2020-12-22 RX ADMIN — METFORMIN HYDROCHLORIDE 1000 MILLIGRAM(S): 850 TABLET ORAL at 21:29

## 2020-12-22 RX ADMIN — ATORVASTATIN CALCIUM 20 MILLIGRAM(S): 80 TABLET, FILM COATED ORAL at 21:27

## 2020-12-22 RX ADMIN — RISPERIDONE 3 MILLIGRAM(S): 4 TABLET ORAL at 21:29

## 2020-12-22 RX ADMIN — Medication 2: at 16:41

## 2020-12-22 RX ADMIN — Medication 2: at 08:49

## 2020-12-22 RX ADMIN — METFORMIN HYDROCHLORIDE 1000 MILLIGRAM(S): 850 TABLET ORAL at 08:56

## 2020-12-22 RX ADMIN — INSULIN GLARGINE 10 UNIT(S): 100 INJECTION, SOLUTION SUBCUTANEOUS at 21:00

## 2020-12-22 RX ADMIN — Medication 1: at 12:20

## 2020-12-22 RX ADMIN — POLYETHYLENE GLYCOL 3350 17 GRAM(S): 17 POWDER, FOR SOLUTION ORAL at 09:30

## 2020-12-22 NOTE — BH INPATIENT PSYCHIATRY PROGRESS NOTE - MSE UNSTRUCTURED FT
Pt received visible on the unit, dressed casually, fair grooming and hygiene with staff assistance, fair eye contact, cooperative with interview, appropriate. Speech is normal in volume, rate and rhythm.  Mood is "good" with constricted affect, stable, appropriate.  Thought process is less disorganized, more goal directed.  Thought content:  denies SI/HI or VH, +AH of "music" not command, + internal preoccupation.  No delusions or paranoia elicited.  Insight and judgment impaired.  Impulse control adequate on the unit.  Gait steady.  No PMR/PMA or tremor present

## 2020-12-22 NOTE — BH INPATIENT PSYCHIATRY PROGRESS NOTE - NSBHASSESSSUMMFT_PSY_ALL_CORE
55 F, single, noncaregiver, disabled, lives with francisco, history of SCZ, multiple past admissions at [facility] (last 2015), most recent admission at [facility] several months ago for psychosis, followed by psychiatrist Dr. Love at [facility] (pt has no h/o developmental disability), prescribed Clozaril, Risperdal, Depakote, Cogentin, no h/o self-injurious behavior or SAs, no h/o violence or legal issues, PMH Type II DM, HLD, asthma, no h/o substance abuse, BIBEMS activated by francisco (Rahat) for acute psychosis on 11/03/2020, , transferred to [facility] for UTI on 11/16, now transferred back to [facility], less disorganized now, more goal directed in her thoughts, appears internally preoccupied and reports AH of "music" not command.     1. Fazaclo titrated to 250mg PO at bedtime  2. Continue to monitor incontinence  3. Monitor oral intake, fluids encouraged.  4.  cloapine level drawn this AM, CBC + diff weekly ordered for tomorrow AM

## 2020-12-23 LAB
GLUCOSE BLDC GLUCOMTR-MCNC: 179 MG/DL — HIGH (ref 70–99)
GLUCOSE BLDC GLUCOMTR-MCNC: 182 MG/DL — HIGH (ref 70–99)
GLUCOSE BLDC GLUCOMTR-MCNC: 197 MG/DL — HIGH (ref 70–99)
GLUCOSE BLDC GLUCOMTR-MCNC: 201 MG/DL — HIGH (ref 70–99)

## 2020-12-23 PROCEDURE — 99232 SBSQ HOSP IP/OBS MODERATE 35: CPT

## 2020-12-23 RX ORDER — CLOZAPINE 150 MG/1
275 TABLET, ORALLY DISINTEGRATING ORAL AT BEDTIME
Refills: 0 | Status: DISCONTINUED | OUTPATIENT
Start: 2020-12-23 | End: 2020-12-24

## 2020-12-23 RX ADMIN — Medication 1: at 16:34

## 2020-12-23 RX ADMIN — RISPERIDONE 3 MILLIGRAM(S): 4 TABLET ORAL at 20:35

## 2020-12-23 RX ADMIN — INSULIN GLARGINE 10 UNIT(S): 100 INJECTION, SOLUTION SUBCUTANEOUS at 20:37

## 2020-12-23 RX ADMIN — ATORVASTATIN CALCIUM 20 MILLIGRAM(S): 80 TABLET, FILM COATED ORAL at 20:35

## 2020-12-23 RX ADMIN — Medication 1: at 08:56

## 2020-12-23 RX ADMIN — POLYETHYLENE GLYCOL 3350 17 GRAM(S): 17 POWDER, FOR SOLUTION ORAL at 08:57

## 2020-12-23 RX ADMIN — METFORMIN HYDROCHLORIDE 1000 MILLIGRAM(S): 850 TABLET ORAL at 08:37

## 2020-12-23 RX ADMIN — SENNA PLUS 2 TABLET(S): 8.6 TABLET ORAL at 20:36

## 2020-12-23 RX ADMIN — Medication 1: at 12:30

## 2020-12-23 RX ADMIN — METFORMIN HYDROCHLORIDE 1000 MILLIGRAM(S): 850 TABLET ORAL at 20:35

## 2020-12-23 RX ADMIN — CLOZAPINE 275 MILLIGRAM(S): 150 TABLET, ORALLY DISINTEGRATING ORAL at 20:35

## 2020-12-23 NOTE — BH INPATIENT PSYCHIATRY PROGRESS NOTE - NSBHASSESSSUMMFT_PSY_ALL_CORE
56 yo WF with SCZ responding to clozapine+risperdal  POssible goal of CLEANING  COntinue meds and mgmt.

## 2020-12-23 NOTE — BH INPATIENT PSYCHIATRY PROGRESS NOTE - MSE UNSTRUCTURED FT
Pt received visible on the unit, dressed casually, fair grooming and hygiene with staff assistance, adequate eye contact, cooperative with interview, poor boundaries noted.  Speech is normal in volume, rate and rhythm.  Mood is "good" with constricted affect, stable, appropriate.  Thought process is less disorganized, more goal directed.  Thought content:  denies SI/HI or VH, +AH of "music" not command, + internal preoccupation.  No delusions or paranoia elicited.  Insight and judgment impaired.  Impulse control adequate on the unit.  Gait steady.  No PMR/PMA or tremor present. Reliability: poor.

## 2020-12-24 LAB
BASOPHILS # BLD AUTO: 0.04 K/UL — SIGNIFICANT CHANGE UP (ref 0–0.2)
BASOPHILS NFR BLD AUTO: 0.5 % — SIGNIFICANT CHANGE UP (ref 0–2)
EOSINOPHIL # BLD AUTO: 0 K/UL — SIGNIFICANT CHANGE UP (ref 0–0.5)
EOSINOPHIL NFR BLD AUTO: 0 % — SIGNIFICANT CHANGE UP (ref 0–6)
GLUCOSE BLDC GLUCOMTR-MCNC: 142 MG/DL — HIGH (ref 70–99)
GLUCOSE BLDC GLUCOMTR-MCNC: 157 MG/DL — HIGH (ref 70–99)
GLUCOSE BLDC GLUCOMTR-MCNC: 207 MG/DL — HIGH (ref 70–99)
GLUCOSE BLDC GLUCOMTR-MCNC: 236 MG/DL — HIGH (ref 70–99)
HCT VFR BLD CALC: 34 % — LOW (ref 34.5–45)
HGB BLD-MCNC: 10.7 G/DL — LOW (ref 11.5–15.5)
IANC: 5.55 K/UL — SIGNIFICANT CHANGE UP (ref 1.5–8.5)
IMM GRANULOCYTES NFR BLD AUTO: 1.5 % — SIGNIFICANT CHANGE UP (ref 0–1.5)
LYMPHOCYTES # BLD AUTO: 2.33 K/UL — SIGNIFICANT CHANGE UP (ref 1–3.3)
LYMPHOCYTES # BLD AUTO: 27.7 % — SIGNIFICANT CHANGE UP (ref 13–44)
MCHC RBC-ENTMCNC: 26.1 PG — LOW (ref 27–34)
MCHC RBC-ENTMCNC: 31.5 GM/DL — LOW (ref 32–36)
MCV RBC AUTO: 82.9 FL — SIGNIFICANT CHANGE UP (ref 80–100)
MONOCYTES # BLD AUTO: 0.37 K/UL — SIGNIFICANT CHANGE UP (ref 0–0.9)
MONOCYTES NFR BLD AUTO: 4.4 % — SIGNIFICANT CHANGE UP (ref 2–14)
NEUTROPHILS # BLD AUTO: 5.55 K/UL — SIGNIFICANT CHANGE UP (ref 1.8–7.4)
NEUTROPHILS NFR BLD AUTO: 65.9 % — SIGNIFICANT CHANGE UP (ref 43–77)
NRBC # BLD: 0 /100 WBCS — SIGNIFICANT CHANGE UP
NRBC # FLD: 0 K/UL — SIGNIFICANT CHANGE UP
PLATELET # BLD AUTO: 391 K/UL — SIGNIFICANT CHANGE UP (ref 150–400)
RBC # BLD: 4.1 M/UL — SIGNIFICANT CHANGE UP (ref 3.8–5.2)
RBC # FLD: 14.8 % — HIGH (ref 10.3–14.5)
WBC # BLD: 8.42 K/UL — SIGNIFICANT CHANGE UP (ref 3.8–10.5)
WBC # FLD AUTO: 8.42 K/UL — SIGNIFICANT CHANGE UP (ref 3.8–10.5)

## 2020-12-24 RX ORDER — CLOZAPINE 150 MG/1
300 TABLET, ORALLY DISINTEGRATING ORAL AT BEDTIME
Refills: 0 | Status: DISCONTINUED | OUTPATIENT
Start: 2020-12-26 | End: 2020-12-28

## 2020-12-24 RX ORDER — CLOZAPINE 150 MG/1
275 TABLET, ORALLY DISINTEGRATING ORAL AT BEDTIME
Refills: 0 | Status: COMPLETED | OUTPATIENT
Start: 2020-12-24 | End: 2020-12-25

## 2020-12-24 RX ORDER — PALIPERIDONE 1.5 MG/1
234 TABLET, EXTENDED RELEASE ORAL ONCE
Refills: 0 | Status: COMPLETED | OUTPATIENT
Start: 2020-12-25 | End: 2020-12-25

## 2020-12-24 RX ADMIN — METFORMIN HYDROCHLORIDE 1000 MILLIGRAM(S): 850 TABLET ORAL at 21:06

## 2020-12-24 RX ADMIN — INSULIN GLARGINE 10 UNIT(S): 100 INJECTION, SOLUTION SUBCUTANEOUS at 21:06

## 2020-12-24 RX ADMIN — ATORVASTATIN CALCIUM 20 MILLIGRAM(S): 80 TABLET, FILM COATED ORAL at 21:05

## 2020-12-24 RX ADMIN — Medication 2: at 08:17

## 2020-12-24 RX ADMIN — RISPERIDONE 3 MILLIGRAM(S): 4 TABLET ORAL at 21:05

## 2020-12-24 RX ADMIN — CLOZAPINE 50 MILLIGRAM(S): 150 TABLET, ORALLY DISINTEGRATING ORAL at 21:05

## 2020-12-24 RX ADMIN — METFORMIN HYDROCHLORIDE 1000 MILLIGRAM(S): 850 TABLET ORAL at 08:17

## 2020-12-24 RX ADMIN — Medication 2: at 12:59

## 2020-12-24 RX ADMIN — POLYETHYLENE GLYCOL 3350 17 GRAM(S): 17 POWDER, FOR SOLUTION ORAL at 08:18

## 2020-12-24 NOTE — BH INPATIENT PSYCHIATRY PROGRESS NOTE - MSE UNSTRUCTURED FT
Pt received visible on the unit, dressed casually, fair grooming and hygiene with staff assistance, adequate eye contact, cooperative with interview, poor boundaries noted.  Speech is normal in volume, rate and rhythm.  Mood is "good" with constricted affect with very mild blunting, stable, more appropriate.  Thought process is less disorganized, more goal directed with less FTD evident.  Thought content:  denies SI/HI or VH, +AH of "music" not command that is attenuating, + internal preoccupation attenuated.  No delusions or paranoia elicited.  Insight and judgment impaired but slightly improved, agrees to CLEANING, feels medication is helping.  Impulse control adequate on the unit.  Gait steady.  No PMR/PMA or tremor present. Reliability: poor.

## 2020-12-24 NOTE — PHARMACOTHERAPY INTERVENTION NOTE - COMMENTS
Recommend shortening clozapine 275mg order for 2 doses: 12/24 and 12/25. This way Fazaclo 300mg order can start 12/26. Currently, orders overlap

## 2020-12-24 NOTE — BH INPATIENT PSYCHIATRY PROGRESS NOTE - NSBHASSESSSUMMFT_PSY_ALL_CORE
54 yo WF with SCZ responding to clozapine+risperdal  Now with tx team goal of CLEANING  Continue meds and mgmt.

## 2020-12-25 LAB
GLUCOSE BLDC GLUCOMTR-MCNC: 129 MG/DL — HIGH (ref 70–99)
GLUCOSE BLDC GLUCOMTR-MCNC: 134 MG/DL — HIGH (ref 70–99)
GLUCOSE BLDC GLUCOMTR-MCNC: 180 MG/DL — HIGH (ref 70–99)
GLUCOSE BLDC GLUCOMTR-MCNC: 224 MG/DL — HIGH (ref 70–99)

## 2020-12-25 RX ORDER — CLOZAPINE 150 MG/1
275 TABLET, ORALLY DISINTEGRATING ORAL ONCE
Refills: 0 | Status: COMPLETED | OUTPATIENT
Start: 2020-12-25 | End: 2020-12-25

## 2020-12-25 RX ADMIN — RISPERIDONE 3 MILLIGRAM(S): 4 TABLET ORAL at 20:55

## 2020-12-25 RX ADMIN — METFORMIN HYDROCHLORIDE 1000 MILLIGRAM(S): 850 TABLET ORAL at 09:16

## 2020-12-25 RX ADMIN — ATORVASTATIN CALCIUM 20 MILLIGRAM(S): 80 TABLET, FILM COATED ORAL at 20:55

## 2020-12-25 RX ADMIN — INSULIN GLARGINE 10 UNIT(S): 100 INJECTION, SOLUTION SUBCUTANEOUS at 20:56

## 2020-12-25 RX ADMIN — METFORMIN HYDROCHLORIDE 1000 MILLIGRAM(S): 850 TABLET ORAL at 20:55

## 2020-12-25 RX ADMIN — Medication 1: at 09:51

## 2020-12-25 RX ADMIN — PALIPERIDONE 234 MILLIGRAM(S): 1.5 TABLET, EXTENDED RELEASE ORAL at 09:36

## 2020-12-26 LAB
GLUCOSE BLDC GLUCOMTR-MCNC: 174 MG/DL — HIGH (ref 70–99)
GLUCOSE BLDC GLUCOMTR-MCNC: 179 MG/DL — HIGH (ref 70–99)
GLUCOSE BLDC GLUCOMTR-MCNC: 188 MG/DL — HIGH (ref 70–99)
GLUCOSE BLDC GLUCOMTR-MCNC: 197 MG/DL — HIGH (ref 70–99)

## 2020-12-26 RX ADMIN — Medication 1: at 08:50

## 2020-12-26 RX ADMIN — CLOZAPINE 300 MILLIGRAM(S): 150 TABLET, ORALLY DISINTEGRATING ORAL at 20:57

## 2020-12-26 RX ADMIN — ATORVASTATIN CALCIUM 20 MILLIGRAM(S): 80 TABLET, FILM COATED ORAL at 20:57

## 2020-12-26 RX ADMIN — Medication 1: at 17:07

## 2020-12-26 RX ADMIN — METFORMIN HYDROCHLORIDE 1000 MILLIGRAM(S): 850 TABLET ORAL at 08:46

## 2020-12-26 RX ADMIN — METFORMIN HYDROCHLORIDE 1000 MILLIGRAM(S): 850 TABLET ORAL at 20:57

## 2020-12-26 RX ADMIN — RISPERIDONE 3 MILLIGRAM(S): 4 TABLET ORAL at 20:57

## 2020-12-26 RX ADMIN — Medication 1: at 12:39

## 2020-12-26 RX ADMIN — CLOZAPINE 25 MILLIGRAM(S): 150 TABLET, ORALLY DISINTEGRATING ORAL at 00:15

## 2020-12-26 RX ADMIN — INSULIN GLARGINE 10 UNIT(S): 100 INJECTION, SOLUTION SUBCUTANEOUS at 20:57

## 2020-12-27 LAB
GLUCOSE BLDC GLUCOMTR-MCNC: 167 MG/DL — HIGH (ref 70–99)
GLUCOSE BLDC GLUCOMTR-MCNC: 186 MG/DL — HIGH (ref 70–99)
GLUCOSE BLDC GLUCOMTR-MCNC: 201 MG/DL — HIGH (ref 70–99)
GLUCOSE BLDC GLUCOMTR-MCNC: 271 MG/DL — HIGH (ref 70–99)

## 2020-12-27 PROCEDURE — 99232 SBSQ HOSP IP/OBS MODERATE 35: CPT

## 2020-12-27 RX ADMIN — Medication 1: at 17:15

## 2020-12-27 RX ADMIN — Medication 1: at 12:15

## 2020-12-27 RX ADMIN — INSULIN GLARGINE 10 UNIT(S): 100 INJECTION, SOLUTION SUBCUTANEOUS at 20:39

## 2020-12-27 RX ADMIN — CLOZAPINE 300 MILLIGRAM(S): 150 TABLET, ORALLY DISINTEGRATING ORAL at 20:38

## 2020-12-27 RX ADMIN — ATORVASTATIN CALCIUM 20 MILLIGRAM(S): 80 TABLET, FILM COATED ORAL at 20:38

## 2020-12-27 RX ADMIN — Medication 2: at 08:27

## 2020-12-27 RX ADMIN — METFORMIN HYDROCHLORIDE 1000 MILLIGRAM(S): 850 TABLET ORAL at 20:39

## 2020-12-27 RX ADMIN — METFORMIN HYDROCHLORIDE 1000 MILLIGRAM(S): 850 TABLET ORAL at 08:44

## 2020-12-27 RX ADMIN — Medication 1: at 20:42

## 2020-12-27 RX ADMIN — RISPERIDONE 3 MILLIGRAM(S): 4 TABLET ORAL at 20:38

## 2020-12-28 LAB
GLUCOSE BLDC GLUCOMTR-MCNC: 124 MG/DL — HIGH (ref 70–99)
GLUCOSE BLDC GLUCOMTR-MCNC: 155 MG/DL — HIGH (ref 70–99)
GLUCOSE BLDC GLUCOMTR-MCNC: 202 MG/DL — HIGH (ref 70–99)
GLUCOSE BLDC GLUCOMTR-MCNC: 202 MG/DL — HIGH (ref 70–99)

## 2020-12-28 PROCEDURE — 99232 SBSQ HOSP IP/OBS MODERATE 35: CPT

## 2020-12-28 RX ORDER — CLOZAPINE 150 MG/1
325 TABLET, ORALLY DISINTEGRATING ORAL AT BEDTIME
Refills: 0 | Status: DISCONTINUED | OUTPATIENT
Start: 2020-12-28 | End: 2020-12-29

## 2020-12-28 RX ORDER — PALIPERIDONE 1.5 MG/1
156 TABLET, EXTENDED RELEASE ORAL ONCE
Refills: 0 | Status: COMPLETED | OUTPATIENT
Start: 2020-12-30 | End: 2020-12-30

## 2020-12-28 RX ADMIN — METFORMIN HYDROCHLORIDE 1000 MILLIGRAM(S): 850 TABLET ORAL at 20:39

## 2020-12-28 RX ADMIN — SENNA PLUS 2 TABLET(S): 8.6 TABLET ORAL at 20:39

## 2020-12-28 RX ADMIN — RISPERIDONE 3 MILLIGRAM(S): 4 TABLET ORAL at 20:39

## 2020-12-28 RX ADMIN — Medication 2: at 08:22

## 2020-12-28 RX ADMIN — METFORMIN HYDROCHLORIDE 1000 MILLIGRAM(S): 850 TABLET ORAL at 08:08

## 2020-12-28 RX ADMIN — Medication 30 MILLILITER(S): at 14:00

## 2020-12-28 RX ADMIN — ATORVASTATIN CALCIUM 20 MILLIGRAM(S): 80 TABLET, FILM COATED ORAL at 20:39

## 2020-12-28 RX ADMIN — INSULIN GLARGINE 10 UNIT(S): 100 INJECTION, SOLUTION SUBCUTANEOUS at 21:10

## 2020-12-28 RX ADMIN — Medication 2: at 16:56

## 2020-12-28 RX ADMIN — CLOZAPINE 325 MILLIGRAM(S): 150 TABLET, ORALLY DISINTEGRATING ORAL at 20:39

## 2020-12-28 NOTE — BH INPATIENT PSYCHIATRY PROGRESS NOTE - NSBHASSESSSUMMFT_PSY_ALL_CORE
56 yo WF with SCZ responding to clozapine+risperdal  Now with tx team goal of CLEANING  Continue meds and mgmt.

## 2020-12-28 NOTE — BH INPATIENT PSYCHIATRY PROGRESS NOTE - NSCGIIMPROVESX_PSY_ALL_CORE
2 = Much improved - notably better with signficant reduction of symptoms; increase in the level of functioning but some symptoms remain
4 = No change - symptoms remain essentially unchanged
3 = Minimally improved - slightly better with little or no clinically meaningful reduction of symptoms.  Represents very little change in basic clinical status, level of care, or functional capacity.
4 = No change - symptoms remain essentially unchanged
4 = No change - symptoms remain essentially unchanged
3 = Minimally improved - slightly better with little or no clinically meaningful reduction of symptoms.  Represents very little change in basic clinical status, level of care, or functional capacity.

## 2020-12-28 NOTE — BH INPATIENT PSYCHIATRY PROGRESS NOTE - NSCGISEVERILLNESS_PSY_ALL_CORE
6 = Severely ill - disruptive pathology, behavior and function are frequently influenced by symptoms, may require assistance from others
5 = Markedly ill - intrusive symptoms that distinctly impair social/occupational function or cause intrusive levels of distress

## 2020-12-28 NOTE — BH INPATIENT PSYCHIATRY PROGRESS NOTE - MSE UNSTRUCTURED FT
Pt received visible on the unit, dressed casually, fair grooming and hygiene with staff assistance, adequate eye contact, cooperative with interview, poor boundaries noted.  Speech is normal in volume, rate and rhythm.  Mood is "good" with constricted affect with very mild blunting, stable, more appropriate.  Thought process is less disorganized, more goal directed with less FTD evident but concreteness very evident. Thought content:  denies SI/HI or VH, +AH of "music" not command that is further attenuating, + internal preoccupation attenuated.  No delusions or paranoia elicited.  Insight and judgment impaired but slightly improved, agrees to CLEANING, feels medication is helping.  Impulse control adequate on the unit.  Gait steady.  No PMA  with less PMR, no tremor present. Reliability: poor.

## 2020-12-28 NOTE — BH INPATIENT PSYCHIATRY PROGRESS NOTE - NSBHATTENDATTEST_PSY_ALL_CORE
I have personally seen, examined and participated in the care of this patient. I have reviewed all pertinent clinical information, including history, physical exam, plan and the Medical/PA/NP Student’s note and agree except as noted.

## 2020-12-29 LAB
GLUCOSE BLDC GLUCOMTR-MCNC: 126 MG/DL — HIGH (ref 70–99)
GLUCOSE BLDC GLUCOMTR-MCNC: 161 MG/DL — HIGH (ref 70–99)
GLUCOSE BLDC GLUCOMTR-MCNC: 169 MG/DL — HIGH (ref 70–99)
GLUCOSE BLDC GLUCOMTR-MCNC: 179 MG/DL — HIGH (ref 70–99)

## 2020-12-29 PROCEDURE — 99238 HOSP IP/OBS DSCHRG MGMT 30/<: CPT

## 2020-12-29 RX ORDER — CLOZAPINE 150 MG/1
350 TABLET, ORALLY DISINTEGRATING ORAL AT BEDTIME
Refills: 0 | Status: DISCONTINUED | OUTPATIENT
Start: 2020-12-29 | End: 2020-12-31

## 2020-12-29 RX ADMIN — ATORVASTATIN CALCIUM 20 MILLIGRAM(S): 80 TABLET, FILM COATED ORAL at 20:40

## 2020-12-29 RX ADMIN — INSULIN GLARGINE 10 UNIT(S): 100 INJECTION, SOLUTION SUBCUTANEOUS at 20:37

## 2020-12-29 RX ADMIN — Medication 1: at 17:05

## 2020-12-29 RX ADMIN — METFORMIN HYDROCHLORIDE 1000 MILLIGRAM(S): 850 TABLET ORAL at 08:40

## 2020-12-29 RX ADMIN — Medication 1: at 08:40

## 2020-12-29 RX ADMIN — Medication 1: at 12:08

## 2020-12-29 RX ADMIN — CLOZAPINE 100 MILLIGRAM(S): 150 TABLET, ORALLY DISINTEGRATING ORAL at 20:39

## 2020-12-29 RX ADMIN — METFORMIN HYDROCHLORIDE 1000 MILLIGRAM(S): 850 TABLET ORAL at 20:40

## 2020-12-29 RX ADMIN — RISPERIDONE 3 MILLIGRAM(S): 4 TABLET ORAL at 20:40

## 2020-12-29 NOTE — BH INPATIENT PSYCHIATRY DISCHARGE NOTE - NSBHMETABOLIC_PSY_ALL_CORE_FT
BMI: BMI (kg/m2): 26.2 (11-27-20 @ 08:30)  HbA1c: A1C with Estimated Average Glucose: 9.5 % (11-05-20 @ 07:50)    Glucose: POCT Blood Glucose.: 179 mg/dL (12-29-20 @ 07:56)    BP: 104/64 (12-29-20 @ 07:38) (100/57 - 104/64)  Lipid Panel: Date/Time: 11-05-20 @ 07:50  Cholesterol, Serum: 146  Direct LDL: 79  HDL Cholesterol, Serum: 31  Total Cholesterol/HDL Ration Measurement: --  Triglycerides, Serum: 355

## 2020-12-29 NOTE — BH INPATIENT PSYCHIATRY DISCHARGE NOTE - HOSPITAL COURSE
x On admission, Interview was challenging and limited due to severity of psychosis, gross disorganization and word salad. Pt was calm and pleasant on approach and not a behavioral issue. Gave incomprehensible answers and appears internally preoccupied.  Pt placed on nursing enhanced supervision for disorganization.  Pt was restarted on Clozaril which was titrated to 350mg PO at bedtime and Risperdal 3mg PO at bedtime for psychosis.  On the combination of medication, patient showed much improvement in psychotic sx and was taken off nursing enhanced supervision.  Pt agreeable to receive Invega CLEANING to ensure compliance and prevent relapse and received loading dose of Invega Sustenna 234mg IM on 12/25/20 and Invega Sustenna 156mg IM on 12/30/20.  Pt was not a behavioral issue on the unit and did not require emergency IM medication during her hospitalization.  Pt’s guardian and fiancé were contacted throughout patient’s hospitalization and all were in agreement with discharge plan.       On admission, Interview was challenging and limited due to severity of psychosis, gross disorganization and word salad. Pt was calm and pleasant on approach and not a behavioral issue. Gave incomprehensible answers and appears internally preoccupied.  Pt placed on nursing enhanced supervision for disorganization.  Pt was restarted on Clozaril which was titrated to 350mg PO at bedtime and Risperdal 3mg PO at bedtime for psychosis.  On the combination of medication, patient showed much improvement in psychotic sx and was taken off nursing enhanced supervision.  Pt agreeable to receive Invega CLEANING to ensure compliance and prevent relapse and received loading dose of Invega Sustenna 234mg IM on 12/25/20 and Invega Sustenna 156mg IM on 12/30/20.  Pt was not a behavioral issue on the unit and did not require emergency IM medication during her hospitalization.  Pt’s guardian and fiancé were contacted throughout patient’s hospitalization and all were in agreement with discharge plan.  On discharge,      Although patient was admitted due to risk factors for violence/suicide including psychotic sx, the patient’s risk for suicide/violence was mitigated during her hospitalization and her protective factors outweigh her risk factors at this time.  Pt is currently at low acute risk for suicide/violence.  Patient’s protective factors include:  no current SI/HI/I/P, compliance with medication, willingness to engage in treatment on CLEANING, family support, guardian in the community, no hx of suicide attempts, no hx of aggression, no legal hx, no known substance abuse hx, and no current acute depressive, psychotic or manic sx.  Although the patient is at chronic risk for violence/suicide given her hx of psychiatric illness and of hx of psychiatric hospitalizations, this risk cannot be ameliorated by continued inpatient treatment.  The patient no longer met the criteria for psychiatric hospitalization at discharge.       On admission, Interview was challenging and limited due to severity of psychosis, gross disorganization and word salad. Pt was calm and pleasant on approach and not a behavioral issue. Gave incomprehensible answers and appears internally preoccupied.  Pt placed on nursing enhanced supervision for disorganization.  Pt was restarted on Clozaril which was titrated to 350mg PO at bedtime and Risperdal 3mg PO at bedtime for psychosis.  On the combination of medication, patient showed much improvement in psychotic sx and was taken off nursing enhanced supervision.  Pt agreeable to receive Invega CLEANING to ensure compliance and prevent relapse and received loading dose of Invega Sustenna 234mg IM on 12/25/20 and Invega Sustenna 156mg IM on 12/30/20.  Pt was not a behavioral issue on the unit and did not require emergency IM medication during her hospitalization.  Pt’s guardian and fiancé were contacted throughout patient’s hospitalization and all were in agreement with discharge plan.  On the days leading to discharge, patient denied SI/HI/I/P or VH or paranoia.  Pt's thoughts were more goal directed.  Pt reported chronic AH of "music" not command or distressing in nature.  Pt looked forward to going to home and spending time with her fiance and brother.  Pt reported good sleep and appetite.  Pt reported her fiance administers her medication and assures she is compliant.  Pt endorsed motivation to continue medication as prescribed and engage in outpatient treatment.  Safety planning done with patient and patient agreed to call 911 or go to the nearest ED if she finds herself a danger to herself or others.  Suicide hotline information given to patient with discharge documentation.  Although patient was admitted due to risk factors for violence/suicide including psychotic sx, the patient’s risk for suicide/violence was mitigated during her hospitalization and her protective factors outweigh her risk factors at this time.  Pt is currently at low acute risk for suicide/violence.  Patient’s protective factors include:  no current SI/HI/I/P, compliance with medication, willingness to engage in treatment on CLEANING, family support, guardian in the community, no hx of suicide attempts, no hx of aggression, no legal hx, no known substance abuse hx, and no current acute depressive, psychotic or manic sx.  Although the patient is at chronic risk for violence/suicide given her hx of psychiatric illness and of hx of psychiatric hospitalizations, this risk cannot be ameliorated by continued inpatient treatment.  The patient no longer met the criteria for psychiatric hospitalization at discharge.       On admission, Interview was challenging and limited due to severity of psychosis, gross disorganization and word salad. Pt was calm and pleasant on approach and not a behavioral issue. Gave incomprehensible answers and appears internally preoccupied.  Pt placed on nursing enhanced supervision for disorganization.  Pt was restarted on Clozaril which was titrated to 350mg PO at bedtime and Risperdal 3mg PO at bedtime for psychosis (CBC + diff last drawn on 12/30/20:  WBC 7.02, ANC 4.30).  On the combination of medication, patient showed much improvement in psychotic sx and was taken off nursing enhanced supervision.  Pt agreeable to receive Invega CLEANING to ensure compliance and prevent relapse and received loading dose of Invega Sustenna 234mg IM on 12/25/20 and Invega Sustenna 156mg IM on 12/30/20.  Pt was not a behavioral issue on the unit and did not require emergency IM medication during her hospitalization.  Pt’s guardian and fiancé were contacted throughout patient’s hospitalization and all were in agreement with discharge plan.  On the days leading to discharge, patient denied SI/HI/I/P or VH or paranoia.  Pt's thoughts were more goal directed.  Pt reported chronic AH of "music" not command or distressing in nature.  Pt looked forward to going to home and spending time with her fiance and brother.  Pt reported good sleep and appetite.  Pt reported her fiance administers her medication and assures she is compliant.  Pt endorsed motivation to continue medication as prescribed and engage in outpatient treatment.  Safety planning done with patient and patient agreed to call 911 or go to the nearest ED if she finds herself a danger to herself or others.  Suicide hotline information given to patient with discharge documentation.  Although patient was admitted due to risk factors for violence/suicide including psychotic sx, the patient’s risk for suicide/violence was mitigated during her hospitalization and her protective factors outweigh her risk factors at this time.  Pt is currently at low acute risk for suicide/violence.  Patient’s protective factors include:  no current SI/HI/I/P, compliance with medication, willingness to engage in treatment on CLEANING, family support, guardian in the community, no hx of suicide attempts, no hx of aggression, no legal hx, no known substance abuse hx, and no current acute depressive, psychotic or manic sx.  Although the patient is at chronic risk for violence/suicide given her hx of psychiatric illness and of hx of psychiatric hospitalizations, this risk cannot be ameliorated by continued inpatient treatment.  The patient no longer met the criteria for psychiatric hospitalization at discharge.

## 2020-12-29 NOTE — BH INPATIENT PSYCHIATRY PROGRESS NOTE - NSBHASSESSSUMMFT_PSY_ALL_CORE
Pt with goal directed thought process, chronic AH not command, overall improved since admission.      1. Will titrate Clozaril to 350mg PO at bedtime for psychosis.  CBC + diff tomorrow AM.  2. CLEANING:  Invega Sustenna 156mg IM due tomorrow, continue Risperdal PO x 7 days    3. Nursing to provide diabetic education and teach patient to use insulin pen for discharge and if cannot use will contact hospitalist for diabetic medication discharge recommendations.  4. Projected discharge this Thurs 12/31/20

## 2020-12-29 NOTE — BH INPATIENT PSYCHIATRY DISCHARGE NOTE - NSBHDCHANDOFFNOFT_PSY_A_CORE
Left message at [facility] - Dr. Aquino, for call back to give handoff.  SW to fax discharge summary and can call Jeanne Yao NP at [phone] for handoff

## 2020-12-29 NOTE — BH INPATIENT PSYCHIATRY PROGRESS NOTE - MSE UNSTRUCTURED FT
Pt received visible on the unit, dressed casually, fair grooming and hygiene, adequate eye contact, cooperative with interview, appropriate.  Speech is normal in volume, rate and rhythm.  Mood is "good" with constricted affect, more reactive.  Thought process is goal directed but concreteness very evident. Thought content:  denies SI/HI or VH, +AH of "music" not command that is further attenuating.  No delusions or paranoia elicited.  Insight and judgment impaired but improved.  Impulse control adequate on the unit.  Gait steady.  No PMA/PMR, no tremor present.

## 2020-12-29 NOTE — BH INPATIENT PSYCHIATRY DISCHARGE NOTE - NSDCMRMEDTOKEN_GEN_ALL_CORE_FT
atorvastatin 80 mg oral tablet: 1 tab(s) orally once a day  benztropine 2 mg oral tablet: 1 tab(s) orally once a day  cloZAPine 25 mg oral tablet: 1 tab(s) orally once a day (at bedtime)  cloZAPine 25 mg oral tablet: 1 tab(s) orally   insulin glargine: 5 unit(s) subcutaneous once a day (at bedtime)  metFORMIN 1000 mg oral tablet: 1 tab(s) orally 2 times a day  polyethylene glycol 3350 oral powder for reconstitution: 17 gram(s) orally once a day  risperiDONE 3 mg oral tablet: 1 tab(s) orally once a day (at bedtime)  senna oral tablet: 2 tab(s) orally once a day (at bedtime)   alcohol swabs : Apply topically to affected area at bedtime prior to Lantus administration   atorvastatin 20 mg oral tablet: 1 tab(s) orally once a day (at bedtime)  cloZAPine 100 mg oral tablet: 3 tab(s) orally once a day (at bedtime)   cloZAPine 50 mg oral tablet: 1 tab(s) orally once a day (at bedtime)   Insulin Pen Needles, 4mm: 1 application subcutaneously at bedtime with Lantus administration. ** Use with insulin pen **   Invega Sustenna 156 mg/mL intramuscular suspension, extended release: 156 milligram(s) intramuscular every 4 weeks (Last given on 12/30/30)  Lantus Solostar Pen 100 units/mL subcutaneous solution: 10 unit(s) subcutaneous once a day (at bedtime) MDD:Lantus as covered by patient&#x27;s insurance  metFORMIN 1000 mg oral tablet: 1 tab(s) orally 2 times a day  polyethylene glycol 3350 oral powder for reconstitution: 17 gram(s) orally once a day  senna oral tablet: 2 tab(s) orally once a day (at bedtime)  SITagliptin 100 mg oral tablet: 1 tab(s) orally once a day   alcohol swabs : Apply topically to affected area at bedtime prior to Lantus administration   atorvastatin 20 mg oral tablet: 1 tab(s) orally once a day (at bedtime)  cloZAPine 100 mg oral tablet: 3 tab(s) orally once a day (at bedtime)   cloZAPine 50 mg oral tablet: 1 tab(s) orally once a day (at bedtime)   Insulin Pen Needles, 4mm: 1 application subcutaneously at bedtime with Lantus administration. ** Use with insulin pen **   Invega Sustenna 156 mg/mL intramuscular suspension, extended release: 156 milligram(s) intramuscular every 4 weeks (Last given on 12/30/30)  Lantus Solostar Pen 100 units/mL subcutaneous solution: 10 unit(s) subcutaneous once a day (at bedtime) MDD:Lantus as covered by patient&#x27;s insurance  metFORMIN 1000 mg oral tablet: 1 tab(s) orally 2 times a day  polyethylene glycol 3350 oral powder for reconstitution: 17 gram(s) orally once a day  risperiDONE 3 mg oral tablet: 1 tab(s) orally once a day (at bedtime) --Take for 6 days then discontinue as you are on Invega Sustenna long acting injectable every 4 weeks  senna oral tablet: 2 tab(s) orally once a day (at bedtime)  SITagliptin 100 mg oral tablet: 1 tab(s) orally once a day

## 2020-12-29 NOTE — BH INPATIENT PSYCHIATRY DISCHARGE NOTE - HPI (INCLUDE ILLNESS QUALITY, SEVERITY, DURATION, TIMING, CONTEXT, MODIFYING FACTORS, ASSOCIATED SIGNS AND SYMPTOMS)
This is a 54 y/o female, single, noncaregiver, disabled, lives with francisco, history of Schizophrenia, multiple past admissions at [facility] (most recent 2015), most recent psych admission at [facility] several months ago for psychosis, followed by psychiatrist Dr. Love at [facility] (pt has no h/o developmental disability), prescribed Clozaril, Risperdal, Depakote, Cogentin, no h/o self-injurious behavior or suicide attempts, no h/o violence or legal issues, PMH Type II DM, HLD, asthma, no h/o substance abuse, BIBEMS activated by francisco (Rahat) for acute psychosis on 11/03/2020, , transferred to [facility] for UTI on 11/16/2020, now transferred back to [facility], still psychotic and grossly disorganized.     Per seen and examined on unit. Interview significantly limited by gross disorganization and word salad. Pt is calm and pleasant on approach. Gives incomprehensible answers to all orientation and safety assessment questions. There is concern among nursing staff regarding pt's safety ambulating. On exam, pt is able to follow basic directions, and shows that she is unsteady on her feet, and can stand up and ambulate with assistance. Per ED behavioral health assessment note on 11/03/2020: "Interview is limited significantly by disorganized/impoverished thought process. Pt says "I don't remember" in response to multiple questions. She is disheveled with poor eye contact. Pt states her "" Rahat sent her to the ED but she doesn't remember why. She says "I don't remember" when asked how she is feeling/if anything is bothering her. She then says that the sound of ambulances scares her. She says "I feel safe here." She says she doesn't feel safe at home but is unable to say why. When asked if anyone is trying to harm her, she responds "I watch TV but people are jealous of me." She denies SI/HI, denies AH/VH. States she takes medications but responds "I don't remember" when asked for names of meds. She responds "I don't remember" when asked about her mood. She denies substance use. Collateral obtained from pt's Rahat singh. Pt carries diagnosis of Schizophrenia. Informant called 911 today because pt has been decompensating for the past month, with acute worsening over the past few days.  He states that pt appears depressed (she has not reported feeling depressed), has been lying in bed for days, urinated on herself in bed, refusing to shower. She keeps staring at the floor and muttering things in broken Faroese and Maori. No SI. Hypersomnia, reduced appetite. She is not making sense at times. She repeatedly states "I'm scared" but doesn't know what she is scared of. Multiple past psych admissions. Most recent admission was at [facility] a few months ago. This was first hospitalization since 2015 [facility] admission. At baseline, pt is "upbeat", cooks, not disorganized, not paranoid. No h/o suicide attempts, no h/o violence. Psychiatrist is Dr. Love at [facility] (she has no developmental disability). Meds: Clozapine 250 mg po QHS (she skipped dose two nights about but otherwise is compliant--she took last night's dose), Risperdal 2 mg po QHS, Cogentin 1 mg po QHS, Metformin 1000 mg po BID, Depakote 1000 mg po QHS, Atorvastatin 80 mg po QHS. Informant gives pt her medications. She has DM II, HLD, asthma. No substance use. Informant advocates for psychiatric admission."    Per chart review, at [facility], pt developed malaise, decreased PO intake, and fatigue concerning for infectious process, and after transfer to [facility] was found to have found to have severe sepsis 2/2 UTI.  Depakote was held due to rising LFTS. Per medicine discharge note, "Clozapine was initially held during admission and then restarted Depakote was held due to rising LFTS. She was started on Lantus 5u at bedtime which will be continued upon dc with her Metformin 1000mg QD to be further managed by [facility] team. QTC was 432 but should be monitored closely." There was c/f possible myocarditis 2/2 Clozapine use. A limited Echo showed no wall motion deficits that would be indicative of myocarditis. This, in addition to lack of EKG changes, lack of chest pain/SOB, and stable CKMB and troponins, lack of fever, indicated that myocarditis was highly unlikely. Per [facility] CL team, prior to transfer back to [facility] pt was alert, but disorganized, paranoid, delusional and had word salad.”

## 2020-12-30 LAB
BASOPHILS # BLD AUTO: 0.02 K/UL — SIGNIFICANT CHANGE UP (ref 0–0.2)
BASOPHILS NFR BLD AUTO: 0.3 % — SIGNIFICANT CHANGE UP (ref 0–2)
CLOZAPINE SERPL-MCNC: 1039 NG/ML — HIGH (ref 350–600)
CLOZAPINE SPEC-SCNC: 1383 NG/ML — SIGNIFICANT CHANGE UP
EOSINOPHIL # BLD AUTO: 0 K/UL — SIGNIFICANT CHANGE UP (ref 0–0.5)
EOSINOPHIL NFR BLD AUTO: 0 % — SIGNIFICANT CHANGE UP (ref 0–6)
GLUCOSE BLDC GLUCOMTR-MCNC: 149 MG/DL — HIGH (ref 70–99)
GLUCOSE BLDC GLUCOMTR-MCNC: 171 MG/DL — HIGH (ref 70–99)
GLUCOSE BLDC GLUCOMTR-MCNC: 176 MG/DL — HIGH (ref 70–99)
GLUCOSE BLDC GLUCOMTR-MCNC: 185 MG/DL — HIGH (ref 70–99)
HCT VFR BLD CALC: 34 % — LOW (ref 34.5–45)
HGB BLD-MCNC: 10.7 G/DL — LOW (ref 11.5–15.5)
IANC: 4.3 K/UL — SIGNIFICANT CHANGE UP (ref 1.5–8.5)
IMM GRANULOCYTES NFR BLD AUTO: 0.9 % — SIGNIFICANT CHANGE UP (ref 0–1.5)
LYMPHOCYTES # BLD AUTO: 2.22 K/UL — SIGNIFICANT CHANGE UP (ref 1–3.3)
LYMPHOCYTES # BLD AUTO: 31.6 % — SIGNIFICANT CHANGE UP (ref 13–44)
MCHC RBC-ENTMCNC: 26.2 PG — LOW (ref 27–34)
MCHC RBC-ENTMCNC: 31.5 GM/DL — LOW (ref 32–36)
MCV RBC AUTO: 83.3 FL — SIGNIFICANT CHANGE UP (ref 80–100)
MONOCYTES # BLD AUTO: 0.42 K/UL — SIGNIFICANT CHANGE UP (ref 0–0.9)
MONOCYTES NFR BLD AUTO: 6 % — SIGNIFICANT CHANGE UP (ref 2–14)
NEUTROPHILS # BLD AUTO: 4.3 K/UL — SIGNIFICANT CHANGE UP (ref 1.8–7.4)
NEUTROPHILS NFR BLD AUTO: 61.2 % — SIGNIFICANT CHANGE UP (ref 43–77)
NORCLOZAPINE SERPL-MCNC: 344 NG/ML — SIGNIFICANT CHANGE UP
NRBC # BLD: 0 /100 WBCS — SIGNIFICANT CHANGE UP
NRBC # FLD: 0 K/UL — SIGNIFICANT CHANGE UP
PLATELET # BLD AUTO: 357 K/UL — SIGNIFICANT CHANGE UP (ref 150–400)
RBC # BLD: 4.08 M/UL — SIGNIFICANT CHANGE UP (ref 3.8–5.2)
RBC # FLD: 15 % — HIGH (ref 10.3–14.5)
WBC # BLD: 7.02 K/UL — SIGNIFICANT CHANGE UP (ref 3.8–10.5)
WBC # FLD AUTO: 7.02 K/UL — SIGNIFICANT CHANGE UP (ref 3.8–10.5)

## 2020-12-30 PROCEDURE — 99232 SBSQ HOSP IP/OBS MODERATE 35: CPT

## 2020-12-30 RX ORDER — ATORVASTATIN CALCIUM 80 MG/1
1 TABLET, FILM COATED ORAL
Qty: 0 | Refills: 0 | DISCHARGE
Start: 2020-12-30

## 2020-12-30 RX ORDER — SENNA PLUS 8.6 MG/1
2 TABLET ORAL
Qty: 60 | Refills: 0
Start: 2020-12-30

## 2020-12-30 RX ORDER — CLOZAPINE 150 MG/1
7 TABLET, ORALLY DISINTEGRATING ORAL
Qty: 0 | Refills: 0 | DISCHARGE
Start: 2020-12-30

## 2020-12-30 RX ORDER — METFORMIN HYDROCHLORIDE 850 MG/1
1 TABLET ORAL
Qty: 60 | Refills: 0
Start: 2020-12-30

## 2020-12-30 RX ORDER — RISPERIDONE 4 MG/1
1 TABLET ORAL
Qty: 6 | Refills: 0
Start: 2020-12-30

## 2020-12-30 RX ORDER — SENNA PLUS 8.6 MG/1
2 TABLET ORAL
Qty: 0 | Refills: 0 | DISCHARGE
Start: 2020-12-30

## 2020-12-30 RX ORDER — ISOPROPYL ALCOHOL, BENZOCAINE .7; .06 ML/ML; ML/ML
1 SWAB TOPICAL
Qty: 100 | Refills: 1
Start: 2020-12-30 | End: 2021-02-17

## 2020-12-30 RX ORDER — INSULIN GLARGINE 100 [IU]/ML
10 INJECTION, SOLUTION SUBCUTANEOUS
Qty: 3 | Refills: 0
Start: 2020-12-30 | End: 2021-01-28

## 2020-12-30 RX ORDER — METFORMIN HYDROCHLORIDE 850 MG/1
1 TABLET ORAL
Qty: 0 | Refills: 0 | DISCHARGE
Start: 2020-12-30

## 2020-12-30 RX ORDER — METFORMIN HYDROCHLORIDE 850 MG/1
1 TABLET ORAL
Qty: 0 | Refills: 0 | DISCHARGE

## 2020-12-30 RX ORDER — SITAGLIPTIN 50 MG/1
1 TABLET, FILM COATED ORAL
Qty: 0 | Refills: 0 | DISCHARGE
Start: 2020-12-30

## 2020-12-30 RX ORDER — POLYETHYLENE GLYCOL 3350 17 G/17G
17 POWDER, FOR SOLUTION ORAL
Qty: 510 | Refills: 0
Start: 2020-12-30

## 2020-12-30 RX ORDER — CLOZAPINE 150 MG/1
1 TABLET, ORALLY DISINTEGRATING ORAL
Qty: 30 | Refills: 0
Start: 2020-12-30

## 2020-12-30 RX ORDER — CLOZAPINE 150 MG/1
3 TABLET, ORALLY DISINTEGRATING ORAL
Qty: 90 | Refills: 0
Start: 2020-12-30

## 2020-12-30 RX ORDER — POLYETHYLENE GLYCOL 3350 17 G/17G
17 POWDER, FOR SOLUTION ORAL
Qty: 0 | Refills: 0 | DISCHARGE
Start: 2020-12-30

## 2020-12-30 RX ORDER — ATORVASTATIN CALCIUM 80 MG/1
1 TABLET, FILM COATED ORAL
Qty: 0 | Refills: 0 | DISCHARGE

## 2020-12-30 RX ORDER — ATORVASTATIN CALCIUM 80 MG/1
1 TABLET, FILM COATED ORAL
Qty: 30 | Refills: 0
Start: 2020-12-30

## 2020-12-30 RX ORDER — SITAGLIPTIN 50 MG/1
1 TABLET, FILM COATED ORAL
Qty: 30 | Refills: 0
Start: 2020-12-30

## 2020-12-30 RX ADMIN — METFORMIN HYDROCHLORIDE 1000 MILLIGRAM(S): 850 TABLET ORAL at 08:26

## 2020-12-30 RX ADMIN — Medication 1: at 17:22

## 2020-12-30 RX ADMIN — CLOZAPINE 350 MILLIGRAM(S): 150 TABLET, ORALLY DISINTEGRATING ORAL at 20:36

## 2020-12-30 RX ADMIN — Medication 650 MILLIGRAM(S): at 17:59

## 2020-12-30 RX ADMIN — Medication 1: at 08:25

## 2020-12-30 RX ADMIN — PALIPERIDONE 156 MILLIGRAM(S): 1.5 TABLET, EXTENDED RELEASE ORAL at 11:25

## 2020-12-30 RX ADMIN — RISPERIDONE 3 MILLIGRAM(S): 4 TABLET ORAL at 20:36

## 2020-12-30 RX ADMIN — ATORVASTATIN CALCIUM 20 MILLIGRAM(S): 80 TABLET, FILM COATED ORAL at 20:36

## 2020-12-30 RX ADMIN — SENNA PLUS 2 TABLET(S): 8.6 TABLET ORAL at 20:36

## 2020-12-30 RX ADMIN — Medication 650 MILLIGRAM(S): at 16:21

## 2020-12-30 RX ADMIN — INSULIN GLARGINE 10 UNIT(S): 100 INJECTION, SOLUTION SUBCUTANEOUS at 20:37

## 2020-12-30 RX ADMIN — METFORMIN HYDROCHLORIDE 1000 MILLIGRAM(S): 850 TABLET ORAL at 20:36

## 2020-12-30 NOTE — BH DISCHARGE NOTE NURSING/SOCIAL WORK/PSYCH REHAB - NSBHDCREFEROTHER2FT_PSY_A_CORE
[facility] assessment for services *** please note as indicated this appt can be virtual or in-person, Please  contact the coordinator – Allison at [phone] to advise preference ***

## 2020-12-30 NOTE — BH DISCHARGE NOTE NURSING/SOCIAL WORK/PSYCH REHAB - NSBHDCAPPT3DT_PSY_A_CORE
Patient Seen in: [facility] AND [facility] Emergency Department    History   Patient presents with:  Abdomen/Flank Pain (GI/)    Stated Complaint: abdominal pain    HPI    Patient complains of periumbilical abdominal pain that began few days ago.   Pain describ 15.58 kg/m²   Pulse ox         Physical Exam  General Appearance: alert, no distress  Eyes: pupils equal and round no pallor or injection  ENT, Mouth: mucous membranes moist post pharynx injected  Respiratory: there are no retractions, lungs are clear to a radiology report.   Dictated by (CST): Lachelle Nino MD on 4/21/2019 at 7:29     Approved by (CST): Lachelle Nino MD on 4/21/2019 at 7:41              Re exam after us sitting comf denies abd pain though us suggestive of pelvic mass so MRI ordered no sig 21-Feb-2021 11:30

## 2020-12-30 NOTE — BH DISCHARGE NOTE NURSING/SOCIAL WORK/PSYCH REHAB - MODE OF TRANSPORTATION
pt family (son and possibly her partner) will be picking patient up at 11:30am day of d/c  (12/31/20)./Ambulatory

## 2020-12-30 NOTE — BH DISCHARGE NOTE NURSING/SOCIAL WORK/PSYCH REHAB - CAREGIVER PHONE NUMBER
[id number]    ROS    Pertinent positives and negatives are stated within HPI, all other systems reviewed and are negative. Past Surgical History:   Procedure Laterality Date    CHOLECYSTECTOMY  03/20/2017    laparoscopic    ERCP  03/17/2017    biliary stones     SLEEVE GASTRECTOMY  3/11/15    [facility]   Social History:  reports that she has never smoked. She has never used smokeless tobacco. She reports that she does not drink alcohol or use drugs. Family History: family history includes Hypertension in her mother. Allergies: Patient has no known allergies. Physical Exam           ED Triage Vitals [08/01/20 0201]   BP Temp Temp Source Pulse Resp SpO2 Height Weight   -- 99.2 °F (37.3 °C) Oral 94 18 98 % 5' 2\" (1.575 m) 223 lb (101.2 kg)      Oxygen Saturation Interpretation: Normal.    General Appearance/Constitutional:  Alert, development consistent with age, NAD. HEENT:  NC/NT. PERRLA. Airway patent. Neck:  Supple. No lymphadenopathy. Respiratory:  Clear to auscultation and breath sounds equal.  CV:  Regular rate and rhythm. GI:  General Appearance: normal.       Bowel sounds: normal bowel sounds. Distension:  None. Tenderness: No abdominal tenderness. Liver: non-palpable and non-tender. Spleen:  non-tender. Pulsatile Mass: absent. Hernia:  no inguinal or femoral hernias noted. Back: CVA Tenderness: No.  : (chaperone present during examination). External Genitalia: Hair distribution; normal, Lesions absent, moderately edematous bilateral labia with no erythema or drainage. Topical antifungal cream present       Urethral Meatus: Size normal, Location normal, Lesions absent, Prolapse absent. Vagina: Topical antifungal cream present. No discharge noted. Cervix: normal appearing cervix without discharge or lesions. Uterus:  not examined. Adenexa: no tenderness bilaterally.        Anus/Perineum: [phone] / [phone]  normal.  Integument:  Normal turgor. Warm, dry, without visible rash, unless noted elsewhere. Lymphatics: No lymphangitis or adenopathy noted. Neurological:  Orientation age-appropriate. Motor functions intact. Lab / Imaging Results   (All laboratory and radiology results have been personally reviewed by myself)  Labs:  Results for orders placed or performed during the hospital encounter of 08/01/20   Wet prep, genital    Specimen: Vaginal   Result Value Ref Range    Trichomonas Prep None Seen     Yeast, Wet Prep None Seen     Clue Cells, Wet Prep None Seen     Source Wet Prep VAGINAL    C.trachomatis N.gonorrhoeae DNA    Specimen: Cervix   Result Value Ref Range    Source Cervix    URINALYSIS   Result Value Ref Range    Color, UA Yellow Straw/Yellow    Clarity, UA Clear Clear    Glucose, Ur Negative Negative mg/dL    Bilirubin Urine Negative Negative    Ketones, Urine TRACE (A) Negative mg/dL    Specific Gravity, UA >=1.030 1.005 - 1.030    Blood, Urine SMALL (A) Negative    pH, UA 6.0 5.0 - 9.0    Protein, UA TRACE Negative mg/dL    Urobilinogen, Urine 1.0 <2.0 E.U./dL    Nitrite, Urine Negative Negative    Leukocyte Esterase, Urine MODERATE (A) Negative   Microscopic Urinalysis   Result Value Ref Range    WBC, UA >20 (A) 0 - 5 /HPF    RBC, UA 5-10 (A) 0 - 2 /HPF    Epithelial Cells, UA FEW /HPF    Bacteria, UA FEW (A) None Seen /HPF     Imaging: All Radiology results interpreted by Radiologist unless otherwise noted. No orders to display     ED Course / Medical Decision Making     Medications   nitrofurantoin (MACRODANTIN) capsule 100 mg (100 mg Oral Given 8/1/20 0344)        Re-examination:  8/1/20       Time: 0330    Patients symptoms show no change. Patient resting in no distress. Consults:   None    Procedures:   none    MDM:   Patient presented with vaginal swelling and dysuria. She was found to have acute urinary tract infection.   The patient states that she was prescribed Keflex by her OB/GYN and stopped taking it 1 day ago related to possible allergic reaction. I am not sure if she was failing treatment with Keflex or discontinued therapy too early. I feel the best thing to do is to initiate her on a different antibiotic and she was given Macrobid in the emergency department. Wet prep was negative, but there could have been a false negative related to antifungal cream in the vaginal vault. Patient has not concern of STIs and there is no overwhelming clinical concern right now to treat her prophylactically. Cultures were sent. Patient appears well, nontoxic, and she is stable. She is having no abdominal/pelvic pain and no vaginal bleeding. She is appropriate for discharge and outpatient follow-up with her OB/GYN. She is instructed to continue her topical antifungal cream.  She is instructed to return to the emergency department immediately with any new or worsening symptoms. Counseling: The emergency provider has spoken with the patient and discussed todays results, in addition to providing specific details for the plan of care and counseling regarding the diagnosis and prognosis. Questions are answered at this time and they are agreeable with the plan. Assessment      1. Swelling of vagina    2. Urinary tract infection in mother during first trimester of pregnancy    3. 8 weeks gestation of pregnancy      Plan   Discharge to home  Patient condition is good    New Medications     New Prescriptions    NITROFURANTOIN, MACROCRYSTAL-MONOHYDRATE, (MACROBID) 100 MG CAPSULE    Take 1 capsule by mouth 2 times daily for 7 days     Electronically signed by BRIANNA Dixon CNP   DD: 8/1/20  **This report was transcribed using voice recognition software. Every effort was made to ensure accuracy; however, inadvertent computerized transcription errors may be present.   END OF ED PROVIDER NOTE       BRIANNA Britton CNP  08/01/20 0345

## 2020-12-30 NOTE — BH DISCHARGE NOTE NURSING/SOCIAL WORK/PSYCH REHAB - NSDCPLANREVIEWED_PSY_ALL_CORE
Sw has reviewed plan with patient and with her partner, as well as informed patient's guardian agency of d/c plan./Yes

## 2020-12-30 NOTE — BH DISCHARGE NOTE NURSING/SOCIAL WORK/PSYCH REHAB - NSDPDISTO_PSY_ALL_CORE
Pt has requested that she be d/c to her partners address: [phone] [place] [address]. Pts' phone number [phone]/Home

## 2020-12-30 NOTE — BH INPATIENT PSYCHIATRY PROGRESS NOTE - NSBHFUPMEDSE_PSY_A_CORE
Yes
None known
Yes
None known
None known

## 2020-12-30 NOTE — BH DISCHARGE NOTE NURSING/SOCIAL WORK/PSYCH REHAB - NSDCPEEDUCATION_PSY_ALL_CORE
Coronavirus/COVID19/Diabetes/Fall Risk/Healthy Living/Influenza Vaccination/Safe and Effective Use of Medications/Relapse Prevention

## 2020-12-30 NOTE — BH DISCHARGE NOTE NURSING/SOCIAL WORK/PSYCH REHAB - NSCDUDCCRISIS_PSY_A_CORE
[facility]  1 (885) [facility]-WELL ([phone])  Text "WELL" to 03693  Website: www.[employer]/.[employer] 1 (913) 251-OYHC (0459) Website: www.safehorizon.org/.National Suicide Prevention Lifeline 5 (355) 251-8286/.  [employer]  1 (484) LIFENET (317-1009)/.  [facility]  [phone] [place] [address] [phone]   Hours:  Monday through Friday from 9 AM to 3 PM/.  [employer]  [employer] - Veterans Crisis Line  [phone], Option 1

## 2020-12-30 NOTE — BH DISCHARGE NOTE NURSING/SOCIAL WORK/PSYCH REHAB - PATIENT PORTAL LINK FT
You can access the FollowMyHealth Patient Portal offered by [facility] by registering at the following website: [email or web address]. By joining [facility]’s FollowMyHealth portal, you will also be able to view your health information using other applications (apps) compatible with our system.

## 2020-12-30 NOTE — BH INPATIENT PSYCHIATRY PROGRESS NOTE - NSBHCONSBHPROVDETAILS_PSY_A_CORE  FT
Writer received signout from GLADYS Yao on Low6 12/22/20. Case and meds discussed in week prior, also, during team meetings, notably re clozapine increases and CELANING options to aid compliance.
Writer received signout from GLADYS Yao on Low6 12/22/20. Case and meds discussed in week prior, also, during team meetings, notably re clozapine increases and CLEANING options to aid compliance.
Writer received signout from GLADYS Yao on Low6 12/22/20. Case and meds discussed in week prior, also, during team meetings, notably re clozapine increases and CLEANING options to aid compliance.

## 2020-12-30 NOTE — BH INPATIENT PSYCHIATRY PROGRESS NOTE - NSBHASSESSSUMMFT_PSY_ALL_CORE
Pt with more goal directed thought process, with chronic AH of "voices" not distressing or command in nature.  Has been tending to ADLs on her own.  Care coordinated by SW and patient ready for discharge tomorrow.  Will continue current medication Pt with more goal directed thought process, with chronic AH of "voices" not distressing or command in nature.  Has been tending to ADLs on her own.  Care coordinated by SW and patient ready for discharge tomorrow.  Will continue current medication.  Patient to receive Invega Sustenna 156mg IM today.

## 2020-12-30 NOTE — BH INPATIENT PSYCHIATRY PROGRESS NOTE - MSE UNSTRUCTURED FT
Pt received visible on the unit, dressed casually, fair grooming and hygiene, adequate eye contact, cooperative with interview, appropriate.  Speech is normal in volume, rate and rhythm.  Mood is "good" with constricted affect, more reactive.  Thought process is goal directed but concreteness very evident. Thought content:  denies SI/HI or VH, +AH of "music" not command that is further attenuating.  No delusions or paranoia elicited.  Insight and judgment impaired but improved.  Impulse control adequate on the unit.  Gait steady.  No PMA/PMR, no tremor present.      Pt received visible on the unit, dressed casually, fair grooming and hygiene without staff assistance, adequate eye contact, cooperative with interview, appropriate.  Speech is normal in volume, rate and rhythm.  Mood is "alright" with constricted affect, more reactive.  Thought process is goal directed but concreteness very evident. Thought content:  denies SI/HI or VH, +AH of "music" not command that is further attenuating.  No delusions or paranoia elicited.  Insight and judgment impaired but improved.  Impulse control adequate on the unit.  Gait steady.  No PMA/PMR, no tremor present.

## 2020-12-31 VITALS — TEMPERATURE: 96 F

## 2020-12-31 LAB — GLUCOSE BLDC GLUCOMTR-MCNC: 215 MG/DL — HIGH (ref 70–99)

## 2020-12-31 RX ADMIN — Medication 2: at 08:21

## 2020-12-31 RX ADMIN — METFORMIN HYDROCHLORIDE 1000 MILLIGRAM(S): 850 TABLET ORAL at 08:36

## 2020-12-31 NOTE — BH INPATIENT PSYCHIATRY PROGRESS NOTE - NSTXCOPEPROGRES_PSY_ALL_CORE
Met - goal discontinued

## 2020-12-31 NOTE — BH INPATIENT PSYCHIATRY PROGRESS NOTE - NSTXSLFCREPROGRES_PSY_ALL_CORE
No Change
Met - goal discontinued
Met - goal discontinued
No Change
Improving
No Change
Improving
Met - goal discontinued
Improving

## 2020-12-31 NOTE — BH INPATIENT PSYCHIATRY PROGRESS NOTE - NSTREATMENTCERTY_PSY_ALL_CORE

## 2020-12-31 NOTE — BH INPATIENT PSYCHIATRY PROGRESS NOTE - NSTXPSYCHOINTERMD_PSY_ALL_CORE
The patient remains psychotic, disorganized, but is showing some small improvement.  Will continue to titrate clozapine.
Continue to titrate Clozapine
The patient remains psychotic, disorganized, but is showing some small improvement.  Will continue to titrate clozapine.
Continue to titrate Clozapine
The patient remains psychotic, disorganized, but is showing some small improvement.  Will continue to titrate clozapine.
The patient remains psychotic, disorganized, but is showing some small improvement.  Will continue to titrate clozapine.
The patient remains psychotic, less disorganized, and is showing some improvement.  Fazaclo increased to 250 mg PO at bedtime
The patient remains psychotic, grossly disorganized.  Will continue to titrate clozapine.
The patient remains psychotic, disorganized, but is showing some small improvement.  Will continue to titrate clozapine.
The patient remains psychotic, less disorganized, and is showing some improvement.  Fazaclo increased to 250 mg PO at bedtime
Medication management
The patient remains psychotic, less disorganized, and is showing some improvement.  Fazaclo increased to 250 mg PO at bedtime
Medication management
Medication management

## 2020-12-31 NOTE — BH INPATIENT PSYCHIATRY PROGRESS NOTE - NS ED BHA REVIEW OF ED CHART VITAL SIGNS REVIEWED
Yes
Father  Still living? Unknown  Family history of heart attack, Age at diagnosis: Age Unknown
Yes

## 2020-12-31 NOTE — BH SAFETY PLAN - STEP 3 DISTRACTION NAME
[facility]  [address]  [address]  [place] [address]  Phone: [phone]  Fax: [phone]      July 3, 2019       Patient: Holli Stack   MR Number: [id number]   YOB: 1967   Date of Visit: 7/3/2019     Thank you for allowing me to participate in the care of Margie Bae. Here is my assessment and plan. Also attached is a copy of her consult note:    ASSESSMENT:  Visit Diagnoses and Associated Orders     Obstructive sleep apnea syndrome   (New Problem)  -  Primary    Needs work up    POLYSOMNOGRAPHY (PSG) - Diagnostic Testing [72278 Custom]   - Future Order         Paroxysmal atrial fibrillation (HCC)   (Stable)           Diastolic dysfunction with chronic heart failure (HCC)   (Stable)           Essential hypertension   (Stable)           Moderate persistent asthma without complication   (Stable)           MDD (major depressive disorder), recurrent severe, without psychosis (HCC)   (Stable)           Chronic narcotic dependence (HCC)   (Stable)           Morbid obesity, unspecified obesity type ([address]   (Stable)                 Plan:     Differential diagnosis includes but not limited to: ASTRID, PLMD's, narcolepsy, parasomnias. Reviewed ASTRID (which is the highest likelihood diagnosis): pathophysiology, diagnosis, complications and treatment. Instructed her not to drive if drowsy. Continue medications per her PCP and other physicians. Limit caffeine use after 3pm. Will do PSG to rule-out ASTRID and other sleep disorders. 1 wk follow up after study to review her results. The chronic medical conditions listed are directly related to the primary diagnosis listed above. The management of the primary diagnosis affects the secondary diagnosis and vice versa. Given chronic narcotics need to check for central sleep apnea as well.     Had a very brandi discussion with the patient that she has been previously .

## 2020-12-31 NOTE — BH INPATIENT PSYCHIATRY PROGRESS NOTE - NSTXDISORGGOAL_PSY_ALL_CORE
Will make at least 3 goal and reality oriented statements during therapy
Will make at least 3 goal and reality oriented statements during therapy
Will demonstrate purposeful and predictable thoughts/behaviors by making a request
Will demonstrate related thoughts for 5 min in conversation
Will make at least 3 goal and reality oriented statements during therapy
Will demonstrate related thoughts for 5 min in conversation
Will demonstrate related thoughts for 5 min in conversation
Will demonstrate the ability to maintain reality orientation and communicate clearly with others during 2 conversations with staff daily
Will make at least 3 goal and reality oriented statements during therapy
Will demonstrate the ability to maintain reality orientation and communicate clearly with others during 2 conversations with staff daily
Will demonstrate the ability to maintain reality orientation and communicate clearly with others during 2 conversations with staff daily
Will make at least 3 goal and reality oriented statements during therapy

## 2020-12-31 NOTE — BH INPATIENT PSYCHIATRY PROGRESS NOTE - MSE UNSTRUCTURED FT
Pt received visible on the unit, dressed casually, fair grooming and hygiene without staff assistance, adequate eye contact, cooperative with interview, appropriate.  Speech is normal in volume, rate and rhythm.  Mood is "good" with constricted affect, more reactive.  Thought process is goal directed but concreteness very evident. Thought content:  denies SI/HI or VH, +AH of "music" not command that is further attenuating.  No delusions or paranoia elicited.  Insight and judgment impaired but improved.  Impulse control adequate on the unit.  Gait steady.  No PMA/PMR, no tremor present.

## 2020-12-31 NOTE — BH INPATIENT PSYCHIATRY PROGRESS NOTE - NSBHINPTBILLING_PSY_ALL_CORE
14153 - Inpatient Moderate Complexity
61387 - Inpatient Moderate Complexity
16423 - Inpatient Moderate Complexity
16673 - Inpatient Moderate Complexity
24126 - Inpatient Moderate Complexity
18966 - Inpatient Moderate Complexity
46255 - Inpatient Moderate Complexity
06583 - Inpatient Moderate Complexity
77531 - Inpatient Moderate Complexity
94693 - Inpatient Moderate Complexity
61118 - Inpatient Moderate Complexity
31875 - Inpatient Moderate Complexity
47920 - Inpatient Low Complexity
43256 - Inpatient Moderate Complexity
88400 - Hospital Discharge Day Management; 30 min or less
06157 - Inpatient Moderate Complexity

## 2020-12-31 NOTE — BH INPATIENT PSYCHIATRY PROGRESS NOTE - NSBHATTESTSEENBY_PSY_A_CORE
NP without Attending Psychiatrist
NP without Attending Psychiatrist
attending Psychiatrist without NP/Trainee
NP without Attending Psychiatrist
attending Psychiatrist without NP/Trainee

## 2020-12-31 NOTE — BH INPATIENT PSYCHIATRY PROGRESS NOTE - NSTXSLFCREGOAL_PSY_ALL_CORE
Will perform ADLs without assistance/prompts daily
Be able to demonstrate the ability to care for self in 2 areas such as feeding oneself and hygiene
Will perform ADLs without assistance/prompts daily
Be able to demonstrate the ability to care for self in 2 areas such as feeding oneself and hygiene
Will perform ADLs without assistance/prompts daily

## 2020-12-31 NOTE — BH INPATIENT PSYCHIATRY PROGRESS NOTE - NSBHCONSULTIPREASON_PSY_A_CORE
danger to self; mental illness expected to respond to inpatient care

## 2020-12-31 NOTE — BH INPATIENT PSYCHIATRY PROGRESS NOTE - NSTXDISORGINTERMD_PSY_ALL_CORE
The patient remains psychotic, disorganized, but is showing some small improvement.  Fazaclo increased to 225 mg hs
Patient remains psychotic, disorganized.  Will continue clozapine trial.
The patient remains psychotic, disorganized, but is showing some small improvement.  Fazaclo increased to 200mg hs
The patient remains psychotic, disorganized, but is showing some small improvement.  Fazaclo increased to 200mg hs
The patient remains psychotic, grossly disorganized.  Will continue to titrate clozapine.
The patient remains psychotic, disorganized, but is showing some small improvement.  Will continue to titrate clozapine.
The patient remains psychotic, less disorganized, and is showing some improvement.  Fazaclo increased to 250 mg PO at bedtime
medication management
medication management
The patient remains psychotic, disorganized, but is showing some small improvement.  Fazaclo increased to 225 mg hs
The patient remains psychotic, less disorganized, and is showing some improvement.  Fazaclo increased to 250 mg PO at bedtime
medication management
The patient remains psychotic, less disorganized, and is showing some improvement.  Fazaclo increased to 250 mg PO at bedtime

## 2020-12-31 NOTE — BH INPATIENT PSYCHIATRY PROGRESS NOTE - NSTXPROBDCSOC_PSY_ALL_CORE
DISCHARGE ISSUE - POOR SOCIALIZATION IN COMMUNITY

## 2020-12-31 NOTE — BH INPATIENT PSYCHIATRY PROGRESS NOTE - NSTXOTHERGOAL_PSY_ALL_CORE
Patient would benefit from identifying 1-2 elements of an effective discharge planning that will help her to avoid readmission/maintain symptom management in the community.

## 2020-12-31 NOTE — BH INPATIENT PSYCHIATRY PROGRESS NOTE - NSTXDCSOCGOAL_PSY_ALL_CORE
Will accept referrals to support groups, clubhouse, senior centers, etc.
Other...
Will accept referrals to support groups, clubhouse, senior centers, etc.
Other...

## 2020-12-31 NOTE — BH INPATIENT PSYCHIATRY PROGRESS NOTE - NSTXSLFCREDATETRGT_PSY_ALL_CORE
28-Dec-2020
28-Dec-2020
15-Dec-2020
28-Dec-2020
15-Dec-2020
28-Dec-2020
15-Dec-2020
28-Dec-2020

## 2020-12-31 NOTE — BH INPATIENT PSYCHIATRY PROGRESS NOTE - CURRENT MEDICATION
MEDICATIONS  (STANDING):  atorvastatin 20 milliGRAM(s) Oral at bedtime  cloZAPine 275 milliGRAM(s) Oral at bedtime  dextrose 40% Gel 15 Gram(s) Oral once  glucagon  Injectable 1 milliGRAM(s) IntraMuscular once  insulin glargine Injectable (LANTUS) 10 Unit(s) SubCutaneous at bedtime  insulin lispro (ADMELOG) corrective regimen sliding scale   SubCutaneous three times a day before meals  insulin lispro (ADMELOG) corrective regimen sliding scale   SubCutaneous at bedtime  metFORMIN 1000 milliGRAM(s) Oral two times a day  polyethylene glycol 3350 17 Gram(s) Oral daily  risperiDONE  Disintegrating Tablet 3 milliGRAM(s) Oral at bedtime  senna 2 Tablet(s) Oral at bedtime  sitaGLIPtin 100 milliGRAM(s) Oral daily    MEDICATIONS  (PRN):  acetaminophen   Tablet .. 650 milliGRAM(s) Oral every 6 hours PRN Temp greater or equal to 38C (100.4F), Moderate Pain (4 - 6), Severe Pain (7 - 10)  haloperidol     Tablet 5 milliGRAM(s) Oral every 6 hours PRN agitation  haloperidol    Injectable 5 milliGRAM(s) IntraMuscular once PRN combative behavior  LORazepam     Tablet 1 milliGRAM(s) Oral every 6 hours PRN anxiety  LORazepam   Injectable 2 milliGRAM(s) IntraMuscular once PRN agitation  
MEDICATIONS  (STANDING):  atorvastatin 20 milliGRAM(s) Oral at bedtime  cloZAPine 350 milliGRAM(s) Oral at bedtime  dextrose 40% Gel 15 Gram(s) Oral once  glucagon  Injectable 1 milliGRAM(s) IntraMuscular once  insulin glargine Injectable (LANTUS) 10 Unit(s) SubCutaneous at bedtime  insulin lispro (ADMELOG) corrective regimen sliding scale   SubCutaneous three times a day before meals  insulin lispro (ADMELOG) corrective regimen sliding scale   SubCutaneous at bedtime  metFORMIN 1000 milliGRAM(s) Oral two times a day  polyethylene glycol 3350 17 Gram(s) Oral daily  risperiDONE  Disintegrating Tablet 3 milliGRAM(s) Oral at bedtime  senna 2 Tablet(s) Oral at bedtime  sitaGLIPtin 100 milliGRAM(s) Oral daily    MEDICATIONS  (PRN):  acetaminophen   Tablet .. 650 milliGRAM(s) Oral every 6 hours PRN Temp greater or equal to 38C (100.4F), Moderate Pain (4 - 6), Severe Pain (7 - 10)  aluminum hydroxide/magnesium hydroxide/simethicone Suspension 30 milliLiter(s) Oral every 4 hours PRN Dyspepsia  haloperidol     Tablet 5 milliGRAM(s) Oral every 6 hours PRN agitation  haloperidol    Injectable 5 milliGRAM(s) IntraMuscular once PRN combative behavior  
MEDICATIONS  (STANDING):  atorvastatin 20 milliGRAM(s) Oral at bedtime  cloZAPine Disintegrating Tablet 250 milliGRAM(s) Oral at bedtime  dextrose 40% Gel 15 Gram(s) Oral once  glucagon  Injectable 1 milliGRAM(s) IntraMuscular once  insulin glargine Injectable (LANTUS) 10 Unit(s) SubCutaneous at bedtime  insulin lispro (ADMELOG) corrective regimen sliding scale   SubCutaneous three times a day before meals  insulin lispro (ADMELOG) corrective regimen sliding scale   SubCutaneous at bedtime  metFORMIN 1000 milliGRAM(s) Oral two times a day  polyethylene glycol 3350 17 Gram(s) Oral daily  risperiDONE  Disintegrating Tablet 3 milliGRAM(s) Oral at bedtime  senna 2 Tablet(s) Oral at bedtime  sitaGLIPtin 100 milliGRAM(s) Oral daily    MEDICATIONS  (PRN):  acetaminophen   Tablet .. 650 milliGRAM(s) Oral every 6 hours PRN Temp greater or equal to 38C (100.4F), Moderate Pain (4 - 6), Severe Pain (7 - 10)  haloperidol     Tablet 5 milliGRAM(s) Oral every 6 hours PRN agitation  haloperidol    Injectable 5 milliGRAM(s) IntraMuscular once PRN combative behavior  LORazepam     Tablet 1 milliGRAM(s) Oral every 6 hours PRN anxiety  LORazepam   Injectable 2 milliGRAM(s) IntraMuscular once PRN agitation  
MEDICATIONS  (STANDING):  atorvastatin 20 milliGRAM(s) Oral at bedtime  cloZAPine Disintegrating Tablet 225 milliGRAM(s) Oral at bedtime  dextrose 40% Gel 15 Gram(s) Oral once  glucagon  Injectable 1 milliGRAM(s) IntraMuscular once  insulin glargine Injectable (LANTUS) 7 Unit(s) SubCutaneous at bedtime  insulin lispro (ADMELOG) corrective regimen sliding scale   SubCutaneous three times a day before meals  insulin lispro (ADMELOG) corrective regimen sliding scale   SubCutaneous at bedtime  metFORMIN 1000 milliGRAM(s) Oral two times a day  polyethylene glycol 3350 17 Gram(s) Oral daily  risperiDONE  Disintegrating Tablet 3 milliGRAM(s) Oral at bedtime  senna 2 Tablet(s) Oral at bedtime  sitaGLIPtin 100 milliGRAM(s) Oral daily    MEDICATIONS  (PRN):  acetaminophen   Tablet .. 650 milliGRAM(s) Oral every 6 hours PRN Temp greater or equal to 38C (100.4F), Moderate Pain (4 - 6), Severe Pain (7 - 10)  haloperidol     Tablet 5 milliGRAM(s) Oral every 6 hours PRN agitation  haloperidol    Injectable 5 milliGRAM(s) IntraMuscular once PRN combative behavior  LORazepam     Tablet 1 milliGRAM(s) Oral every 6 hours PRN anxiety  LORazepam   Injectable 2 milliGRAM(s) IntraMuscular once PRN agitation  
MEDICATIONS  (STANDING):  cloZAPine Disintegrating Tablet 225 milliGRAM(s) Oral at bedtime  dextrose 40% Gel 15 Gram(s) Oral once  glucagon  Injectable 1 milliGRAM(s) IntraMuscular once  insulin glargine Injectable (LANTUS) 5 Unit(s) SubCutaneous at bedtime  insulin lispro (ADMELOG) corrective regimen sliding scale   SubCutaneous three times a day before meals  insulin lispro (ADMELOG) corrective regimen sliding scale   SubCutaneous at bedtime  metFORMIN 1000 milliGRAM(s) Oral two times a day  polyethylene glycol 3350 17 Gram(s) Oral daily  risperiDONE  Disintegrating Tablet 3 milliGRAM(s) Oral at bedtime  senna 2 Tablet(s) Oral at bedtime    MEDICATIONS  (PRN):  acetaminophen   Tablet .. 650 milliGRAM(s) Oral every 6 hours PRN Temp greater or equal to 38C (100.4F), Moderate Pain (4 - 6), Severe Pain (7 - 10)  haloperidol     Tablet 5 milliGRAM(s) Oral every 6 hours PRN agitation  haloperidol    Injectable 5 milliGRAM(s) IntraMuscular once PRN combative behavior  LORazepam     Tablet 1 milliGRAM(s) Oral every 6 hours PRN anxiety  LORazepam   Injectable 2 milliGRAM(s) IntraMuscular once PRN agitation  
MEDICATIONS  (STANDING):  atorvastatin 20 milliGRAM(s) Oral at bedtime  cloZAPine Disintegrating Tablet 250 milliGRAM(s) Oral at bedtime  dextrose 40% Gel 15 Gram(s) Oral once  glucagon  Injectable 1 milliGRAM(s) IntraMuscular once  insulin glargine Injectable (LANTUS) 10 Unit(s) SubCutaneous at bedtime  insulin lispro (ADMELOG) corrective regimen sliding scale   SubCutaneous three times a day before meals  insulin lispro (ADMELOG) corrective regimen sliding scale   SubCutaneous at bedtime  metFORMIN 1000 milliGRAM(s) Oral two times a day  polyethylene glycol 3350 17 Gram(s) Oral daily  risperiDONE  Disintegrating Tablet 3 milliGRAM(s) Oral at bedtime  senna 2 Tablet(s) Oral at bedtime  sitaGLIPtin 100 milliGRAM(s) Oral daily    MEDICATIONS  (PRN):  acetaminophen   Tablet .. 650 milliGRAM(s) Oral every 6 hours PRN Temp greater or equal to 38C (100.4F), Moderate Pain (4 - 6), Severe Pain (7 - 10)  haloperidol     Tablet 5 milliGRAM(s) Oral every 6 hours PRN agitation  haloperidol    Injectable 5 milliGRAM(s) IntraMuscular once PRN combative behavior  LORazepam     Tablet 1 milliGRAM(s) Oral every 6 hours PRN anxiety  LORazepam   Injectable 2 milliGRAM(s) IntraMuscular once PRN agitation  
MEDICATIONS  (STANDING):  cloZAPine Disintegrating Tablet 150 milliGRAM(s) Oral at bedtime  dextrose 40% Gel 15 Gram(s) Oral once  glucagon  Injectable 1 milliGRAM(s) IntraMuscular once  insulin glargine Injectable (LANTUS) 5 Unit(s) SubCutaneous at bedtime  insulin lispro (ADMELOG) corrective regimen sliding scale   SubCutaneous three times a day before meals  insulin lispro (ADMELOG) corrective regimen sliding scale   SubCutaneous at bedtime  metFORMIN 1000 milliGRAM(s) Oral two times a day  polyethylene glycol 3350 17 Gram(s) Oral daily  risperiDONE  Disintegrating Tablet 3 milliGRAM(s) Oral at bedtime  senna 2 Tablet(s) Oral at bedtime    MEDICATIONS  (PRN):  acetaminophen   Tablet .. 650 milliGRAM(s) Oral every 6 hours PRN Temp greater or equal to 38C (100.4F), Moderate Pain (4 - 6), Severe Pain (7 - 10)  haloperidol     Tablet 5 milliGRAM(s) Oral every 6 hours PRN agitation  haloperidol    Injectable 5 milliGRAM(s) IntraMuscular once PRN combative behavior  LORazepam     Tablet 1 milliGRAM(s) Oral every 6 hours PRN anxiety  LORazepam   Injectable 2 milliGRAM(s) IntraMuscular once PRN agitation  
MEDICATIONS  (STANDING):  cloZAPine Disintegrating Tablet 200 milliGRAM(s) Oral at bedtime  dextrose 40% Gel 15 Gram(s) Oral once  glucagon  Injectable 1 milliGRAM(s) IntraMuscular once  insulin glargine Injectable (LANTUS) 5 Unit(s) SubCutaneous at bedtime  insulin lispro (ADMELOG) corrective regimen sliding scale   SubCutaneous at bedtime  insulin lispro (ADMELOG) corrective regimen sliding scale   SubCutaneous three times a day before meals  metFORMIN 1000 milliGRAM(s) Oral two times a day  polyethylene glycol 3350 17 Gram(s) Oral daily  risperiDONE  Disintegrating Tablet 3 milliGRAM(s) Oral at bedtime  senna 2 Tablet(s) Oral at bedtime    MEDICATIONS  (PRN):  acetaminophen   Tablet .. 650 milliGRAM(s) Oral every 6 hours PRN Temp greater or equal to 38C (100.4F), Moderate Pain (4 - 6), Severe Pain (7 - 10)  haloperidol     Tablet 5 milliGRAM(s) Oral every 6 hours PRN agitation  haloperidol    Injectable 5 milliGRAM(s) IntraMuscular once PRN combative behavior  LORazepam     Tablet 1 milliGRAM(s) Oral every 6 hours PRN anxiety  LORazepam   Injectable 2 milliGRAM(s) IntraMuscular once PRN agitation  
MEDICATIONS  (STANDING):  cloZAPine Disintegrating Tablet 200 milliGRAM(s) Oral at bedtime  dextrose 40% Gel 15 Gram(s) Oral once  glucagon  Injectable 1 milliGRAM(s) IntraMuscular once  insulin glargine Injectable (LANTUS) 5 Unit(s) SubCutaneous at bedtime  insulin lispro (ADMELOG) corrective regimen sliding scale   SubCutaneous three times a day before meals  insulin lispro (ADMELOG) corrective regimen sliding scale   SubCutaneous at bedtime  metFORMIN 1000 milliGRAM(s) Oral two times a day  polyethylene glycol 3350 17 Gram(s) Oral daily  risperiDONE  Disintegrating Tablet 3 milliGRAM(s) Oral at bedtime  senna 2 Tablet(s) Oral at bedtime    MEDICATIONS  (PRN):  acetaminophen   Tablet .. 650 milliGRAM(s) Oral every 6 hours PRN Temp greater or equal to 38C (100.4F), Moderate Pain (4 - 6), Severe Pain (7 - 10)  haloperidol     Tablet 5 milliGRAM(s) Oral every 6 hours PRN agitation  haloperidol    Injectable 5 milliGRAM(s) IntraMuscular once PRN combative behavior  LORazepam     Tablet 1 milliGRAM(s) Oral every 6 hours PRN anxiety  LORazepam   Injectable 2 milliGRAM(s) IntraMuscular once PRN agitation  
MEDICATIONS  (STANDING):  cloZAPine Disintegrating Tablet 150 milliGRAM(s) Oral at bedtime  dextrose 40% Gel 15 Gram(s) Oral once  glucagon  Injectable 1 milliGRAM(s) IntraMuscular once  insulin glargine Injectable (LANTUS) 5 Unit(s) SubCutaneous at bedtime  insulin lispro (ADMELOG) corrective regimen sliding scale   SubCutaneous three times a day before meals  insulin lispro (ADMELOG) corrective regimen sliding scale   SubCutaneous at bedtime  metFORMIN 1000 milliGRAM(s) Oral two times a day  polyethylene glycol 3350 17 Gram(s) Oral daily  risperiDONE  Disintegrating Tablet 3 milliGRAM(s) Oral at bedtime  senna 2 Tablet(s) Oral at bedtime    MEDICATIONS  (PRN):  acetaminophen   Tablet .. 650 milliGRAM(s) Oral every 6 hours PRN Temp greater or equal to 38C (100.4F), Moderate Pain (4 - 6), Severe Pain (7 - 10)  haloperidol     Tablet 5 milliGRAM(s) Oral every 6 hours PRN agitation  haloperidol    Injectable 5 milliGRAM(s) IntraMuscular once PRN combative behavior  LORazepam     Tablet 1 milliGRAM(s) Oral every 6 hours PRN anxiety  LORazepam   Injectable 2 milliGRAM(s) IntraMuscular once PRN agitation  
MEDICATIONS  (STANDING):  cloZAPine Disintegrating Tablet 150 milliGRAM(s) Oral at bedtime  dextrose 40% Gel 15 Gram(s) Oral once  glucagon  Injectable 1 milliGRAM(s) IntraMuscular once  insulin glargine Injectable (LANTUS) 5 Unit(s) SubCutaneous at bedtime  insulin lispro (ADMELOG) corrective regimen sliding scale   SubCutaneous three times a day before meals  insulin lispro (ADMELOG) corrective regimen sliding scale   SubCutaneous at bedtime  metFORMIN 1000 milliGRAM(s) Oral two times a day  polyethylene glycol 3350 17 Gram(s) Oral daily  risperiDONE  Disintegrating Tablet 3 milliGRAM(s) Oral at bedtime  senna 2 Tablet(s) Oral at bedtime    MEDICATIONS  (PRN):  acetaminophen   Tablet .. 650 milliGRAM(s) Oral every 6 hours PRN Temp greater or equal to 38C (100.4F), Moderate Pain (4 - 6), Severe Pain (7 - 10)  haloperidol     Tablet 5 milliGRAM(s) Oral every 6 hours PRN agitation  haloperidol    Injectable 5 milliGRAM(s) IntraMuscular once PRN combative behavior  LORazepam     Tablet 1 milliGRAM(s) Oral every 6 hours PRN anxiety  LORazepam   Injectable 2 milliGRAM(s) IntraMuscular once PRN agitation  
MEDICATIONS  (STANDING):  cloZAPine Disintegrating Tablet 175 milliGRAM(s) Oral at bedtime  dextrose 40% Gel 15 Gram(s) Oral once  glucagon  Injectable 1 milliGRAM(s) IntraMuscular once  insulin glargine Injectable (LANTUS) 5 Unit(s) SubCutaneous at bedtime  insulin lispro (ADMELOG) corrective regimen sliding scale   SubCutaneous three times a day before meals  insulin lispro (ADMELOG) corrective regimen sliding scale   SubCutaneous at bedtime  metFORMIN 1000 milliGRAM(s) Oral two times a day  polyethylene glycol 3350 17 Gram(s) Oral daily  risperiDONE  Disintegrating Tablet 3 milliGRAM(s) Oral at bedtime  senna 2 Tablet(s) Oral at bedtime    MEDICATIONS  (PRN):  acetaminophen   Tablet .. 650 milliGRAM(s) Oral every 6 hours PRN Temp greater or equal to 38C (100.4F), Moderate Pain (4 - 6), Severe Pain (7 - 10)  haloperidol     Tablet 5 milliGRAM(s) Oral every 6 hours PRN agitation  haloperidol    Injectable 5 milliGRAM(s) IntraMuscular once PRN combative behavior  LORazepam     Tablet 1 milliGRAM(s) Oral every 6 hours PRN anxiety  LORazepam   Injectable 2 milliGRAM(s) IntraMuscular once PRN agitation  
MEDICATIONS  (STANDING):  atorvastatin 20 milliGRAM(s) Oral at bedtime  cloZAPine 325 milliGRAM(s) Oral at bedtime  dextrose 40% Gel 15 Gram(s) Oral once  glucagon  Injectable 1 milliGRAM(s) IntraMuscular once  insulin glargine Injectable (LANTUS) 10 Unit(s) SubCutaneous at bedtime  insulin lispro (ADMELOG) corrective regimen sliding scale   SubCutaneous three times a day before meals  insulin lispro (ADMELOG) corrective regimen sliding scale   SubCutaneous at bedtime  metFORMIN 1000 milliGRAM(s) Oral two times a day  polyethylene glycol 3350 17 Gram(s) Oral daily  risperiDONE  Disintegrating Tablet 3 milliGRAM(s) Oral at bedtime  senna 2 Tablet(s) Oral at bedtime  sitaGLIPtin 100 milliGRAM(s) Oral daily    MEDICATIONS  (PRN):  acetaminophen   Tablet .. 650 milliGRAM(s) Oral every 6 hours PRN Temp greater or equal to 38C (100.4F), Moderate Pain (4 - 6), Severe Pain (7 - 10)  aluminum hydroxide/magnesium hydroxide/simethicone Suspension 30 milliLiter(s) Oral every 4 hours PRN Dyspepsia  haloperidol     Tablet 5 milliGRAM(s) Oral every 6 hours PRN agitation  haloperidol    Injectable 5 milliGRAM(s) IntraMuscular once PRN combative behavior  
MEDICATIONS  (STANDING):  atorvastatin 20 milliGRAM(s) Oral at bedtime  cloZAPine 350 milliGRAM(s) Oral at bedtime  dextrose 40% Gel 15 Gram(s) Oral once  glucagon  Injectable 1 milliGRAM(s) IntraMuscular once  insulin glargine Injectable (LANTUS) 10 Unit(s) SubCutaneous at bedtime  insulin lispro (ADMELOG) corrective regimen sliding scale   SubCutaneous three times a day before meals  insulin lispro (ADMELOG) corrective regimen sliding scale   SubCutaneous at bedtime  metFORMIN 1000 milliGRAM(s) Oral two times a day  polyethylene glycol 3350 17 Gram(s) Oral daily  risperiDONE  Disintegrating Tablet 3 milliGRAM(s) Oral at bedtime  senna 2 Tablet(s) Oral at bedtime  sitaGLIPtin 100 milliGRAM(s) Oral daily    MEDICATIONS  (PRN):  acetaminophen   Tablet .. 650 milliGRAM(s) Oral every 6 hours PRN Temp greater or equal to 38C (100.4F), Moderate Pain (4 - 6), Severe Pain (7 - 10)  aluminum hydroxide/magnesium hydroxide/simethicone Suspension 30 milliLiter(s) Oral every 4 hours PRN Dyspepsia  haloperidol     Tablet 5 milliGRAM(s) Oral every 6 hours PRN agitation  haloperidol    Injectable 5 milliGRAM(s) IntraMuscular once PRN combative behavior  
MEDICATIONS  (STANDING):  atorvastatin 20 milliGRAM(s) Oral at bedtime  cloZAPine 350 milliGRAM(s) Oral at bedtime  dextrose 40% Gel 15 Gram(s) Oral once  glucagon  Injectable 1 milliGRAM(s) IntraMuscular once  insulin glargine Injectable (LANTUS) 10 Unit(s) SubCutaneous at bedtime  insulin lispro (ADMELOG) corrective regimen sliding scale   SubCutaneous three times a day before meals  insulin lispro (ADMELOG) corrective regimen sliding scale   SubCutaneous at bedtime  metFORMIN 1000 milliGRAM(s) Oral two times a day  paliperidone Injectable, Long Acting 156 milliGRAM(s) IntraMuscular once  polyethylene glycol 3350 17 Gram(s) Oral daily  risperiDONE  Disintegrating Tablet 3 milliGRAM(s) Oral at bedtime  senna 2 Tablet(s) Oral at bedtime  sitaGLIPtin 100 milliGRAM(s) Oral daily    MEDICATIONS  (PRN):  acetaminophen   Tablet .. 650 milliGRAM(s) Oral every 6 hours PRN Temp greater or equal to 38C (100.4F), Moderate Pain (4 - 6), Severe Pain (7 - 10)  aluminum hydroxide/magnesium hydroxide/simethicone Suspension 30 milliLiter(s) Oral every 4 hours PRN Dyspepsia  haloperidol     Tablet 5 milliGRAM(s) Oral every 6 hours PRN agitation  haloperidol    Injectable 5 milliGRAM(s) IntraMuscular once PRN combative behavior  
MEDICATIONS  (STANDING):  atorvastatin 20 milliGRAM(s) Oral at bedtime  cloZAPine Disintegrating Tablet 300 milliGRAM(s) Oral at bedtime  dextrose 40% Gel 15 Gram(s) Oral once  glucagon  Injectable 1 milliGRAM(s) IntraMuscular once  insulin glargine Injectable (LANTUS) 10 Unit(s) SubCutaneous at bedtime  insulin lispro (ADMELOG) corrective regimen sliding scale   SubCutaneous three times a day before meals  insulin lispro (ADMELOG) corrective regimen sliding scale   SubCutaneous at bedtime  metFORMIN 1000 milliGRAM(s) Oral two times a day  polyethylene glycol 3350 17 Gram(s) Oral daily  risperiDONE  Disintegrating Tablet 3 milliGRAM(s) Oral at bedtime  senna 2 Tablet(s) Oral at bedtime  sitaGLIPtin 100 milliGRAM(s) Oral daily    MEDICATIONS  (PRN):  acetaminophen   Tablet .. 650 milliGRAM(s) Oral every 6 hours PRN Temp greater or equal to 38C (100.4F), Moderate Pain (4 - 6), Severe Pain (7 - 10)  haloperidol     Tablet 5 milliGRAM(s) Oral every 6 hours PRN agitation  haloperidol    Injectable 5 milliGRAM(s) IntraMuscular once PRN combative behavior

## 2020-12-31 NOTE — BH INPATIENT PSYCHIATRY PROGRESS NOTE - NSTXSLFCREDATEEST_PSY_ALL_CORE
08-Dec-2020

## 2020-12-31 NOTE — BH INPATIENT PSYCHIATRY PROGRESS NOTE - NSTXPSYCHOPROGRES_PSY_ALL_CORE
Improving
No Change
Improving
No Change
Improving
No Change
Improving
Met - goal discontinued
Improving
Met - goal discontinued
Improving

## 2020-12-31 NOTE — BH INPATIENT PSYCHIATRY PROGRESS NOTE - NSTXDISORGPROGRES_PSY_ALL_CORE
Improving
Improving
Met - goal discontinued
Improving
Met - goal discontinued
No Change
Improving
Improving
Met - goal discontinued

## 2020-12-31 NOTE — BH INPATIENT PSYCHIATRY PROGRESS NOTE - NSTXDISORGDATEEST_PSY_ALL_CORE
27-Nov-2020
09-Dec-2020
09-Dec-2020
10-Dec-2020
27-Nov-2020
27-Nov-2020

## 2020-12-31 NOTE — BH INPATIENT PSYCHIATRY PROGRESS NOTE - NSBHROSSYSTEMS_PSY_ALL_CORE
Psychiatric
Psychiatric/Endocrine...
Psychiatric
Psychiatric/Endocrine...
Psychiatric
Psychiatric

## 2020-12-31 NOTE — BH INPATIENT PSYCHIATRY PROGRESS NOTE - NSTXPSYCHOGOAL_PSY_ALL_CORE
Will verbalize 1 strategy to successfully cope with psychotic symptoms
Will ask for PRN medication to manage hallucinations
Will report using a mindfulness skill to be able to read 5 pages of a book daily despite hallucinations
Will ask for PRN medication to manage hallucinations
Will report using a mindfulness skill to be able to read 5 pages of a book daily despite hallucinations
Will report using a mindfulness skill to be able to read 5 pages of a book daily despite hallucinations
Will ask for PRN medication to manage hallucinations
Will report using a mindfulness skill to be able to read 5 pages of a book daily despite hallucinations
Will verbalize 1 strategy to successfully cope with psychotic symptoms

## 2020-12-31 NOTE — BH INPATIENT PSYCHIATRY PROGRESS NOTE - NSBHFUPINTERVALCCFT_PSY_A_CORE
Patient seen for follow up for schizophrenia, chart reviewed, and case discussed with treatment team.  No events reported overnight. Sleep and appetite is fair. Patient completes ADLS with staff support. Patient remains grossly disorganized and confused. She is responsive to staff redirection and is out on the unit at times. Fazaclo titrated to 150mg QHS. Patient has been compliant with medications, no adverse effects reported.       
Psychosis     
f/up Schizophrenia: "I ate good, I slept good".
f/up schizophrenia: "I just want to go home". 
psychosis
Psychosis/disorganization+noncompliance+comorbids
Pt seen f/u for psychosis
Psychosis/disorganization+clozapine uptitration+option of CLEANING
"I don't think I am ready to go home yet"	
Psychosis/disorganization+noncompliance
Patient reported feeling "tired" this morning, required encouragement to sit up in bed. 
"I am doing well, I enjoy talking with you"	
Seen f/u for psychosis	
Seen f/u for psychosis

## 2020-12-31 NOTE — BH INPATIENT PSYCHIATRY PROGRESS NOTE - NSTXPROBDISORG_PSY_ALL_CORE
DISORGANIZATION OF THOUGHT/BEHAVIOR

## 2020-12-31 NOTE — BH INPATIENT PSYCHIATRY PROGRESS NOTE - NSBHMETABOLIC_PSY_ALL_CORE_FT
BMI: BMI (kg/m2): 26.2 (11-27-20 @ 08:30)  HbA1c: A1C with Estimated Average Glucose: 9.5 % (11-05-20 @ 07:50)    Glucose: POCT Blood Glucose.: 201 mg/dL (12-16-20 @ 11:54)    BP: 110/63 (12-16-20 @ 08:29) (110/63 - 122/76)  Lipid Panel: Date/Time: 11-05-20 @ 07:50  Cholesterol, Serum: 146  Direct LDL: 79  HDL Cholesterol, Serum: 31  Total Cholesterol/HDL Ration Measurement: --  Triglycerides, Serum: 355  
BMI: BMI (kg/m2): 26.2 (11-27-20 @ 08:30)  HbA1c:   Glucose: POCT Blood Glucose.: 146 mg/dL (12-09-20 @ 12:37)    BP: --  Lipid Panel: Date/Time: 11-05-20 @ 07:50  Cholesterol, Serum: 146  Direct LDL: 79  HDL Cholesterol, Serum: 31  Total Cholesterol/HDL Ration Measurement: --  Triglycerides, Serum: 355  
BMI: BMI (kg/m2): 26.2 (11-27-20 @ 08:30)  HbA1c:   Glucose: POCT Blood Glucose.: 146 mg/dL (12-14-20 @ 12:07)    BP: 108/77 (12-13-20 @ 07:40) (108/77 - 108/77)  Lipid Panel: Date/Time: 11-05-20 @ 07:50  Cholesterol, Serum: 146  Direct LDL: 79  HDL Cholesterol, Serum: 31  Total Cholesterol/HDL Ration Measurement: --  Triglycerides, Serum: 355  
BMI: BMI (kg/m2): 26.2 (11-27-20 @ 08:30)  HbA1c:   Glucose: POCT Blood Glucose.: 203 mg/dL (12-08-20 @ 08:16)    BP: --  Lipid Panel: Date/Time: 11-05-20 @ 07:50  Cholesterol, Serum: 146  Direct LDL: 79  HDL Cholesterol, Serum: 31  Total Cholesterol/HDL Ration Measurement: --  Triglycerides, Serum: 355  
BMI: BMI (kg/m2): 26.2 (11-27-20 @ 08:30)  HbA1c: A1C with Estimated Average Glucose: 9.5 % (11-05-20 @ 07:50)    Glucose: POCT Blood Glucose.: 200 mg/dL (12-15-20 @ 11:46)    BP: 108/77 (12-13-20 @ 07:40) (108/77 - 108/77)  Lipid Panel: Date/Time: 11-05-20 @ 07:50  Cholesterol, Serum: 146  Direct LDL: 79  HDL Cholesterol, Serum: 31  Total Cholesterol/HDL Ration Measurement: --  Triglycerides, Serum: 355  
BMI: BMI (kg/m2): 26.2 (11-27-20 @ 08:30)  HbA1c:   Glucose: POCT Blood Glucose.: 191 mg/dL (12-10-20 @ 08:21)    BP: 100/62 (12-10-20 @ 08:49) (100/62 - 100/62)  Lipid Panel: Date/Time: 11-05-20 @ 07:50  Cholesterol, Serum: 146  Direct LDL: 79  HDL Cholesterol, Serum: 31  Total Cholesterol/HDL Ration Measurement: --  Triglycerides, Serum: 355  
BMI: BMI (kg/m2): 26.2 (11-27-20 @ 08:30)  HbA1c:   Glucose: POCT Blood Glucose.: 230 mg/dL (12-11-20 @ 08:15)    BP: 100/62 (12-10-20 @ 08:49) (100/62 - 100/62)  Lipid Panel: Date/Time: 11-05-20 @ 07:50  Cholesterol, Serum: 146  Direct LDL: 79  HDL Cholesterol, Serum: 31  Total Cholesterol/HDL Ration Measurement: --  Triglycerides, Serum: 355  
BMI: BMI (kg/m2): 26.2 (11-27-20 @ 08:30)  HbA1c: A1C with Estimated Average Glucose: 9.5 % (11-05-20 @ 07:50)    Glucose: POCT Blood Glucose.: 179 mg/dL (12-29-20 @ 07:56)    BP: 104/64 (12-29-20 @ 07:38) (100/57 - 104/64)  Lipid Panel: Date/Time: 11-05-20 @ 07:50  Cholesterol, Serum: 146  Direct LDL: 79  HDL Cholesterol, Serum: 31  Total Cholesterol/HDL Ration Measurement: --  Triglycerides, Serum: 355  
BMI: BMI (kg/m2): 26.2 (11-27-20 @ 08:30)  HbA1c: A1C with Estimated Average Glucose: 9.5 % (11-05-20 @ 07:50)    Glucose: POCT Blood Glucose.: 157 mg/dL (12-21-20 @ 12:15)    BP: 119/67 (12-20-20 @ 08:50) (95/65 - 119/67)  Lipid Panel: Date/Time: 11-05-20 @ 07:50  Cholesterol, Serum: 146  Direct LDL: 79  HDL Cholesterol, Serum: 31  Total Cholesterol/HDL Ration Measurement: --  Triglycerides, Serum: 355  
BMI: BMI (kg/m2): 26.2 (11-27-20 @ 08:30)  HbA1c: A1C with Estimated Average Glucose: 9.5 % (11-05-20 @ 07:50)    Glucose: POCT Blood Glucose.: 232 mg/dL (12-18-20 @ 12:05)    BP: 118/79 (12-18-20 @ 08:50) (108/62 - 122/76)  Lipid Panel: Date/Time: 11-05-20 @ 07:50  Cholesterol, Serum: 146  Direct LDL: 79  HDL Cholesterol, Serum: 31  Total Cholesterol/HDL Ration Measurement: --  Triglycerides, Serum: 355  
BMI: BMI (kg/m2): 26.2 (11-27-20 @ 08:30)  HbA1c: A1C with Estimated Average Glucose: 9.5 % (11-05-20 @ 07:50)    Glucose: POCT Blood Glucose.: 179 mg/dL (12-23-20 @ 16:10)    BP: --  Lipid Panel: Date/Time: 11-05-20 @ 07:50  Cholesterol, Serum: 146  Direct LDL: 79  HDL Cholesterol, Serum: 31  Total Cholesterol/HDL Ration Measurement: --  Triglycerides, Serum: 355  
BMI: BMI (kg/m2): 26.2 (11-27-20 @ 08:30)  HbA1c: A1C with Estimated Average Glucose: 9.5 % (11-05-20 @ 07:50)    Glucose: POCT Blood Glucose.: 218 mg/dL (12-22-20 @ 08:03)    BP: 119/67 (12-20-20 @ 08:50) (119/67 - 119/67)  Lipid Panel: Date/Time: 11-05-20 @ 07:50  Cholesterol, Serum: 146  Direct LDL: 79  HDL Cholesterol, Serum: 31  Total Cholesterol/HDL Ration Measurement: --  Triglycerides, Serum: 355  
BMI: BMI (kg/m2): 26.2 (11-27-20 @ 08:30)  HbA1c: A1C with Estimated Average Glucose: 9.5 % (11-05-20 @ 07:50)    Glucose: POCT Blood Glucose.: 186 mg/dL (12-27-20 @ 16:25)    BP: 100/57 (12-27-20 @ 07:40) (100/57 - 100/57)  Lipid Panel: Date/Time: 11-05-20 @ 07:50  Cholesterol, Serum: 146  Direct LDL: 79  HDL Cholesterol, Serum: 31  Total Cholesterol/HDL Ration Measurement: --  Triglycerides, Serum: 355  
BMI: BMI (kg/m2): 26.2 (11-27-20 @ 08:30)  HbA1c: A1C with Estimated Average Glucose: 9.5 % (11-05-20 @ 07:50)    Glucose: POCT Blood Glucose.: 202 mg/dL (12-28-20 @ 16:28)    BP: 100/57 (12-27-20 @ 07:40) (100/57 - 100/57)  Lipid Panel: Date/Time: 11-05-20 @ 07:50  Cholesterol, Serum: 146  Direct LDL: 79  HDL Cholesterol, Serum: 31  Total Cholesterol/HDL Ration Measurement: --  Triglycerides, Serum: 355  
BMI: BMI (kg/m2): 26.2 (11-27-20 @ 08:30)  HbA1c: A1C with Estimated Average Glucose: 9.5 % (11-05-20 @ 07:50)    Glucose: POCT Blood Glucose.: 215 mg/dL (12-31-20 @ 07:56)    BP: 104/64 (12-29-20 @ 07:38) (104/64 - 104/64)  Lipid Panel: Date/Time: 11-05-20 @ 07:50  Cholesterol, Serum: 146  Direct LDL: 79  HDL Cholesterol, Serum: 31  Total Cholesterol/HDL Ration Measurement: --  Triglycerides, Serum: 355  
BMI: BMI (kg/m2): 26.2 (11-27-20 @ 08:30)  HbA1c: A1C with Estimated Average Glucose: 9.5 % (11-05-20 @ 07:50)    Glucose: POCT Blood Glucose.: 185 mg/dL (12-30-20 @ 08:03)    BP: 104/64 (12-29-20 @ 07:38) (104/64 - 104/64)  Lipid Panel: Date/Time: 11-05-20 @ 07:50  Cholesterol, Serum: 146  Direct LDL: 79  HDL Cholesterol, Serum: 31  Total Cholesterol/HDL Ration Measurement: --  Triglycerides, Serum: 355

## 2020-12-31 NOTE — BH INPATIENT PSYCHIATRY PROGRESS NOTE - NSTXCOPEGOALOTHER_PSY_ALL_CORE
Demonstrate daily medication/treatment compliance for improved symptom management.

## 2020-12-31 NOTE — BH INPATIENT PSYCHIATRY PROGRESS NOTE - NSTXPROBPSYCHO_PSY_ALL_CORE
PSYCHOTIC SYMPTOMS

## 2020-12-31 NOTE — BH INPATIENT PSYCHIATRY PROGRESS NOTE - NSTXPROBSLFCRE_PSY_ALL_CORE
SELF-CARE DEFICIT

## 2020-12-31 NOTE — BH INPATIENT PSYCHIATRY PROGRESS NOTE - PRN MEDS
MEDICATIONS  (PRN):  acetaminophen   Tablet .. 650 milliGRAM(s) Oral every 6 hours PRN Temp greater or equal to 38C (100.4F), Moderate Pain (4 - 6), Severe Pain (7 - 10)  haloperidol     Tablet 5 milliGRAM(s) Oral every 6 hours PRN agitation  haloperidol    Injectable 5 milliGRAM(s) IntraMuscular once PRN combative behavior  LORazepam     Tablet 1 milliGRAM(s) Oral every 6 hours PRN anxiety  LORazepam   Injectable 2 milliGRAM(s) IntraMuscular once PRN agitation  
MEDICATIONS  (PRN):  acetaminophen   Tablet .. 650 milliGRAM(s) Oral every 6 hours PRN Temp greater or equal to 38C (100.4F), Moderate Pain (4 - 6), Severe Pain (7 - 10)  aluminum hydroxide/magnesium hydroxide/simethicone Suspension 30 milliLiter(s) Oral every 4 hours PRN Dyspepsia  haloperidol     Tablet 5 milliGRAM(s) Oral every 6 hours PRN agitation  haloperidol    Injectable 5 milliGRAM(s) IntraMuscular once PRN combative behavior  
MEDICATIONS  (PRN):  acetaminophen   Tablet .. 650 milliGRAM(s) Oral every 6 hours PRN Temp greater or equal to 38C (100.4F), Moderate Pain (4 - 6), Severe Pain (7 - 10)  haloperidol     Tablet 5 milliGRAM(s) Oral every 6 hours PRN agitation  haloperidol    Injectable 5 milliGRAM(s) IntraMuscular once PRN combative behavior  LORazepam     Tablet 1 milliGRAM(s) Oral every 6 hours PRN anxiety  LORazepam   Injectable 2 milliGRAM(s) IntraMuscular once PRN agitation  
MEDICATIONS  (PRN):  acetaminophen   Tablet .. 650 milliGRAM(s) Oral every 6 hours PRN Temp greater or equal to 38C (100.4F), Moderate Pain (4 - 6), Severe Pain (7 - 10)  aluminum hydroxide/magnesium hydroxide/simethicone Suspension 30 milliLiter(s) Oral every 4 hours PRN Dyspepsia  haloperidol     Tablet 5 milliGRAM(s) Oral every 6 hours PRN agitation  haloperidol    Injectable 5 milliGRAM(s) IntraMuscular once PRN combative behavior  
MEDICATIONS  (PRN):  acetaminophen   Tablet .. 650 milliGRAM(s) Oral every 6 hours PRN Temp greater or equal to 38C (100.4F), Moderate Pain (4 - 6), Severe Pain (7 - 10)  aluminum hydroxide/magnesium hydroxide/simethicone Suspension 30 milliLiter(s) Oral every 4 hours PRN Dyspepsia  haloperidol     Tablet 5 milliGRAM(s) Oral every 6 hours PRN agitation  haloperidol    Injectable 5 milliGRAM(s) IntraMuscular once PRN combative behavior  
MEDICATIONS  (PRN):  acetaminophen   Tablet .. 650 milliGRAM(s) Oral every 6 hours PRN Temp greater or equal to 38C (100.4F), Moderate Pain (4 - 6), Severe Pain (7 - 10)  haloperidol     Tablet 5 milliGRAM(s) Oral every 6 hours PRN agitation  haloperidol    Injectable 5 milliGRAM(s) IntraMuscular once PRN combative behavior  LORazepam     Tablet 1 milliGRAM(s) Oral every 6 hours PRN anxiety  LORazepam   Injectable 2 milliGRAM(s) IntraMuscular once PRN agitation  
MEDICATIONS  (PRN):  acetaminophen   Tablet .. 650 milliGRAM(s) Oral every 6 hours PRN Temp greater or equal to 38C (100.4F), Moderate Pain (4 - 6), Severe Pain (7 - 10)  haloperidol     Tablet 5 milliGRAM(s) Oral every 6 hours PRN agitation  haloperidol    Injectable 5 milliGRAM(s) IntraMuscular once PRN combative behavior  LORazepam     Tablet 1 milliGRAM(s) Oral every 6 hours PRN anxiety  LORazepam   Injectable 2 milliGRAM(s) IntraMuscular once PRN agitation  
MEDICATIONS  (PRN):  acetaminophen   Tablet .. 650 milliGRAM(s) Oral every 6 hours PRN Temp greater or equal to 38C (100.4F), Moderate Pain (4 - 6), Severe Pain (7 - 10)  haloperidol     Tablet 5 milliGRAM(s) Oral every 6 hours PRN agitation  haloperidol    Injectable 5 milliGRAM(s) IntraMuscular once PRN combative behavior  
MEDICATIONS  (PRN):  acetaminophen   Tablet .. 650 milliGRAM(s) Oral every 6 hours PRN Temp greater or equal to 38C (100.4F), Moderate Pain (4 - 6), Severe Pain (7 - 10)  aluminum hydroxide/magnesium hydroxide/simethicone Suspension 30 milliLiter(s) Oral every 4 hours PRN Dyspepsia  haloperidol     Tablet 5 milliGRAM(s) Oral every 6 hours PRN agitation  haloperidol    Injectable 5 milliGRAM(s) IntraMuscular once PRN combative behavior

## 2020-12-31 NOTE — BH INPATIENT PSYCHIATRY PROGRESS NOTE - NSBHASSESSSUMMFT_PSY_ALL_CORE
Pt with more goal directed thought process, with chronic AH of "voices" not distressing or command in nature.  Has been tending to ADLs on her own.  Care coordinated by SW and patient ready for discharge today.

## 2020-12-31 NOTE — BH INPATIENT PSYCHIATRY PROGRESS NOTE - MSE OPTIONS
Unstructured MSE

## 2020-12-31 NOTE — BH INPATIENT PSYCHIATRY PROGRESS NOTE - NSBHFUPINTERVALHXFT_PSY_A_CORE
"I have to pray, remember what happened to another world, so much war in a different country". Patient reported sleeping well and with fair appetite. As per nursing, last time patient defecated on herself was friday. Patient observed eating her lunch today, reported eating minimal breakfast. Patient not observed to have waxy flexibility or unblinking stare.
56 y/o female, single, noncaregiver, disabled, lives with [facility], history of Schizophrenia, multiple past admissions at [facility] (most recent 2015), most recent psych admission at [facility] several months ago for psychosis, followed by psychiatrist Dr. Love at [facility] (pt has no h/o developmental disability), prescribed Clozaril, Risperdal, Depakote, Cogentin, no h/o self-injurious behavior or suicide attempts, no h/o violence or legal issues, PMH Type II DM, HLD, asthma, no h/o substance abuse, BIBEMS activated by lizy (Rahat) for acute psychosis.     Patient transferred to [facility] and admitted to medicine with severe urosepsis pyelonephritis E. Coli treated with ceftriazone changed to Zosyn secondary to rising LFTs.    Pt with SOB with CXR positive for pulmonary edema resolved with Lasix.  +elevated CPK secondary to rigors. +DMT2.  +HLD.  Pt was stabilized and returned to [facility].  
Patient reported eating a lot last night, stated that "god said it was good". Patient was focused on Editorially and segun. As per staff, patient has been able to go to the bathroom. Patient reported fair appetite and sleeping well last night. Denied constipation. Patient taking medications and denies any SE.
Patient reported feeling "okay", observed to be religiously focused, reported sleeping well and with low to fair appetite. Patient observed to eat 30% of breakfast and needs encouragement. Writer spoke to marianna Weems who stated that patient sounded better and improved, was religiously focused, concerned about discharge plans for patient. 
Patient seen for follow up for schizophrenia, chart reviewed, and case discussed with treatment team.  No events reported overnight. Patient is little changed.  Sleep and appetite are fair. Patient completes ADLs with staff support. Patient remains grossly disorganized and confused, unable to fully participate in interview, becoming tangential and loose. She is responsive to staff redirection and is out on the unit at times. Patient has been compliant with medications, no adverse effects reported.
Patient seen for follow up for schizophrenia, chart reviewed, and case discussed with treatment team.  No events reported overnight. The patient is less sedated this morning, out of bed, and visible on the unit.  She remains grossly disorganized, tangential and loose in thought process.  She is unable to fully participate in the interview.  She completes ADLs with staff support. Encouraged the patient to stay out of bed today.  She has been sleeping and eating well.  Patient has been compliant with medications, no adverse effects reported.
Patient seen for follow up for schizophrenia, chart reviewed, and case discussed with treatment team.  No events reported overnight. The patient is showing some small improvement in symptoms.  She is able to have a goal directed conversation for short periods of time.  However, she then becomes more tangential and loose again.  Encouraged the patient to spend more time out of bed, and to increase her po intake.  She has been sleeping and eating well.  Patient has been compliant with medications, no adverse effects reported.
Pt calm and cooperative with interview, happy to be going home today.  Looks forward to spending time with her fiance.  No disorganization noted on interview.  Pt denies SI/HI/I/P or VH or paranoia.  Pt with chronic AH of "music," not command or distressing in nature.  Pt eating and sleeping well.  Pt tending to ADLs on her own.  Pt endorses motivation to continue medication as prescribed and engage in outpatient treatment.  Pt reports she likes her providers at [facility].  Pt reports she will allow her fiance to administer her medication to ensure she is compliant.
Pt remains rather disorganized, but improving. Frequently stops writer to ask if he is rabbi, but attenuating.  Today writer is covering attending, and pt happy to discuss her family background, but at times rather unintelligible.  Says eating and sleeping well.  No clozapine incontinence.  SW arranged video with .  No new SEs reported or observed.  Will increase clozapine further and discuss CLEANING options tomorrow.
Pt reports she is feeling better, but not ready to go home yet.  Pt denies dizziness or enuresis last night or over the weekend.  Orthostatic VS WNL.  Pt remains less disorganized with more goal directed conversation.  Pt willing to titrate Fazaclo to 250mg PO at bedtime for tonight, reports she thinks that will help her.  Pt denies SI/HI/I/P or VH or paranoia.  Reports AH of "music" not command and appears internally preoccupied. Pt has been showering with staff assistance.  Advised by legal that patient has court hearing for discharge tomorrow.  Pt reports to writer she does not want to go to court as she feels she needs further hospitalization.  Reports she will tell mental hygiene .
Pt compliant with PO meds.  Feels the medication is helping, and reports improvements in past few days, likely d/t recent dose increase above home dose.  Also started CLEANING loading and we will continue loading this week.  Asks about discharge to home.  Will consider THURS or soon after, with further increases.  Appears less thought disordered and more organized, also brighter affect noted.  No new SEs reported or observed.  Will not likely pursue ACT, but SW making further inquiries.
Patient reported enuresis last night.  Patient eating minimal breakfast this morning, and reported that she eats lunch and dinner better. Denied constipation. Patient reported slight dizziness when getting up from bed and patient instructed to take her time from one  position to another. Orthostatic BPs unremarkable from last night. Patient denied any sialorrhea and no visible sialorrhea. Patient observed to be less disorganized. Reported sleeping well last night. 
Pt remains less disorganized, able to make more goal oriented statements.  Pt reports she has been talking to her fiance and he has been supportive.  She reports she lives with him and he helps her to be compliant with medication.  Pt also talks about her children and how she misses them.  Pt denies SI/HI/I/P or VH or paranoia.  Pt remains with AH of "music, some good some bad," but denies the voices are command in nature.  Pt reports the voices never go away.  Pt reports she showered this morning with minimal staff assistance.  Pt reports she has been toileting herself as well.  Pt is thankful for care received on the unit.  Pt denies physical complaints today.
Pt with more goal directed thought process.  Pt reports AH of "music," chronic in nature and not distressing.  Pt reports, "it is good music."  Pt denies SI/HI/I/P or VH or paranoia.  Pt reports she made her bed and showered on her own today.  Pt reports eating and sleeping well.  Pt continues to be agreeable to receive second dose of Invega CLEANING.  Pt reports she feels ready for discharge this week and will be going home to her fiance.  Pt reports he is supportive and helps to make sure patient is compliant with medication by administering it to her.
Pt able to verbalize that her meds are helping her.  She wishes to continue clozapine and risperdal.  Agrees that an CLEANING option makes sense, could be helfpul.  Willing to start loading of CLEANING.  Discussed plan for small clozapine increase.  No new SEs reported or observed.  Possible goal of DC next week.
Pt calm and cooperative with interview.  Patient reports she showered and tended to her ADLs on her own this morning.  Pt denies SI/HI/I/P or VH or paranoia.  Pt continues to reports AH of "music,"  not command or distressing in nature.  Pt feels ready for discharge tomorrow.  Pt able to do insulin pen with nursing.  Pt reports good sleep and appetite.

## 2020-12-31 NOTE — BH INPATIENT PSYCHIATRY PROGRESS NOTE - NSTXDCSOCPROGRES_PSY_ALL_CORE
Improving
Met - goal discontinued
Improving
Met - goal discontinued
Improving
Improving

## 2020-12-31 NOTE — BH INPATIENT PSYCHIATRY PROGRESS NOTE - NSTXPSYCHODATETRGT_PSY_ALL_CORE
25-Dec-2020
15-Dec-2020
25-Dec-2020
15-Dec-2020
15-Dec-2020
25-Dec-2020

## 2020-12-31 NOTE — BH INPATIENT PSYCHIATRY PROGRESS NOTE - NSTXDISORGDATETRGT_PSY_ALL_CORE
18-Dec-2020

## 2020-12-31 NOTE — BH INPATIENT PSYCHIATRY PROGRESS NOTE - NSTXPSYCHODATEEST_PSY_ALL_CORE
17-Dec-2020
08-Dec-2020
08-Dec-2020
17-Dec-2020
08-Dec-2020
17-Dec-2020

## 2020-12-31 NOTE — BH INPATIENT PSYCHIATRY PROGRESS NOTE - NSTXDCSOCINTERMD_PSY_ALL_CORE
ACT referral, guardian in place, family support
ACT referral, guardian in place, family support
guardian in place, family support
guardian in place, family support, care coordinated by ANABELA
ACT referral, guardian in place, family support
ACT referral, guardian in place, family support
guardian in place, family support, care coordinated by ANABELA

## 2020-12-31 NOTE — BH INPATIENT PSYCHIATRY PROGRESS NOTE - NSTXSLFCREINTERMD_PSY_ALL_CORE
Staff support and encouragement, toileting every hours
Staff support, toileting every 2-3 hours
Staff support
Staff support and encouragement, toileting 
Staff support
Staff support, toileting every 2-3 hours
Staff support
Staff support
Staff support and encouragement, toileting every hours
Staff support and encouragement, toileting

## 2021-01-04 NOTE — CHART NOTE - NSCHARTNOTEFT_GEN_A_CORE
Received call from patient's fiance, Rahat Maharajalicia ([phone]), reporting patient does not have glucometer at home and needs prescription sent to [facility] ([phone]).  He reported patient is doing "really well" and was thankful for services provided to her while inpatient.  He reports she went to appointment with psychiatrist at [facility] today and is going to make an appointment to follow-up with PCP.  Advised him prescription for lancets, glucometer and test strips sent to [facility] pharmacy.

## 2021-01-06 RX ORDER — ISOPROPYL ALCOHOL, BENZOCAINE .7; .06 ML/ML; ML/ML
1 SWAB TOPICAL
Qty: 100 | Refills: 1
Start: 2021-01-06 | End: 2021-02-24

## 2021-01-07 RX ORDER — ISOPROPYL ALCOHOL, BENZOCAINE .7; .06 ML/ML; ML/ML
1 SWAB TOPICAL
Qty: 100 | Refills: 1
Start: 2021-01-07 | End: 2021-02-25

## 2021-01-14 NOTE — SOCIAL WORK POST DISCHARGE FOLLOW UP NOTE - NSBHSWFOLLOWUP_PSY_ALL_CORE_FT
I/p Chief received home care forms and completed. Sw faxed to [facility] at [phone]. forms were sent to medical records to be placed in pt chart.

## 2021-05-14 NOTE — PROGRESS NOTE BEHAVIORAL HEALTH - THOUGHT PROCESS
No change in H&P.I reviewed the H&P, I examined the patient, and there are no changes in thepatient's condition.    Tangential/Other/Flight of ideas/Disorganized

## 2022-11-01 NOTE — ED BEHAVIORAL HEALTH ASSESSMENT NOTE - FAMILY DETAILS
SURGERY POSTOPERATIVE VISIT NOTE      OPERATION POST-OP DAY    Excision of 2.5 cm posterior neck sebaceous cyst 10     HISTORY OF PRESENT ILLNESS:    Kasey Dailey is a 59 year old year old female who presents for follow up after surgical intervention as noted above by Dr. Vargas on 10/24/22.    Today, the patient reports feeling good. Denies pain on the back of her neck. Denies fevers, chills and drainage from incision. Does note a pulling sensation on occasion with certain head movements.     I have reviewed the nurse's notes and assessment and agree.     PHYSICAL EXAMINATION:    Vitals signs:  There were no vitals taken for this visit.  Constitutional:  Patient was alert and appropriate. No severe distress.    Respiratory:  Normal effort without use of accessory muscles.    Cardiac:  Normal rate.   Integument: Posterior neck incision well approximated and healing well. Skin glue beginning to flake off. NO s/s of infection. Non-tender.       PATHOLOGY/CULTURES:  Neck, posterior, excision of cyst:   -Infundibular/epidermal cyst    ASSESSMENT/PLAN:   Patient is a 59 year old year old female who is POD10 from Excision of 2.5 cm posterior neck sebaceous cyst  by Dr. Vargas on 10/24/22.    1. Ok for showers. No baths or submerging in water until incisions have full healed.   2. Sutures/Staples: NA  3. Pathology/Culture results: Reviewed and noted above.  4. Work/School Excuse: NA  5. Follow up: as needed with the General Surgery service. Patient agreed to call the clinic with any further questions/concerns.      Rachel Paget PA-C  General Surgery  [phone]  Supervising Physician  Dr. Vargas   lives with fiance

## 2023-07-04 NOTE — BH TREATMENT PLAN - NSTXPSYCHODATETRGT_PSY_ALL_CORE
0340 pt presented to [facility] with painful contractions  0350 pt consented to [facility] and was found to be 8/90/-1  0356 pt delivered live female infant precipitously in [facility] with CNM at bedside  0405 pt transferred to L&D    7/5/2023 PPD 1 -- doing well, normal involution, normal lochia, pain controlled with PO meds. Breastfeeding going well. Desires discharge home today.   
25-Dec-2020
15-Dec-2020

## 2023-07-06 ENCOUNTER — INPATIENT (INPATIENT)
Facility: HOSPITAL | Age: 58
LOS: 5 days | Discharge: HOME HEALTH | End: 2023-07-12
Attending: HOSPITALIST | Admitting: HOSPITALIST
Payer: MEDICARE

## 2023-07-06 VITALS
TEMPERATURE: 98 F | DIASTOLIC BLOOD PRESSURE: 94 MMHG | SYSTOLIC BLOOD PRESSURE: 150 MMHG | HEART RATE: 107 BPM | RESPIRATION RATE: 18 BRPM | OXYGEN SATURATION: 100 %

## 2023-07-06 DIAGNOSIS — F25.9 SCHIZOAFFECTIVE DISORDER, UNSPECIFIED: ICD-10-CM

## 2023-07-06 DIAGNOSIS — R79.89 OTHER SPECIFIED ABNORMAL FINDINGS OF BLOOD CHEMISTRY: ICD-10-CM

## 2023-07-06 DIAGNOSIS — E78.5 HYPERLIPIDEMIA, UNSPECIFIED: ICD-10-CM

## 2023-07-06 DIAGNOSIS — E87.20 ACIDOSIS, UNSPECIFIED: ICD-10-CM

## 2023-07-06 DIAGNOSIS — R46.2 STRANGE AND INEXPLICABLE BEHAVIOR: ICD-10-CM

## 2023-07-06 DIAGNOSIS — E11.9 TYPE 2 DIABETES MELLITUS WITHOUT COMPLICATIONS: ICD-10-CM

## 2023-07-06 LAB
ALBUMIN SERPL ELPH-MCNC: 4.7 G/DL — SIGNIFICANT CHANGE UP (ref 3.3–5)
ALBUMIN SERPL ELPH-MCNC: 4.7 G/DL — SIGNIFICANT CHANGE UP (ref 3.3–5)
ALP SERPL-CCNC: 107 U/L — SIGNIFICANT CHANGE UP (ref 40–120)
ALP SERPL-CCNC: 98 U/L — SIGNIFICANT CHANGE UP (ref 40–120)
ALT FLD-CCNC: 28 U/L — SIGNIFICANT CHANGE UP (ref 4–33)
ALT FLD-CCNC: 28 U/L — SIGNIFICANT CHANGE UP (ref 4–33)
AMPHET UR-MCNC: NEGATIVE — SIGNIFICANT CHANGE UP
ANION GAP SERPL CALC-SCNC: 16 MMOL/L — HIGH (ref 7–14)
ANION GAP SERPL CALC-SCNC: 17 MMOL/L — HIGH (ref 7–14)
APPEARANCE UR: CLEAR — SIGNIFICANT CHANGE UP
AST SERPL-CCNC: 14 U/L — SIGNIFICANT CHANGE UP (ref 4–32)
AST SERPL-CCNC: 17 U/L — SIGNIFICANT CHANGE UP (ref 4–32)
B-OH-BUTYR SERPL-SCNC: <0 MMOL/L — SIGNIFICANT CHANGE UP (ref 0–0.4)
B-OH-BUTYR SERPL-SCNC: <0 MMOL/L — SIGNIFICANT CHANGE UP (ref 0–0.4)
BACTERIA # UR AUTO: NEGATIVE /HPF — SIGNIFICANT CHANGE UP
BARBITURATES UR SCN-MCNC: NEGATIVE — SIGNIFICANT CHANGE UP
BASE EXCESS BLDV CALC-SCNC: -0.4 MMOL/L — SIGNIFICANT CHANGE UP (ref -2–3)
BASE EXCESS BLDV CALC-SCNC: -0.4 MMOL/L — SIGNIFICANT CHANGE UP (ref -2–3)
BASE EXCESS BLDV CALC-SCNC: -0.6 MMOL/L — SIGNIFICANT CHANGE UP (ref -2–3)
BASE EXCESS BLDV CALC-SCNC: -3.2 MMOL/L — LOW (ref -2–3)
BASE EXCESS BLDV CALC-SCNC: 1.9 MMOL/L — SIGNIFICANT CHANGE UP (ref -2–3)
BASOPHILS # BLD AUTO: 0.03 K/UL — SIGNIFICANT CHANGE UP (ref 0–0.2)
BASOPHILS NFR BLD AUTO: 0.3 % — SIGNIFICANT CHANGE UP (ref 0–2)
BENZODIAZ UR-MCNC: NEGATIVE — SIGNIFICANT CHANGE UP
BILIRUB SERPL-MCNC: 0.6 MG/DL — SIGNIFICANT CHANGE UP (ref 0.2–1.2)
BILIRUB SERPL-MCNC: 0.7 MG/DL — SIGNIFICANT CHANGE UP (ref 0.2–1.2)
BILIRUB UR-MCNC: NEGATIVE — SIGNIFICANT CHANGE UP
BLOOD GAS VENOUS COMPREHENSIVE RESULT: SIGNIFICANT CHANGE UP
BUN SERPL-MCNC: 11 MG/DL — SIGNIFICANT CHANGE UP (ref 7–23)
BUN SERPL-MCNC: 8 MG/DL — SIGNIFICANT CHANGE UP (ref 7–23)
CA-I SERPL-SCNC: 1.22 MMOL/L — SIGNIFICANT CHANGE UP (ref 1.15–1.33)
CALCIUM SERPL-MCNC: 10 MG/DL — SIGNIFICANT CHANGE UP (ref 8.4–10.5)
CALCIUM SERPL-MCNC: 9.5 MG/DL — SIGNIFICANT CHANGE UP (ref 8.4–10.5)
CAST: 0 /LPF — SIGNIFICANT CHANGE UP (ref 0–4)
CHLORIDE BLDV-SCNC: 102 MMOL/L — SIGNIFICANT CHANGE UP (ref 96–108)
CHLORIDE BLDV-SCNC: 102 MMOL/L — SIGNIFICANT CHANGE UP (ref 96–108)
CHLORIDE BLDV-SCNC: 105 MMOL/L — SIGNIFICANT CHANGE UP (ref 96–108)
CHLORIDE BLDV-SCNC: 105 MMOL/L — SIGNIFICANT CHANGE UP (ref 96–108)
CHLORIDE BLDV-SCNC: 98 MMOL/L — SIGNIFICANT CHANGE UP (ref 96–108)
CHLORIDE SERPL-SCNC: 103 MMOL/L — SIGNIFICANT CHANGE UP (ref 98–107)
CHLORIDE SERPL-SCNC: 97 MMOL/L — LOW (ref 98–107)
CO2 BLDV-SCNC: 24.6 MMOL/L — SIGNIFICANT CHANGE UP (ref 22–26)
CO2 BLDV-SCNC: 25.3 MMOL/L — SIGNIFICANT CHANGE UP (ref 22–26)
CO2 BLDV-SCNC: 26.8 MMOL/L — HIGH (ref 22–26)
CO2 BLDV-SCNC: 26.8 MMOL/L — HIGH (ref 22–26)
CO2 BLDV-SCNC: 27.9 MMOL/L — HIGH (ref 22–26)
CO2 SERPL-SCNC: 21 MMOL/L — LOW (ref 22–31)
CO2 SERPL-SCNC: 22 MMOL/L — SIGNIFICANT CHANGE UP (ref 22–31)
COCAINE METAB.OTHER UR-MCNC: NEGATIVE — SIGNIFICANT CHANGE UP
COLOR SPEC: YELLOW — SIGNIFICANT CHANGE UP
CREAT SERPL-MCNC: 0.49 MG/DL — LOW (ref 0.5–1.3)
CREAT SERPL-MCNC: 0.7 MG/DL — SIGNIFICANT CHANGE UP (ref 0.5–1.3)
CREATININE URINE RESULT, DAU: 20 MG/DL — SIGNIFICANT CHANGE UP
DIFF PNL FLD: NEGATIVE — SIGNIFICANT CHANGE UP
EGFR: 100 ML/MIN/1.73M2 — SIGNIFICANT CHANGE UP
EGFR: 108 ML/MIN/1.73M2 — SIGNIFICANT CHANGE UP
EOSINOPHIL # BLD AUTO: 0 K/UL — SIGNIFICANT CHANGE UP (ref 0–0.5)
EOSINOPHIL NFR BLD AUTO: 0 % — SIGNIFICANT CHANGE UP (ref 0–6)
GAS PNL BLDV: 134 MMOL/L — LOW (ref 136–145)
GAS PNL BLDV: 137 MMOL/L — SIGNIFICANT CHANGE UP (ref 136–145)
GAS PNL BLDV: 138 MMOL/L — SIGNIFICANT CHANGE UP (ref 136–145)
GAS PNL BLDV: 140 MMOL/L — SIGNIFICANT CHANGE UP (ref 136–145)
GAS PNL BLDV: 140 MMOL/L — SIGNIFICANT CHANGE UP (ref 136–145)
GAS PNL BLDV: SIGNIFICANT CHANGE UP
GAS PNL BLDV: SIGNIFICANT CHANGE UP
GLUCOSE BLDV-MCNC: 182 MG/DL — HIGH (ref 70–99)
GLUCOSE BLDV-MCNC: 187 MG/DL — HIGH (ref 70–99)
GLUCOSE BLDV-MCNC: 205 MG/DL — HIGH (ref 70–99)
GLUCOSE BLDV-MCNC: 230 MG/DL — HIGH (ref 70–99)
GLUCOSE BLDV-MCNC: 282 MG/DL — HIGH (ref 70–99)
GLUCOSE SERPL-MCNC: 185 MG/DL — HIGH (ref 70–99)
GLUCOSE SERPL-MCNC: 205 MG/DL — HIGH (ref 70–99)
GLUCOSE UR QL: NEGATIVE MG/DL — SIGNIFICANT CHANGE UP
HCO3 BLDV-SCNC: 23 MMOL/L — SIGNIFICANT CHANGE UP (ref 22–29)
HCO3 BLDV-SCNC: 24 MMOL/L — SIGNIFICANT CHANGE UP (ref 22–29)
HCO3 BLDV-SCNC: 25 MMOL/L — SIGNIFICANT CHANGE UP (ref 22–29)
HCO3 BLDV-SCNC: 25 MMOL/L — SIGNIFICANT CHANGE UP (ref 22–29)
HCO3 BLDV-SCNC: 27 MMOL/L — SIGNIFICANT CHANGE UP (ref 22–29)
HCT VFR BLD CALC: 37.6 % — SIGNIFICANT CHANGE UP (ref 34.5–45)
HCT VFR BLDA CALC: 35 % — SIGNIFICANT CHANGE UP (ref 34.5–46.5)
HCT VFR BLDA CALC: 36 % — SIGNIFICANT CHANGE UP (ref 34.5–46.5)
HCT VFR BLDA CALC: 37 % — SIGNIFICANT CHANGE UP (ref 34.5–46.5)
HCT VFR BLDA CALC: 38 % — SIGNIFICANT CHANGE UP (ref 34.5–46.5)
HCT VFR BLDA CALC: 40 % — SIGNIFICANT CHANGE UP (ref 34.5–46.5)
HGB BLD CALC-MCNC: 11.6 G/DL — LOW (ref 11.7–16.1)
HGB BLD CALC-MCNC: 12 G/DL — SIGNIFICANT CHANGE UP (ref 11.7–16.1)
HGB BLD CALC-MCNC: 12.3 G/DL — SIGNIFICANT CHANGE UP (ref 11.7–16.1)
HGB BLD CALC-MCNC: 12.5 G/DL — SIGNIFICANT CHANGE UP (ref 11.7–16.1)
HGB BLD CALC-MCNC: 13.2 G/DL — SIGNIFICANT CHANGE UP (ref 11.7–16.1)
HGB BLD-MCNC: 12.7 G/DL — SIGNIFICANT CHANGE UP (ref 11.5–15.5)
IANC: 5.69 K/UL — SIGNIFICANT CHANGE UP (ref 1.8–7.4)
IMM GRANULOCYTES NFR BLD AUTO: 0.4 % — SIGNIFICANT CHANGE UP (ref 0–0.9)
KETONES UR-MCNC: NEGATIVE MG/DL — SIGNIFICANT CHANGE UP
LACTATE BLDV-MCNC: 2.6 MMOL/L — HIGH (ref 0.5–2)
LACTATE BLDV-MCNC: 3.7 MMOL/L — HIGH (ref 0.5–2)
LACTATE BLDV-MCNC: 3.7 MMOL/L — HIGH (ref 0.5–2)
LACTATE BLDV-MCNC: 4.1 MMOL/L — CRITICAL HIGH (ref 0.5–2)
LACTATE BLDV-MCNC: 5.1 MMOL/L — CRITICAL HIGH (ref 0.5–2)
LEUKOCYTE ESTERASE UR-ACNC: NEGATIVE — SIGNIFICANT CHANGE UP
LYMPHOCYTES # BLD AUTO: 2.74 K/UL — SIGNIFICANT CHANGE UP (ref 1–3.3)
LYMPHOCYTES # BLD AUTO: 30.1 % — SIGNIFICANT CHANGE UP (ref 13–44)
MCHC RBC-ENTMCNC: 27.2 PG — SIGNIFICANT CHANGE UP (ref 27–34)
MCHC RBC-ENTMCNC: 33.8 GM/DL — SIGNIFICANT CHANGE UP (ref 32–36)
MCV RBC AUTO: 80.5 FL — SIGNIFICANT CHANGE UP (ref 80–100)
METHADONE UR-MCNC: NEGATIVE — SIGNIFICANT CHANGE UP
MONOCYTES # BLD AUTO: 0.6 K/UL — SIGNIFICANT CHANGE UP (ref 0–0.9)
MONOCYTES NFR BLD AUTO: 6.6 % — SIGNIFICANT CHANGE UP (ref 2–14)
NEUTROPHILS # BLD AUTO: 5.69 K/UL — SIGNIFICANT CHANGE UP (ref 1.8–7.4)
NEUTROPHILS NFR BLD AUTO: 62.6 % — SIGNIFICANT CHANGE UP (ref 43–77)
NITRITE UR-MCNC: NEGATIVE — SIGNIFICANT CHANGE UP
NRBC # BLD: 0 /100 WBCS — SIGNIFICANT CHANGE UP (ref 0–0)
NRBC # FLD: 0 K/UL — SIGNIFICANT CHANGE UP (ref 0–0)
OPIATES UR-MCNC: NEGATIVE — SIGNIFICANT CHANGE UP
OXYCODONE UR-MCNC: NEGATIVE — SIGNIFICANT CHANGE UP
PCO2 BLDV: 38 MMHG — LOW (ref 39–52)
PCO2 BLDV: 41 MMHG — SIGNIFICANT CHANGE UP (ref 39–52)
PCO2 BLDV: 45 MMHG — SIGNIFICANT CHANGE UP (ref 39–52)
PCO2 BLDV: 46 MMHG — SIGNIFICANT CHANGE UP (ref 39–52)
PCO2 BLDV: 46 MMHG — SIGNIFICANT CHANGE UP (ref 39–52)
PCP SPEC-MCNC: SIGNIFICANT CHANGE UP
PCP UR-MCNC: NEGATIVE — SIGNIFICANT CHANGE UP
PH BLDV: 7.31 — LOW (ref 7.32–7.43)
PH BLDV: 7.35 — SIGNIFICANT CHANGE UP (ref 7.32–7.43)
PH BLDV: 7.36 — SIGNIFICANT CHANGE UP (ref 7.32–7.43)
PH BLDV: 7.41 — SIGNIFICANT CHANGE UP (ref 7.32–7.43)
PH BLDV: 7.42 — SIGNIFICANT CHANGE UP (ref 7.32–7.43)
PH UR: 6 — SIGNIFICANT CHANGE UP (ref 5–8)
PLATELET # BLD AUTO: 281 K/UL — SIGNIFICANT CHANGE UP (ref 150–400)
PO2 BLDV: 24 MMHG — LOW (ref 25–45)
PO2 BLDV: 29 MMHG — SIGNIFICANT CHANGE UP (ref 25–45)
PO2 BLDV: 37 MMHG — SIGNIFICANT CHANGE UP (ref 25–45)
PO2 BLDV: 50 MMHG — HIGH (ref 25–45)
PO2 BLDV: 61 MMHG — HIGH (ref 25–45)
POTASSIUM BLDV-SCNC: 3.5 MMOL/L — SIGNIFICANT CHANGE UP (ref 3.5–5.1)
POTASSIUM BLDV-SCNC: 3.6 MMOL/L — SIGNIFICANT CHANGE UP (ref 3.5–5.1)
POTASSIUM BLDV-SCNC: 3.6 MMOL/L — SIGNIFICANT CHANGE UP (ref 3.5–5.1)
POTASSIUM BLDV-SCNC: 3.8 MMOL/L — SIGNIFICANT CHANGE UP (ref 3.5–5.1)
POTASSIUM BLDV-SCNC: 4 MMOL/L — SIGNIFICANT CHANGE UP (ref 3.5–5.1)
POTASSIUM SERPL-MCNC: 3.6 MMOL/L — SIGNIFICANT CHANGE UP (ref 3.5–5.3)
POTASSIUM SERPL-MCNC: 3.9 MMOL/L — SIGNIFICANT CHANGE UP (ref 3.5–5.3)
POTASSIUM SERPL-SCNC: 3.6 MMOL/L — SIGNIFICANT CHANGE UP (ref 3.5–5.3)
POTASSIUM SERPL-SCNC: 3.9 MMOL/L — SIGNIFICANT CHANGE UP (ref 3.5–5.3)
PROT SERPL-MCNC: 7.7 G/DL — SIGNIFICANT CHANGE UP (ref 6–8.3)
PROT SERPL-MCNC: 7.9 G/DL — SIGNIFICANT CHANGE UP (ref 6–8.3)
PROT UR-MCNC: NEGATIVE MG/DL — SIGNIFICANT CHANGE UP
RBC # BLD: 4.67 M/UL — SIGNIFICANT CHANGE UP (ref 3.8–5.2)
RBC # FLD: 12.7 % — SIGNIFICANT CHANGE UP (ref 10.3–14.5)
RBC CASTS # UR COMP ASSIST: 0 /HPF — SIGNIFICANT CHANGE UP (ref 0–4)
SAO2 % BLDV: 30.5 % — LOW (ref 67–88)
SAO2 % BLDV: 41.1 % — LOW (ref 67–88)
SAO2 % BLDV: 56.9 % — LOW (ref 67–88)
SAO2 % BLDV: 85.4 % — SIGNIFICANT CHANGE UP (ref 67–88)
SAO2 % BLDV: 91.8 % — HIGH (ref 67–88)
SODIUM SERPL-SCNC: 136 MMOL/L — SIGNIFICANT CHANGE UP (ref 135–145)
SODIUM SERPL-SCNC: 140 MMOL/L — SIGNIFICANT CHANGE UP (ref 135–145)
SP GR SPEC: 1.01 — SIGNIFICANT CHANGE UP (ref 1–1.03)
SQUAMOUS # UR AUTO: 0 /HPF — SIGNIFICANT CHANGE UP (ref 0–5)
THC UR QL: NEGATIVE — SIGNIFICANT CHANGE UP
TSH SERPL-MCNC: 4.17 UIU/ML — SIGNIFICANT CHANGE UP (ref 0.27–4.2)
UROBILINOGEN FLD QL: 0.2 MG/DL — SIGNIFICANT CHANGE UP (ref 0.2–1)
WBC # BLD: 9.1 K/UL — SIGNIFICANT CHANGE UP (ref 3.8–10.5)
WBC # FLD AUTO: 9.1 K/UL — SIGNIFICANT CHANGE UP (ref 3.8–10.5)
WBC UR QL: 0 /HPF — SIGNIFICANT CHANGE UP (ref 0–5)

## 2023-07-06 PROCEDURE — 99285 EMERGENCY DEPT VISIT HI MDM: CPT

## 2023-07-06 PROCEDURE — 71045 X-RAY EXAM CHEST 1 VIEW: CPT | Mod: 26

## 2023-07-06 PROCEDURE — 99223 1ST HOSP IP/OBS HIGH 75: CPT

## 2023-07-06 RX ORDER — PALIPERIDONE 1.5 MG/1
156 TABLET, EXTENDED RELEASE ORAL
Qty: 0 | Refills: 0 | DISCHARGE

## 2023-07-06 RX ORDER — SODIUM CHLORIDE 9 MG/ML
1000 INJECTION, SOLUTION INTRAVENOUS
Refills: 0 | Status: DISCONTINUED | OUTPATIENT
Start: 2023-07-06 | End: 2023-07-12

## 2023-07-06 RX ORDER — INSULIN LISPRO 100/ML
VIAL (ML) SUBCUTANEOUS
Refills: 0 | Status: DISCONTINUED | OUTPATIENT
Start: 2023-07-06 | End: 2023-07-12

## 2023-07-06 RX ORDER — SODIUM CHLORIDE 9 MG/ML
1000 INJECTION INTRAMUSCULAR; INTRAVENOUS; SUBCUTANEOUS ONCE
Refills: 0 | Status: COMPLETED | OUTPATIENT
Start: 2023-07-06 | End: 2023-07-06

## 2023-07-06 RX ORDER — THIAMINE MONONITRATE (VIT B1) 100 MG
100 TABLET ORAL ONCE
Refills: 0 | Status: DISCONTINUED | OUTPATIENT
Start: 2023-07-06 | End: 2023-07-06

## 2023-07-06 RX ORDER — GLUCAGON INJECTION, SOLUTION 0.5 MG/.1ML
1 INJECTION, SOLUTION SUBCUTANEOUS ONCE
Refills: 0 | Status: DISCONTINUED | OUTPATIENT
Start: 2023-07-06 | End: 2023-07-12

## 2023-07-06 RX ORDER — ENOXAPARIN SODIUM 100 MG/ML
40 INJECTION SUBCUTANEOUS EVERY 24 HOURS
Refills: 0 | Status: DISCONTINUED | OUTPATIENT
Start: 2023-07-06 | End: 2023-07-12

## 2023-07-06 RX ORDER — SODIUM CHLORIDE 9 MG/ML
1000 INJECTION INTRAMUSCULAR; INTRAVENOUS; SUBCUTANEOUS ONCE
Refills: 0 | Status: DISCONTINUED | OUTPATIENT
Start: 2023-07-06 | End: 2023-07-06

## 2023-07-06 RX ORDER — SODIUM CHLORIDE 9 MG/ML
1000 INJECTION, SOLUTION INTRAVENOUS ONCE
Refills: 0 | Status: COMPLETED | OUTPATIENT
Start: 2023-07-06 | End: 2023-07-06

## 2023-07-06 RX ORDER — SENNA PLUS 8.6 MG/1
2 TABLET ORAL AT BEDTIME
Refills: 0 | Status: DISCONTINUED | OUTPATIENT
Start: 2023-07-06 | End: 2023-07-12

## 2023-07-06 RX ORDER — LANOLIN ALCOHOL/MO/W.PET/CERES
3 CREAM (GRAM) TOPICAL AT BEDTIME
Refills: 0 | Status: DISCONTINUED | OUTPATIENT
Start: 2023-07-06 | End: 2023-07-12

## 2023-07-06 RX ORDER — ACETAMINOPHEN 500 MG
650 TABLET ORAL EVERY 6 HOURS
Refills: 0 | Status: DISCONTINUED | OUTPATIENT
Start: 2023-07-06 | End: 2023-07-12

## 2023-07-06 RX ORDER — THIAMINE MONONITRATE (VIT B1) 100 MG
100 TABLET ORAL ONCE
Refills: 0 | Status: COMPLETED | OUTPATIENT
Start: 2023-07-06 | End: 2023-07-06

## 2023-07-06 RX ORDER — INSULIN LISPRO 100/ML
VIAL (ML) SUBCUTANEOUS AT BEDTIME
Refills: 0 | Status: DISCONTINUED | OUTPATIENT
Start: 2023-07-06 | End: 2023-07-12

## 2023-07-06 RX ORDER — DEXTROSE 50 % IN WATER 50 %
25 SYRINGE (ML) INTRAVENOUS ONCE
Refills: 0 | Status: DISCONTINUED | OUTPATIENT
Start: 2023-07-06 | End: 2023-07-12

## 2023-07-06 RX ORDER — ONDANSETRON 8 MG/1
4 TABLET, FILM COATED ORAL EVERY 8 HOURS
Refills: 0 | Status: DISCONTINUED | OUTPATIENT
Start: 2023-07-06 | End: 2023-07-12

## 2023-07-06 RX ORDER — DEXTROSE 50 % IN WATER 50 %
15 SYRINGE (ML) INTRAVENOUS ONCE
Refills: 0 | Status: DISCONTINUED | OUTPATIENT
Start: 2023-07-06 | End: 2023-07-12

## 2023-07-06 RX ORDER — SODIUM CHLORIDE 9 MG/ML
1000 INJECTION INTRAMUSCULAR; INTRAVENOUS; SUBCUTANEOUS
Refills: 0 | Status: DISCONTINUED | OUTPATIENT
Start: 2023-07-06 | End: 2023-07-09

## 2023-07-06 RX ORDER — ATORVASTATIN CALCIUM 80 MG/1
20 TABLET, FILM COATED ORAL AT BEDTIME
Refills: 0 | Status: DISCONTINUED | OUTPATIENT
Start: 2023-07-06 | End: 2023-07-12

## 2023-07-06 RX ORDER — DEXTROSE 50 % IN WATER 50 %
12.5 SYRINGE (ML) INTRAVENOUS ONCE
Refills: 0 | Status: DISCONTINUED | OUTPATIENT
Start: 2023-07-06 | End: 2023-07-12

## 2023-07-06 RX ADMIN — Medication 100 MILLIGRAM(S): at 22:04

## 2023-07-06 RX ADMIN — SODIUM CHLORIDE 1000 MILLILITER(S): 9 INJECTION INTRAMUSCULAR; INTRAVENOUS; SUBCUTANEOUS at 03:47

## 2023-07-06 RX ADMIN — SODIUM CHLORIDE 1000 MILLILITER(S): 9 INJECTION, SOLUTION INTRAVENOUS at 13:00

## 2023-07-06 RX ADMIN — SODIUM CHLORIDE 1000 MILLILITER(S): 9 INJECTION INTRAMUSCULAR; INTRAVENOUS; SUBCUTANEOUS at 09:10

## 2023-07-06 RX ADMIN — SODIUM CHLORIDE 100 MILLILITER(S): 9 INJECTION INTRAMUSCULAR; INTRAVENOUS; SUBCUTANEOUS at 19:42

## 2023-07-06 NOTE — ED ADULT NURSE NOTE - OBJECTIVE STATEMENT
Pt received from triage A/Ox4 answer all questions appropriately  C/C abnormal behavior. Per Pt, came to hospital by EMS after room mate called ambulance for her. Endorsing auditory hallucinations that were instructing her to leave her home. Denies SI/HI/ and visual hallucinations. Sates she has not been adhering to her medications  Pt denies chest pain and SOB during initial assessment. Breathing is even and unlabored on room air  Pt abdomin is soft and non distended, non tender to touch  Pt normally ambulates independently with steady gait, Moves all four extremities on command, skin is intact  Pt not in any visible distress at time of assessment, resting comfortably at the bedside  Vital signs stable, NKA to medications  PMHx Schizophrenia, DM  Pending provider orders

## 2023-07-06 NOTE — PATIENT PROFILE ADULT - FALL HARM RISK - HARM RISK INTERVENTIONS
Assistance OOB with selected safe patient handling equipment/Communicate Risk of Fall with Harm to all staff/Discuss with provider need for PT consult/Monitor gait and stability/Provide patient with walking aids - walker, cane, crutches/Reinforce activity limits and safety measures with patient and family/Tailored Fall Risk Interventions/Use of alarms - bed, chair and/or voice tab/Visual Cue: Yellow wristband and red socks/Bed in lowest position, wheels locked, appropriate side rails in place/Call bell, personal items and telephone in reach/Instruct patient to call for assistance before getting out of bed or chair/Non-slip footwear when patient is out of bed/[place] to call system/Physically safe environment - no spills, clutter or unnecessary equipment/Purposeful Proactive Rounding/Room/bathroom lighting operational, light cord in reach

## 2023-07-06 NOTE — H&P ADULT - PROBLEM SELECTOR PLAN 2
Chronic unstable  Pt presenting with increasing bizarre behavior   Continue home medications: ****  Psych consult in AM   If becomes agitated overnight can consider PRN ativan Chronic unstable  Pt presenting with increasing bizarre behavior   Will hold clozapine 350mg nightly pending psych eval   Psych consult in AM   If becomes agitated overnight can consider PRN ativan

## 2023-07-06 NOTE — H&P ADULT - NSICDXPASTSURGICALHX_GEN_ALL_CORE_FT
Refill requested by: Patient  Phone:  Home phone verified  PCP:  Jose  Med: Hydrocodone-acetaminophen   Dosage: 5-325mg  Si tab every 4 hours as needed for pain  Allergies: Yes - see listing  Quantity requestin - 1 mth supply   Mail Order: no  [facility] [address] [facility] [address] [facility]    Comments:  Last prescribed by Dr. Schaefer for chronic back pain on 18. Last seen by Dr. Schaefer on 17.    Patient transferring care to Dr. Bacon. Next appointment on 18.    Per Dr. Schaefer last dictation states:    \"this is an 59 year old with history of breast cancer currently undergoing adjuvant endocrine therapy who presents for scheduled follow up and discussion.     1.  Malignant neoplasm of left female breast, unspecified estrogen receptor status, unspecified site of breast (CMS/HCC) [C50.912]  Patient has no clinical, laboratory, or radiographic evidence of disease relapse. She continues to tolerate adjuvant endocrine therapy well and will be due to complete five years in .     2.  Back pain. Chronic and stable\"    Will review with Dr. Bacon since taking over her plan of care. Please advise.    PAST SURGICAL HISTORY:  No significant past surgical history

## 2023-07-06 NOTE — ED ADULT TRIAGE NOTE - CHIEF COMPLAINT QUOTE
pt p/w 4 days abnormal behavior, auditory hallucinations. is not answering questions correctly but follows commands. EMS called by significant other, endorses possible medication noncompliance. history of schizophrenia, diabetes

## 2023-07-06 NOTE — H&P ADULT - ASSESSMENT
57 yr old female presenting with bizarre behavior/psych evaluation and was found to have elevated Lactic acid

## 2023-07-06 NOTE — H&P ADULT - PROBLEM SELECTOR PLAN 3
Chronic moderate exacerbation     Hold home oral agents  LDCS with diabetic diet  A1c and lipid panel in AM Chronic moderate exacerbation     Hold home metformin and glimepiride   LDCS with diabetic diet  A1c and lipid panel in AM no chest pain, no cough, and no shortness of breath.

## 2023-07-06 NOTE — CONSULT NOTE ADULT - SUBJECTIVE AND OBJECTIVE BOX
MEDICAL TOXICOLOGY CONSULT    HPI:  57F PMH Schizophrenia, DM (on metformin 1000mg BID, no recent dose changes), presents to the ED with family concerned that pt has been exhibiting psychosis/decompensated schizophrenia. No suspicion of acute ingestion. Pts partner administers her medications.  Vitals: reviewed, reassuring  EKG:   Exam: AAOx3 though internally pre-occupied, normal pupillary exam, no diaphoresis/flushed skin/tremors/clonus  Meds: Metformin, Clozapine, Risperidone  In ED: pts initial lactate 3.7 with a pH 7.35, pt was given 2L IVF bolus and repeat lactate of 5 with a pH 7.31. Pts infectious workup negative thus far.     Toxicology consulted for metformin toxicity    PAST MEDICAL & SURGICAL HISTORY:  Schizoaffective disorder      HLD (hyperlipidemia)      Diabetes mellitus      No significant past surgical history          MEDICATION HISTORY:  acetaminophen     Tablet .. 650 milliGRAM(s) Oral every 6 hours PRN  enoxaparin Injectable 40 milliGRAM(s) SubCutaneous every 24 hours  sodium chloride 0.9%. 1000 milliLiter(s) IV Continuous <Continuous>      FAMILY HISTORY:  No pertinent family history in first degree relatives        REVIEW OF SYSTEMS:   _____unable to perform due to intoxication, dementia, or illness      Vital Signs Last 24 Hrs  T(C): 36.3 (2023 16:57), Max: 36.8 (2023 01:19)  T(F): 97.4 (2023 16:57), Max: 98.3 (2023 01:19)  HR: 93 (2023 16:57) (93 - 107)  BP: 151/81 (2023 16:57) (119/74 - 160/95)  BP(mean): --  RR: 18 (2023 16:57) (18 - 18)  SpO2: 100% (2023 16:57) (100% - 100%)    Parameters below as of 2023 16:57  Patient On (Oxygen Delivery Method): room air        SIGNIFICANT LABORATORY STUDIES:                        12.7   9.10  )-----------( 281      ( 2023 03:40 )             37.6       07-06    140  |  103  |  8   ----------------------------<  205<H>  3.9   |  21<L>  |  0.49<L>    Ca    9.5      2023 15:26    TPro  7.7  /  Alb  4.7  /  TBili  0.6  /  DBili  x   /  AST  17  /  ALT  28  /  AlkPhos  98  07-06          Urinalysis Basic - ( 2023 15:26 )    Color: x / Appearance: x / SG: x / pH: x  Gluc: 205 mg/dL / Ketone: x  / Bili: x / Urobili: x   Blood: x / Protein: x / Nitrite: x   Leuk Esterase: x / RBC: x / WBC x   Sq Epi: x / Non Sq Epi: x / Bacteria: x        Anion Gap: 16<H>  @ 15:26  CK: --  @ 15:26  Troponin:  --   @ 15:26  Pro-BNP:  --   @ 15:26  VB  07 @ 15:26  Carboxyhemoglobin %:  --   @ 15:26  Methemoglobin %:  --   @ 15:26  Osmolality Serum:  --   @ 15:26  Aspirin Level: --   @ 15:26  Acetaminophen Level:  --   @ 15:26  Ethanol Level:  --   @ 15:26  Digoxin Level:  --   @ 15:26  Phenytoin Level:  --   @ 15:26  Carbamazepine level:  --   @ 15:26  Lamotrigine level:  --   @ 15:26  Anion Gap: --  @ 11:00  CK: --  @ 11:00  Troponin:  --   @ 11:00  Pro-BNP:  --   @ 11:00  VB  07 @ 11:00  Carboxyhemoglobin %:  --   @ 11:00  Methemoglobin %:  --   @ 11:00  Osmolality Serum:  --   @ 11:00  Aspirin Level: --   @ 11:00  Acetaminophen Level:  --   @ 11:00  Ethanol Level:  --   @ 11:00  Digoxin Level:  --   @ 11:00  Phenytoin Level:  --   @ 11:00  Carbamazepine level:  --   @ 11:00  Lamotrigine level:  --   @ 11:00  Anion Gap: --  @ 07:50  CK: --  @ 07:50  Troponin:  --   @ 07:50  Pro-BNP:  --   @ 07:50  VB  - @ 07:50  Carboxyhemoglobin %:  --   @ 07:50  Methemoglobin %:  --   @ 07:50  Osmolality Serum:  --   @ 07:50  Aspirin Level: --   @ 07:50  Acetaminophen Level:  --   @ 07:50  Ethanol Level:  --   @ 07:50  Digoxin Level:  --   @ 07:50  Phenytoin Level:  --   @ 07:50  Carbamazepine level:  --   @ 07:50  Lamotrigine level:  --   @ 07:50  Anion Gap: 17<H>  @ 03:40  CK: --  @ 03:40  Troponin:  --   @ 03:40  Pro-BNP:  --   @ 03:40  VB   @ 03:40  Carboxyhemoglobin %:  --   @ 03:40  Methemoglobin %:  --   @ 03:40  Osmolality Serum:  --   @ 03:40  Aspirin Level: --   @ 03:40  Acetaminophen Level:  --   @ 03:40  Ethanol Level:  --   @ 03:40  Digoxin Level:  --   @ 03:40  Phenytoin Level:  --   @ 03:40  Carbamazepine level:  --   @ 03:40  Lamotrigine level:  --   @ 03:40

## 2023-07-06 NOTE — H&P ADULT - TIME BILLING
I have spent a total of 96minutes to prepare to see the patient, obtaining and reviewing history, physical examination, explaining the diagnosis, prognosis and treatment plan with the patient/family/caregiver. I also have spent the time ordering studies and testing, interpreting results, medicine reconciliation, subspecialty consultation and documentation as above.

## 2023-07-06 NOTE — ED ADULT NURSE NOTE - NSFALLUNIVINTERV_ED_ALL_ED
Bed/Stretcher in lowest position, wheels locked, appropriate side rails in place/Call bell, personal items and telephone in reach/Instruct patient to call for assistance before getting out of bed/chair/stretcher/Non-slip footwear applied when patient is off stretcher/[place] to call system/Physically safe environment - no spills, clutter or unnecessary equipment/Purposeful proactive rounding/Room/bathroom lighting operational, light cord in reach

## 2023-07-06 NOTE — H&P ADULT - PROBLEM SELECTOR PLAN 1
New  pH 7.31 LA 5.1-> 4.1  UA/Utox: negative, CXR clear lungs  Toxicology consulted: likely from metformin- hold, pill count if possible (partner says unlikely as multiple bottles), Trend Lactate, Thiamine 100mg IV

## 2023-07-06 NOTE — ED PROVIDER NOTE - ATTENDING CONTRIBUTION TO CARE
pertinent PMH/PSH/FHx/SHx and Review of Systems contained within:  57-year-old female with past medical history inclusive of  hyperlipidemia, diabetes, asthma; past psychiatric history inclusive of schizophrenia and multiple psych hospitalizations in the past (on Clozaril, risperidone, Depakote, Cogentin), brought in by EMS after her  activated 911 for bizarre behavior  for 1 week.  Patient stating she does not know why she is here.  States "I peed on the couch"  but denies urinary frequency, dysuria, hematuria.  Denies all other physical symptoms.  Denies SI/HI/AVH.  Denies smoking, alcohol, or illicit drug use.  Unreliable ROS given current confused state.    Collateral per  Rahat @ [phone]:     states that he has a system where he  puts patient's medications in a bowl and she promises to take them,  but  4 days ago he saw patient throw out medications from her bowel and is unsure how long this may have been occurring.  Reports that patient has been exhibiting bizarre behavior beginning 1 week ago such as "giggling uncontrollably" without giving explanation why.  Patient was completing ADLs until 4 days ago when she refused to shower, refusing to get out of bed or couch to go use the bathroom and instead urinating on herself.  States that she has felt "a little warm at times" but is unsure if this is because of fever or because of the warm weather.  Also stating he has heard pt speaking to herself. History and physical above obtained and documented (or dictated) by me (attending physician).  Resident or ACP and/or medical student involved in case for assistance with disposition and may document involvement as necessary via progress note(s).   -Dr. Richardson

## 2023-07-06 NOTE — H&P ADULT - NSHPPHYSICALEXAM_GEN_ALL_CORE
PHYSICAL EXAM:  GENERAL: NAD, comfortable at bedside   HEAD:  Atraumatic, Normocephalic  EYES: EOMI, PERRL, conjunctiva and sclera clear  NECK: Supple, No JVD  CHEST/LUNG: Clear to auscultation bilaterally; No wheezes, rales or rhonchi  HEART: Regular rate and rhythm; No murmurs, rubs, or gallops, (+)S1, S2  ABDOMEN: Soft, Nontender, Nondistended; Normal Bowel sounds   EXTREMITIES:  2+ Peripheral Pulses, No clubbing, cyanosis, or edema  PSYCH: responds to questions with redirection but also strays on tangents   NEUROLOGY: AAOx1 (self), non-focal  SKIN: No rashes or lesions

## 2023-07-06 NOTE — ED ADULT TRIAGE NOTE - ACCOMPANIED BY
FMLA or Disability Forms were received and faxed to the Forms Completion Department today at [phone].  (Please include all appropriate authorization forms with your fax)    Did you have the patient complete (in full) and sign the \"Authorization for Disclosure of Health Information Forms Completion\" form?  NO ________  (If no, please give reason)    RECEIVED VIA FAX    DUE TO VERY HIGH FORM VOLUMES, please communicate to the patient that the Forms Completion team estimates their form may take longer than our usual 14 business day turnaround goal.    If you have questions about this encounter, please contact the Forms Completion Dept. at [phone].       EMT/paramedic

## 2023-07-06 NOTE — H&P ADULT - HISTORY OF PRESENT ILLNESS
57 yr old female with a pmh of HLD, asthma, schizoaffective disorder multiple prior hospitalizations), T2DM not on insulin, who presents with       Vitals: T 97.4, HR 93, /81, RR 18 satting 100% RA 57 yr old female with a pmh of HLD, asthma, schizoaffective disorder multiple prior hospitalizations), T2DM not on insulin, who presents with a 1-2 week history of bizarre behavior based on collateral from her partner. Pt started with intermittent uncontrollable laughter, then she started to become confused and having incontinence problems. Pt is independent at baseline with ADLs and AAO x2 (gets the year mixed up usually).   Denies  headache, dizziness, chest pain, palpitations, SOB, abdominal pain, joint pain,  urinary symptoms.     Vitals: T 97.4, HR 93, /81, RR 18 satting 100% RA    Son  will [phone]- also gave permission to release information to partner BLAINE (phone number in emergency contacts)

## 2023-07-06 NOTE — H&P ADULT - NSHPLABSRESULTS_GEN_ALL_CORE
labs personally reviewed and pertinent [facility] documents/labs/diagnostics reviewed                         12.7   9.10  )-----------( 281      ( 06 Jul 2023 03:40 )             37.6       07-06    140  |  103  |  8   ----------------------------<  205<H>  3.9   |  21<L>  |  0.49<L>    Ca    9.5      06 Jul 2023 15:26    TPro  7.7  /  Alb  4.7  /  TBili  0.6  /  DBili  x   /  AST  17  /  ALT  28  /  AlkPhos  98  07-06                15:26 - VBG - pH: 7.41  | pCO2: 38    | pO2: 50    | Lactate: 4.1    11:00 - VBG - pH: 7.31  | pCO2: 46    | pO2: 24    | Lactate: 5.1    07:50 - VBG - pH: 7.35  | pCO2: 46    | pO2: 29    | Lactate: 3.7    03:40 - VBG - pH: 7.36  | pCO2: 45    | pO2: 37    | Lactate: 3.7        Urinalysis Basic - ( 06 Jul 2023 15:26 )    Color: x / Appearance: x / SG: x / pH: x  Gluc: 205 mg/dL / Ketone: x  / Bili: x / Urobili: x   Blood: x / Protein: x / Nitrite: x   Leuk Esterase: x / RBC: x / WBC x   Sq Epi: x / Non Sq Epi: x / Bacteria: x        CXR interpreted by myself: clear lungs

## 2023-07-06 NOTE — CONSULT NOTE ADULT - ASSESSMENT
Assessment:  It’s likely metformin is contributing to the metabolic acidosis with an elevated lactate concentration though we can’t be too sure this is the only contributor to pts lab abnormalities.   Pt didn’t use albuterol that we know of. Unknown if pt is a daily drinker (thus causing absolute/relative thiamine deficiency- which may cause elevated lactate). Unknown if pt ingested NSAIDs (may cause elevated lactate from lab assay measuring propionic acid as lactic acid). Pt doesn’t have known liver dysfunction that may cause a type B lactic acidosis.     Recommendations:  1. Hold metformin  2. Would try to get a pill count to ensure pt didn’t have an acute ingestion of metformin.  3. Continue to trend lactate until it’s at least near normal limits or downtrending from initial value.   4. Continue to search for/rule out other causes of elevated lactate.   5. Recommend 100mg Thiamine IV

## 2023-07-06 NOTE — ED ADULT NURSE NOTE - SUICIDE SCREENING QUESTION 2
Patient arrived to clinic for weekly Taxol.  PICC line in place with good blood return noted.  Labs drawn in MD office and wiin parameters to treat.  Pre medication given and Taxol infused starting at 50ml/hr at titrating to max rate.  (pt has not received Taxol in 2 months)  Tolerated infusion well.  PICC line flushed and gauze/mesh cover placed.  Confirmed next appointment and to ambulated out of clinic in no apparent distress.   No

## 2023-07-06 NOTE — ED PROVIDER NOTE - PROGRESS NOTE DETAILS
Constanza Fletcher DO (PGY3): Patient signed out to me by night team.  Patient reassessed, resting comfortably in bed, patient's speech is scattered with flight of ideas, appears to be internally stimulated.  Labs overall without emergent findings.  Patient with a lactate of 3.7, plan for IV fluids and reassess. Constanza Fletcher DO (PGY3): Patient signed out to me by night team.  Patient reassessed, resting comfortably in bed, patient's speech is scattered with flight of ideas, appears to be internally stimulated.  Labs overall without emergent findings.  Patient with a lactate of 3.7, plan for IV fluids, rpt vbg and psych consult. Constanza Fletcher DO (PGY3): Patient's repeat lactate worsening now at 5.1 status post 2 IV fluid boluses.  Pt resting comfortably, hemodynamically stable without complaints. Patient on metformin, given history of schizophrenia and collateral from the  regarding medicine taking difficulties concern for possible metformin toxicity inducing lactic acidosis.  Lower concern for DKA at this time given no ketones in the urine, FSG 180s, serum glucose 200s s/p drinking orange juice and apple juice. Nephrology paged, awaiting call back. Constanza Fletcher DO (PGY3):Spoke with nephrology, because kidney function within normal limits at this time, state no recommendations from their perspective, they are recommending speak with toxicology first.  Spoke with toxicology, plan for IV fluid bolus and repeat of labs.  State if labs are downtrending, can likely clear from a toxicologic standpoint also recommending a pill count.  Spoke with , Rahat  who states he is unable to figure out how many pills are left as he is using metformin from multiple bottles all of which have different prescription dates/amounts. Pt remains hemodynamically stable. Pending labs after IVF. Constanza Fletcher DO (PGY3): Patient's lactate downtrending, repeat 4.1.   Patient remains hemodynamically stable.  Plan for admission to medicine for psych to see and lactic acidosis.  Spoke with hospitalist will admit to their service.

## 2023-07-06 NOTE — H&P ADULT - NSICDXPASTMEDICALHX_GEN_ALL_CORE_FT
PAST MEDICAL HISTORY:  Asthma     Diabetes mellitus     HLD (hyperlipidemia)     Schizoaffective disorder

## 2023-07-06 NOTE — H&P ADULT - NSHPREVIEWOFSYSTEMS_GEN_ALL_CORE
REVIEW OF SYSTEMS:    CONSTITUTIONAL: No weakness, fevers or chills  EYES/ENT: No visual changes;  No dysphagia; No sore throat; No rhinorrhea; No sinus pain/pressure  NECK: No pain or stiffness  RESPIRATORY: No cough, wheezing, hemoptysis; No shortness of breath  CARDIOVASCULAR: No chest pain or palpitations; No lower extremity edema  GASTROINTESTINAL: No abdominal or epigastric pain. No nausea, vomiting, or hematemesis; No diarrhea or constipation. No melena or hematochezia.  GENITOURINARY: No dysuria, frequency or hematuria +incontinence   NEUROLOGICAL: No numbness or weakness  PSYCH: uncontrollable laughter, confusion  MSK: ambulates without assistance   SKIN: No itching, burning, rashes, or lesions   All other review of systems is negative unless indicated above. REVIEW OF SYSTEMS:    CONSTITUTIONAL: No weakness, fevers or chills  EYES/ENT: No visual changes;  No dysphagia; No sore throat; No rhinorrhea; No sinus pain/pressure  NECK: No pain or stiffness  RESPIRATORY: No cough, wheezing, hemoptysis; No shortness of breath  CARDIOVASCULAR: No chest pain or palpitations; No lower extremity edema  GASTROINTESTINAL: No abdominal or epigastric pain. No nausea, vomiting, or hematemesis; No diarrhea + constipation. No melena or hematochezia.  GENITOURINARY: No dysuria, frequency or hematuria +incontinence   NEUROLOGICAL: No numbness or weakness  PSYCH: uncontrollable laughter, confusion  MSK: ambulates without assistance   SKIN: No itching, burning, rashes, or lesions   All other review of systems is negative unless indicated above.

## 2023-07-06 NOTE — ED PROVIDER NOTE - CLINICAL SUMMARY MEDICAL DECISION MAKING FREE TEXT BOX
pertinent PMH/PSH/FHx/SHx and Review of Systems contained within:  57-year-old female with past medical history inclusive of  hyperlipidemia, diabetes, asthma; past psychiatric history inclusive of schizophrenia and multiple psych hospitalizations in the past (on Clozaril, risperidone, Depakote, Cogentin), brought in by EMS after her  activated 911 for bizarre behavior  for 1 week.  Patient stating she does not know why she is here.  States "I peed on the couch"  but denies urinary frequency, dysuria, hematuria.  Denies all other physical symptoms.  Denies SI/HI/AVH.  Denies smoking, alcohol, or illicit drug use.  Unreliable ROS given current confused state.    Collateral per  Rahat @ [phone]:     states that he has a system where he  puts patient's medications in a bowl and she promises to take them,  but  4 days ago he saw patient throw out medications from her bowel and is unsure how long this may have been occurring.  Reports that patient has been exhibiting bizarre behavior beginning 1 week ago such as "giggling uncontrollably" without giving explanation why.  Patient was completing ADLs until 4 days ago when she refused to shower, refusing to get out of bed or couch to go use the bathroom and instead urinating on herself.  States that she has felt "a little warm at times" but is unsure if this is because of fever or because of the warm weather.  Also stating he has heard pt speaking to herself.    Ddx includes delirium 2/2 to uti or other infxn vs decompensated schizo vs undifferentiated psychosis. labs, ua, cxr, reassess for +/- emergent psych consult vs medical admission.

## 2023-07-06 NOTE — ED PROVIDER NOTE - PHYSICAL EXAMINATION
Gen: Alert, NAD  Head: NC, AT,  EOMI, normal lids/conjunctiva  ENT:  normal hearing, patent oropharynx without erythema/exudate  Neck: +supple, no tenderness/meningismus/JVD, +Trachea midline  Chest: no chest wall tenderness, equal chest rise  Pulm: Bilateral BS, normal resp effort, no wheeze/stridor/retractions  CV: RRR, no M/R/G, +dist pulses  Abd: +BS, soft, NT/ND  Rectal: deferred  Mskel: no edema/erythema/cyanosis  Skin: no rash  Neuro: AAOx3, no sensory/motor deficits, CN 2-12 intact   Psych: internally preoccupied, making tangential statements and intermittently mumbling. Denies SI/HI. Denies AVH.

## 2023-07-07 LAB
A1C WITH ESTIMATED AVERAGE GLUCOSE RESULT: 8 % — HIGH (ref 4–5.6)
ANION GAP SERPL CALC-SCNC: 13 MMOL/L — SIGNIFICANT CHANGE UP (ref 7–14)
BASOPHILS # BLD AUTO: 0.02 K/UL — SIGNIFICANT CHANGE UP (ref 0–0.2)
BASOPHILS NFR BLD AUTO: 0.2 % — SIGNIFICANT CHANGE UP (ref 0–2)
BUN SERPL-MCNC: 6 MG/DL — LOW (ref 7–23)
CALCIUM SERPL-MCNC: 8.9 MG/DL — SIGNIFICANT CHANGE UP (ref 8.4–10.5)
CHLORIDE SERPL-SCNC: 107 MMOL/L — SIGNIFICANT CHANGE UP (ref 98–107)
CHOLEST SERPL-MCNC: 153 MG/DL — SIGNIFICANT CHANGE UP
CO2 SERPL-SCNC: 22 MMOL/L — SIGNIFICANT CHANGE UP (ref 22–31)
CREAT SERPL-MCNC: 0.5 MG/DL — SIGNIFICANT CHANGE UP (ref 0.5–1.3)
CULTURE RESULTS: NO GROWTH — SIGNIFICANT CHANGE UP
EGFR: 108 ML/MIN/1.73M2 — SIGNIFICANT CHANGE UP
EOSINOPHIL # BLD AUTO: 0 K/UL — SIGNIFICANT CHANGE UP (ref 0–0.5)
EOSINOPHIL NFR BLD AUTO: 0 % — SIGNIFICANT CHANGE UP (ref 0–6)
ESTIMATED AVERAGE GLUCOSE: 183 — SIGNIFICANT CHANGE UP
GLUCOSE SERPL-MCNC: 145 MG/DL — HIGH (ref 70–99)
HCT VFR BLD CALC: 32.7 % — LOW (ref 34.5–45)
HDLC SERPL-MCNC: 28 MG/DL — LOW
HGB BLD-MCNC: 10.6 G/DL — LOW (ref 11.5–15.5)
IANC: 4.28 K/UL — SIGNIFICANT CHANGE UP (ref 1.8–7.4)
IMM GRANULOCYTES NFR BLD AUTO: 0.4 % — SIGNIFICANT CHANGE UP (ref 0–0.9)
LACTATE SERPL-SCNC: 1.7 MMOL/L — SIGNIFICANT CHANGE UP (ref 0.5–2)
LIPID PNL WITH DIRECT LDL SERPL: 70 MG/DL — SIGNIFICANT CHANGE UP
LYMPHOCYTES # BLD AUTO: 3.23 K/UL — SIGNIFICANT CHANGE UP (ref 1–3.3)
LYMPHOCYTES # BLD AUTO: 40.1 % — SIGNIFICANT CHANGE UP (ref 13–44)
MAGNESIUM SERPL-MCNC: 1.5 MG/DL — LOW (ref 1.6–2.6)
MCHC RBC-ENTMCNC: 26.2 PG — LOW (ref 27–34)
MCHC RBC-ENTMCNC: 32.4 GM/DL — SIGNIFICANT CHANGE UP (ref 32–36)
MCV RBC AUTO: 80.9 FL — SIGNIFICANT CHANGE UP (ref 80–100)
MONOCYTES # BLD AUTO: 0.5 K/UL — SIGNIFICANT CHANGE UP (ref 0–0.9)
MONOCYTES NFR BLD AUTO: 6.2 % — SIGNIFICANT CHANGE UP (ref 2–14)
MRSA PCR RESULT.: SIGNIFICANT CHANGE UP
NEUTROPHILS # BLD AUTO: 4.28 K/UL — SIGNIFICANT CHANGE UP (ref 1.8–7.4)
NEUTROPHILS NFR BLD AUTO: 53.1 % — SIGNIFICANT CHANGE UP (ref 43–77)
NON HDL CHOLESTEROL: 125 MG/DL — SIGNIFICANT CHANGE UP
NRBC # BLD: 0 /100 WBCS — SIGNIFICANT CHANGE UP (ref 0–0)
NRBC # FLD: 0 K/UL — SIGNIFICANT CHANGE UP (ref 0–0)
PHOSPHATE SERPL-MCNC: 2.6 MG/DL — SIGNIFICANT CHANGE UP (ref 2.5–4.5)
PLATELET # BLD AUTO: 268 K/UL — SIGNIFICANT CHANGE UP (ref 150–400)
POTASSIUM SERPL-MCNC: 3.5 MMOL/L — SIGNIFICANT CHANGE UP (ref 3.5–5.3)
POTASSIUM SERPL-SCNC: 3.5 MMOL/L — SIGNIFICANT CHANGE UP (ref 3.5–5.3)
RBC # BLD: 4.04 M/UL — SIGNIFICANT CHANGE UP (ref 3.8–5.2)
RBC # FLD: 12.9 % — SIGNIFICANT CHANGE UP (ref 10.3–14.5)
S AUREUS DNA NOSE QL NAA+PROBE: SIGNIFICANT CHANGE UP
SODIUM SERPL-SCNC: 142 MMOL/L — SIGNIFICANT CHANGE UP (ref 135–145)
SPECIMEN SOURCE: SIGNIFICANT CHANGE UP
TRIGL SERPL-MCNC: 273 MG/DL — HIGH
WBC # BLD: 8.06 K/UL — SIGNIFICANT CHANGE UP (ref 3.8–10.5)
WBC # FLD AUTO: 8.06 K/UL — SIGNIFICANT CHANGE UP (ref 3.8–10.5)

## 2023-07-07 PROCEDURE — 99233 SBSQ HOSP IP/OBS HIGH 50: CPT

## 2023-07-07 PROCEDURE — 90792 PSYCH DIAG EVAL W/MED SRVCS: CPT

## 2023-07-07 PROCEDURE — 74019 RADEX ABDOMEN 2 VIEWS: CPT | Mod: 26

## 2023-07-07 RX ORDER — CLOZAPINE 150 MG/1
25 TABLET, ORALLY DISINTEGRATING ORAL AT BEDTIME
Refills: 0 | Status: DISCONTINUED | OUTPATIENT
Start: 2023-07-07 | End: 2023-07-08

## 2023-07-07 RX ORDER — OLANZAPINE 15 MG/1
2.5 TABLET, FILM COATED ORAL EVERY 6 HOURS
Refills: 0 | Status: DISCONTINUED | OUTPATIENT
Start: 2023-07-07 | End: 2023-07-12

## 2023-07-07 RX ORDER — POLYETHYLENE GLYCOL 3350 17 G/17G
17 POWDER, FOR SOLUTION ORAL
Refills: 0 | Status: DISCONTINUED | OUTPATIENT
Start: 2023-07-07 | End: 2023-07-12

## 2023-07-07 RX ORDER — MAGNESIUM SULFATE 500 MG/ML
1 VIAL (ML) INJECTION ONCE
Refills: 0 | Status: COMPLETED | OUTPATIENT
Start: 2023-07-07 | End: 2023-07-07

## 2023-07-07 RX ORDER — CHLORHEXIDINE GLUCONATE 213 G/1000ML
1 SOLUTION TOPICAL
Refills: 0 | Status: DISCONTINUED | OUTPATIENT
Start: 2023-07-07 | End: 2023-07-12

## 2023-07-07 RX ADMIN — Medication 100 GRAM(S): at 10:42

## 2023-07-07 RX ADMIN — SODIUM CHLORIDE 100 MILLILITER(S): 9 INJECTION INTRAMUSCULAR; INTRAVENOUS; SUBCUTANEOUS at 15:53

## 2023-07-07 RX ADMIN — Medication 1: at 18:09

## 2023-07-07 RX ADMIN — CHLORHEXIDINE GLUCONATE 1 APPLICATION(S): 213 SOLUTION TOPICAL at 05:32

## 2023-07-07 RX ADMIN — Medication 10 MILLIGRAM(S): at 17:54

## 2023-07-07 RX ADMIN — Medication 2: at 13:09

## 2023-07-07 RX ADMIN — ENOXAPARIN SODIUM 40 MILLIGRAM(S): 100 INJECTION SUBCUTANEOUS at 13:12

## 2023-07-07 RX ADMIN — POLYETHYLENE GLYCOL 3350 17 GRAM(S): 17 POWDER, FOR SOLUTION ORAL at 17:55

## 2023-07-07 RX ADMIN — CLOZAPINE 25 MILLIGRAM(S): 150 TABLET, ORALLY DISINTEGRATING ORAL at 22:17

## 2023-07-07 RX ADMIN — ATORVASTATIN CALCIUM 20 MILLIGRAM(S): 80 TABLET, FILM COATED ORAL at 22:18

## 2023-07-07 RX ADMIN — SODIUM CHLORIDE 100 MILLILITER(S): 9 INJECTION INTRAMUSCULAR; INTRAVENOUS; SUBCUTANEOUS at 05:32

## 2023-07-07 NOTE — BH CONSULTATION LIAISON ASSESSMENT NOTE - CURRENT MEDICATION
MEDICATIONS  (STANDING):  atorvastatin 20 milliGRAM(s) Oral at bedtime  chlorhexidine 2% Cloths 1 Application(s) Topical <User Schedule>  dextrose 5%. 1000 milliLiter(s) (50 mL/Hr) IV Continuous <Continuous>  dextrose 5%. 1000 milliLiter(s) (100 mL/Hr) IV Continuous <Continuous>  dextrose 50% Injectable 25 Gram(s) IV Push once  dextrose 50% Injectable 12.5 Gram(s) IV Push once  dextrose 50% Injectable 25 Gram(s) IV Push once  enoxaparin Injectable 40 milliGRAM(s) SubCutaneous every 24 hours  glucagon  Injectable 1 milliGRAM(s) IntraMuscular once  insulin lispro (ADMELOG) corrective regimen sliding scale   SubCutaneous three times a day before meals  insulin lispro (ADMELOG) corrective regimen sliding scale   SubCutaneous at bedtime  senna 2 Tablet(s) Oral at bedtime  sodium chloride 0.9%. 1000 milliLiter(s) (100 mL/Hr) IV Continuous <Continuous>    MEDICATIONS  (PRN):  acetaminophen     Tablet .. 650 milliGRAM(s) Oral every 6 hours PRN Temp greater or equal to 38C (100.4F), Mild Pain (1 - 3), Moderate Pain (4 - 6)  aluminum hydroxide/magnesium hydroxide/simethicone Suspension 30 milliLiter(s) Oral every 4 hours PRN Dyspepsia  dextrose Oral Gel 15 Gram(s) Oral once PRN Blood Glucose LESS THAN 70 milliGRAM(s)/deciliter  melatonin 3 milliGRAM(s) Oral at bedtime PRN Insomnia  ondansetron Injectable 4 milliGRAM(s) IV Push every 8 hours PRN Nausea and/or Vomiting

## 2023-07-07 NOTE — DIETITIAN INITIAL EVALUATION ADULT - PERTINENT LABORATORY DATA
07-07    142  |  107  |  6<L>  ----------------------------<  145<H>  3.5   |  22  |  0.50    Ca    8.9      07 Jul 2023 05:52  Phos  2.6     07-07  Mg     1.50     07-07    TPro  7.7  /  Alb  4.7  /  TBili  0.6  /  DBili  x   /  AST  17  /  ALT  28  /  AlkPhos  98  07-06  POCT Blood Glucose.: 227 mg/dL (07-07-23 @ 12:44)  A1C with Estimated Average Glucose Result: 8.0 % (07-07-23 @ 05:52)

## 2023-07-07 NOTE — DIETITIAN INITIAL EVALUATION ADULT - PROBLEM/PLAN-3
Vaccine Information Statement(s) was given today. This has been reviewed, questions answered, and verbal consent given by Patient for injection(s) and administration of Tetanus/Diphtheria/Pertussis (Tdap). Patient tolerated without incident. See immunization grid for documentation. DISPLAY PLAN FREE TEXT

## 2023-07-07 NOTE — BH CONSULTATION LIAISON ASSESSMENT NOTE - NSBHCHARTREVIEWLAB_PSY_A_CORE FT
10.2   13.04 )-----------( 270      ( 2020 03:00 )             33.0     11-19    136  |  94<L>  |  12  ----------------------------<  179<H>  3.9   |  25  |  0.70    Ca    10.0      2020 02:25    TPro  7.2  /  Alb  3.6  /  TBili  0.5  /  DBili  x   /  AST  31  /  ALT  12  /  AlkPhos  63  11-19    Urinalysis Basic - ( 2020 05:36 )    Color: YELLOW / Appearance: Lt TURBID / S.026 / pH: 6.0  Gluc: NEGATIVE / Ketone: SMALL  / Bili: NEGATIVE / Urobili: NORMAL   Blood: LARGE / Protein: 300 / Nitrite: POSITIVE   Leuk Esterase: LARGE / RBC: 6-10 / WBC >50   Sq Epi: OCC / Non Sq Epi: x / Bacteria: MANY      LIVER FUNCTIONS - ( 2020 02:25 )  Alb: 3.6 g/dL / Pro: 7.2 g/dL / ALK PHOS: 63 u/L / ALT: 12 u/L / AST: 31 u/L / GGT: x                       10.6   8.06  )-----------( 268      ( 07 Jul 2023 05:52 )             32.7   07-07    142  |  107  |  6<L>  ----------------------------<  145<H>  3.5   |  22  |  0.50    Ca    8.9      07 Jul 2023 05:52  Phos  2.6     07-07  Mg     1.50     07-07    TPro  7.7  /  Alb  4.7  /  TBili  0.6  /  DBili  x   /  AST  17  /  ALT  28  /  AlkPhos  98  07-06

## 2023-07-07 NOTE — BH CONSULTATION LIAISON ASSESSMENT NOTE - NSBHCHARTREVIEWVS_PSY_A_CORE FT
Vital Signs Last 24 Hrs  T(C): 36.3 (07 Jul 2023 13:23), Max: 37 (07 Jul 2023 09:00)  T(F): 97.3 (07 Jul 2023 13:23), Max: 98.6 (07 Jul 2023 09:00)  HR: 88 (07 Jul 2023 13:23) (83 - 93)  BP: 145/69 (07 Jul 2023 13:23) (135/72 - 151/81)  BP(mean): --  RR: 18 (07 Jul 2023 09:00) (17 - 18)  SpO2: 100% (07 Jul 2023 13:23) (99% - 100%)    Parameters below as of 07 Jul 2023 13:23  Patient On (Oxygen Delivery Method): room air

## 2023-07-07 NOTE — BH CONSULTATION LIAISON ASSESSMENT NOTE - HPI (INCLUDE ILLNESS QUALITY, SEVERITY, DURATION, TIMING, CONTEXT, MODIFYING FACTORS, ASSOCIATED SIGNS AND SYMPTOMS)
Patient is a 56 y/o female, with a PmHx of hyperlipidemia, diabetes, asthma, past psychiatric history inclusive of schizophrenia and multiple psych hospitalizations in the past, brought in by EMS after her  activated 911 for bizarre behavior  for 1 week. Patient comes from home, lives with partner, on disability. Patient currently on clozapine 350mg. Spoke with spouse who reports he cannot be certain patient is compliant with medications as there are some nights he does not supervise her taking her medication and 1 night he did witness patient throwing them out. Rahat also noted patient has been acting bizarre, confused, laughing constantly and talking to herself. Patient is not at baseline.    Patient was seen and assessed at bedside. patient is alert, confused, illogical. Patient was asked direct questions and she provided answers that were not relevant. patient was asked where she is and she tells provider how to spell her middle name.  Patient was also noted to be talking to self when provider entered room. She was loose in thought process and disorganized. Patient denies any thoughts of self harm or thoughts of wanting to harm others. States she has no AH or VH but does appear internally preoccupied.

## 2023-07-07 NOTE — PROGRESS NOTE ADULT - SUBJECTIVE AND OBJECTIVE BOX
Patient is a 57y old  Female who presents with a chief complaint of lactic acidosis, bizarre behavior (06 Jul 2023 18:37)      SUBJECTIVE / OVERNIGHT EVENTS: Pt seen and examined at 12:05pm, no overnight events, pt is confused, able to say her name otherwise unable to hold a conversation, laughing to self, sitting in a chair. Denies any pain, no other new issues reported.    MEDICATIONS  (STANDING):  atorvastatin 20 milliGRAM(s) Oral at bedtime  chlorhexidine 2% Cloths 1 Application(s) Topical <User Schedule>  dextrose 5%. 1000 milliLiter(s) (50 mL/Hr) IV Continuous <Continuous>  dextrose 5%. 1000 milliLiter(s) (100 mL/Hr) IV Continuous <Continuous>  dextrose 50% Injectable 25 Gram(s) IV Push once  dextrose 50% Injectable 12.5 Gram(s) IV Push once  dextrose 50% Injectable 25 Gram(s) IV Push once  enoxaparin Injectable 40 milliGRAM(s) SubCutaneous every 24 hours  glucagon  Injectable 1 milliGRAM(s) IntraMuscular once  insulin lispro (ADMELOG) corrective regimen sliding scale   SubCutaneous three times a day before meals  insulin lispro (ADMELOG) corrective regimen sliding scale   SubCutaneous at bedtime  senna 2 Tablet(s) Oral at bedtime  sodium chloride 0.9%. 1000 milliLiter(s) (100 mL/Hr) IV Continuous <Continuous>    MEDICATIONS  (PRN):  acetaminophen     Tablet .. 650 milliGRAM(s) Oral every 6 hours PRN Temp greater or equal to 38C (100.4F), Mild Pain (1 - 3), Moderate Pain (4 - 6)  aluminum hydroxide/magnesium hydroxide/simethicone Suspension 30 milliLiter(s) Oral every 4 hours PRN Dyspepsia  dextrose Oral Gel 15 Gram(s) Oral once PRN Blood Glucose LESS THAN 70 milliGRAM(s)/deciliter  melatonin 3 milliGRAM(s) Oral at bedtime PRN Insomnia  ondansetron Injectable 4 milliGRAM(s) IV Push every 8 hours PRN Nausea and/or Vomiting      Vital Signs Last 24 Hrs  T(C): 36.3 (07 Jul 2023 13:23), Max: 37 (07 Jul 2023 09:00)  T(F): 97.3 (07 Jul 2023 13:23), Max: 98.6 (07 Jul 2023 09:00)  HR: 88 (07 Jul 2023 13:23) (83 - 93)  BP: 145/69 (07 Jul 2023 13:23) (135/72 - 151/81)  BP(mean): --  RR: 18 (07 Jul 2023 09:00) (17 - 18)  SpO2: 100% (07 Jul 2023 13:23) (99% - 100%)    Parameters below as of 07 Jul 2023 13:23  Patient On (Oxygen Delivery Method): room air      CAPILLARY BLOOD GLUCOSE      POCT Blood Glucose.: 227 mg/dL (07 Jul 2023 12:44)  POCT Blood Glucose.: 147 mg/dL (07 Jul 2023 08:20)  POCT Blood Glucose.: 179 mg/dL (06 Jul 2023 22:31)  POCT Blood Glucose.: 190 mg/dL (06 Jul 2023 17:10)    I&O's Summary      PHYSICAL EXAM:  GENERAL: NAD  CHEST/LUNG: Clear to auscultation bilaterally; No wheeze  HEART: Regular rate and rhythm  ABDOMEN: Soft, Nontender, mildly distended  EXTREMITIES: no LE edema  PSYCH: laughing inappropriately  NEUROLOGY: Alert, awake, able to say her name  SKIN: No rashes or lesions    LABS:                        10.6   8.06  )-----------( 268      ( 07 Jul 2023 05:52 )             32.7     07-07    142  |  107  |  6<L>  ----------------------------<  145<H>  3.5   |  22  |  0.50    Ca    8.9      07 Jul 2023 05:52  Phos  2.6     07-07  Mg     1.50     07-07    TPro  7.7  /  Alb  4.7  /  TBili  0.6  /  DBili  x   /  AST  17  /  ALT  28  /  AlkPhos  98  07-06          Urinalysis Basic - ( 07 Jul 2023 05:52 )    Color: x / Appearance: x / SG: x / pH: x  Gluc: 145 mg/dL / Ketone: x  / Bili: x / Urobili: x   Blood: x / Protein: x / Nitrite: x   Leuk Esterase: x / RBC: x / WBC x   Sq Epi: x / Non Sq Epi: x / Bacteria: x        RADIOLOGY & ADDITIONAL TESTS:    Imaging Personally Reviewed:    Consultant(s) Notes Reviewed:      Care Discussed with Consultants/Other Providers:

## 2023-07-07 NOTE — BH CONSULTATION LIAISON ASSESSMENT NOTE - NSBHATTESTCOMMENTATTENDFT_PSY_A_CORE
Chart reviewed. I agree with NP Marielena's history, MSE, A/P.  Patient very disorganized with essential word salad. She is calm and grossly cooperative. Unclear if internally preoccupied.    Plan per above.  If concern for elopement- please place on 1:1.  Obtain ASA and tylenol level.  If no cardiac contraindication can resume clozapine. Ensure on bowel regimen when initiating clozapine.  Please check patient's bag to ensure no meds/belongings are dangerous and patient can by accident take.

## 2023-07-07 NOTE — BH CONSULTATION LIAISON ASSESSMENT NOTE - NSBHPSYCHHX_PSY_A_CORE
no yes... Opioid Counseling: I discussed with the patient the potential side effects of opioids including but not limited to addiction, altered mental status, and depression. I stressed avoiding alcohol, benzodiazepines, muscle relaxants and sleep aids unless specifically okayed by a physician. The patient verbalized understanding of the proper use and possible adverse effects of opioids. All of the patient's questions and concerns were addressed. They were instructed to flush the remaining pills down the toilet if they did not need them for pain.

## 2023-07-07 NOTE — DIETITIAN INITIAL EVALUATION ADULT - ADD RECOMMEND
1. Recommend adding Glucerna 8oz 2x/day (440kcal, 20gm protein) for nutrient support.   2. Monitor and replete electrolytes.   3. Monitor bowel movement, increase bowel regimen PRN.   4. Monitor weight, labs, po intake and tolerance, bowel movement, skin integrity.   5. Encourage PO intake and honor food preferences as able.

## 2023-07-07 NOTE — BH CONSULTATION LIAISON ASSESSMENT NOTE - NSBHATTESTAPPAMEND_PSY_A_CORE
I have personally seen and examined this patient. I fully participated in the care of this patient. I have made amendments to the documentation where appropriate and otherwise agree with the history, physical exam, and plan as documented by the STEFAN

## 2023-07-07 NOTE — DIETITIAN INITIAL EVALUATION ADULT - ORAL INTAKE PTA/DIET HISTORY
Patient is alert and confused during visit. Attempted to call son, Johnny for nutrition interview. Johnny stated that patient has lost a lot of weight PTA, but was unable to quantify time frame/ amount of weight loss, requested RD to call patient's boyfriend- Rahat, for more information. RD attempted to call Rahat, unsuccessful at this time. Patient has no known food allergies, per chart.

## 2023-07-07 NOTE — BH CONSULTATION LIAISON ASSESSMENT NOTE - NSBHREFERLPTEAMNAME_PSY_A_CORE_FT
S INPATIENT BEHAVIORAL HEALTH PROGRESS NOTE  This visit was performed via live two-way video with patient's verbal consent.   Clinician Location: Home.  Patient Location: Home.  Caroline is in [place] and identity has been established.   She was informed that consent to treat includes permission to submit charges to the applicable insurance on file. Caroline was advised regarding the potential risk inherent in video visits, as the assessment may be limited due to what can be seen on the screen which potentially results in an incomplete assessment; as well as either of us may discontinue the video visit if it is.   Patient:  Caroline Wu Date:  2020   :  1986 Attending:  Geovanna Jarvis MD   34 year old female      Chief Complaint:       \"I am doing better.  No major side effect with medication.  Able to sleep better.  I do have some leg swelling which I am not sure it is from medication or for any other reason.\"     Interval History (3 Chronic or Extended 4+):  Patient is seen for follow-up appointment in partial hospitalization program for medication management and psychotherapy.                Patient reports he is attending group therapy which has been helping her.  Patient had a quiet weekend.  She did had some family visit and Cook out for the long weekend and she felt much better .  Patient is feeling safe in the group therapy and has been participating in the group therapy.  Patient reports she is taking medication as prescribed.  Last night she slept the best.  Stated that her mood is stable she has not noticed any side effect with lamotrigine or Abilify except she has some leg swelling.  Does a not pitted swelling, not sure it is from the medication or for any other reason.  Patient does not have high blood pressure.  Reported that to monitor her blood pressure.  Stated she is drinking enough fluid not drinking excessively.  Denies any trouble with urination.  Denies any other side effect  with the medication.  Appetite is good.  She feels more happy year more pleasant and does not have so much emotional lability and depressed and hopeless feeling.  Patient does not have any safety concern.  Patient denies any psychotic symptoms.  Appetite is about the same.  Sleep is improved.  Energy level is good.  Denies any psychotic symptoms.  Denies any suicidal homicide ideation    Symptoms Reported Today:  Affect: Anxious, Blunted, Flat  Mood: Anxious, Depressed, Flat  Thought Process: Organized  Psychosis: None  Symptom Notation: Alert, oriented, and pleasant  Psychosocial Stressors: Interpersonal conflict  Sleep Report: Early morning awakening, Fair(woke at 4)  Hours Slept: 6-7  Meals: Dinner, Breakfast  Goal Complete / Activity: Caroline was able to socialize and clean her house. She relaxed and enjoyed quiet time.  Treatment Plan: Continue treatment plan  Patient Response: Attentive, Good eye contact, Interactive  Comment: Unable to complete symptom rating sheet d/t virtual platform with COVID accomodations. Pt responded verbally to safety and psychosis symptoms.    ALLERGIES:   Allergen Reactions   • Darvocet [Propoxyphene N-Apap] RASH   • Keflex SWELLING and ARTHRALGIA   • Latex   (Environmental) RASH   • Penicillin G RASH and Nausea & Vomiting   • Sulfa Antibiotics RASH        Medications:       Current Outpatient Medications   Medication Sig   • ARIPiprazole (ABILIFY) 10 MG tablet Take 0.5 tablets by mouth nightly. For depression and anxiety   • lamoTRIgine (LaMICtal) 25 MG tablet Take 1 tablet by mouth 2 times daily. depression and mood swing.   • HYDROcodone-acetaminophen (NORCO)  MG per tablet Take 1 tablet by mouth daily as needed for Pain.   • clonazePAM (KlonoPIN) 1 MG tablet Take 1 tablet by mouth 2 times daily as needed for Anxiety.   • lisdexamfetamine (VYVANSE) 60 MG capsule Take 1 capsule by mouth every morning.   • traMADol (ULTRAM) 50 MG tablet Take 1 tablet by mouth at bedtime as  needed for Pain.   • butalbital-APAP-caffeine (FIORICET) -40 MG per tablet Take 1 tablet by mouth daily as directed   • doxycycline monohydrate (MONODOX) 100 MG capsule Take 1 capsule by mouth 2 times daily.   • tiZANidine (ZANAFLEX) 4 MG tablet Take 1 tablet by mouth every 8 hours as needed (fibromyalgia).   • Lactobacillus (FLORAJEN ACIDOPHILUS) capsule Take 1 capsule by mouth daily.   • FLUoxetine (PROZAC) 20 MG capsule Take 3 capsules by mouth daily.   • ketoconazole (NIZORAL) 2 % cream Apply topically to the affected area(s) daily.   • lidocaine (XYLOCAINE) 5 % ointment Apply topically as needed for Pain. Apply to perianal region three times daily as needed for pain   • ergocalciferol (DRISDOL) 1.25 mg (50,000 units) capsule Take 1 capsule by mouth 2 days a week.   • metFORMIN (GLUCOPHAGE) 500 MG tablet Take 1 tablet by mouth daily after dinner.   • pantoprazole (PROTONIX) 40 MG tablet Take 1 tablet by mouth daily.   • fluticasone (FLONASE) 50 MCG/ACT nasal spray Spray 2 sprays in each nostril 2 times daily.   • meclizine HCl (ANTIVERT) 25 MG tablet Take 1 tablet by mouth 3 times daily as needed (dizziness).   • sumatriptan (IMITREX) 100 MG tablet Take 1 tab at onset of headache. May repeat in 2 hours if needed.   • Ascorbic Acid (VITAMIN C) 1000 MG tablet Take 1,000 mg by mouth daily.     No current facility-administered medications for this encounter.        Labs:       CBC  Recent Labs     06/16/20  1202 08/11/20  1253   WBC 7.7 10.1   RBC 4.17 4.33    231   SEG 66 72   TLYMPH 26 20   PMON 6 5   PEOS 1 1   PBASO 1 1   ANEUT 5.2 7.4   ALYMS 2.0 2.1   RAMOS 0.4 0.5   AEOS 0.1 0.1   ABASO 0.0 0.1       CMP  Recent Labs     01/30/20  0715 08/11/20  1253   SODIUM  --  136   POTASSIUM  --  3.9   CHLORIDE  --  99   CO2  --  26   ANIONGAP  --  15   GLUCOSE 134* 89   BUN  --  11   CREATININE  --  0.79   CALCIUM  --  9.1   ALBUMIN  --  4.3   BILIRUBIN  --  0.6   ALKPT  --  79   AST  --  18   GPT  --  24    GLOB  --  3.5   AGR  --  1.2       Other  Recent Labs     06/16/20  1202 08/11/20  1253   MCV 90.4 89.6       ROS (Extended 2-9 Pertinent and more):       Patient denies any physical distress.  Denies any chest pain, palpitations, shortness of breath, or altered mental status.     PFSH (One Area Pertinent):       Same as admission note except      EXAM (Detailed at least 9 Bullets):      Vitals:    Visit Vitals  LMP 08/18/2009       There were no vitals filed for this visit.      MENTAL STATUS EXAM:  Appearance:  Well-groomed, good eye contact, appears stated age.   Psychomotor Behavior:  No abnormal movements, normal gait.  Behavior:  Calm, pleasant, interactive.   Orientation:  Oriented to person, place and time.   Memory:  no apparent impairments in short term memory and no apparent impairments in long term memory     Good, able to demonstrate accurate historical recall for recent and more remote events and discussions.  Attention:  Good, no fluctuation or obvious deficit noted and able to follow the conversation.   Consciousness:  Awake and alert.   Fund of Knowledge:  Consistent with education and experiences as evidenced by vocabulary.  Cooperativity:  Cooperative, forthcoming, appears reliable.    Speech:  Normal rate, tone, and volume.   Language:  No abnormality noted.    Mood:  \"anxious\" and \"better\".  Affect:  anxious and appropriate.  Thought Process:  Linear, logical, goal-directed.    Thought Content:  No overt delusions or abnormality noted.  No hallucinations.  Not responding to internal stimuli.   Insight:  good, as evidenced by patient's insight into her own illnessGood, based on appropriate recognition of impact of psychiatric symptoms and need for treatment.   Judgment: good, as evidenced by engagement in treatment.  Good, based on appropriate recent decisions.  Safety:  No suicidal ideation, intent and plan; denies homicidal ideation, intent and plan.  Motivation to pursue treatment:   Good.      Diagnoses-   Generalized anxiety disorder  Major depressive disorder recurrent moderate  ADHD inattentive type    Medical Decision Making and Plan of Treatment (High 4/4/High):  Discussed with patient regarding further treatment in a partial hospitalization program.  Patient is agreeable to continue partial hospitalization program for positive coping skill, cognitive behavior therapy and stress management.      Discussed about medication options-discussed about various medication changes, effects, side effects and other alternatives with the treatment.  Discussed about medication effects, side effects.  Patient is agreeable to try medication to improve symptoms.  Medication changes will be as below.        Reviewed and discussed  with Patient. Verbal consent obtained in order to maintain social distancing recommendations during the COVID crisis.      None.    New medication added--  None      Following medication does remain same     Discussed with patient regarding possibility of medication side effect which is very unusual to have leg swelling with Abilify and lamotrigine.  Most likely could be with her Abilify.  We recommended to reduce Abilify dose or to discontinue to see how the leg swellings improves.  Patient reports she does not want to stop medication she is doing good she is not having any side effects she was able to sleep better and she feels her emotional stability is much better with her mood and anxiety symptoms and does not want to change she will monitor her leg edema.  Patient is advised to monitor her blood pressure at home.  If leg edema does not improved patient will contact her primary provider.    Right now continue Abilify 5 mg at bedtime  Continue Lamictal 25 mg twice a day    Continue Prozac 60 mg per day  And continue Vyvanse 60 mg once a day      Following medication dose has been changed  None    Continue group therapy to improve coping skills to address psychosocial  stressors    Patient can benefit from IOP program upon completion of the partial hospitalization program    Patient also needs help with outpatient appointment with [facility] psychiatrist and therapist upon completion of partial hospitalization program      Risk Assessment and Rationale for Further Stay:       Patient reports she is taking her medication.  Mood is improved.  Anxiety is improved sleep is improved.  Patient has leg swelling which is very unusual side effect with psychotropic medication that patient is on.  We will monitor patient does not want to change or discontinue any medication at the present time.  Patient will continue to work in a group therapy to improve her coping skills and addressing her psychosocial stressors          Geovanna Jarvis MD   [facility]

## 2023-07-07 NOTE — PHYSICAL THERAPY INITIAL EVALUATION ADULT - PERTINENT HX OF CURRENT PROBLEM, REHAB EVAL
57 year old female presenting with bizarre behavior/psych evaluation and was found to have elevated Lactic acid

## 2023-07-07 NOTE — BH CONSULTATION LIAISON ASSESSMENT NOTE - SUMMARY
Patient is a 58 y/o female, with a PmHx of hyperlipidemia, diabetes, asthma, past psychiatric history inclusive of schizophrenia and multiple psych hospitalizations in the past, brought in by EMS after her  activated 911 for bizarre behavior  for 1 week. Patient comes from home, lives with partner, on disability. Patient currently on clozapine 350mg. Spoke with spouse who reports he cannot be certain patient is compliant with medications as there are some nights he does not supervise her taking her medication and 1 night he did witness patient throwing them out. Rahat also noted patient has been acting bizarre, confused, laughing constantly and talking to herself. Patient is not at baseline.    PLAN  - no SI or HI at this time - no need for increased level of observation  - antipsychotics can only be given if qtc < 500   - PRN Patient is a 58 y/o female, with a PmHx of hyperlipidemia, diabetes, asthma, past psychiatric history inclusive of schizophrenia and multiple psych hospitalizations in the past, brought in by EMS after her  activated 911 for bizarre behavior  for 1 week. Patient comes from home, lives with partner, on disability. Patient currently on clozapine 350mg. Spoke with spouse who reports he cannot be certain patient is compliant with medications as there are some nights he does not supervise her taking her medication and 1 night he did witness patient throwing them out. Raaht also noted patient has been acting bizarre, confused, laughing constantly and talking to herself. Patient is not at baseline.    PLAN  - no SI or HI at this time - no need for psychiatric increased level of observation  - antipsychotics can only be given if qtc < 500   - uncertainty of medication compliance at home   --- if no cardiac contraindications, would recommend RESTARTING clozapine at 25mg qhs  -- CL will follow and titrate as needed  - PRN for agitation: Zyprexa 2.5mg q6hrs   - DISPO: may require [facility] admission. cannot leave AMA at this time

## 2023-07-07 NOTE — PHYSICAL THERAPY INITIAL EVALUATION ADULT - ADDITIONAL COMMENTS
Patient poor historian, please see care coordination note for further details. Per chart patient lives with fiance, was independent in ambulation prior to admission.

## 2023-07-07 NOTE — BH CONSULTATION LIAISON ASSESSMENT NOTE - RISK ASSESSMENT
delivery delivered Risks: Schizophrenia, lack of insight, acute medical condition  Protective: Good family support , domiciled

## 2023-07-07 NOTE — DIETITIAN INITIAL EVALUATION ADULT - OTHER INFO
57 yr old female presenting with bizarre behavior/psych evaluation and was found to have elevated Lactic acid, per chart.     Collateral obtained from RN, RN reports patient has good po intake. No reports of any difficulty chewing/swallowing, any nausea, vomiting, diarrhea noted at this time. No bowel movement since admission (~1day). Bowel regimen is in placed. Recommend continue to monitor bowel movement. Noted elevated POCT- 147(7/7), 179(7/6), HgA1c- 8.0% (7/7), low Mg-1.5(7/7). Patient is on Magnesium sulfate for repletion. On insulin coverage and consistent carb diet. Current weight: 60.7kg/133.8lbs (7/6/2023). As per Akila CORTEZ, weight history 65kg (11/27/2020). Noted with non-significant weight loss of -4.3kg/-6.6%BW loss x 2.5years. Will continue to monitor weight trend.

## 2023-07-07 NOTE — BH CONSULTATION LIAISON ASSESSMENT NOTE - PAST PSYCHOTROPIC MEDICATION
-Clozapine 250mg PO qHS  -Risperdal 2mg PO qHS  -Cogentin 1mg PO qHS  -Depakote 1000mg PO qHS  -Divalproex Sodium ER 1500mg qHS -Clozapine   -Risperdal  -Cogentin  -Depakote

## 2023-07-07 NOTE — PHYSICAL THERAPY INITIAL EVALUATION ADULT - LIGHT TOUCH SENSATION, LLE, REHAB EVAL
Herminia Galvin is calling to request a refill on the following medication(s):    Last Visit Date (If Applicable):  8/65/2237    Next Visit Date:    8/4/2021    Medication Request:  Requested Prescriptions     Pending Prescriptions Disp Refills    clonazePAM (KLONOPIN) 0.5 MG tablet [Pharmacy Med Name: clonazePAM 0.5 MG TABLET] 60 tablet 0     Sig: TAKE ONE TABLET BY MOUTH TWICE A DAY within normal limits

## 2023-07-08 DIAGNOSIS — D64.9 ANEMIA, UNSPECIFIED: ICD-10-CM

## 2023-07-08 DIAGNOSIS — K59.00 CONSTIPATION, UNSPECIFIED: ICD-10-CM

## 2023-07-08 LAB
ANION GAP SERPL CALC-SCNC: 7 MMOL/L — SIGNIFICANT CHANGE UP (ref 7–14)
BUN SERPL-MCNC: 6 MG/DL — LOW (ref 7–23)
CALCIUM SERPL-MCNC: 8.9 MG/DL — SIGNIFICANT CHANGE UP (ref 8.4–10.5)
CHLORIDE SERPL-SCNC: 109 MMOL/L — HIGH (ref 98–107)
CO2 SERPL-SCNC: 23 MMOL/L — SIGNIFICANT CHANGE UP (ref 22–31)
CREAT SERPL-MCNC: 0.58 MG/DL — SIGNIFICANT CHANGE UP (ref 0.5–1.3)
EGFR: 104 ML/MIN/1.73M2 — SIGNIFICANT CHANGE UP
FERRITIN SERPL-MCNC: 88 NG/ML — SIGNIFICANT CHANGE UP (ref 13–330)
FOLATE SERPL-MCNC: >20 NG/ML — HIGH (ref 3.1–17.5)
GLUCOSE SERPL-MCNC: 176 MG/DL — HIGH (ref 70–99)
HCT VFR BLD CALC: 33.1 % — LOW (ref 34.5–45)
HGB BLD-MCNC: 10.8 G/DL — LOW (ref 11.5–15.5)
IRON SATN MFR SERPL: 22 % — SIGNIFICANT CHANGE UP (ref 14–50)
IRON SATN MFR SERPL: 52 UG/DL — SIGNIFICANT CHANGE UP (ref 30–160)
MAGNESIUM SERPL-MCNC: 2 MG/DL — SIGNIFICANT CHANGE UP (ref 1.6–2.6)
MCHC RBC-ENTMCNC: 26.9 PG — LOW (ref 27–34)
MCHC RBC-ENTMCNC: 32.6 GM/DL — SIGNIFICANT CHANGE UP (ref 32–36)
MCV RBC AUTO: 82.5 FL — SIGNIFICANT CHANGE UP (ref 80–100)
NRBC # BLD: 0 /100 WBCS — SIGNIFICANT CHANGE UP (ref 0–0)
NRBC # FLD: 0 K/UL — SIGNIFICANT CHANGE UP (ref 0–0)
PHOSPHATE SERPL-MCNC: 2.6 MG/DL — SIGNIFICANT CHANGE UP (ref 2.5–4.5)
PLATELET # BLD AUTO: 251 K/UL — SIGNIFICANT CHANGE UP (ref 150–400)
POTASSIUM SERPL-MCNC: 3.9 MMOL/L — SIGNIFICANT CHANGE UP (ref 3.5–5.3)
POTASSIUM SERPL-SCNC: 3.9 MMOL/L — SIGNIFICANT CHANGE UP (ref 3.5–5.3)
RBC # BLD: 4.01 M/UL — SIGNIFICANT CHANGE UP (ref 3.8–5.2)
RBC # FLD: 13 % — SIGNIFICANT CHANGE UP (ref 10.3–14.5)
SODIUM SERPL-SCNC: 139 MMOL/L — SIGNIFICANT CHANGE UP (ref 135–145)
TIBC SERPL-MCNC: 234 UG/DL — SIGNIFICANT CHANGE UP (ref 220–430)
TOXICOLOGY SCREEN, DRUGS OF ABUSE, SERUM RESULT: SIGNIFICANT CHANGE UP
UIBC SERPL-MCNC: 182 UG/DL — SIGNIFICANT CHANGE UP (ref 110–370)
VIT B12 SERPL-MCNC: 440 PG/ML — SIGNIFICANT CHANGE UP (ref 200–900)
WBC # BLD: 7.14 K/UL — SIGNIFICANT CHANGE UP (ref 3.8–10.5)
WBC # FLD AUTO: 7.14 K/UL — SIGNIFICANT CHANGE UP (ref 3.8–10.5)

## 2023-07-08 PROCEDURE — 99233 SBSQ HOSP IP/OBS HIGH 50: CPT

## 2023-07-08 RX ORDER — CLOZAPINE 150 MG/1
50 TABLET, ORALLY DISINTEGRATING ORAL AT BEDTIME
Refills: 0 | Status: DISCONTINUED | OUTPATIENT
Start: 2023-07-08 | End: 2023-07-10

## 2023-07-08 RX ADMIN — POLYETHYLENE GLYCOL 3350 17 GRAM(S): 17 POWDER, FOR SOLUTION ORAL at 17:58

## 2023-07-08 RX ADMIN — Medication 1: at 13:07

## 2023-07-08 RX ADMIN — POLYETHYLENE GLYCOL 3350 17 GRAM(S): 17 POWDER, FOR SOLUTION ORAL at 06:55

## 2023-07-08 RX ADMIN — CHLORHEXIDINE GLUCONATE 1 APPLICATION(S): 213 SOLUTION TOPICAL at 06:56

## 2023-07-08 RX ADMIN — Medication 2: at 18:11

## 2023-07-08 RX ADMIN — ATORVASTATIN CALCIUM 20 MILLIGRAM(S): 80 TABLET, FILM COATED ORAL at 22:35

## 2023-07-08 RX ADMIN — SODIUM CHLORIDE 100 MILLILITER(S): 9 INJECTION INTRAMUSCULAR; INTRAVENOUS; SUBCUTANEOUS at 09:07

## 2023-07-08 RX ADMIN — SODIUM CHLORIDE 100 MILLILITER(S): 9 INJECTION INTRAMUSCULAR; INTRAVENOUS; SUBCUTANEOUS at 23:00

## 2023-07-08 RX ADMIN — CLOZAPINE 50 MILLIGRAM(S): 150 TABLET, ORALLY DISINTEGRATING ORAL at 22:35

## 2023-07-08 RX ADMIN — ENOXAPARIN SODIUM 40 MILLIGRAM(S): 100 INJECTION SUBCUTANEOUS at 13:07

## 2023-07-08 RX ADMIN — Medication 1: at 09:07

## 2023-07-08 NOTE — BH CONSULTATION LIAISON PROGRESS NOTE - NSBHASSESSMENTFT_PSY_ALL_CORE
***INCOMPLETE***    Patient is a 58 y/o female, with a PmHx of hyperlipidemia, diabetes, asthma, past psychiatric history inclusive of schizophrenia and multiple psych hospitalizations in the past, brought in by EMS after her  activated 911 for bizarre behavior  for 1 week. Patient comes from home, lives with partner, on disability. Patient currently on clozapine 350mg. Spoke with spouse who reports he cannot be certain patient is compliant with medications as there are some nights he does not supervise her taking her medication and 1 night he did witness patient throwing them out. Rahat also noted patient has been acting bizarre, confused, laughing constantly and talking to herself. Patient is not at baseline.    7/8: Patient is disorganized and illogical on exam. Unable to engage meaningfully on exam. Denies SI/HI/AVH. Increase clozapine to 50mg QHS. Re-check ANC on Monday, 7/10.    PLAN  - no SI or HI at this time - no need for psychiatric increased level of observation  - antipsychotics can only be given if qtc < 500   - re-check ANC on Monday, 7/10  - uncertainty of medication compliance at home   --- if no cardiac contraindications, would recommend increasing clozapine to 50mg qhs  -- CL will follow and titrate as needed  - PRN for agitation: Zyprexa 2.5mg q6hrs   - DISPO: may require [facility] admission. cannot leave AMA at this time Patient is a 56 y/o female, with a PmHx of hyperlipidemia, diabetes, asthma, past psychiatric history inclusive of schizophrenia and multiple psych hospitalizations in the past, brought in by EMS after her  activated 911 for bizarre behavior  for 1 week. Patient comes from home, lives with partner, on disability. Patient currently on clozapine 350mg. Spoke with spouse who reports he cannot be certain patient is compliant with medications as there are some nights he does not supervise her taking her medication and 1 night he did witness patient throwing them out. Rahat also noted patient has been acting bizarre, confused, laughing constantly and talking to herself. Patient is not at baseline.    7/8: Patient is disorganized and illogical on exam. Unable to engage meaningfully on exam. Denies SI/HI/AVH. Increase clozapine to 50mg QHS. Re-check ANC on Monday, 7/10.    PLAN  - no SI or HI at this time - no need for psychiatric increased level of observation  - antipsychotics can only be given if qtc < 500   - re-check ANC on Monday, 7/10  - uncertainty of medication compliance at home   --- if no cardiac contraindications, would recommend increasing clozapine to 50mg qhs  -- CL will follow and titrate as needed  - PRN for agitation: Zyprexa 2.5mg q6hrs   - DISPO: may require [facility] admission. cannot leave AMA at this time

## 2023-07-08 NOTE — BH CONSULTATION LIAISON PROGRESS NOTE - ATTENDING COMMENTS
Case discussed with resident md, impression and plan discussed and agreed upon. Telephonic supervision provided

## 2023-07-08 NOTE — PROVIDER CONTACT NOTE (MEDICATION) - ASSESSMENT
Most recent blood pressure 112/63 HR 76. Previous 142/63 bp 77 HR, drop greater than 20mmhg. However bp greater than 90/50.

## 2023-07-08 NOTE — PROVIDER CONTACT NOTE (MEDICATION) - SITUATION
Patient has clozapine 50mg PO ordered for bedtime. As per parameter on order hold for systolic drop greater than 20mmhg, or blood pressure <90/50.

## 2023-07-08 NOTE — PROGRESS NOTE ADULT - SUBJECTIVE AND OBJECTIVE BOX
Patient is a 57y old  Female who presents with a chief complaint of Strange and inexplicable behavior     (07 Jul 2023 14:48)      SUBJECTIVE / OVERNIGHT EVENTS: Pt seen and examined at 11:45am, no overnight events, per nsg had 2 bms last night, Denies any pain, no other new issues reported.        MEDICATIONS  (STANDING):  atorvastatin 20 milliGRAM(s) Oral at bedtime  chlorhexidine 2% Cloths 1 Application(s) Topical <User Schedule>  cloZAPine 50 milliGRAM(s) Oral at bedtime  dextrose 5%. 1000 milliLiter(s) (50 mL/Hr) IV Continuous <Continuous>  dextrose 5%. 1000 milliLiter(s) (100 mL/Hr) IV Continuous <Continuous>  dextrose 50% Injectable 25 Gram(s) IV Push once  dextrose 50% Injectable 12.5 Gram(s) IV Push once  dextrose 50% Injectable 25 Gram(s) IV Push once  enoxaparin Injectable 40 milliGRAM(s) SubCutaneous every 24 hours  glucagon  Injectable 1 milliGRAM(s) IntraMuscular once  insulin lispro (ADMELOG) corrective regimen sliding scale   SubCutaneous at bedtime  insulin lispro (ADMELOG) corrective regimen sliding scale   SubCutaneous three times a day before meals  polyethylene glycol 3350 17 Gram(s) Oral two times a day  senna 2 Tablet(s) Oral at bedtime  sodium chloride 0.9%. 1000 milliLiter(s) (100 mL/Hr) IV Continuous <Continuous>    MEDICATIONS  (PRN):  acetaminophen     Tablet .. 650 milliGRAM(s) Oral every 6 hours PRN Temp greater or equal to 38C (100.4F), Mild Pain (1 - 3), Moderate Pain (4 - 6)  aluminum hydroxide/magnesium hydroxide/simethicone Suspension 30 milliLiter(s) Oral every 4 hours PRN Dyspepsia  dextrose Oral Gel 15 Gram(s) Oral once PRN Blood Glucose LESS THAN 70 milliGRAM(s)/deciliter  melatonin 3 milliGRAM(s) Oral at bedtime PRN Insomnia  OLANZapine 2.5 milliGRAM(s) Oral every 6 hours PRN agitation  ondansetron Injectable 4 milliGRAM(s) IV Push every 8 hours PRN Nausea and/or Vomiting      Vital Signs Last 24 Hrs  T(C): 36.9 (08 Jul 2023 05:55), Max: 36.9 (08 Jul 2023 05:55)  T(F): 98.5 (08 Jul 2023 05:55), Max: 98.5 (08 Jul 2023 05:55)  HR: 76 (08 Jul 2023 05:55) (76 - 78)  BP: 133/74 (08 Jul 2023 05:55) (119/67 - 133/74)  BP(mean): --  RR: 18 (08 Jul 2023 05:55) (18 - 18)  SpO2: 100% (08 Jul 2023 05:55) (100% - 100%)    Parameters below as of 08 Jul 2023 05:55  Patient On (Oxygen Delivery Method): room air      CAPILLARY BLOOD GLUCOSE      POCT Blood Glucose.: 195 mg/dL (08 Jul 2023 12:28)  POCT Blood Glucose.: 175 mg/dL (08 Jul 2023 08:53)  POCT Blood Glucose.: 216 mg/dL (07 Jul 2023 22:06)  POCT Blood Glucose.: 182 mg/dL (07 Jul 2023 17:43)    I&O's Summary      PHYSICAL EXAM:  GENERAL: NAD  CHEST/LUNG: Clear to auscultation bilaterally; No wheeze  HEART: Regular rate and rhythm  ABDOMEN: Soft, Nontender, mildly distended  EXTREMITIES: no LE edema  PSYCH: laughing inappropriately  NEUROLOGY: Alert, awake, able to say her name  SKIN: No rashes or lesions    LABS:                        10.8   7.14  )-----------( 251      ( 08 Jul 2023 06:55 )             33.1     07-08    139  |  109<H>  |  6<L>  ----------------------------<  176<H>  3.9   |  23  |  0.58    Ca    8.9      08 Jul 2023 06:55  Phos  2.6     07-08  Mg     2.00     07-08    TPro  7.7  /  Alb  4.7  /  TBili  0.6  /  DBili  x   /  AST  17  /  ALT  28  /  AlkPhos  98  07-06          Urinalysis Basic - ( 08 Jul 2023 06:55 )    Color: x / Appearance: x / SG: x / pH: x  Gluc: 176 mg/dL / Ketone: x  / Bili: x / Urobili: x   Blood: x / Protein: x / Nitrite: x   Leuk Esterase: x / RBC: x / WBC x   Sq Epi: x / Non Sq Epi: x / Bacteria: x        RADIOLOGY & ADDITIONAL TESTS:  < from: Xray Abdomen 2 Views (07.07.23 @ 15:29) >  XR ABDOMEN 2V    < end of copied text >  < from: Xray Abdomen 2 Views (07.07.23 @ 15:29) >  Large colonic stool burden.    < end of copied text >  < from: Xray Abdomen 2 Views (07.07.23 @ 15:29) >  Nonobstructive gas pattern.  Constipation.      < end of copied text >    Imaging Personally Reviewed:    Consultant(s) Notes Reviewed:      Care Discussed with Consultants/Other Providers:

## 2023-07-09 LAB
ANION GAP SERPL CALC-SCNC: 12 MMOL/L — SIGNIFICANT CHANGE UP (ref 7–14)
ANION GAP SERPL CALC-SCNC: 15 MMOL/L — HIGH (ref 7–14)
BUN SERPL-MCNC: 5 MG/DL — LOW (ref 7–23)
BUN SERPL-MCNC: 6 MG/DL — LOW (ref 7–23)
CALCIUM SERPL-MCNC: 8.8 MG/DL — SIGNIFICANT CHANGE UP (ref 8.4–10.5)
CALCIUM SERPL-MCNC: 9.1 MG/DL — SIGNIFICANT CHANGE UP (ref 8.4–10.5)
CHLORIDE SERPL-SCNC: 104 MMOL/L — SIGNIFICANT CHANGE UP (ref 98–107)
CHLORIDE SERPL-SCNC: 106 MMOL/L — SIGNIFICANT CHANGE UP (ref 98–107)
CO2 SERPL-SCNC: 17 MMOL/L — LOW (ref 22–31)
CO2 SERPL-SCNC: 23 MMOL/L — SIGNIFICANT CHANGE UP (ref 22–31)
CREAT SERPL-MCNC: 0.53 MG/DL — SIGNIFICANT CHANGE UP (ref 0.5–1.3)
CREAT SERPL-MCNC: 0.53 MG/DL — SIGNIFICANT CHANGE UP (ref 0.5–1.3)
EGFR: 106 ML/MIN/1.73M2 — SIGNIFICANT CHANGE UP
EGFR: 106 ML/MIN/1.73M2 — SIGNIFICANT CHANGE UP
GLUCOSE SERPL-MCNC: 180 MG/DL — HIGH (ref 70–99)
GLUCOSE SERPL-MCNC: 184 MG/DL — HIGH (ref 70–99)
HCT VFR BLD CALC: 33.1 % — LOW (ref 34.5–45)
HGB BLD-MCNC: 10.8 G/DL — LOW (ref 11.5–15.5)
MAGNESIUM SERPL-MCNC: 1.8 MG/DL — SIGNIFICANT CHANGE UP (ref 1.6–2.6)
MAGNESIUM SERPL-MCNC: 1.9 MG/DL — SIGNIFICANT CHANGE UP (ref 1.6–2.6)
MCHC RBC-ENTMCNC: 27.3 PG — SIGNIFICANT CHANGE UP (ref 27–34)
MCHC RBC-ENTMCNC: 32.6 GM/DL — SIGNIFICANT CHANGE UP (ref 32–36)
MCV RBC AUTO: 83.8 FL — SIGNIFICANT CHANGE UP (ref 80–100)
NRBC # BLD: 0 /100 WBCS — SIGNIFICANT CHANGE UP (ref 0–0)
NRBC # FLD: 0 K/UL — SIGNIFICANT CHANGE UP (ref 0–0)
PHOSPHATE SERPL-MCNC: 3.2 MG/DL — SIGNIFICANT CHANGE UP (ref 2.5–4.5)
PHOSPHATE SERPL-MCNC: 3.6 MG/DL — SIGNIFICANT CHANGE UP (ref 2.5–4.5)
PLATELET # BLD AUTO: 173 K/UL — SIGNIFICANT CHANGE UP (ref 150–400)
POTASSIUM SERPL-MCNC: 4.1 MMOL/L — SIGNIFICANT CHANGE UP (ref 3.5–5.3)
POTASSIUM SERPL-MCNC: 4.1 MMOL/L — SIGNIFICANT CHANGE UP (ref 3.5–5.3)
POTASSIUM SERPL-SCNC: 4.1 MMOL/L — SIGNIFICANT CHANGE UP (ref 3.5–5.3)
POTASSIUM SERPL-SCNC: 4.1 MMOL/L — SIGNIFICANT CHANGE UP (ref 3.5–5.3)
RBC # BLD: 3.95 M/UL — SIGNIFICANT CHANGE UP (ref 3.8–5.2)
RBC # FLD: 12.7 % — SIGNIFICANT CHANGE UP (ref 10.3–14.5)
SODIUM SERPL-SCNC: 136 MMOL/L — SIGNIFICANT CHANGE UP (ref 135–145)
SODIUM SERPL-SCNC: 141 MMOL/L — SIGNIFICANT CHANGE UP (ref 135–145)
WBC # BLD: 7.78 K/UL — SIGNIFICANT CHANGE UP (ref 3.8–10.5)
WBC # FLD AUTO: 7.78 K/UL — SIGNIFICANT CHANGE UP (ref 3.8–10.5)

## 2023-07-09 PROCEDURE — 99232 SBSQ HOSP IP/OBS MODERATE 35: CPT

## 2023-07-09 RX ADMIN — Medication 2: at 12:35

## 2023-07-09 RX ADMIN — Medication 1: at 08:41

## 2023-07-09 RX ADMIN — ENOXAPARIN SODIUM 40 MILLIGRAM(S): 100 INJECTION SUBCUTANEOUS at 12:35

## 2023-07-09 RX ADMIN — Medication 1: at 18:29

## 2023-07-09 RX ADMIN — Medication 1: at 22:44

## 2023-07-09 RX ADMIN — ATORVASTATIN CALCIUM 20 MILLIGRAM(S): 80 TABLET, FILM COATED ORAL at 22:39

## 2023-07-09 RX ADMIN — POLYETHYLENE GLYCOL 3350 17 GRAM(S): 17 POWDER, FOR SOLUTION ORAL at 18:30

## 2023-07-09 RX ADMIN — CHLORHEXIDINE GLUCONATE 1 APPLICATION(S): 213 SOLUTION TOPICAL at 06:16

## 2023-07-09 RX ADMIN — POLYETHYLENE GLYCOL 3350 17 GRAM(S): 17 POWDER, FOR SOLUTION ORAL at 06:16

## 2023-07-09 RX ADMIN — SODIUM CHLORIDE 100 MILLILITER(S): 9 INJECTION INTRAMUSCULAR; INTRAVENOUS; SUBCUTANEOUS at 12:36

## 2023-07-09 RX ADMIN — CLOZAPINE 50 MILLIGRAM(S): 150 TABLET, ORALLY DISINTEGRATING ORAL at 22:39

## 2023-07-09 NOTE — PROGRESS NOTE ADULT - SUBJECTIVE AND OBJECTIVE BOX
Patient is a 57y old  Female who presents with a chief complaint of lactic acidosis, bizarre behavior (08 Jul 2023 13:44)      SUBJECTIVE / OVERNIGHT EVENTS:  Pt seen and attempted to examine at 11:45am, no overnight events, per nsg had bm yesterday, pt is verbally abusive today, does not want to speak with provider or examine her, no other new issues reported.        MEDICATIONS  (STANDING):  atorvastatin 20 milliGRAM(s) Oral at bedtime  chlorhexidine 2% Cloths 1 Application(s) Topical <User Schedule>  cloZAPine 50 milliGRAM(s) Oral at bedtime  dextrose 5%. 1000 milliLiter(s) (100 mL/Hr) IV Continuous <Continuous>  dextrose 5%. 1000 milliLiter(s) (50 mL/Hr) IV Continuous <Continuous>  dextrose 50% Injectable 25 Gram(s) IV Push once  dextrose 50% Injectable 12.5 Gram(s) IV Push once  dextrose 50% Injectable 25 Gram(s) IV Push once  enoxaparin Injectable 40 milliGRAM(s) SubCutaneous every 24 hours  glucagon  Injectable 1 milliGRAM(s) IntraMuscular once  insulin lispro (ADMELOG) corrective regimen sliding scale   SubCutaneous three times a day before meals  insulin lispro (ADMELOG) corrective regimen sliding scale   SubCutaneous at bedtime  polyethylene glycol 3350 17 Gram(s) Oral two times a day  senna 2 Tablet(s) Oral at bedtime  sodium chloride 0.9%. 1000 milliLiter(s) (100 mL/Hr) IV Continuous <Continuous>    MEDICATIONS  (PRN):  acetaminophen     Tablet .. 650 milliGRAM(s) Oral every 6 hours PRN Temp greater or equal to 38C (100.4F), Mild Pain (1 - 3), Moderate Pain (4 - 6)  aluminum hydroxide/magnesium hydroxide/simethicone Suspension 30 milliLiter(s) Oral every 4 hours PRN Dyspepsia  dextrose Oral Gel 15 Gram(s) Oral once PRN Blood Glucose LESS THAN 70 milliGRAM(s)/deciliter  melatonin 3 milliGRAM(s) Oral at bedtime PRN Insomnia  OLANZapine 2.5 milliGRAM(s) Oral every 6 hours PRN agitation  ondansetron Injectable 4 milliGRAM(s) IV Push every 8 hours PRN Nausea and/or Vomiting      Vital Signs Last 24 Hrs  T(C): 36.8 (09 Jul 2023 12:41), Max: 36.8 (08 Jul 2023 14:20)  T(F): 98.3 (09 Jul 2023 12:41), Max: 98.3 (09 Jul 2023 12:41)  HR: 74 (09 Jul 2023 12:41) (74 - 77)  BP: 130/63 (09 Jul 2023 12:41) (108/61 - 142/63)  BP(mean): --  RR: 18 (09 Jul 2023 12:41) (18 - 18)  SpO2: 98% (09 Jul 2023 12:41) (97% - 100%)    Parameters below as of 09 Jul 2023 12:41  Patient On (Oxygen Delivery Method): room air      CAPILLARY BLOOD GLUCOSE      POCT Blood Glucose.: 218 mg/dL (09 Jul 2023 12:31)  POCT Blood Glucose.: 178 mg/dL (09 Jul 2023 08:32)  POCT Blood Glucose.: 186 mg/dL (08 Jul 2023 22:11)  POCT Blood Glucose.: 219 mg/dL (08 Jul 2023 17:58)    I&O's Summary      PHYSICAL EXAM: Limited  GENERAL: NAD  CHEST/LUNG: Appears to be breathing comfortably  HEART: Refused  ABDOMEN: refused  EXTREMITIES: Refused  PSYCH: verbally abusive  NEUROLOGY: Alert, awake  SKIN: Refused    LABS:                        10.8   7.78  )-----------( 173      ( 09 Jul 2023 05:52 )             33.1     07-09    136  |  104  |  6<L>  ----------------------------<  180<H>  4.1   |  17<L>  |  0.53    Ca    8.8      09 Jul 2023 05:52  Phos  3.2     07-09  Mg     1.80     07-09            Urinalysis Basic - ( 09 Jul 2023 05:52 )    Color: x / Appearance: x / SG: x / pH: x  Gluc: 180 mg/dL / Ketone: x  / Bili: x / Urobili: x   Blood: x / Protein: x / Nitrite: x   Leuk Esterase: x / RBC: x / WBC x   Sq Epi: x / Non Sq Epi: x / Bacteria: x        RADIOLOGY & ADDITIONAL TESTS:    Imaging Personally Reviewed:    Consultant(s) Notes Reviewed:      Care Discussed with Consultants/Other Providers:

## 2023-07-10 LAB
ANION GAP SERPL CALC-SCNC: 13 MMOL/L — SIGNIFICANT CHANGE UP (ref 7–14)
BASOPHILS # BLD AUTO: 0.02 K/UL — SIGNIFICANT CHANGE UP (ref 0–0.2)
BASOPHILS NFR BLD AUTO: 0.3 % — SIGNIFICANT CHANGE UP (ref 0–2)
BUN SERPL-MCNC: 6 MG/DL — LOW (ref 7–23)
CALCIUM SERPL-MCNC: 9.3 MG/DL — SIGNIFICANT CHANGE UP (ref 8.4–10.5)
CHLORIDE SERPL-SCNC: 104 MMOL/L — SIGNIFICANT CHANGE UP (ref 98–107)
CO2 SERPL-SCNC: 22 MMOL/L — SIGNIFICANT CHANGE UP (ref 22–31)
CREAT SERPL-MCNC: 0.53 MG/DL — SIGNIFICANT CHANGE UP (ref 0.5–1.3)
EGFR: 106 ML/MIN/1.73M2 — SIGNIFICANT CHANGE UP
EOSINOPHIL # BLD AUTO: 0 K/UL — SIGNIFICANT CHANGE UP (ref 0–0.5)
EOSINOPHIL NFR BLD AUTO: 0 % — SIGNIFICANT CHANGE UP (ref 0–6)
GLUCOSE BLDC GLUCOMTR-MCNC: 175 MG/DL — HIGH (ref 70–99)
GLUCOSE BLDC GLUCOMTR-MCNC: 306 MG/DL — HIGH (ref 70–99)
GLUCOSE BLDC GLUCOMTR-MCNC: 325 MG/DL — HIGH (ref 70–99)
GLUCOSE SERPL-MCNC: 214 MG/DL — HIGH (ref 70–99)
HCT VFR BLD CALC: 33.6 % — LOW (ref 34.5–45)
HGB BLD-MCNC: 10.9 G/DL — LOW (ref 11.5–15.5)
IANC: 3.92 K/UL — SIGNIFICANT CHANGE UP (ref 1.8–7.4)
IMM GRANULOCYTES NFR BLD AUTO: 0.4 % — SIGNIFICANT CHANGE UP (ref 0–0.9)
LYMPHOCYTES # BLD AUTO: 3.14 K/UL — SIGNIFICANT CHANGE UP (ref 1–3.3)
LYMPHOCYTES # BLD AUTO: 41 % — SIGNIFICANT CHANGE UP (ref 13–44)
MAGNESIUM SERPL-MCNC: 1.8 MG/DL — SIGNIFICANT CHANGE UP (ref 1.6–2.6)
MCHC RBC-ENTMCNC: 26.7 PG — LOW (ref 27–34)
MCHC RBC-ENTMCNC: 32.4 GM/DL — SIGNIFICANT CHANGE UP (ref 32–36)
MCV RBC AUTO: 82.4 FL — SIGNIFICANT CHANGE UP (ref 80–100)
MONOCYTES # BLD AUTO: 0.54 K/UL — SIGNIFICANT CHANGE UP (ref 0–0.9)
MONOCYTES NFR BLD AUTO: 7.1 % — SIGNIFICANT CHANGE UP (ref 2–14)
NEUTROPHILS # BLD AUTO: 3.92 K/UL — SIGNIFICANT CHANGE UP (ref 1.8–7.4)
NEUTROPHILS NFR BLD AUTO: 51.2 % — SIGNIFICANT CHANGE UP (ref 43–77)
NRBC # BLD: 0 /100 WBCS — SIGNIFICANT CHANGE UP (ref 0–0)
NRBC # FLD: 0 K/UL — SIGNIFICANT CHANGE UP (ref 0–0)
PHOSPHATE SERPL-MCNC: 3.6 MG/DL — SIGNIFICANT CHANGE UP (ref 2.5–4.5)
PLATELET # BLD AUTO: 234 K/UL — SIGNIFICANT CHANGE UP (ref 150–400)
POTASSIUM SERPL-MCNC: 4.2 MMOL/L — SIGNIFICANT CHANGE UP (ref 3.5–5.3)
POTASSIUM SERPL-SCNC: 4.2 MMOL/L — SIGNIFICANT CHANGE UP (ref 3.5–5.3)
RBC # BLD: 4.08 M/UL — SIGNIFICANT CHANGE UP (ref 3.8–5.2)
RBC # FLD: 12.9 % — SIGNIFICANT CHANGE UP (ref 10.3–14.5)
SODIUM SERPL-SCNC: 139 MMOL/L — SIGNIFICANT CHANGE UP (ref 135–145)
WBC # BLD: 7.65 K/UL — SIGNIFICANT CHANGE UP (ref 3.8–10.5)
WBC # FLD AUTO: 7.65 K/UL — SIGNIFICANT CHANGE UP (ref 3.8–10.5)

## 2023-07-10 PROCEDURE — 99231 SBSQ HOSP IP/OBS SF/LOW 25: CPT | Mod: FS

## 2023-07-10 PROCEDURE — 99232 SBSQ HOSP IP/OBS MODERATE 35: CPT

## 2023-07-10 RX ORDER — CLOZAPINE 150 MG/1
75 TABLET, ORALLY DISINTEGRATING ORAL AT BEDTIME
Refills: 0 | Status: DISCONTINUED | OUTPATIENT
Start: 2023-07-10 | End: 2023-07-11

## 2023-07-10 RX ADMIN — ATORVASTATIN CALCIUM 20 MILLIGRAM(S): 80 TABLET, FILM COATED ORAL at 21:35

## 2023-07-10 RX ADMIN — CHLORHEXIDINE GLUCONATE 1 APPLICATION(S): 213 SOLUTION TOPICAL at 06:09

## 2023-07-10 RX ADMIN — CLOZAPINE 75 MILLIGRAM(S): 150 TABLET, ORALLY DISINTEGRATING ORAL at 21:35

## 2023-07-10 RX ADMIN — Medication 2: at 22:56

## 2023-07-10 RX ADMIN — Medication 2: at 08:47

## 2023-07-10 RX ADMIN — ENOXAPARIN SODIUM 40 MILLIGRAM(S): 100 INJECTION SUBCUTANEOUS at 12:43

## 2023-07-10 RX ADMIN — Medication 1: at 18:18

## 2023-07-10 RX ADMIN — Medication 4: at 12:43

## 2023-07-10 RX ADMIN — POLYETHYLENE GLYCOL 3350 17 GRAM(S): 17 POWDER, FOR SOLUTION ORAL at 18:18

## 2023-07-10 NOTE — BH CONSULTATION LIAISON PROGRESS NOTE - NSBHASSESSMENTFT_PSY_ALL_CORE
Patient is a 56 y/o female, with a PmHx of hyperlipidemia, diabetes, asthma, past psychiatric history inclusive of schizophrenia and multiple psych hospitalizations in the past, brought in by EMS after her  activated 911 for bizarre behavior  for 1 week. Patient comes from home, lives with partner, on disability. Patient currently on clozapine 350mg. Spoke with spouse who reports he cannot be certain patient is compliant with medications as there are some nights he does not supervise her taking her medication and 1 night he did witness patient throwing them out. Rahat also noted patient has been acting bizarre, confused, laughing constantly and talking to herself. Patient is not at baseline.    7/8: Patient is disorganized and illogical on exam. Unable to engage meaningfully on exam. Denies SI/HI/AVH. Increase clozapine to 50mg QHS. Re-check ANC on Monday, 7/10.  7/10: no SI or HI. still remains with loose thought process although slight improvement noted. ANC WNL    PLAN  - no SI or HI at this time - no need for psychiatric increased level of observation  - antipsychotics can only be given if qtc < 500   - ANC on Monday, 7/10 WNL  - uncertainty of medication compliance at home   ---* if no cardiac contraindications, would recommend increasing clozapine to 75 mg qhs *  -- CL will follow and titrate as needed  - PRN for agitation: Zyprexa 2.5mg q6hrs   - DISPO: may require [facility] admission. cannot leave AMA at this time Patient is a 58 y/o female, with a PmHx of hyperlipidemia, diabetes, asthma, past psychiatric history inclusive of schizophrenia and multiple psych hospitalizations in the past, brought in by EMS after her  activated 911 for bizarre behavior  for 1 week. Patient comes from home, lives with partner, on disability. Patient currently on clozapine 350mg. Spoke with spouse who reports he cannot be certain patient is compliant with medications as there are some nights he does not supervise her taking her medication and 1 night he did witness patient throwing them out. Rahat also noted patient has been acting bizarre, confused, laughing constantly and talking to herself. Patient is not at baseline.    7/8: Patient is disorganized and illogical on exam. Unable to engage meaningfully on exam. Denies SI/HI/AVH. Increase clozapine to 50mg QHS. Re-check ANC on Monday, 7/10.  7/10: no SI or HI. still remains with loose thought process -improvement noted. ANC WNL. collateral obtained - states patient returning to baseline, no longer desire inpt admission.     PLAN  - no SI or HI at this time - no need for psychiatric increased level of observation  - antipsychotics can only be given if qtc < 500   - ANC on Monday, 7/10 WNL  - uncertainty of medication compliance at home   ---* if no cardiac contraindications, would recommend increasing clozapine to 75 mg qhs *  -- CL will follow and titrate as needed  - PRN for agitation: Zyprexa 2.5mg q6hrs   - DISPO: pending. WENDY left with [facility] clinic

## 2023-07-10 NOTE — BH CONSULTATION LIAISON PROGRESS NOTE - NSBHFUPINTERVALHXFT_PSY_A_CORE
Patient seen and examined at bedside. Patient is alseep, notes this is the first night she got ample sleep, feeling well rested.  patient is calm, cooperative. Speech is decreased in speed, more easily interrupted. patient continues to be loose in thought process however less disorganized and bizarre. Patient able to look at the hospital board and read the date to provider. Aware of where she is. No Si or HI, appreciate of care, feeling safe. Tolerating clozapine, taking all medication.  Patient seen and examined at bedside. Patient is asleep, notes this is the first night she got ample sleep, feeling well rested.  patient is calm, cooperative. Speech is decreased in speed, more easily interrupted. patient continues to be loose in thought process however less disorganized and bizarre. Patient able to look at the hospital board and read the date to provider. Aware of where she is. No Si or HI, appreciate of care, feeling safe. Tolerating clozapine, taking all medication.     Spoke with Rahat, patient sig other - rahat reports patient is at baseline. States she always tells "odd stories" and she is much improved since her admission.  He does believe patient was throwing out her medication and is aware she needs to be monitored. Rahat tells provider patient has a outpatient psychiatry appointment at [facility] with GLADYS [place] in about 1 week.  Called [facility] at [phone]. left VM with nursing station for return call.

## 2023-07-10 NOTE — BH CONSULTATION LIAISON PROGRESS NOTE - NSBHFUPINTERVALCCFT_PSY_A_CORE
"[place] live in [place], [place]"  patient calm, pleasant, loose   "[place] live in [place], [place]"

## 2023-07-10 NOTE — PROGRESS NOTE ADULT - SUBJECTIVE AND OBJECTIVE BOX
Patient is a 57y old  Female who presents with a chief complaint of lactic acidosis, bizarre behavior (09 Jul 2023 13:22)      SUBJECTIVE / OVERNIGHT EVENTS:  Resting  in bed    MEDICATIONS  (STANDING):  atorvastatin 20 milliGRAM(s) Oral at bedtime  chlorhexidine 2% Cloths 1 Application(s) Topical <User Schedule>  cloZAPine 50 milliGRAM(s) Oral at bedtime  dextrose 5%. 1000 milliLiter(s) (100 mL/Hr) IV Continuous <Continuous>  dextrose 5%. 1000 milliLiter(s) (50 mL/Hr) IV Continuous <Continuous>  dextrose 50% Injectable 12.5 Gram(s) IV Push once  dextrose 50% Injectable 25 Gram(s) IV Push once  dextrose 50% Injectable 25 Gram(s) IV Push once  enoxaparin Injectable 40 milliGRAM(s) SubCutaneous every 24 hours  glucagon  Injectable 1 milliGRAM(s) IntraMuscular once  insulin lispro (ADMELOG) corrective regimen sliding scale   SubCutaneous three times a day before meals  insulin lispro (ADMELOG) corrective regimen sliding scale   SubCutaneous at bedtime  polyethylene glycol 3350 17 Gram(s) Oral two times a day  senna 2 Tablet(s) Oral at bedtime    MEDICATIONS  (PRN):  acetaminophen     Tablet .. 650 milliGRAM(s) Oral every 6 hours PRN Temp greater or equal to 38C (100.4F), Mild Pain (1 - 3), Moderate Pain (4 - 6)  aluminum hydroxide/magnesium hydroxide/simethicone Suspension 30 milliLiter(s) Oral every 4 hours PRN Dyspepsia  dextrose Oral Gel 15 Gram(s) Oral once PRN Blood Glucose LESS THAN 70 milliGRAM(s)/deciliter  melatonin 3 milliGRAM(s) Oral at bedtime PRN Insomnia  OLANZapine 2.5 milliGRAM(s) Oral every 6 hours PRN agitation  ondansetron Injectable 4 milliGRAM(s) IV Push every 8 hours PRN Nausea and/or Vomiting        CAPILLARY BLOOD GLUCOSE      POCT Blood Glucose.: 234 mg/dL (10 Jul 2023 08:20)  POCT Blood Glucose.: 256 mg/dL (09 Jul 2023 22:36)  POCT Blood Glucose.: 169 mg/dL (09 Jul 2023 17:57)  POCT Blood Glucose.: 218 mg/dL (09 Jul 2023 12:31)    I&O's Summary      PHYSICAL EXAM:  GENERAL: NAD, well-developed  HEAD:  Atraumatic, Normocephalic  EYES: EOMI, PERRLA, conjunctiva and sclera clear  NECK: Supple, No JVD  CHEST/LUNG: Clear to auscultation bilaterally; No wheeze  HEART: Regular rate and rhythm; No murmurs, rubs, or gallops  ABDOMEN: Soft, Nontender, Nondistended; Bowel sounds present  EXTREMITIES:  2+ Peripheral Pulses, No clubbing, cyanosis, or edema  PSYCH: AAOx3  NEUROLOGY: non-focal  SKIN: No rashes or lesions    LABS:                        10.9   7.65  )-----------( 234      ( 10 Jul 2023 05:58 )             33.6     07-10    139  |  104  |  6<L>  ----------------------------<  214<H>  4.2   |  22  |  0.53    Ca    9.3      10 Jul 2023 05:58  Phos  3.6     07-10  Mg     1.80     07-10            Care Discussed with Consultants/Other Providers:  Lactate--> nl. no plan for metformin  D/w NP, may need in patient psych. F/u Psych   Patient is a 57y old  Female who presents with a chief complaint of lactic acidosis, bizarre behavior (09 Jul 2023 13:22)      SUBJECTIVE / OVERNIGHT EVENTS:  Resting  in bed    MEDICATIONS  (STANDING):  atorvastatin 20 milliGRAM(s) Oral at bedtime  chlorhexidine 2% Cloths 1 Application(s) Topical <User Schedule>  cloZAPine 50 milliGRAM(s) Oral at bedtime  dextrose 5%. 1000 milliLiter(s) (100 mL/Hr) IV Continuous <Continuous>  dextrose 5%. 1000 milliLiter(s) (50 mL/Hr) IV Continuous <Continuous>  dextrose 50% Injectable 12.5 Gram(s) IV Push once  dextrose 50% Injectable 25 Gram(s) IV Push once  dextrose 50% Injectable 25 Gram(s) IV Push once  enoxaparin Injectable 40 milliGRAM(s) SubCutaneous every 24 hours  glucagon  Injectable 1 milliGRAM(s) IntraMuscular once  insulin lispro (ADMELOG) corrective regimen sliding scale   SubCutaneous three times a day before meals  insulin lispro (ADMELOG) corrective regimen sliding scale   SubCutaneous at bedtime  polyethylene glycol 3350 17 Gram(s) Oral two times a day  senna 2 Tablet(s) Oral at bedtime    MEDICATIONS  (PRN):  acetaminophen     Tablet .. 650 milliGRAM(s) Oral every 6 hours PRN Temp greater or equal to 38C (100.4F), Mild Pain (1 - 3), Moderate Pain (4 - 6)  aluminum hydroxide/magnesium hydroxide/simethicone Suspension 30 milliLiter(s) Oral every 4 hours PRN Dyspepsia  dextrose Oral Gel 15 Gram(s) Oral once PRN Blood Glucose LESS THAN 70 milliGRAM(s)/deciliter  melatonin 3 milliGRAM(s) Oral at bedtime PRN Insomnia  OLANZapine 2.5 milliGRAM(s) Oral every 6 hours PRN agitation  ondansetron Injectable 4 milliGRAM(s) IV Push every 8 hours PRN Nausea and/or Vomiting        CAPILLARY BLOOD GLUCOSE  POCT Blood Glucose.: 234 mg/dL (10 Jul 2023 08:20)  POCT Blood Glucose.: 256 mg/dL (09 Jul 2023 22:36)  POCT Blood Glucose.: 169 mg/dL (09 Jul 2023 17:57)  POCT Blood Glucose.: 218 mg/dL (09 Jul 2023 12:31)    Vital Signs Last 24 Hrs  T(F): 97.5 (10 Jul 2023 06:00), Max: 98.2 (09 Jul 2023 21:23)  HR: 81 (10 Jul 2023 06:00) (81 - 88)  BP: 108/61 (10 Jul 2023 06:00) (108/61 - 118/60)  RR: 18 (10 Jul 2023 06:00) (18 - 18)  SpO2: 98% (10 Jul 2023 06:00) (98% - 99%)  room air        PHYSICAL EXAM:  GENERAL: NAD, well-developed  HEAD:  Atraumatic, Normocephalic  EYES: EOMI, PERRLA, conjunctiva and sclera clear  NECK: Supple, No JVD  CHEST/LUNG: Clear to auscultation bilaterally; No wheeze  HEART: Regular rate and rhythm; No murmurs, rubs, or gallops  ABDOMEN: Soft, Nontender, Nondistended; Bowel sounds present  EXTREMITIES:  2+ Peripheral Pulses, No clubbing, cyanosis, or edema  PSYCH: Alert  NEUROLOGY: non-focal  SKIN: No rashes or lesions    LABS:                        10.9   7.65  )-----------( 234      ( 10 Jul 2023 05:58 )             33.6     07-10    139  |  104  |  6<L>  ----------------------------<  214<H>  4.2   |  22  |  0.53    Ca    9.3      10 Jul 2023 05:58  Phos  3.6     07-10  Mg     1.80     07-10            Care Discussed with Consultants/Other Providers:  Lactate--> nl. no plan for metformin  D/w NP, may need in patient psych. F/u Psych

## 2023-07-10 NOTE — PROGRESS NOTE ADULT - PROBLEM SELECTOR PLAN 5
cont SS insulin  Hold home metformin and glimepiride   consistent carb diet  A1c 8 cont SS insulin  Hold home metformin and glimepiride   consistent carb diet  A1c 8  No plan to restart metformin due to lactic acidosis on admission.

## 2023-07-11 ENCOUNTER — TRANSCRIPTION ENCOUNTER (OUTPATIENT)
Age: 58
End: 2023-07-11

## 2023-07-11 LAB
ANION GAP SERPL CALC-SCNC: 16 MMOL/L — HIGH (ref 7–14)
BASOPHILS # BLD AUTO: 0.04 K/UL — SIGNIFICANT CHANGE UP (ref 0–0.2)
BASOPHILS NFR BLD AUTO: 0.5 % — SIGNIFICANT CHANGE UP (ref 0–2)
BUN SERPL-MCNC: 11 MG/DL — SIGNIFICANT CHANGE UP (ref 7–23)
CALCIUM SERPL-MCNC: 9.5 MG/DL — SIGNIFICANT CHANGE UP (ref 8.4–10.5)
CHLORIDE SERPL-SCNC: 100 MMOL/L — SIGNIFICANT CHANGE UP (ref 98–107)
CO2 SERPL-SCNC: 21 MMOL/L — LOW (ref 22–31)
CREAT SERPL-MCNC: 0.59 MG/DL — SIGNIFICANT CHANGE UP (ref 0.5–1.3)
EGFR: 104 ML/MIN/1.73M2 — SIGNIFICANT CHANGE UP
EOSINOPHIL # BLD AUTO: 0.18 K/UL — SIGNIFICANT CHANGE UP (ref 0–0.5)
EOSINOPHIL NFR BLD AUTO: 2.2 % — SIGNIFICANT CHANGE UP (ref 0–6)
GLUCOSE BLDC GLUCOMTR-MCNC: 195 MG/DL — HIGH (ref 70–99)
GLUCOSE BLDC GLUCOMTR-MCNC: 246 MG/DL — HIGH (ref 70–99)
GLUCOSE BLDC GLUCOMTR-MCNC: 257 MG/DL — HIGH (ref 70–99)
GLUCOSE BLDC GLUCOMTR-MCNC: 338 MG/DL — HIGH (ref 70–99)
GLUCOSE SERPL-MCNC: 257 MG/DL — HIGH (ref 70–99)
HCT VFR BLD CALC: 34.7 % — SIGNIFICANT CHANGE UP (ref 34.5–45)
HGB BLD-MCNC: 11.4 G/DL — LOW (ref 11.5–15.5)
IANC: 4.62 K/UL — SIGNIFICANT CHANGE UP (ref 1.8–7.4)
IMM GRANULOCYTES NFR BLD AUTO: 0.7 % — SIGNIFICANT CHANGE UP (ref 0–0.9)
LYMPHOCYTES # BLD AUTO: 2.76 K/UL — SIGNIFICANT CHANGE UP (ref 1–3.3)
LYMPHOCYTES # BLD AUTO: 33.9 % — SIGNIFICANT CHANGE UP (ref 13–44)
MCHC RBC-ENTMCNC: 27.2 PG — SIGNIFICANT CHANGE UP (ref 27–34)
MCHC RBC-ENTMCNC: 32.9 GM/DL — SIGNIFICANT CHANGE UP (ref 32–36)
MCV RBC AUTO: 82.8 FL — SIGNIFICANT CHANGE UP (ref 80–100)
MONOCYTES # BLD AUTO: 0.49 K/UL — SIGNIFICANT CHANGE UP (ref 0–0.9)
MONOCYTES NFR BLD AUTO: 6 % — SIGNIFICANT CHANGE UP (ref 2–14)
NEUTROPHILS # BLD AUTO: 4.62 K/UL — SIGNIFICANT CHANGE UP (ref 1.8–7.4)
NEUTROPHILS NFR BLD AUTO: 56.7 % — SIGNIFICANT CHANGE UP (ref 43–77)
NRBC # BLD: 0 /100 WBCS — SIGNIFICANT CHANGE UP (ref 0–0)
NRBC # FLD: 0 K/UL — SIGNIFICANT CHANGE UP (ref 0–0)
PLATELET # BLD AUTO: 276 K/UL — SIGNIFICANT CHANGE UP (ref 150–400)
POTASSIUM SERPL-MCNC: 3.9 MMOL/L — SIGNIFICANT CHANGE UP (ref 3.5–5.3)
POTASSIUM SERPL-SCNC: 3.9 MMOL/L — SIGNIFICANT CHANGE UP (ref 3.5–5.3)
RBC # BLD: 4.19 M/UL — SIGNIFICANT CHANGE UP (ref 3.8–5.2)
RBC # FLD: 12.9 % — SIGNIFICANT CHANGE UP (ref 10.3–14.5)
SODIUM SERPL-SCNC: 137 MMOL/L — SIGNIFICANT CHANGE UP (ref 135–145)
WBC # BLD: 8.15 K/UL — SIGNIFICANT CHANGE UP (ref 3.8–10.5)
WBC # FLD AUTO: 8.15 K/UL — SIGNIFICANT CHANGE UP (ref 3.8–10.5)

## 2023-07-11 PROCEDURE — 99232 SBSQ HOSP IP/OBS MODERATE 35: CPT

## 2023-07-11 PROCEDURE — 99231 SBSQ HOSP IP/OBS SF/LOW 25: CPT

## 2023-07-11 RX ORDER — CLOZAPINE 150 MG/1
100 TABLET, ORALLY DISINTEGRATING ORAL AT BEDTIME
Refills: 0 | Status: DISCONTINUED | OUTPATIENT
Start: 2023-07-11 | End: 2023-07-11

## 2023-07-11 RX ORDER — CLOZAPINE 150 MG/1
75 TABLET, ORALLY DISINTEGRATING ORAL AT BEDTIME
Refills: 0 | Status: DISCONTINUED | OUTPATIENT
Start: 2023-07-11 | End: 2023-07-12

## 2023-07-11 RX ADMIN — SENNA PLUS 2 TABLET(S): 8.6 TABLET ORAL at 22:00

## 2023-07-11 RX ADMIN — Medication 3: at 18:17

## 2023-07-11 RX ADMIN — ENOXAPARIN SODIUM 40 MILLIGRAM(S): 100 INJECTION SUBCUTANEOUS at 12:27

## 2023-07-11 RX ADMIN — ATORVASTATIN CALCIUM 20 MILLIGRAM(S): 80 TABLET, FILM COATED ORAL at 22:00

## 2023-07-11 RX ADMIN — Medication 2: at 08:52

## 2023-07-11 RX ADMIN — CLOZAPINE 75 MILLIGRAM(S): 150 TABLET, ORALLY DISINTEGRATING ORAL at 22:00

## 2023-07-11 RX ADMIN — CHLORHEXIDINE GLUCONATE 1 APPLICATION(S): 213 SOLUTION TOPICAL at 06:10

## 2023-07-11 RX ADMIN — Medication 4: at 12:27

## 2023-07-11 NOTE — DISCHARGE NOTE PROVIDER - CARE PROVIDER_API CALL
PCP Dr Beck,   Phone: [phone]  Fax: (   )    -  Established Patient  Follow Up Time: 1 week    Psych Dr Birmingham,   Phone: [phone]  Fax: (   )    -  Established Patient  Scheduled Appointment: 07/26/2023

## 2023-07-11 NOTE — PROGRESS NOTE ADULT - NSPROGADDITIONALINFOA_GEN_ALL_CORE
Addendum 2:40 pm  Seen by psych  "PLAN  - no SI or HI at this time - no need for psychiatric increased level of observation  - antipsychotics can only be given if qtc < 500   - ANC on Monday, 7/10 WNL  - uncertainty of medication compliance at home   ---* if no cardiac contraindications, would recommend increasing clozapine to 100 mg qhs *  -- CL will follow and titrate as needed  - PRN for agitation: Zyprexa 2.5mg q6hrs   - DISPO: pending. [facility] - patient has f/u appointment with GLADYS ferrell on 7/26 ( ongoing attempts to reach provider)"    EKG orede. If QTC <500 , will increase Clozapine

## 2023-07-11 NOTE — BH CONSULTATION LIAISON PROGRESS NOTE - NSBHCONSULTPRIMARYDISCUSSYES_PSY_A_CORE FT
discussed kaleb muniz  home vs rehab  attempting to reach outpatient psychiatrist prior to DC discussed w flavio  home vs rehab  attempting to reach outpatient psychiatrist prior to DC  discussed w dr solis

## 2023-07-11 NOTE — BH CONSULTATION LIAISON PROGRESS NOTE - NSBHASSESSMENTFT_PSY_ALL_CORE
Patient is a 58 y/o female, with a PmHx of hyperlipidemia, diabetes, asthma, past psychiatric history inclusive of schizophrenia and multiple psych hospitalizations in the past, brought in by EMS after her  activated 911 for bizarre behavior  for 1 week. Patient comes from home, lives with partner, on disability. Patient currently on clozapine 350mg. Spoke with spouse who reports he cannot be certain patient is compliant with medications as there are some nights he does not supervise her taking her medication and 1 night he did witness patient throwing them out. Rahat also noted patient has been acting bizarre, confused, laughing constantly and talking to herself. Patient is not at baseline.    7/8: Patient is disorganized and illogical on exam. Unable to engage meaningfully on exam. Denies SI/HI/AVH. Increase clozapine to 50mg QHS. Re-check ANC on Monday, 7/10.  7/10: no SI or HI. still remains with loose thought process -improvement noted. ANC WNL. collateral obtained - states patient returning to baseline, no longer desire inpt admission.   7/11: continues to improve- less disorganization and though process more linear.     PLAN  - no SI or HI at this time - no need for psychiatric increased level of observation  - antipsychotics can only be given if qtc < 500   - ANC on Monday, 7/10 WNL  - uncertainty of medication compliance at home   ---* if no cardiac contraindications, would recommend increasing clozapine to 100 mg qhs *  -- CL will follow and titrate as needed  - PRN for agitation: Zyprexa 2.5mg q6hrs   - DISPO: pending. WENDY left with [facility] - patient has f/u appointment with GLADYS ferrell on 7/26 Patient is a 58 y/o female, with a PmHx of hyperlipidemia, diabetes, asthma, past psychiatric history inclusive of schizophrenia and multiple psych hospitalizations in the past, brought in by EMS after her  activated 911 for bizarre behavior  for 1 week. Patient comes from home, lives with partner, on disability. Patient currently on clozapine 350mg. Spoke with spouse who reports he cannot be certain patient is compliant with medications as there are some nights he does not supervise her taking her medication and 1 night he did witness patient throwing them out. Rahat also noted patient has been acting bizarre, confused, laughing constantly and talking to herself. Patient is not at baseline.    7/8: Patient is disorganized and illogical on exam. Unable to engage meaningfully on exam. Denies SI/HI/AVH. Increase clozapine to 50mg QHS. Re-check ANC on Monday, 7/10.  7/10: no SI or HI. still remains with loose thought process -improvement noted. ANC WNL. collateral obtained - states patient returning to baseline, no longer desire inpt admission.   7/11: continues to improve- less disorganization and though process more linear.     PLAN  - no SI or HI at this time - no need for psychiatric increased level of observation  - antipsychotics can only be given if qtc < 500   - ANC on Monday, 7/10 WNL  - uncertainty of medication compliance at home   ---* if no cardiac contraindications, would recommend increasing clozapine to 100 mg qhs *  -- CL will follow and titrate as needed  - PRN for agitation: Zyprexa 2.5mg q6hrs   - DISPO: pending. [facility] - patient has f/u appointment with GLADYS ferrell on 7/26 ( ongoing attempts to reach provider) Patient is a 56 y/o female, with a PmHx of hyperlipidemia, diabetes, asthma, past psychiatric history inclusive of schizophrenia and multiple psych hospitalizations in the past, brought in by EMS after her  activated 911 for bizarre behavior  for 1 week. Patient comes from home, lives with partner, on disability. Patient currently on clozapine 350mg. Spoke with spouse who reports he cannot be certain patient is compliant with medications as there are some nights he does not supervise her taking her medication and 1 night he did witness patient throwing them out. Rahat also noted patient has been acting bizarre, confused, laughing constantly and talking to herself. Patient is not at baseline.    7/8: Patient is disorganized and illogical on exam. Unable to engage meaningfully on exam. Denies SI/HI/AVH. Increase clozapine to 50mg QHS. Re-check ANC on Monday, 7/10.  7/10: no SI or HI. still remains with loose thought process -improvement noted. ANC WNL. collateral obtained - states patient returning to baseline, no longer desire inpt admission.   7/11: continues to improve- less disorganization and though process more linear.     PLAN  - no SI or HI at this time - no need for psychiatric increased level of observation  - antipsychotics can only be given if qtc < 500   - ANC on Monday, 7/10 WNL  - uncertainty of medication compliance at home   ---* if no cardiac contraindications, would recommend increasing clozapine to 100 mg qhs *  -- CL will follow and titrate as needed  - PRN for agitation: Zyprexa 2.5mg q6hrs   - DISPO: pending.  [facility] - patient has f/u appointment with GLADYS ferrell on 7/26 ( ongoing attempts to reach provider) - spoke with nursing staff again today - NP made aware of this providers call back number.   -- if appointment is needed before 7/26 - please provide information on crisis clinic  --- [facility] [phone] (M-F 9am-7pm)

## 2023-07-11 NOTE — BH CONSULTATION LIAISON PROGRESS NOTE - NSBHFUPINTERVALHXFT_PSY_A_CORE
Patient seen and examined at bedside. Patient is resting in bed, calm, cooperative. States she feels better and has no complaints. Does state "the air conditioning smells nice", otherwise does not have loose thought content during this exam. Appears with less disorganization. patient denies AH and VH does not seem internally preoccupied on this exam, no SI or HI.  Discussed medication and decreased dose due to re-titration and importance of daily compliance.

## 2023-07-11 NOTE — PROGRESS NOTE ADULT - PROBLEM SELECTOR PLAN 5
cont SS insulin  Hold home metformin and glimepiride   consistent carb diet  A1c 8  No plan to restart metformin due to lactic acidosis on admission.

## 2023-07-11 NOTE — DISCHARGE NOTE PROVIDER - HOSPITAL COURSE
57 yr old female with a pmh of HLD, asthma, schizoaffective disorder multiple prior hospitalizations), T2DM not on insulin, who presents with a 1-2 week history of bizarre behavior based on collateral from her partner. Pt started with intermittent uncontrollable laughter, then she started to become confused and having incontinence problems. Pt is independent at baseline with ADLs and AAO x2 (gets the year mixed up usually).   Denies  headache, dizziness, chest pain, palpitations, SOB, abdominal pain, joint pain,  urinary symptoms.     HOSPITAL COURSE: 57 yr old female with a pmh of HLD, asthma, schizoaffective disorder multiple prior hospitalizations), T2DM not on insulin, who presents with a 1-2 week history of bizarre behavior based on collateral from her partner. Pt started with intermittent uncontrollable laughter, then she started to become confused and having incontinence problems. Pt is independent at baseline with ADLs and AAO x2 (gets the year mixed up usually).   Denies  headache, dizziness, chest pain, palpitations, SOB, abdominal pain, joint pain,  urinary symptoms.     HOSPITAL COURSE:  57 yr old female presenting with bizarre behavior/psych evaluation and was found to have elevated Lactic acid    57 yr old female with a pmh of HLD, asthma, schizoaffective disorder multiple prior hospitalizations), T2DM not on insulin, who presents with a 1-2 week history of bizarre behavior based on collateral from her partner. Pt started with intermittent uncontrollable laughter, then she started to become confused and having incontinence problems. Pt is independent at baseline with ADLs and AAO x2 (gets the year mixed up usually).   Denies  headache, dizziness, chest pain, palpitations, SOB, abdominal pain, joint pain,  urinary symptoms.     HOSPITAL COURSE:  57 yr old female presenting with bizarre behavior/psych evaluation and was found to have elevated Lactic acid   Problem: Lactic acidosis.   ·  Plan: New  pH 7.31 LA 5.1-> 4.1  UA/Utox: negative, CXR clear lungs  Toxicology consulted: likely from metformin- hold, pill count if possible (partner says unlikely as multiple bottles), Trend Lactate- lactate improved, Thiamine 100mg IV x1 dose given  Lactate--> nl. no plan for metformin.     Problem/Plan - 2:  ·  Problem: Schizoaffective disorder.   ·  Plan: Chronic unstable  Pt presenting with increasing bizarre behavior   Psych consult appreciated, restarted clozapine, dose increased to 50mg on 7/8  Zyprexa prn   serum drug screen neg  Lactate--> nl. no plan for metformin  Now on Clozapine 75 mg QHS.   Patient declined EKG o allow check of QTC to increase dose to 100 mg qhs.  psych  Dr Coleman agrees to d/c on 75 mg qhs with out patient /u py 7/26/23.     Problem/Plan - 3:  ·  Problem: Constipation.   ·  Plan: abd xray showed Large colonic stool burden. Non obstructive gas pattern  Having bms per nsg, cont miralax and senna, cont to monitor bms  RESOLVED.     Problem/Plan - 4:  ·  Problem: Anemia.   ·  Plan: Diana, b12 wnl  folate elevated  cont to monitor.     Problem/Plan - 5:  ·  Problem: Type 2 diabetes mellitus treated without insulin.   ·  Plan: cont SS insulin  Hold home metformin and glimepiride   consistent carb diet  A1c 8  No plan to restart metformin due to lactic acidosis on admission.    Keep out patient f/u with PCP Dr Beck and Psych Dr Birmingham 7/26/23   57 yr old female with a pmh of HLD, asthma, schizoaffective disorder multiple prior hospitalizations), T2DM not on insulin, who presents with a 1-2 week history of bizarre behavior based on collateral from her partner. Pt started with intermittent uncontrollable laughter, then she started to become confused and having incontinence problems. Pt is independent at baseline with ADLs and AAO x2 (gets the year mixed up usually).   Denies  headache, dizziness, chest pain, palpitations, SOB, abdominal pain, joint pain,  urinary symptoms.     HOSPITAL COURSE:  57 yr old female presenting with bizarre behavior/psych evaluation and was found to have elevated Lactic acid   Problem: Lactic acidosis.   ·  Plan: New  pH 7.31 LA 5.1-> 4.1  UA/Utox: negative, CXR clear lungs  Toxicology consulted: likely from metformin- hold, pill count if possible (partner says unlikely as multiple bottles), Trend Lactate- lactate improved, Thiamine 100mg IV x1 dose given  Lactate--> nl. no plan for metformin.     Problem/Plan - 2:  ·  Problem: Schizoaffective disorder.   ·  Plan: Chronic unstable  Pt presenting with increasing bizarre behavior   Psych consult appreciated, restarted clozapine, dose increased to 50mg on 7/8  Zyprexa prn   serum drug screen neg  Lactate--> nl. no plan for metformin  Now on Clozapine 75 mg QHS.   Patient declined EKG o allow check of QTC to increase dose to 100 mg qhs.  psych  Dr Coleman agrees to d/c on 75 mg qhs with out patient /u py 7/26/23.     Problem/Plan - 3:  ·  Problem: Constipation.   ·  Plan: abd xray showed Large colonic stool burden. Non obstructive gas pattern  Having bms per nsg, cont miralax and senna, cont to monitor bms  RESOLVED.     Problem/Plan - 4:  ·  Problem: Anemia.   ·  Plan: Diana, b12 wnl  folate elevated  cont to monitor.     Problem/Plan - 5:  ·  Problem: Type 2 diabetes mellitus treated without insulin.   ·  Plan: cont SS insulin  Hold home metformin and glimepiride   consistent carb diet  A1c 8  No plan to restart metformin due to lactic acidosis on admission. Will discharge on glimepiride 4 mg qd.     Keep out patient f/u with PCP Dr Beck and Psych Dr Birmingham 7/26/23  Spoke with pt's francisco Giang on day of discharge; informed him of need for repeat labwork within 1 week of discharge. He was made aware to follow up with GLADYS Birmingham, however if unable to get appt within 1 week of discharge he can follow up with PCP or [facility] for this blood work. He verbalized understanding.

## 2023-07-11 NOTE — DISCHARGE NOTE PROVIDER - NSDCCPCAREPLAN_GEN_ALL_CORE_FT
PRINCIPAL DISCHARGE DIAGNOSIS  Diagnosis: Bizarre behavior  Assessment and Plan of Treatment: Continue on Cloapine 75 mg qhs. Please keep Psych appointment 7/26/23 with Dr Darling      SECONDARY DISCHARGE DIAGNOSES  Diagnosis: Lactic acidosis  Assessment and Plan of Treatment: Do not take any more metformin. Please follow with PCP Dr Beck in 1 week    Diagnosis: Type 2 diabetes mellitus treated without insulin  Assessment and Plan of Treatment: no more metformin. c/w glyburide and see PCP 1 week    Diagnosis: AMS (altered mental status)  Assessment and Plan of Treatment:      PRINCIPAL DISCHARGE DIAGNOSIS  Diagnosis: Bizarre behavior  Assessment and Plan of Treatment: Continue on Cloapine 75 mg qhs. Please keep Psych appointment 7/26/23 with Dr Darling      SECONDARY DISCHARGE DIAGNOSES  Diagnosis: Lactic acidosis  Assessment and Plan of Treatment: Do not take any more metformin. Please follow with PCP Dr Beck in 1 week    Diagnosis: Type 2 diabetes mellitus treated without insulin  Assessment and Plan of Treatment: no more metformin. c/w glimeperide  and see PCP 1 week    Diagnosis: AMS (altered mental status)  Assessment and Plan of Treatment:      PRINCIPAL DISCHARGE DIAGNOSIS  Diagnosis: Bizarre behavior  Assessment and Plan of Treatment: Continue on Cloapine 75 mg qhs. Please keep Psych appointment 7/26/23 with Dr Darling. You will need weekly CBC to monitor ANC while on Cloazpine.      SECONDARY DISCHARGE DIAGNOSES  Diagnosis: Lactic acidosis  Assessment and Plan of Treatment: Do not take any more metformin. Please follow with PCP Dr Beck in 1 week    Diagnosis: Type 2 diabetes mellitus treated without insulin  Assessment and Plan of Treatment: no more metformin. c/w glimeperide  and see PCP 1 week

## 2023-07-11 NOTE — DISCHARGE NOTE PROVIDER - NSDCFUADDAPPT_GEN_ALL_CORE_FT
You will need labwork (CBC with differential) within 1 week of discharge to monitor ANC while on Clozapine. You are scheduled for follow up appointment with your psychiatry NP Vinnie on 7/26, however labwork should be done within 1 week of discharge. You may follow up with your primary care provider or [facility] for this labwork if you are unable to get in with your psych NP in time. Please call to schedule appointment.  -[facility] [phone] (M-F 9am-7pm)

## 2023-07-11 NOTE — DISCHARGE NOTE PROVIDER - PROVIDER TOKENS
FREE:[LAST:[PCP Dr Beck],PHONE:[phone]],FAX:[(   )    -],FOLLOWUP:[1 week],ESTABLISHEDPATIENT:[T]],FREE:[LAST:[Psych Dr Birmingham],PHONE:[phone]],FAX:[(   )    -],SCHEDULEDAPPT:[07/26/2023],ESTABLISHEDPATIENT:[T]]

## 2023-07-11 NOTE — PROGRESS NOTE ADULT - SUBJECTIVE AND OBJECTIVE BOX
f/u Psych Patient is a 57y old  Female who presents with a chief complaint of lactic acidosis, bizarre behavior (11 Jul 2023 08:22)      SUBJECTIVE / OVERNIGHT EVENTS:  resting in bed.  agreeable to stopping metformin.  no complaints    MEDICATIONS  (STANDING):  atorvastatin 20 milliGRAM(s) Oral at bedtime  chlorhexidine 2% Cloths 1 Application(s) Topical <User Schedule>  cloZAPine 75 milliGRAM(s) Oral at bedtime  dextrose 5%. 1000 milliLiter(s) (100 mL/Hr) IV Continuous <Continuous>  dextrose 5%. 1000 milliLiter(s) (50 mL/Hr) IV Continuous <Continuous>  dextrose 50% Injectable 25 Gram(s) IV Push once  dextrose 50% Injectable 12.5 Gram(s) IV Push once  dextrose 50% Injectable 25 Gram(s) IV Push once  enoxaparin Injectable 40 milliGRAM(s) SubCutaneous every 24 hours  glucagon  Injectable 1 milliGRAM(s) IntraMuscular once  insulin lispro (ADMELOG) corrective regimen sliding scale   SubCutaneous three times a day before meals  insulin lispro (ADMELOG) corrective regimen sliding scale   SubCutaneous at bedtime  polyethylene glycol 3350 17 Gram(s) Oral two times a day  senna 2 Tablet(s) Oral at bedtime    MEDICATIONS  (PRN):  acetaminophen     Tablet .. 650 milliGRAM(s) Oral every 6 hours PRN Temp greater or equal to 38C (100.4F), Mild Pain (1 - 3), Moderate Pain (4 - 6)  aluminum hydroxide/magnesium hydroxide/simethicone Suspension 30 milliLiter(s) Oral every 4 hours PRN Dyspepsia  dextrose Oral Gel 15 Gram(s) Oral once PRN Blood Glucose LESS THAN 70 milliGRAM(s)/deciliter  melatonin 3 milliGRAM(s) Oral at bedtime PRN Insomnia  OLANZapine 2.5 milliGRAM(s) Oral every 6 hours PRN agitation  ondansetron Injectable 4 milliGRAM(s) IV Push every 8 hours PRN Nausea and/or Vomiting        CAPILLARY BLOOD GLUCOSE  POCT Blood Glucose.: 246 mg/dL (11 Jul 2023 08:22)  POCT Blood Glucose.: 325 mg/dL (10 Jul 2023 22:55)  POCT Blood Glucose.: 175 mg/dL (10 Jul 2023 17:49)  POCT Blood Glucose.: 306 mg/dL (10 Jul 2023 12:24)    Vital Signs Last 24 Hrs  T(F): 97.6 (11 Jul 2023 06:10), Max: 98.7 (10 Jul 2023 21:14)  HR: 74 (11 Jul 2023 06:10) (74 - 79)  BP: 101/53 (11 Jul 2023 06:10) (101/53 - 127/70)  RR: 17 (11 Jul 2023 06:10) (17 - 18)  SpO2: 95% (11 Jul 2023 06:10) (95% - 97%)   room air        PHYSICAL EXAM:  GENERAL: NAD, well-developed  HEAD:  Atraumatic, Normocephalic  EYES: EOMI, PERRLA, conjunctiva and sclera clear  NECK: Supple, No JVD  CHEST/LUNG: Clear to auscultation bilaterally; No wheeze  HEART: Regular rate and rhythm; No murmurs, rubs, or gallops  ABDOMEN: Soft, Nontender, Nondistended; Bowel sounds present  EXTREMITIES:  2+ Peripheral Pulses, No clubbing, cyanosis, or edema  PSYCH: AAOx3  NEUROLOGY: non-focal  SKIN: No rashes or lesions    LABS:                        11.4   8.15  )-----------( 276      ( 11 Jul 2023 07:16 )             34.7     07-11    137  |  100  |  11  ----------------------------<  257<H>  3.9   |  21<L>  |  0.59    Ca    9.5      11 Jul 2023 07:16  Phos  3.6     07-10  Mg     1.80     07-10        Care Discussed with Consultants/Other Providers:  f/u Psych

## 2023-07-11 NOTE — DISCHARGE NOTE PROVIDER - NSDCMRMEDTOKEN_GEN_ALL_CORE_FT
alcohol swabs : Apply topically to affected area at bedtime prior to Lantus administration E11.9  atorvastatin 20 mg oral tablet: 1 tab(s) orally once a day (at bedtime)  cloZAPine 100 mg oral tablet: 3 tab(s) orally once a day (at bedtime)   cloZAPine 50 mg oral tablet: 1 tab(s) orally once a day (at bedtime)   glucometer (per patient&#x27;s insurance): Test blood sugars four times a day. Dispense #1 glucometer.  metFORMIN 1000 mg oral tablet: 1 tab(s) orally 2 times a day   atorvastatin 20 mg oral tablet: 1 tab(s) orally once a day (at bedtime)  cloZAPine 25 mg oral tablet: 3 tab(s) orally once a day (at bedtime)  glimepiride 4 mg oral tablet: 1 tab(s) orally once a day  glucometer (per patient&#x27;s insurance): Test blood sugars four times a day. Dispense #1 glucometer.  polyethylene glycol 3350 oral powder for reconstitution: 17 gram(s) orally 2 times a day as needed for  constipation , this is an over the counter medication  senna leaf extract oral tablet: 2 tab(s) orally once a day (at bedtime)

## 2023-07-11 NOTE — BH CONSULTATION LIAISON PROGRESS NOTE - NSBHMSETHTPROC_PSY_A_CORE
Illogical/Impaired reasoning/Other
Disorganized/Illogical/Impaired reasoning
Disorganized/Illogical/Impaired reasoning/Other

## 2023-07-12 ENCOUNTER — TRANSCRIPTION ENCOUNTER (OUTPATIENT)
Age: 58
End: 2023-07-12

## 2023-07-12 VITALS
SYSTOLIC BLOOD PRESSURE: 126 MMHG | TEMPERATURE: 98 F | OXYGEN SATURATION: 100 % | DIASTOLIC BLOOD PRESSURE: 80 MMHG | HEART RATE: 82 BPM

## 2023-07-12 DIAGNOSIS — Z02.9 ENCOUNTER FOR ADMINISTRATIVE EXAMINATIONS, UNSPECIFIED: ICD-10-CM

## 2023-07-12 LAB
ANION GAP SERPL CALC-SCNC: 13 MMOL/L — SIGNIFICANT CHANGE UP (ref 7–14)
BASOPHILS # BLD AUTO: 0.03 K/UL — SIGNIFICANT CHANGE UP (ref 0–0.2)
BASOPHILS NFR BLD AUTO: 0.4 % — SIGNIFICANT CHANGE UP (ref 0–2)
BUN SERPL-MCNC: 16 MG/DL — SIGNIFICANT CHANGE UP (ref 7–23)
CALCIUM SERPL-MCNC: 9.9 MG/DL — SIGNIFICANT CHANGE UP (ref 8.4–10.5)
CHLORIDE SERPL-SCNC: 100 MMOL/L — SIGNIFICANT CHANGE UP (ref 98–107)
CO2 SERPL-SCNC: 22 MMOL/L — SIGNIFICANT CHANGE UP (ref 22–31)
CREAT SERPL-MCNC: 0.55 MG/DL — SIGNIFICANT CHANGE UP (ref 0.5–1.3)
EGFR: 106 ML/MIN/1.73M2 — SIGNIFICANT CHANGE UP
EOSINOPHIL # BLD AUTO: 0.17 K/UL — SIGNIFICANT CHANGE UP (ref 0–0.5)
EOSINOPHIL NFR BLD AUTO: 2.2 % — SIGNIFICANT CHANGE UP (ref 0–6)
GLUCOSE BLDC GLUCOMTR-MCNC: 262 MG/DL — HIGH (ref 70–99)
GLUCOSE BLDC GLUCOMTR-MCNC: 304 MG/DL — HIGH (ref 70–99)
GLUCOSE SERPL-MCNC: 271 MG/DL — HIGH (ref 70–99)
HCT VFR BLD CALC: 35.6 % — SIGNIFICANT CHANGE UP (ref 34.5–45)
HGB BLD-MCNC: 11.9 G/DL — SIGNIFICANT CHANGE UP (ref 11.5–15.5)
IANC: 4.12 K/UL — SIGNIFICANT CHANGE UP (ref 1.8–7.4)
IMM GRANULOCYTES NFR BLD AUTO: 0.5 % — SIGNIFICANT CHANGE UP (ref 0–0.9)
LYMPHOCYTES # BLD AUTO: 2.73 K/UL — SIGNIFICANT CHANGE UP (ref 1–3.3)
LYMPHOCYTES # BLD AUTO: 35.9 % — SIGNIFICANT CHANGE UP (ref 13–44)
MAGNESIUM SERPL-MCNC: 1.5 MG/DL — LOW (ref 1.6–2.6)
MCHC RBC-ENTMCNC: 26.9 PG — LOW (ref 27–34)
MCHC RBC-ENTMCNC: 33.4 GM/DL — SIGNIFICANT CHANGE UP (ref 32–36)
MCV RBC AUTO: 80.4 FL — SIGNIFICANT CHANGE UP (ref 80–100)
MONOCYTES # BLD AUTO: 0.51 K/UL — SIGNIFICANT CHANGE UP (ref 0–0.9)
MONOCYTES NFR BLD AUTO: 6.7 % — SIGNIFICANT CHANGE UP (ref 2–14)
MRSA PCR RESULT.: SIGNIFICANT CHANGE UP
NEUTROPHILS # BLD AUTO: 4.12 K/UL — SIGNIFICANT CHANGE UP (ref 1.8–7.4)
NEUTROPHILS NFR BLD AUTO: 54.3 % — SIGNIFICANT CHANGE UP (ref 43–77)
NRBC # BLD: 0 /100 WBCS — SIGNIFICANT CHANGE UP (ref 0–0)
NRBC # FLD: 0 K/UL — SIGNIFICANT CHANGE UP (ref 0–0)
PHOSPHATE SERPL-MCNC: 3.5 MG/DL — SIGNIFICANT CHANGE UP (ref 2.5–4.5)
PLATELET # BLD AUTO: 269 K/UL — SIGNIFICANT CHANGE UP (ref 150–400)
POTASSIUM SERPL-MCNC: 3.9 MMOL/L — SIGNIFICANT CHANGE UP (ref 3.5–5.3)
POTASSIUM SERPL-SCNC: 3.9 MMOL/L — SIGNIFICANT CHANGE UP (ref 3.5–5.3)
RBC # BLD: 4.43 M/UL — SIGNIFICANT CHANGE UP (ref 3.8–5.2)
RBC # FLD: 12.9 % — SIGNIFICANT CHANGE UP (ref 10.3–14.5)
S AUREUS DNA NOSE QL NAA+PROBE: SIGNIFICANT CHANGE UP
SODIUM SERPL-SCNC: 135 MMOL/L — SIGNIFICANT CHANGE UP (ref 135–145)
WBC # BLD: 7.6 K/UL — SIGNIFICANT CHANGE UP (ref 3.8–10.5)
WBC # FLD AUTO: 7.6 K/UL — SIGNIFICANT CHANGE UP (ref 3.8–10.5)

## 2023-07-12 PROCEDURE — 99232 SBSQ HOSP IP/OBS MODERATE 35: CPT

## 2023-07-12 PROCEDURE — 99239 HOSP IP/OBS DSCHRG MGMT >30: CPT

## 2023-07-12 RX ORDER — CLOZAPINE 150 MG/1
3 TABLET, ORALLY DISINTEGRATING ORAL
Qty: 42 | Refills: 0
Start: 2023-07-12 | End: 2023-07-25

## 2023-07-12 RX ORDER — MAGNESIUM OXIDE 400 MG ORAL TABLET 241.3 MG
400 TABLET ORAL
Refills: 0 | Status: DISCONTINUED | OUTPATIENT
Start: 2023-07-12 | End: 2023-07-12

## 2023-07-12 RX ORDER — GLIMEPIRIDE 1 MG
1 TABLET ORAL
Qty: 30 | Refills: 0
Start: 2023-07-12 | End: 2023-08-10

## 2023-07-12 RX ORDER — POLYETHYLENE GLYCOL 3350 17 G/17G
17 POWDER, FOR SOLUTION ORAL
Qty: 0 | Refills: 0 | DISCHARGE
Start: 2023-07-12

## 2023-07-12 RX ORDER — SENNA PLUS 8.6 MG/1
2 TABLET ORAL
Qty: 0 | Refills: 0 | DISCHARGE
Start: 2023-07-12

## 2023-07-12 RX ADMIN — ENOXAPARIN SODIUM 40 MILLIGRAM(S): 100 INJECTION SUBCUTANEOUS at 12:56

## 2023-07-12 RX ADMIN — MAGNESIUM OXIDE 400 MG ORAL TABLET 400 MILLIGRAM(S): 241.3 TABLET ORAL at 12:55

## 2023-07-12 RX ADMIN — CHLORHEXIDINE GLUCONATE 1 APPLICATION(S): 213 SOLUTION TOPICAL at 07:06

## 2023-07-12 RX ADMIN — Medication 3: at 08:38

## 2023-07-12 RX ADMIN — Medication 4: at 12:55

## 2023-07-12 NOTE — PROGRESS NOTE ADULT - PROBLEM SELECTOR PLAN 2
Chronic unstable  Pt presenting with increasing bizarre behavior   Psych consult appreciated, restarted clozapine, dose increased to 50mg on 7/8  Zyprexa prn   serum drug screen neg  Lactate--> nl. no plan for metformin  Now on Clozapine 75 mg QHS.   Patient declined EKG o allow check of QTC to increase dose to 100 mg qhs.  psych  Dr Coleman agrees to d/c on 75 mg qhs with out patient /u pych 7/26/23
Chronic unstable  Pt presenting with increasing bizarre behavior   Will hold clozapine 350mg nightly pending psych eval   Psych consulted, f/u consult recs  If becomes agitated overnight can consider PRN ativan
Chronic unstable  Pt presenting with increasing bizarre behavior   Psych consult appreciated, restarted clozapine, dose increased to 50mg on 7/8  Zyprexa prn   serum drug screen neg  Lactate--> nl. no plan for metformin  D/w NP, may need in patient psych. F/u
Chronic unstable  Pt presenting with increasing bizarre behavior   Psych consult appreciated, restarted clozapine, dose increased to 50mg on 7/8  Zyprexa prn  check serum drug screen ordered
Chronic unstable  Pt presenting with increasing bizarre behavior   Psych consult appreciated, restarted clozapine, dose increased to 50mg on 7/8  Zyprexa prn   serum drug screen neg  Lactate--> nl. no plan for metformin  D/w NP, may need in patient psych. F/u
Chronic unstable  Pt presenting with increasing bizarre behavior   Psych consult appreciated, restarted clozapine, dose increased to 50mg on 7/8  Zyprexa prn   serum drug screen neg

## 2023-07-12 NOTE — PROGRESS NOTE ADULT - PROBLEM SELECTOR PLAN 1
New  pH 7.31 LA 5.1-> 4.1  UA/Utox: negative, CXR clear lungs  Toxicology consulted: likely from metformin- hold, pill count if possible (partner says unlikely as multiple bottles), Trend Lactate- lactate improved, Thiamine 100mg IV x1 dose given
New  pH 7.31 LA 5.1-> 4.1  UA/Utox: negative, CXR clear lungs  Toxicology consulted: likely from metformin- hold, pill count if possible (partner says unlikely as multiple bottles), Trend Lactate- lactate improved, Thiamine 100mg IV x1 dose given  Lactate--> nl. no plan for metformin
New  pH 7.31 LA 5.1-> 4.1  UA/Utox: negative, CXR clear lungs  Toxicology consulted: likely from metformin- hold, pill count if possible (partner says unlikely as multiple bottles), Trend Lactate- lactate improved, Thiamine 100mg IV x1 dose given
New  pH 7.31 LA 5.1-> 4.1  UA/Utox: negative, CXR clear lungs  Toxicology consulted: likely from metformin- hold, pill count if possible (partner says unlikely as multiple bottles), Trend Lactate- lactate improved, Thiamine 100mg IV x1 dose given  Lactate--> nl. no plan for metformin  D/w NP, may need in patient psych. F/u
New  pH 7.31 LA 5.1-> 4.1  UA/Utox: negative, CXR clear lungs  Toxicology consulted: likely from metformin- hold, pill count if possible (partner says unlikely as multiple bottles), Trend Lactate- lactate improved, Thiamine 100mg IV x1 dose given  Lactate--> nl. no plan for metformin  D/w NP, may need in patient psych. F/u
New  pH 7.31 LA 5.1-> 4.1  UA/Utox: negative, CXR clear lungs  Toxicology consulted: likely from metformin- hold, pill count if possible (partner says unlikely as multiple bottles), Trend Lactate- lactate improved, Thiamine 100mg IV x1 dose given

## 2023-07-12 NOTE — PROGRESS NOTE ADULT - PROBLEM SELECTOR PROBLEM 2
Schizoaffective disorder

## 2023-07-12 NOTE — PROGRESS NOTE ADULT - PROBLEM SELECTOR PROBLEM 3
Constipation
Type 2 diabetes mellitus treated without insulin
Constipation

## 2023-07-12 NOTE — BH CONSULTATION LIAISON PROGRESS NOTE - NSBHFUPINTERVALHXFT_PSY_A_CORE
Chart reviewed. Clozapine not increased last night because patient refused EKG.  Patient seen this AM.  at bedside. Patient feels good. Denies paranoid ideation, does not feel that people are spying on her. Denies SI/HI. Denies AH/VH. Denies mood issues. States that she needs clozapine. Denies stomach pain and denies chest pain.  wants to take patient home today.  Explained that can increase clozapine to 100mg HS and that she SHOULD NOT go back to her original dose. Discussed with  for patient to go to Crisis [facility] on Friday where they can consider further titration of medication.  also trying to get earlier appointment with her own psychiatrist.

## 2023-07-12 NOTE — BH CONSULTATION LIAISON PROGRESS NOTE - NSBHCONSULTMEDAGITATION_PSY_A_CORE FT
PRN for agitation: Zyprexa 2.5mg q6hrs 

## 2023-07-12 NOTE — PROGRESS NOTE ADULT - REASON FOR ADMISSION
lactic acidosis, bizarre behavior

## 2023-07-12 NOTE — PROGRESS NOTE ADULT - ASSESSMENT
57 yr old female presenting with bizarre behavior/psych evaluation and was found to have elevated Lactic acid 
57 yr old female presenting with bizarre behavior/psych evaluation and was found to have elevated Lactic acid     Lactate--> nl. no plan for metformin  D/w NP, may need in patient psych. F/u 
57 yr old female presenting with bizarre behavior/psych evaluation and was found to have elevated Lactic acid     Lactate--> nl. no plan for metformin  D/w NP, may need in patient psych. F/u 
57 yr old female presenting with bizarre behavior/psych evaluation and was found to have elevated Lactic acid 
57 yr old female presenting with bizarre behavior/psych evaluation and was found to have elevated Lactic acid 
57 yr old female presenting with bizarre behavior/psych evaluation and was found to have elevated Lactic acid     Lactate--> nl. no plan for metformin  7/12 Dr Coleman- Psychiatry-> home with Clozapine 75 mg qd and out patient f/u with pSych 7/26

## 2023-07-12 NOTE — BH CONSULTATION LIAISON PROGRESS NOTE - NSBHCONSULTPRIMARYDISCUSSYES_PSY_A_CORE FT
discussed w flavio  home vs rehab  attempting to reach outpatient psychiatrist prior to DC  discussed w dr solis

## 2023-07-12 NOTE — PROGRESS NOTE ADULT - SUBJECTIVE AND OBJECTIVE BOX
seen with francisco VALLADARES at bedside  want to talk to psych and get patient home   1710 Patient is a 57y old  Female who presents with a chief complaint of lactic acidosis, bizarre behavior (12 Jul 2023 10:20)      SUBJECTIVE / OVERNIGHT EVENTS:  Patient seen with francisco VALLADARES at bed side. [phone]  Both feel she would be more comfortable at home   Patient wants to go home.  Explain we will not restart metformin for home due to the latic acidosis--> both agree. PCP Dr Beck at [phone]  Awaiting Psych f/u here. Rahat notes she has physch out patient appointment 7/26 already with Dr Birmingham [phone]  Patient declined doing EKG    MEDICATIONS  (STANDING):  atorvastatin 20 milliGRAM(s) Oral at bedtime  chlorhexidine 2% Cloths 1 Application(s) Topical <User Schedule>  cloZAPine 75 milliGRAM(s) Oral at bedtime  dextrose 5%. 1000 milliLiter(s) (50 mL/Hr) IV Continuous <Continuous>  dextrose 5%. 1000 milliLiter(s) (100 mL/Hr) IV Continuous <Continuous>  dextrose 50% Injectable 25 Gram(s) IV Push once  dextrose 50% Injectable 12.5 Gram(s) IV Push once  dextrose 50% Injectable 25 Gram(s) IV Push once  enoxaparin Injectable 40 milliGRAM(s) SubCutaneous every 24 hours  glucagon  Injectable 1 milliGRAM(s) IntraMuscular once  insulin lispro (ADMELOG) corrective regimen sliding scale   SubCutaneous three times a day before meals  insulin lispro (ADMELOG) corrective regimen sliding scale   SubCutaneous at bedtime  magnesium oxide 400 milliGRAM(s) Oral three times a day with meals  polyethylene glycol 3350 17 Gram(s) Oral two times a day  senna 2 Tablet(s) Oral at bedtime    MEDICATIONS  (PRN):  acetaminophen     Tablet .. 650 milliGRAM(s) Oral every 6 hours PRN Temp greater or equal to 38C (100.4F), Mild Pain (1 - 3), Moderate Pain (4 - 6)  aluminum hydroxide/magnesium hydroxide/simethicone Suspension 30 milliLiter(s) Oral every 4 hours PRN Dyspepsia  dextrose Oral Gel 15 Gram(s) Oral once PRN Blood Glucose LESS THAN 70 milliGRAM(s)/deciliter  melatonin 3 milliGRAM(s) Oral at bedtime PRN Insomnia  OLANZapine 2.5 milliGRAM(s) Oral every 6 hours PRN agitation  ondansetron Injectable 4 milliGRAM(s) IV Push every 8 hours PRN Nausea and/or Vomiting        CAPILLARY BLOOD GLUCOSE  POCT Blood Glucose.: 262 mg/dL (12 Jul 2023 08:16)  POCT Blood Glucose.: 195 mg/dL (11 Jul 2023 22:33)  POCT Blood Glucose.: 257 mg/dL (11 Jul 2023 18:07)    Vital Signs Last 24 Hrs  T(F): 98.1 (12 Jul 2023 06:51), Max: 98.5 (11 Jul 2023 13:11)  HR: 75 (12 Jul 2023 06:51) (75 - 82)  BP: 116/62 (12 Jul 2023 06:51) (104/67 - 116/62)  RR: 17 (12 Jul 2023 06:51) (17 - 18)  SpO2: 98% (12 Jul 2023 06:51) (97% - 100%)    Parameters below as of 12 Jul 2023 06:51  Patient On (Oxygen Delivery Method): room air        PHYSICAL EXAM:  GENERAL: NAD, well-developed  HEAD:  Atraumatic, Normocephalic  EYES: EOMI, PERRLA, conjunctiva and sclera clear  NECK: Supple, No JVD  CHEST/LUNG: Clear to auscultation bilaterally; No wheeze  HEART: Regular rate and rhythm; No murmurs, rubs, or gallops  ABDOMEN: Soft, Nontender, Nondistended; Bowel sounds present  EXTREMITIES:  2+ Peripheral Pulses, No clubbing, cyanosis, or edema  PSYCH: Alert  NEUROLOGY: non-focal  SKIN: No rashes or lesions    LABS:                        11.9   7.60  )-----------( 269      ( 12 Jul 2023 05:39 )             35.6     07-12    135  |  100  |  16  ----------------------------<  271<H>  3.9   |  22  |  0.55    Ca    9.9      12 Jul 2023 05:39  Phos  3.5     07-12  Mg     1.50     07-12          Care Discussed with Consultants/Other Providers:  seen with francisco VALLADARES at bedside  want to talk to psych and get patient home   4650    d/w Dr Coleman. Home with clozapine 75 g and out patient f/u Psych

## 2023-07-12 NOTE — DISCHARGE NOTE NURSING/CASE MANAGEMENT/SOCIAL WORK - NSDCFUADDAPPT_GEN_ALL_CORE_FT
You will need labwork (CBC with differential) within 1 week of discharge to monitor ANC while on Clozapine. You are scheduled for follow up appointment with your psychiatry NP Vinnie on 7/26, however labwork should be done within 1 week of discharge. You may follow up with your primary care provider or [facility] for this labwork if you are unable to get in with your psych NP in time. Please call to schedule appointment.  -[facility] [phone] (M-F 9am-7pm)

## 2023-07-12 NOTE — PROGRESS NOTE ADULT - PROBLEM SELECTOR PLAN 4
Diana, b12 wnl  folate elevated  cont to monitor
Diana, b12 wnl  folate elevated  cont to monitor
Chronic stable  Continue atorvastatin       -Abdominal distention-denies any pain, will check abd xray    -Hypomagnesemia- replete mag    -Anemia- check iron studies, vit b12 and folate
Diana, b12 wnl  folate elevated  cont to monitor

## 2023-07-12 NOTE — BH CONSULTATION LIAISON PROGRESS NOTE - NSBHCONSULTFOLLOWAFTERCARE_PSY_A_CORE FT
[facility]  [address] [facility] [address] [phone]    Will need weekly CBC w/ diff

## 2023-07-12 NOTE — BH CONSULTATION LIAISON PROGRESS NOTE - NSBHASSESSMENTFT_PSY_ALL_CORE
Patient is a 56 y/o female, with a PmHx of hyperlipidemia, diabetes, asthma, past psychiatric history inclusive of schizophrenia and multiple psych hospitalizations in the past, brought in by EMS after her  activated 911 for bizarre behavior  for 1 week. Patient comes from home, lives with partner, on disability. Patient currently on clozapine 350mg. Spoke with spouse who reports he cannot be certain patient is compliant with medications as there are some nights he does not supervise her taking her medication and 1 night he did witness patient throwing them out. Rahat also noted patient has been acting bizarre, confused, laughing constantly and talking to herself. Patient is not at baseline.    7/8: Patient is disorganized and illogical on exam. Unable to engage meaningfully on exam. Denies SI/HI/AVH. Increase clozapine to 50mg QHS. Re-check ANC on Monday, 7/10.  7/10: no SI or HI. still remains with loose thought process -improvement noted. ANC WNL. collateral obtained - states patient returning to baseline, no longer desire inpt admission.   7/11: continues to improve- less disorganization and though process more linear.     7/12: Significant improvement from initial encounter. Thought process is not particularly loose, and is more goal directed.  wants to take her home. She does not meet criteria at this point for involuntary psychiatric admission.  Requesting EKG today- if QTC ok can increase clozapine to 100mg HS and can be discharged on this. Should also be discharged on a bowel regimen.  and patient counseled on going to [facility] this week and also attempt to schedule an earlier appointment with outpatient clinic (currently 7/26). Can also go to [facility] subsequently as a bridge to 7/26. She will also need weekly CBC w/ Anc Drawn    PLAN  - no SI or HI at this time - no need for psychiatric increased level of observation  - antipsychotics can only be given if qtc < 500   [] if no cardiac contraindications, would recommend increasing clozapine to 100 mg qhs *  	[] will need weekly CBC w/ diff for ANC measuring  	[] follow up with [facility] crisis clinic as well as eventually her own outpatient clinic  - PRN for agitation: Zyprexa 2.5mg q6hrs      Patient is a 58 y/o female, with a PmHx of hyperlipidemia, diabetes, asthma, past psychiatric history inclusive of schizophrenia and multiple psych hospitalizations in the past, brought in by EMS after her  activated 911 for bizarre behavior  for 1 week. Patient comes from home, lives with partner, on disability. Patient currently on clozapine 350mg. Spoke with spouse who reports he cannot be certain patient is compliant with medications as there are some nights he does not supervise her taking her medication and 1 night he did witness patient throwing them out. Rahat also noted patient has been acting bizarre, confused, laughing constantly and talking to herself. Patient is not at baseline.    7/8: Patient is disorganized and illogical on exam. Unable to engage meaningfully on exam. Denies SI/HI/AVH. Increase clozapine to 50mg QHS. Re-check ANC on Monday, 7/10.  7/10: no SI or HI. still remains with loose thought process -improvement noted. ANC WNL. collateral obtained - states patient returning to baseline, no longer desire inpt admission.   7/11: continues to improve- less disorganization and though process more linear.     7/12: Significant improvement from initial encounter. Thought process is not particularly loose, and is more goal directed.  wants to take her home. She does not meet criteria at this point for involuntary psychiatric admission.  Requesting EKG today- if QTC ok can increase clozapine to 100mg HS and can be discharged on this. Should also be discharged on a bowel regimen.  and patient counseled on going to [facility] this week and also attempt to schedule an earlier appointment with outpatient clinic (currently 7/26). Can also go to [facility] subsequently as a bridge to 7/26. She will also need weekly CBC w/ Anc Drawn    Addendum:  Spoke with hospitalist, patient adamantly refusing EKG. Can therefore go home on clozapine 75mg HS. Can be further titrated as an outpatient.    PLAN  - no SI or HI at this time - no need for psychiatric increased level of observation  - antipsychotics can only be given if qtc < 500   [] as refusing repeat EKG, can continue clozapine 75mg HS and further titrated as outpatient  	[] will need weekly CBC w/ diff for ANC measuring  	[] follow up with [facility] crisis clinic as well as eventually her own outpatient clinic  - PRN for agitation: Zyprexa 2.5mg q6hrs

## 2023-07-12 NOTE — BH CONSULTATION LIAISON PROGRESS NOTE - NSBHFUPINTERVALCCFT_PSY_A_CORE
Jakub Escudero-  It was great to visit with you and learn about your progress. Below are the goals we discussed.    GOALS:  Follow Modified Liquid diet (sent via my chart)  Eat 3 X per day or have a protein drink for 2 meals  Increase exercise to 4-5X per week    Nutrition Educational Materials:    Protein Sources for Weight Loss  [email or web address]     Modified Liquid Diet (2 liquid meals, 1 meal, 2 snacks)  [email or web address]     Tips for Weight Loss and Weight Management  [email or web address]    Please call [phone] to schedule your next  visit with a Dietitian/  Thanks!  Reggie Mcgrath RD, LD  [facility]      patient calm, resting in bed

## 2023-07-12 NOTE — BH CONSULTATION LIAISON PROGRESS NOTE - MSE UNSTRUCTURED FT
Mental Status Exam:  Gunner: well groomed, fair hygiene     Behavior: calm, cooperative, no psychomotor retardation/agitation  Motor: no tremors, EPS, or rigidity  Gait: did not assess, pt in bed  Speech: normal rate, rhythm, prosody and volume   Mood: "okay"  Affect: euthymic, congruent, restricted range  Thought process: no gross loosening of associations, some tangentiality   Thought Content: denies paranoia, delusions   Perception: denies AH/VH  SI: denies  HI: denies  Insight: fair  Judgment: fair    Cognitive Exam:  Attention: intact

## 2023-07-12 NOTE — BH CONSULTATION LIAISON PROGRESS NOTE - NSBHCHARTREVIEWLAB_PSY_A_CORE FT
10.9   7.65  )-----------( 234      ( 10 Jul 2023 05:58 )             33.6   07-10    139  |  104  |  6<L>  ----------------------------<  214<H>  4.2   |  22  |  0.53    Ca    9.3      10 Jul 2023 05:58  Phos  3.6     07-10  Mg     1.80     07-10      
                      11.9   7.60  )-----------( 269      ( 12 Jul 2023 05:39 )             35.6   07-12    135  |  100  |  16  ----------------------------<  271<H>  3.9   |  22  |  0.55    Ca    9.9      12 Jul 2023 05:39  Phos  3.5     07-12  Mg     1.50     07-12    
                      10.6   8.06  )-----------( 268      ( 07 Jul 2023 05:52 )             32.7   07-07    142  |  107  |  6<L>  ----------------------------<  145<H>  3.5   |  22  |  0.50    Ca    8.9      07 Jul 2023 05:52  Phos  2.6     07-07  Mg     1.50     07-07    TPro  7.7  /  Alb  4.7  /  TBili  0.6  /  DBili  x   /  AST  17  /  ALT  28  /  AlkPhos  98  07-06  
                      11.4   8.15  )-----------( 276      ( 11 Jul 2023 07:16 )             34.7   07-11    137  |  100  |  11  ----------------------------<  257<H>  3.9   |  21<L>  |  0.59    Ca    9.5      11 Jul 2023 07:16  Phos  3.6     07-10  Mg     1.80     07-10

## 2023-07-12 NOTE — PROGRESS NOTE ADULT - PROBLEM SELECTOR PROBLEM 5
Type 2 diabetes mellitus treated without insulin

## 2023-07-12 NOTE — PROGRESS NOTE ADULT - PROBLEM SELECTOR PLAN 6
Chronic stable  Continue atorvastatin    -Low bicarb on today's lab- unclear etiology, rpt bmp later today
Chronic stable  Continue atorvastatin

## 2023-07-12 NOTE — BH CONSULTATION LIAISON PROGRESS NOTE - CURRENT MEDICATION
MEDICATIONS  (STANDING):  atorvastatin 20 milliGRAM(s) Oral at bedtime  chlorhexidine 2% Cloths 1 Application(s) Topical <User Schedule>  cloZAPine 75 milliGRAM(s) Oral at bedtime  dextrose 5%. 1000 milliLiter(s) (50 mL/Hr) IV Continuous <Continuous>  dextrose 5%. 1000 milliLiter(s) (100 mL/Hr) IV Continuous <Continuous>  dextrose 50% Injectable 25 Gram(s) IV Push once  dextrose 50% Injectable 12.5 Gram(s) IV Push once  dextrose 50% Injectable 25 Gram(s) IV Push once  enoxaparin Injectable 40 milliGRAM(s) SubCutaneous every 24 hours  glucagon  Injectable 1 milliGRAM(s) IntraMuscular once  insulin lispro (ADMELOG) corrective regimen sliding scale   SubCutaneous three times a day before meals  insulin lispro (ADMELOG) corrective regimen sliding scale   SubCutaneous at bedtime  magnesium oxide 400 milliGRAM(s) Oral three times a day with meals  polyethylene glycol 3350 17 Gram(s) Oral two times a day  senna 2 Tablet(s) Oral at bedtime    MEDICATIONS  (PRN):  acetaminophen     Tablet .. 650 milliGRAM(s) Oral every 6 hours PRN Temp greater or equal to 38C (100.4F), Mild Pain (1 - 3), Moderate Pain (4 - 6)  aluminum hydroxide/magnesium hydroxide/simethicone Suspension 30 milliLiter(s) Oral every 4 hours PRN Dyspepsia  dextrose Oral Gel 15 Gram(s) Oral once PRN Blood Glucose LESS THAN 70 milliGRAM(s)/deciliter  melatonin 3 milliGRAM(s) Oral at bedtime PRN Insomnia  OLANZapine 2.5 milliGRAM(s) Oral every 6 hours PRN agitation  ondansetron Injectable 4 milliGRAM(s) IV Push every 8 hours PRN Nausea and/or Vomiting  
MEDICATIONS  (STANDING):  atorvastatin 20 milliGRAM(s) Oral at bedtime  chlorhexidine 2% Cloths 1 Application(s) Topical <User Schedule>  cloZAPine 50 milliGRAM(s) Oral at bedtime  dextrose 5%. 1000 milliLiter(s) (100 mL/Hr) IV Continuous <Continuous>  dextrose 5%. 1000 milliLiter(s) (50 mL/Hr) IV Continuous <Continuous>  dextrose 50% Injectable 25 Gram(s) IV Push once  dextrose 50% Injectable 12.5 Gram(s) IV Push once  dextrose 50% Injectable 25 Gram(s) IV Push once  enoxaparin Injectable 40 milliGRAM(s) SubCutaneous every 24 hours  glucagon  Injectable 1 milliGRAM(s) IntraMuscular once  insulin lispro (ADMELOG) corrective regimen sliding scale   SubCutaneous at bedtime  insulin lispro (ADMELOG) corrective regimen sliding scale   SubCutaneous three times a day before meals  polyethylene glycol 3350 17 Gram(s) Oral two times a day  senna 2 Tablet(s) Oral at bedtime    MEDICATIONS  (PRN):  acetaminophen     Tablet .. 650 milliGRAM(s) Oral every 6 hours PRN Temp greater or equal to 38C (100.4F), Mild Pain (1 - 3), Moderate Pain (4 - 6)  aluminum hydroxide/magnesium hydroxide/simethicone Suspension 30 milliLiter(s) Oral every 4 hours PRN Dyspepsia  dextrose Oral Gel 15 Gram(s) Oral once PRN Blood Glucose LESS THAN 70 milliGRAM(s)/deciliter  melatonin 3 milliGRAM(s) Oral at bedtime PRN Insomnia  OLANZapine 2.5 milliGRAM(s) Oral every 6 hours PRN agitation  ondansetron Injectable 4 milliGRAM(s) IV Push every 8 hours PRN Nausea and/or Vomiting  
MEDICATIONS  (STANDING):  atorvastatin 20 milliGRAM(s) Oral at bedtime  chlorhexidine 2% Cloths 1 Application(s) Topical <User Schedule>  cloZAPine 75 milliGRAM(s) Oral at bedtime  dextrose 5%. 1000 milliLiter(s) (50 mL/Hr) IV Continuous <Continuous>  dextrose 5%. 1000 milliLiter(s) (100 mL/Hr) IV Continuous <Continuous>  dextrose 50% Injectable 25 Gram(s) IV Push once  dextrose 50% Injectable 12.5 Gram(s) IV Push once  dextrose 50% Injectable 25 Gram(s) IV Push once  enoxaparin Injectable 40 milliGRAM(s) SubCutaneous every 24 hours  glucagon  Injectable 1 milliGRAM(s) IntraMuscular once  insulin lispro (ADMELOG) corrective regimen sliding scale   SubCutaneous three times a day before meals  insulin lispro (ADMELOG) corrective regimen sliding scale   SubCutaneous at bedtime  polyethylene glycol 3350 17 Gram(s) Oral two times a day  senna 2 Tablet(s) Oral at bedtime    MEDICATIONS  (PRN):  acetaminophen     Tablet .. 650 milliGRAM(s) Oral every 6 hours PRN Temp greater or equal to 38C (100.4F), Mild Pain (1 - 3), Moderate Pain (4 - 6)  aluminum hydroxide/magnesium hydroxide/simethicone Suspension 30 milliLiter(s) Oral every 4 hours PRN Dyspepsia  dextrose Oral Gel 15 Gram(s) Oral once PRN Blood Glucose LESS THAN 70 milliGRAM(s)/deciliter  melatonin 3 milliGRAM(s) Oral at bedtime PRN Insomnia  OLANZapine 2.5 milliGRAM(s) Oral every 6 hours PRN agitation  ondansetron Injectable 4 milliGRAM(s) IV Push every 8 hours PRN Nausea and/or Vomiting  
MEDICATIONS  (STANDING):  atorvastatin 20 milliGRAM(s) Oral at bedtime  chlorhexidine 2% Cloths 1 Application(s) Topical <User Schedule>  cloZAPine 25 milliGRAM(s) Oral at bedtime  dextrose 5%. 1000 milliLiter(s) (50 mL/Hr) IV Continuous <Continuous>  dextrose 5%. 1000 milliLiter(s) (100 mL/Hr) IV Continuous <Continuous>  dextrose 50% Injectable 12.5 Gram(s) IV Push once  dextrose 50% Injectable 25 Gram(s) IV Push once  dextrose 50% Injectable 25 Gram(s) IV Push once  enoxaparin Injectable 40 milliGRAM(s) SubCutaneous every 24 hours  glucagon  Injectable 1 milliGRAM(s) IntraMuscular once  insulin lispro (ADMELOG) corrective regimen sliding scale   SubCutaneous at bedtime  insulin lispro (ADMELOG) corrective regimen sliding scale   SubCutaneous three times a day before meals  polyethylene glycol 3350 17 Gram(s) Oral two times a day  senna 2 Tablet(s) Oral at bedtime  sodium chloride 0.9%. 1000 milliLiter(s) (100 mL/Hr) IV Continuous <Continuous>    MEDICATIONS  (PRN):  acetaminophen     Tablet .. 650 milliGRAM(s) Oral every 6 hours PRN Temp greater or equal to 38C (100.4F), Mild Pain (1 - 3), Moderate Pain (4 - 6)  aluminum hydroxide/magnesium hydroxide/simethicone Suspension 30 milliLiter(s) Oral every 4 hours PRN Dyspepsia  dextrose Oral Gel 15 Gram(s) Oral once PRN Blood Glucose LESS THAN 70 milliGRAM(s)/deciliter  melatonin 3 milliGRAM(s) Oral at bedtime PRN Insomnia  OLANZapine 2.5 milliGRAM(s) Oral every 6 hours PRN agitation  ondansetron Injectable 4 milliGRAM(s) IV Push every 8 hours PRN Nausea and/or Vomiting

## 2023-07-12 NOTE — BH CONSULTATION LIAISON PROGRESS NOTE - NSBHATTESTBILLING_PSY_A_CORE
[id number] OBS or IP - high complexity OR 50-79 mins
Billing in another system
Billing in another system
Non-billable

## 2023-07-12 NOTE — BH CONSULTATION LIAISON PROGRESS NOTE - NSBHATTESTTYPEVISIT_PSY_A_CORE
Attending Only
STEFAN without on-site Attending supervision
STEFAN without on-site Attending supervision
Resident/Fellow with telephonic supervision

## 2023-07-12 NOTE — DISCHARGE NOTE NURSING/CASE MANAGEMENT/SOCIAL WORK - PATIENT PORTAL LINK FT
You can access the FollowMyHealth Patient Portal offered by [facility] by registering at the following website: [email or web address]. By joining [facility]’s FollowMyHealth portal, you will also be able to view your health information using other applications (apps) compatible with our system.

## 2023-07-12 NOTE — PROGRESS NOTE ADULT - PROBLEM SELECTOR PLAN 7
Now on Clozapine 75 mg QHS.   Patient declined EKG to allow check of QTC to increase dose to 100 mg qhs.  psych  Dr Coleman agrees to d/c on 75 mg qhs with out patient /u pych 7/26/23  Patient seen with francisco VALLADARES at bed side. [phone]  Patient wants to go home.  Explain we will not restart metformin for home due to the lactic acidosis--> both agree. PCP Dr Beck at [phone]  she has physch out patient appointment 7/26 already with Dr Birmingham [phone]  60 min to coord d/c

## 2023-07-12 NOTE — PROGRESS NOTE ADULT - PROBLEM SELECTOR PLAN 3
abd xray showed Large colonic stool burden. Non obstructive gas pattern  Having bms per nsg, cont miralax and senna, cont to monitor bms
abd xray showed Large colonic stool burden. Non obstructive gas pattern  Having bms per nsg, cont miralax and senna, cont to monitor bms  RESOLVED
cont SS insulin  Hold home metformin and glimepiride   consistent carb diet  A1c 8
abd xray showed Large colonic stool burden. Non obstructive gas pattern  Had 2 bms last night, cont miralax and senna, cont to monitor bms
abd xray showed Large colonic stool burden. Non obstructive gas pattern  Having bms per nsg, cont miralax and senna, cont to monitor bms
abd xray showed Large colonic stool burden. Non obstructive gas pattern  Having bms per nsg, cont miralax and senna, cont to monitor bms

## 2023-09-27 ENCOUNTER — INPATIENT (INPATIENT)
Facility: HOSPITAL | Age: 58
LOS: 42 days | Discharge: HOME | End: 2023-11-09
Attending: PSYCHIATRY & NEUROLOGY | Admitting: PSYCHIATRY & NEUROLOGY
Payer: MEDICARE

## 2023-09-27 VITALS
HEART RATE: 115 BPM | TEMPERATURE: 98 F | DIASTOLIC BLOOD PRESSURE: 85 MMHG | OXYGEN SATURATION: 98 % | RESPIRATION RATE: 18 BRPM | SYSTOLIC BLOOD PRESSURE: 155 MMHG

## 2023-09-27 DIAGNOSIS — F25.9 SCHIZOAFFECTIVE DISORDER, UNSPECIFIED: ICD-10-CM

## 2023-09-27 LAB
A1C WITH ESTIMATED AVERAGE GLUCOSE RESULT: 7.2 % — HIGH (ref 4–5.6)
ALBUMIN SERPL ELPH-MCNC: 4.9 G/DL — SIGNIFICANT CHANGE UP (ref 3.3–5)
ALP SERPL-CCNC: 122 U/L — HIGH (ref 40–120)
ALT FLD-CCNC: 16 U/L — SIGNIFICANT CHANGE UP (ref 4–33)
ANION GAP SERPL CALC-SCNC: 15 MMOL/L — HIGH (ref 7–14)
ANION GAP SERPL CALC-SCNC: 19 MMOL/L — HIGH (ref 7–14)
APAP SERPL-MCNC: <10 UG/ML — LOW (ref 15–25)
AST SERPL-CCNC: 12 U/L — SIGNIFICANT CHANGE UP (ref 4–32)
B-OH-BUTYR SERPL-SCNC: <0 MMOL/L — SIGNIFICANT CHANGE UP (ref 0–0.4)
BASE EXCESS BLDV CALC-SCNC: -1.2 MMOL/L — SIGNIFICANT CHANGE UP (ref -2–3)
BASE EXCESS BLDV CALC-SCNC: 0.8 MMOL/L — SIGNIFICANT CHANGE UP (ref -2–3)
BASOPHILS # BLD AUTO: 0.03 K/UL — SIGNIFICANT CHANGE UP (ref 0–0.2)
BASOPHILS NFR BLD AUTO: 0.4 % — SIGNIFICANT CHANGE UP (ref 0–2)
BILIRUB SERPL-MCNC: 0.9 MG/DL — SIGNIFICANT CHANGE UP (ref 0.2–1.2)
BLOOD GAS VENOUS COMPREHENSIVE RESULT: SIGNIFICANT CHANGE UP
BLOOD GAS VENOUS COMPREHENSIVE RESULT: SIGNIFICANT CHANGE UP
BUN SERPL-MCNC: 18 MG/DL — SIGNIFICANT CHANGE UP (ref 7–23)
BUN SERPL-MCNC: 19 MG/DL — SIGNIFICANT CHANGE UP (ref 7–23)
CALCIUM SERPL-MCNC: 10.2 MG/DL — SIGNIFICANT CHANGE UP (ref 8.4–10.5)
CALCIUM SERPL-MCNC: 10.8 MG/DL — HIGH (ref 8.4–10.5)
CHLORIDE BLDV-SCNC: 98 MMOL/L — SIGNIFICANT CHANGE UP (ref 96–108)
CHLORIDE BLDV-SCNC: 98 MMOL/L — SIGNIFICANT CHANGE UP (ref 96–108)
CHLORIDE SERPL-SCNC: 95 MMOL/L — LOW (ref 98–107)
CHLORIDE SERPL-SCNC: 96 MMOL/L — LOW (ref 98–107)
CO2 BLDV-SCNC: 24.8 MMOL/L — SIGNIFICANT CHANGE UP (ref 22–26)
CO2 BLDV-SCNC: 28.6 MMOL/L — HIGH (ref 22–26)
CO2 SERPL-SCNC: 21 MMOL/L — LOW (ref 22–31)
CO2 SERPL-SCNC: 24 MMOL/L — SIGNIFICANT CHANGE UP (ref 22–31)
CREAT SERPL-MCNC: 0.65 MG/DL — SIGNIFICANT CHANGE UP (ref 0.5–1.3)
CREAT SERPL-MCNC: 0.85 MG/DL — SIGNIFICANT CHANGE UP (ref 0.5–1.3)
EGFR: 101 ML/MIN/1.73M2 — SIGNIFICANT CHANGE UP
EGFR: 78 ML/MIN/1.73M2 — SIGNIFICANT CHANGE UP
EOSINOPHIL # BLD AUTO: 0 K/UL — SIGNIFICANT CHANGE UP (ref 0–0.5)
EOSINOPHIL NFR BLD AUTO: 0 % — SIGNIFICANT CHANGE UP (ref 0–6)
ESTIMATED AVERAGE GLUCOSE: 160 — SIGNIFICANT CHANGE UP
ETHANOL SERPL-MCNC: <10 MG/DL — SIGNIFICANT CHANGE UP
GAS PNL BLDV: 130 MMOL/L — LOW (ref 136–145)
GAS PNL BLDV: 137 MMOL/L — SIGNIFICANT CHANGE UP (ref 136–145)
GLUCOSE BLDV-MCNC: 244 MG/DL — HIGH (ref 70–99)
GLUCOSE BLDV-MCNC: 378 MG/DL — HIGH (ref 70–99)
GLUCOSE SERPL-MCNC: 237 MG/DL — HIGH (ref 70–99)
GLUCOSE SERPL-MCNC: 350 MG/DL — HIGH (ref 70–99)
HCO3 BLDV-SCNC: 24 MMOL/L — SIGNIFICANT CHANGE UP (ref 22–29)
HCO3 BLDV-SCNC: 27 MMOL/L — SIGNIFICANT CHANGE UP (ref 22–29)
HCT VFR BLD CALC: 38 % — SIGNIFICANT CHANGE UP (ref 34.5–45)
HCT VFR BLDA CALC: 38 % — SIGNIFICANT CHANGE UP (ref 34.5–46.5)
HCT VFR BLDA CALC: 38 % — SIGNIFICANT CHANGE UP (ref 34.5–46.5)
HGB BLD CALC-MCNC: 12.6 G/DL — SIGNIFICANT CHANGE UP (ref 11.7–16.1)
HGB BLD CALC-MCNC: 12.8 G/DL — SIGNIFICANT CHANGE UP (ref 11.7–16.1)
HGB BLD-MCNC: 12.7 G/DL — SIGNIFICANT CHANGE UP (ref 11.5–15.5)
IANC: 5.46 K/UL — SIGNIFICANT CHANGE UP (ref 1.8–7.4)
IMM GRANULOCYTES NFR BLD AUTO: 0.5 % — SIGNIFICANT CHANGE UP (ref 0–0.9)
LACTATE BLDV-MCNC: 2.1 MMOL/L — HIGH (ref 0.5–2)
LACTATE BLDV-MCNC: 2.9 MMOL/L — HIGH (ref 0.5–2)
LYMPHOCYTES # BLD AUTO: 2.14 K/UL — SIGNIFICANT CHANGE UP (ref 1–3.3)
LYMPHOCYTES # BLD AUTO: 26.6 % — SIGNIFICANT CHANGE UP (ref 13–44)
MAGNESIUM SERPL-MCNC: 1.6 MG/DL — SIGNIFICANT CHANGE UP (ref 1.6–2.6)
MCHC RBC-ENTMCNC: 26.8 PG — LOW (ref 27–34)
MCHC RBC-ENTMCNC: 33.4 GM/DL — SIGNIFICANT CHANGE UP (ref 32–36)
MCV RBC AUTO: 80.3 FL — SIGNIFICANT CHANGE UP (ref 80–100)
MONOCYTES # BLD AUTO: 0.37 K/UL — SIGNIFICANT CHANGE UP (ref 0–0.9)
MONOCYTES NFR BLD AUTO: 4.6 % — SIGNIFICANT CHANGE UP (ref 2–14)
NEUTROPHILS # BLD AUTO: 5.46 K/UL — SIGNIFICANT CHANGE UP (ref 1.8–7.4)
NEUTROPHILS NFR BLD AUTO: 67.9 % — SIGNIFICANT CHANGE UP (ref 43–77)
NRBC # BLD: 0 /100 WBCS — SIGNIFICANT CHANGE UP (ref 0–0)
NRBC # FLD: 0 K/UL — SIGNIFICANT CHANGE UP (ref 0–0)
PCO2 BLDV: 39 MMHG — SIGNIFICANT CHANGE UP (ref 39–52)
PCO2 BLDV: 49 MMHG — SIGNIFICANT CHANGE UP (ref 39–52)
PH BLDV: 7.35 — SIGNIFICANT CHANGE UP (ref 7.32–7.43)
PH BLDV: 7.39 — SIGNIFICANT CHANGE UP (ref 7.32–7.43)
PHOSPHATE SERPL-MCNC: 4.1 MG/DL — SIGNIFICANT CHANGE UP (ref 2.5–4.5)
PLATELET # BLD AUTO: 311 K/UL — SIGNIFICANT CHANGE UP (ref 150–400)
PO2 BLDV: 34 MMHG — SIGNIFICANT CHANGE UP (ref 25–45)
PO2 BLDV: 47 MMHG — HIGH (ref 25–45)
POTASSIUM BLDV-SCNC: 3.8 MMOL/L — SIGNIFICANT CHANGE UP (ref 3.5–5.1)
POTASSIUM BLDV-SCNC: 3.9 MMOL/L — SIGNIFICANT CHANGE UP (ref 3.5–5.1)
POTASSIUM SERPL-MCNC: 3.8 MMOL/L — SIGNIFICANT CHANGE UP (ref 3.5–5.3)
POTASSIUM SERPL-MCNC: 3.9 MMOL/L — SIGNIFICANT CHANGE UP (ref 3.5–5.3)
POTASSIUM SERPL-SCNC: 3.8 MMOL/L — SIGNIFICANT CHANGE UP (ref 3.5–5.3)
POTASSIUM SERPL-SCNC: 3.9 MMOL/L — SIGNIFICANT CHANGE UP (ref 3.5–5.3)
PROT SERPL-MCNC: 8 G/DL — SIGNIFICANT CHANGE UP (ref 6–8.3)
RBC # BLD: 4.73 M/UL — SIGNIFICANT CHANGE UP (ref 3.8–5.2)
RBC # FLD: 13.2 % — SIGNIFICANT CHANGE UP (ref 10.3–14.5)
SALICYLATES SERPL-MCNC: 6.2 MG/DL — LOW (ref 15–30)
SAO2 % BLDV: 53.8 % — LOW (ref 67–88)
SAO2 % BLDV: 78.9 % — SIGNIFICANT CHANGE UP (ref 67–88)
SARS-COV-2 RNA SPEC QL NAA+PROBE: SIGNIFICANT CHANGE UP
SODIUM SERPL-SCNC: 132 MMOL/L — LOW (ref 135–145)
SODIUM SERPL-SCNC: 138 MMOL/L — SIGNIFICANT CHANGE UP (ref 135–145)
TOXICOLOGY SCREEN, DRUGS OF ABUSE, SERUM RESULT: SIGNIFICANT CHANGE UP
TSH SERPL-MCNC: 2.55 UIU/ML — SIGNIFICANT CHANGE UP (ref 0.27–4.2)
WBC # BLD: 8.04 K/UL — SIGNIFICANT CHANGE UP (ref 3.8–10.5)
WBC # FLD AUTO: 8.04 K/UL — SIGNIFICANT CHANGE UP (ref 3.8–10.5)

## 2023-09-27 PROCEDURE — 99285 EMERGENCY DEPT VISIT HI MDM: CPT

## 2023-09-27 RX ORDER — INSULIN LISPRO 100/ML
VIAL (ML) SUBCUTANEOUS AT BEDTIME
Refills: 0 | Status: DISCONTINUED | OUTPATIENT
Start: 2023-09-27 | End: 2023-09-27

## 2023-09-27 RX ORDER — INSULIN LISPRO 100/ML
VIAL (ML) SUBCUTANEOUS
Refills: 0 | Status: DISCONTINUED | OUTPATIENT
Start: 2023-09-27 | End: 2023-09-27

## 2023-09-27 RX ORDER — CLOZAPINE 150 MG/1
100 TABLET, ORALLY DISINTEGRATING ORAL AT BEDTIME
Refills: 0 | Status: DISCONTINUED | OUTPATIENT
Start: 2023-09-27 | End: 2023-09-27

## 2023-09-27 RX ORDER — INSULIN LISPRO 100/ML
VIAL (ML) SUBCUTANEOUS
Refills: 0 | Status: DISCONTINUED | OUTPATIENT
Start: 2023-09-28 | End: 2023-11-09

## 2023-09-27 RX ORDER — CLOZAPINE 150 MG/1
100 TABLET, ORALLY DISINTEGRATING ORAL AT BEDTIME
Refills: 0 | Status: DISCONTINUED | OUTPATIENT
Start: 2023-09-28 | End: 2023-10-03

## 2023-09-27 RX ORDER — DEXTROSE 50 % IN WATER 50 %
15 SYRINGE (ML) INTRAVENOUS ONCE
Refills: 0 | Status: DISCONTINUED | OUTPATIENT
Start: 2023-09-27 | End: 2023-09-27

## 2023-09-27 RX ORDER — HALOPERIDOL DECANOATE 100 MG/ML
5 INJECTION INTRAMUSCULAR EVERY 6 HOURS
Refills: 0 | Status: DISCONTINUED | OUTPATIENT
Start: 2023-09-28 | End: 2023-11-09

## 2023-09-27 RX ORDER — GLUCAGON INJECTION, SOLUTION 0.5 MG/.1ML
1 INJECTION, SOLUTION SUBCUTANEOUS ONCE
Refills: 0 | Status: DISCONTINUED | OUTPATIENT
Start: 2023-09-28 | End: 2023-10-04

## 2023-09-27 RX ORDER — DEXTROSE 50 % IN WATER 50 %
25 SYRINGE (ML) INTRAVENOUS ONCE
Refills: 0 | Status: DISCONTINUED | OUTPATIENT
Start: 2023-09-27 | End: 2023-09-27

## 2023-09-27 RX ORDER — SODIUM CHLORIDE 9 MG/ML
1000 INJECTION, SOLUTION INTRAVENOUS
Refills: 0 | Status: DISCONTINUED | OUTPATIENT
Start: 2023-09-27 | End: 2023-09-27

## 2023-09-27 RX ORDER — GLUCAGON INJECTION, SOLUTION 0.5 MG/.1ML
1 INJECTION, SOLUTION SUBCUTANEOUS ONCE
Refills: 0 | Status: DISCONTINUED | OUTPATIENT
Start: 2023-09-27 | End: 2023-09-27

## 2023-09-27 RX ORDER — ATORVASTATIN CALCIUM 80 MG/1
20 TABLET, FILM COATED ORAL AT BEDTIME
Refills: 0 | Status: DISCONTINUED | OUTPATIENT
Start: 2023-09-28 | End: 2023-11-09

## 2023-09-27 RX ORDER — DEXTROSE 50 % IN WATER 50 %
15 SYRINGE (ML) INTRAVENOUS ONCE
Refills: 0 | Status: DISCONTINUED | OUTPATIENT
Start: 2023-09-28 | End: 2023-11-09

## 2023-09-27 RX ORDER — DEXTROSE 50 % IN WATER 50 %
12.5 SYRINGE (ML) INTRAVENOUS ONCE
Refills: 0 | Status: DISCONTINUED | OUTPATIENT
Start: 2023-09-27 | End: 2023-09-27

## 2023-09-27 RX ORDER — POLYETHYLENE GLYCOL 3350 17 G/17G
17 POWDER, FOR SOLUTION ORAL DAILY
Refills: 0 | Status: DISCONTINUED | OUTPATIENT
Start: 2023-09-28 | End: 2023-11-09

## 2023-09-27 RX ORDER — METFORMIN HYDROCHLORIDE 850 MG/1
500 TABLET ORAL AT BEDTIME
Refills: 0 | Status: DISCONTINUED | OUTPATIENT
Start: 2023-09-28 | End: 2023-11-02

## 2023-09-27 RX ORDER — ATORVASTATIN CALCIUM 80 MG/1
20 TABLET, FILM COATED ORAL AT BEDTIME
Refills: 0 | Status: DISCONTINUED | OUTPATIENT
Start: 2023-09-27 | End: 2023-09-27

## 2023-09-27 RX ORDER — SENNA PLUS 8.6 MG/1
2 TABLET ORAL AT BEDTIME
Refills: 0 | Status: DISCONTINUED | OUTPATIENT
Start: 2023-09-28 | End: 2023-11-09

## 2023-09-27 NOTE — ED BEHAVIORAL HEALTH NOTE - BEHAVIORAL HEALTH NOTE
Rahat Barnett, [phone] pt's fiance , reports pt lives with him and he reports over the past 2 weeks , pt has been not making sense, talking gibberish, talking to herself,  laughing hysterically, disorganized  . Patient knocked on the neighbor's door and not making sense. Patient has been making phone calls , but he is not sure who she is calling. Patient is talking fast . He does not believe she is having vh.  No si/hi verbalization.  Patient is sleeping ok,  appetite has been less than usual, not showering regularly.  Patient is compliant with medications,  as he administers the medications everyday .Clozapine 100mg qhs , followed by Dr. Vinnie Huddleston at [facility] .   Medical: Patient has DM and has been incontinent , Metformin 500 mg qhs ,  Januvia 100mg qdaily , and Glimepiride 4mg qdaily  , Vascepa 1 gm 2 caps 2xbid , Atorvastatin 20mg qdaily  Allergies: non known  Substance use: none known   No access to guns .    He is advocating for pt's admission .

## 2023-09-27 NOTE — ED BEHAVIORAL HEALTH NOTE - BEHAVIORAL HEALTH NOTE
COVID Exposure Screen- Patient    Have you been tested for COVID-19 in the last 90 days?  ( x ) Yes  (  ) No   (  ) Unknown- Reason: _____  IF YES: Date of test(s), type of test(s), result(s) for ALL tests in last 90 days: ___July,2023_____    In the past 10 days, have you been around anyone with a positive COVID-19 test? (  ) Yes  ( x ) No   (  ) Unknown- Reason: ____  IF YES: Were you closer than 6 feet of them for a total of 15 minutes or more in a 24 hour period? (  ) Yes   (  ) No   ( ) Unknown- Reason: _____

## 2023-09-27 NOTE — ED ADULT NURSE NOTE - NSFALLUNIVINTERV_ED_ALL_ED
Bed/Stretcher in lowest position, wheels locked, appropriate side rails in place/Call bell, personal items and telephone in reach/Instruct patient to call for assistance before getting out of bed/chair/stretcher/Non-slip footwear applied when patient is off stretcher/[place] to call system/Physically safe environment - no spills, clutter or unnecessary equipment/Purposeful proactive rounding/Room/bathroom lighting operational, light cord in reach

## 2023-09-27 NOTE — ED BEHAVIORAL HEALTH ASSESSMENT NOTE - CURRENT PASSIVE IDEATION:
Health Maintenance Due   Topic Date Due   • Shingles Vaccine (1 of 2) [birth year]1989       Patient is due for topics as listed above but is not proceeding with Immunization(s) Shingles at this time. =         None known

## 2023-09-27 NOTE — ED BEHAVIORAL HEALTH ASSESSMENT NOTE - SUMMARY
59 y/o female, single, non caregiver, disabled, lives with [facility], history of Schizophrenia vs Schizoaffective d/o, multiple past admissions at [facility] (most recent 2020 for decompensated psychosis), and also was hospitalized medically for lactic acidosis and followed by CL team in July 2023, followed by psychiatrist Dr. Love at [facility] (pt has no h/o developmental disability), prescribed Clozaril 100mg qhs , no h/o self-injurious behavior or suicide attempts, no h/o violence or legal issues, PMH Type II DM, HLD, asthma, no h/o substance abuse, BIBEMS activated by francisco ([facility]) for  disorganization.   Pt  presents with decompensated psychosis in spite of compliance with medications ,exhibits disorganized thought process and behavior , talking to self, paranoid, she  is unable to care for self at this time due to severity of psychotic symptoms. She requires inpatient psychiatric admission for safety and stabilization.  no 1:1 required at this time , not aggressive or suicidal

## 2023-09-27 NOTE — ED BEHAVIORAL HEALTH ASSESSMENT NOTE - NSBHPSYCHOLCOGORIENT_PSY_A_CORE
Notify MD, follow interventions as ordered, continue to monitor. Oriented to time, place, person, situation

## 2023-09-27 NOTE — ED PROVIDER NOTE - NS_EDPROVIDERDISPOUSERTYPE_ED_A_ED
I have personally evaluated and examined the patient. The Attending was available to me as a supervising provider if needed. dizziness/hypertension

## 2023-09-27 NOTE — ED PROVIDER NOTE - PROGRESS NOTE DETAILS
NP Bereczky- tolerated po fluids well, labs and blood sugar are improving, no dka, inpatient no oral diabetic agents low does sliding scale ordered

## 2023-09-27 NOTE — ED BEHAVIORAL HEALTH ASSESSMENT NOTE - PSYCHIATRIC ISSUES AND PLAN (INCLUDE STANDING AND PRN MEDICATION)
Defer medication changes to inpatient team; continue Clozaril 100mg  po QHS, ; use Haldol 5 mg, Ativan 2 mg PO/IM prn agitation

## 2023-09-27 NOTE — ED PROVIDER NOTE - CLINICAL SUMMARY MEDICAL DECISION MAKING FREE TEXT BOX
This is a 58-year-old female past medical history hyperlipidemia, diabetes, asthma, past psychiatric history schizoaffective disorder. Arrived from home disorganized, poor historian, poor hygiene, with flight of ideas, hyperverbal, unable to give much information patient or engage in meaningful conversation.  bs - 345- vbg ordered, with beta hydrodybutereate, a1c - 7.2, po fluid hydration  recheck bs 295  psych recommendation psych admission after medical clearance

## 2023-09-27 NOTE — ED ADULT TRIAGE NOTE - ARRIVAL FROM
Dc instructions and medications discussed with patient at bedside. All questions answered at this time. VSS. No IV in place at this time. Pt to lobby without incident. daughter to drive patient home.      Home

## 2023-09-27 NOTE — ED ADULT NURSE NOTE - OBJECTIVE STATEMENT
Pt arrive by ems living with DM type2, on Clozapine, and Thorazine for unk reason, pt with flight of ideas denies any dysuria appears unkept. Pt appears confused, disorganized malodorous. Pt co-operates and re-direct well. Labwork, ekg, and covid test collected from pt.

## 2023-09-27 NOTE — ED ADULT TRIAGE NOTE - CHIEF COMPLAINT QUOTE
pt living with DM type2, on Clozapine, and Thorazine for unk reason, pt with flight of ideas denies any dysuria appears unkept

## 2023-09-27 NOTE — ED BEHAVIORAL HEALTH ASSESSMENT NOTE - MEDICAL ISSUES AND PLAN (INCLUDE STANDING AND PRN MEDICATION)
continue current meds: Metformin 500 mg qhs ,  Januvia 100mg qdaily , and Glimepiride 4mg qdaily  , Vascepa 1 gm 2 caps 2xbid , Atorvastatin 20mg qdaily, and sliding scale for dm

## 2023-09-27 NOTE — ED BEHAVIORAL HEALTH ASSESSMENT NOTE - NS ED BHA PLAN ADMIT TO PSYCHIATRY BH CONTACTED FT
the phone number is not available and the provider works for several locations , unable to contact the provider.

## 2023-09-27 NOTE — ED BEHAVIORAL HEALTH ASSESSMENT NOTE - HPI (INCLUDE ILLNESS QUALITY, SEVERITY, DURATION, TIMING, CONTEXT, MODIFYING FACTORS, ASSOCIATED SIGNS AND SYMPTOMS)
57 y/o female, single, noncaregiver, disabled, lives with francisco, history of Schizophrenia vs Schizoaffectove d/o, multiple past admissions at [facility] (most recent 2020 for decompensated psychosis), and also was hospitalized medically for lactic acidosis and followed by CL team in July 2023, followed by psychiatrist Dr. Love at [facility] (pt has no h/o developmental disability), prescribed Clozaril 100mg qhs , no h/o self-injurious behavior or suicide attempts, no h/o violence or legal issues, PMH Type II DM, HLD, asthma, no h/o substance abuse, BIBEMS activated by francisco Nevarez) for  disorganization.   Interview is limited significantly by  acute psychosis, pt is observed, laughing to herself , engaging in disorganised behaviors.  When asked about what brings to the hospital she replies"My daryn Destiny Salazareric called the ".  Pt then says "I am the police and I am here to get you on tape ", she then repeatedly asks "Can I have something to eat?", "if you don't given me food I will call  "ap"  unit".  She then makes nonsensical statements "Shirley from [place] , can you say that ", "Are you Roman Catholic from [place]?" . When trying to redirect the pt she says "I am done with you ". She denies SI/HI, denies AH/VH. States she takes medications ..  Collateral obtained from pt's Rahat singh. please see bh note . Pt did not get her night time medications prior to arrival.

## 2023-09-27 NOTE — ED BEHAVIORAL HEALTH ASSESSMENT NOTE - DESCRIPTION
see HPI superficially cooperative,  disorganised but no aggression  Vital Signs Last 24 Hrs  T(C): 36.7 (27 Sep 2023 20:39), Max: 36.7 (27 Sep 2023 18:07)  T(F): 98 (27 Sep 2023 20:39), Max: 98 (27 Sep 2023 18:07)  HR: 100 (27 Sep 2023 20:39) (100 - 115)  BP: 147/74 (27 Sep 2023 20:39) (147/74 - 155/85)  BP(mean): --  RR: 17 (27 Sep 2023 20:39) (17 - 18)  SpO2: 100% (27 Sep 2023 20:39) (98% - 100%)    Parameters below as of 27 Sep 2023 20:39  Patient On (Oxygen Delivery Method): room air Type II DM, HLD, asthma

## 2023-09-27 NOTE — ED PROVIDER NOTE - OBJECTIVE STATEMENT
This is a 58-year-old female past medical history hyperlipidemia, diabetes, asthma, past psychiatric history schizoaffective disorder. Arrived from home disorganized, poor historian, poor hygiene, with flight of ideas, hyperverbal, unable to give much information patient or engage in meaningful conversation.

## 2023-09-28 DIAGNOSIS — E78.00 PURE HYPERCHOLESTEROLEMIA, UNSPECIFIED: ICD-10-CM

## 2023-09-28 DIAGNOSIS — J45.909 UNSPECIFIED ASTHMA, UNCOMPLICATED: ICD-10-CM

## 2023-09-28 DIAGNOSIS — E11.9 TYPE 2 DIABETES MELLITUS WITHOUT COMPLICATIONS: ICD-10-CM

## 2023-09-28 LAB
CLOZAPINE SERPL-MCNC: 448 NG/ML — SIGNIFICANT CHANGE UP (ref 350–600)
GLUCOSE BLDC GLUCOMTR-MCNC: 207 MG/DL — HIGH (ref 70–99)
GLUCOSE BLDC GLUCOMTR-MCNC: 224 MG/DL — HIGH (ref 70–99)
GLUCOSE BLDC GLUCOMTR-MCNC: 249 MG/DL — HIGH (ref 70–99)
GLUCOSE BLDC GLUCOMTR-MCNC: 265 MG/DL — HIGH (ref 70–99)
GLUCOSE BLDC GLUCOMTR-MCNC: 276 MG/DL — HIGH (ref 70–99)

## 2023-09-28 PROCEDURE — 99222 1ST HOSP IP/OBS MODERATE 55: CPT

## 2023-09-28 RX ORDER — LINAGLIPTIN 5 MG/1
5 TABLET, FILM COATED ORAL DAILY
Refills: 0 | Status: DISCONTINUED | OUTPATIENT
Start: 2023-09-28 | End: 2023-11-09

## 2023-09-28 RX ORDER — ALBUTEROL 90 UG/1
2 AEROSOL, METERED ORAL EVERY 6 HOURS
Refills: 0 | Status: DISCONTINUED | OUTPATIENT
Start: 2023-09-28 | End: 2023-11-09

## 2023-09-28 RX ORDER — INSULIN LISPRO 100/ML
VIAL (ML) SUBCUTANEOUS AT BEDTIME
Refills: 0 | Status: DISCONTINUED | OUTPATIENT
Start: 2023-09-28 | End: 2023-11-09

## 2023-09-28 RX ORDER — HALOPERIDOL DECANOATE 100 MG/ML
5 INJECTION INTRAMUSCULAR ONCE
Refills: 0 | Status: DISCONTINUED | OUTPATIENT
Start: 2023-09-28 | End: 2023-11-09

## 2023-09-28 RX ORDER — GLIMEPIRIDE 1 MG
4 TABLET ORAL
Refills: 0 | Status: DISCONTINUED | OUTPATIENT
Start: 2023-09-28 | End: 2023-11-09

## 2023-09-28 RX ORDER — HALOPERIDOL DECANOATE 100 MG/ML
5 INJECTION INTRAMUSCULAR EVERY 6 HOURS
Refills: 0 | Status: DISCONTINUED | OUTPATIENT
Start: 2023-09-28 | End: 2023-09-28

## 2023-09-28 RX ADMIN — ATORVASTATIN CALCIUM 20 MILLIGRAM(S): 80 TABLET, FILM COATED ORAL at 20:19

## 2023-09-28 RX ADMIN — Medication 2: at 08:31

## 2023-09-28 RX ADMIN — LINAGLIPTIN 5 MILLIGRAM(S): 5 TABLET, FILM COATED ORAL at 09:52

## 2023-09-28 RX ADMIN — Medication 4 MILLIGRAM(S): at 09:53

## 2023-09-28 RX ADMIN — Medication 2: at 12:38

## 2023-09-28 RX ADMIN — Medication 1: at 20:32

## 2023-09-28 RX ADMIN — CLOZAPINE 100 MILLIGRAM(S): 150 TABLET, ORALLY DISINTEGRATING ORAL at 20:18

## 2023-09-28 RX ADMIN — METFORMIN HYDROCHLORIDE 500 MILLIGRAM(S): 850 TABLET ORAL at 20:19

## 2023-09-28 RX ADMIN — Medication 2: at 16:35

## 2023-09-28 NOTE — PSYCHIATRIC REHAB INITIAL EVALUATION - NSBHPRRECOMMEND_PSY_ALL_CORE
Writer met with patient to orient to unit and introduce self, psychiatric rehabilitation staff and department functions. In response to the COVID-19 outbreak, there has been a shift in hospital wide policies and protocols. As a result, it should be noted that unit programming will be re-evaluated on a consistent basis in effort to maintain safety guidelines. Writer encouraged patient to attend psychiatric rehabilitation groups and engage in treatment. Patient appeared not receptive or engaged during the admission interview. The patient appeared guarded with personal data and information surrounding admission. Writer and patient were unable to establish a collaborative psychiatric goal, therefore one will be chosen for them. Psychiatric Rehabilitation staff will continue to engage patient daily in order to develop therapeutic rapport.

## 2023-09-28 NOTE — BH INPATIENT PSYCHIATRY ASSESSMENT NOTE - HPI (INCLUDE ILLNESS QUALITY, SEVERITY, DURATION, TIMING, CONTEXT, MODIFYING FACTORS, ASSOCIATED SIGNS AND SYMPTOMS)
Patient seen for assessment of depression, anxiety, and SI, chart reviewed, and case discussed with treatment team.  Patient seen on admission to the unit.  She was able to confirm the history as reported in the ED assessment - see below.    From ED assessment:  59 y/o female, single, noncaregiver, disabled, lives with francisco, history of Schizophrenia vs Schizoaffectove d/o, multiple past admissions at [facility] (most recent 2020 for decompensated psychosis), and also was hospitalized medically for lactic acidosis and followed by CL team in July 2023, followed by psychiatrist Dr. Love at [facility] (pt has no h/o developmental disability), prescribed Clozaril 100mg qhs , no h/o self-injurious behavior or suicide attempts, no h/o violence or legal issues, PMH Type II DM, HLD, asthma, no h/o substance abuse, BIBEMS activated by francisco Nevarez) for  disorganization.   Interview is limited significantly by  acute psychosis, pt is observed, laughing to herself , engaging in disorganised behaviors.  When asked about what brings to the hospital she replies"My daryn Destiny Danica called the ".  Pt then says "I am the police and I am here to get you on tape ", she then repeatedly asks "Can I have something to eat?", "if you don't given me food I will call  "ap"  unit".  She then makes nonsensical statements "Shirley from [place] , can you say that ", "Are you Latter day from [place]?" . When trying to redirect the pt she says "I am done with you ". She denies SI/HI, denies AH/VH. States she takes medications ..  Collateral obtained from pt's Rahat singh. please see  note . Pt did not get her night time medications prior to arrival. Patient seen for assessment of psychosis, chart reviewed, and case discussed with treatment team.  The patient was seen on the unit, noted to be grossly disorganized, quickly becoming tangential and loose in her thoughts when asked any questions.  She was unable to say what brought her to the hospital.  She was able to provide an answer to closed ended questions, but the accuracy of her responses are in doubts.  She states she did not eat breakfast, but staff reports she did.  She denies depression, denies SI, but admits she had not been sleeping well at home.  She denies hallucinations or delusions.  She states she has been compliant with medications, then later says she isn't sure, and doesn't know her doses.  When asked if the staff could reach out to collaterals, she reported not wanting staff to speak with anyone.  She denies substance use.    From ED assessment:  59 y/o female, single, noncaregiver, disabled, lives with [facility], history of Schizophrenia vs Schizoaffectove d/o, multiple past admissions at [facility] (most recent 2020 for decompensated psychosis), and also was hospitalized medically for lactic acidosis and followed by CL team in July 2023, followed by psychiatrist Dr. Love at [facility] (pt has no h/o developmental disability), prescribed Clozaril 100mg qhs , no h/o self-injurious behavior or suicide attempts, no h/o violence or legal issues, PMH Type II DM, HLD, asthma, no h/o substance abuse, BIBEMS activated by francisco (Rahat) for  disorganization.   Interview is limited significantly by  acute psychosis, pt is observed, laughing to herself , engaging in disorganised behaviors.  When asked about what brings to the hospital she replies"My daryn Destiny Salazareric called the ".  Pt then says "I am the police and I am here to get you on tape ", she then repeatedly asks "Can I have something to eat?", "if you don't given me food I will call  "ap"  unit".  She then makes nonsensical statements "Shirley from [place] , can you say that ", "Are you Church from [place]?" . When trying to redirect the pt she says "I am done with you ". She denies SI/HI, denies AH/VH. States she takes medications ..    Rahat Barnett, [phone] pt's fiance , reports pt lives with him and he reports over the past 2 weeks , pt has been not making sense, talking gibberish, talking to herself,  laughing hysterically, disorganized  . Patient knocked on the neighbor's door and not making sense. Patient has been making phone calls , but he is not sure who she is calling. Patient is talking fast . He does not believe she is having vh.  No si/hi verbalization.  Patient is sleeping ok,  appetite has been less than usual, not showering regularly.  Patient is compliant with medications,  as he administers the medications everyday .Clozapine 100mg qhs , followed by Dr. Vinnie Huddleston at [facility] .   Medical: Patient has DM and has been incontinent , Metformin 500 mg qhs ,  Januvia 100mg qdaily , and Glimepiride 4mg qdaily  , Vascepa 1 gm 2 caps 2xbid , Atorvastatin 20mg qdaily  Allergies: non known  Substance use: none known   No access to guns .

## 2023-09-28 NOTE — ED ADULT NURSE REASSESSMENT NOTE - NS ED NURSE REASSESS COMMENT FT1
PT is resting in stretcher, easily arousable to verbal stimuli. no apparent distress noted. report given to [facility] nurse Burns .

## 2023-09-28 NOTE — BH INPATIENT PSYCHIATRY ASSESSMENT NOTE - NSBHMETABOLIC_PSY_ALL_CORE_FT
BMI: BMI (kg/m2): 21.8 (09-28-23 @ 02:45)  HbA1c: A1C with Estimated Average Glucose Result: 7.2 % (09-27-23 @ 19:00)    Glucose: POCT Blood Glucose.: 224 mg/dL (09-28-23 @ 08:01)    BP: 147/74 (09-27-23 @ 20:39) (147/74 - 155/85)  Lipid Panel: Date/Time: 07-07-23 @ 05:52  Cholesterol, Serum: 153  Direct LDL: --  HDL Cholesterol, Serum: 28  Total Cholesterol/HDL Ration Measurement: --  Triglycerides, Serum: 273
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
## 2023-09-28 NOTE — BH SOCIAL WORK INITIAL PSYCHOSOCIAL EVALUATION - OTHER PAST PSYCHIATRIC HISTORY (INCLUDE DETAILS REGARDING ONSET, COURSE OF ILLNESS, INPATIENT/OUTPATIENT TREATMENT)
EMR reports "57 y/o female, single, noncaregiver, disabled, lives with [facility], history of Schizophrenia vs Schizoaffectove d/o, multiple past admissions at [facility] (most recent 2020 for decompensated psychosis), and also was hospitalized medically for lactic acidosis and followed by CL team in July 2023, followed by psychiatrist Dr. Love at [facility] (pt has no h/o developmental disability), prescribed Clozaril 100mg qhs , no h/o self-injurious behavior or suicide attempts, no h/o violence or legal issues, PMH Type II DM, HLD, asthma, no h/o substance abuse, BIB EMS activated by francisco (Rahat) for  disorganization.   Interview is limited significantly by  acute psychosis, pt is observed, laughing to herself , engaging in disorganised behaviors. "    On the unit, Pt continues to be disorganized and unable to engage in meaningful discussions.

## 2023-09-28 NOTE — BH INPATIENT PSYCHIATRY ASSESSMENT NOTE - NSBHASSESSSUMMFT_PSY_ALL_CORE
59 y/o female, single, noncaregiver, disabled, lives with [facility], history of Schizophrenia vs Schizoaffectove d/o, multiple past admissions at [facility] (most recent 2020 for decompensated psychosis), and also was hospitalized medically for lactic acidosis and followed by CL team in July 2023, followed by psychiatrist Dr. Love at [facility] (pt has no h/o developmental disability), prescribed Clozaril 100mg qhs , no h/o self-injurious behavior or suicide attempts, no h/o violence or legal issues, PMH Type II DM, HLD, asthma, no h/o substance abuse, BIBEMS activated by francisco (Rahat) for disorganization.  The patient is grossly disorganized in thoughts on admission.    No 1:1 needed, as the patient is calm, no SI.  -Continue clozapine 100mg hs - may need to titrate - will hold off for now, as compliance is unclear  -Continue Senna and Miralax prn for constipation  -Continue metformin, glimepiride and linagliptan for DM  	Check fingersticks qid  -Lipitor for cholesterol  -Albuterol prn for asthma  -Obtain collateral when patient gives consent

## 2023-09-28 NOTE — BH INPATIENT PSYCHIATRY ASSESSMENT NOTE - CURRENT MEDICATION
MEDICATIONS  (STANDING):  atorvastatin 20 milliGRAM(s) Oral at bedtime  cloZAPine 100 milliGRAM(s) Oral at bedtime  glimepiride. 4 milliGRAM(s) Oral with breakfast  glucagon  Injectable 1 milliGRAM(s) IntraMuscular once  insulin lispro (ADMELOG) corrective regimen sliding scale   SubCutaneous three times a day before meals  insulin lispro (ADMELOG) corrective regimen sliding scale   SubCutaneous at bedtime  linagliptin 5 milliGRAM(s) Oral daily  metFORMIN 500 milliGRAM(s) Oral at bedtime  senna 2 Tablet(s) Oral at bedtime    MEDICATIONS  (PRN):  albuterol    90 MICROgram(s) HFA Inhaler 2 Puff(s) Inhalation every 6 hours PRN asthma  dextrose Oral Gel 15 Gram(s) Oral once PRN Blood Glucose LESS THAN 70 milliGRAM(s)/deciliter  haloperidol     Tablet 5 milliGRAM(s) Oral every 6 hours PRN agitation  haloperidol    Injectable 5 milliGRAM(s) IntraMuscular once PRN combative behavior  LORazepam     Tablet 2 milliGRAM(s) Oral every 6 hours PRN Agitation  LORazepam   Injectable 2 milliGRAM(s) IntraMuscular once PRN combative behavior  polyethylene glycol 3350 17 Gram(s) Oral daily PRN Constipation   MEDICATIONS  (STANDING):  atorvastatin 20 milliGRAM(s) Oral at bedtime  cloZAPine 100 milliGRAM(s) Oral at bedtime  glimepiride. 4 milliGRAM(s) Oral with breakfast  glucagon  Injectable 1 milliGRAM(s) IntraMuscular once  insulin lispro (ADMELOG) corrective regimen sliding scale   SubCutaneous at bedtime  insulin lispro (ADMELOG) corrective regimen sliding scale   SubCutaneous three times a day before meals  linagliptin 5 milliGRAM(s) Oral daily  metFORMIN 500 milliGRAM(s) Oral at bedtime  senna 2 Tablet(s) Oral at bedtime    MEDICATIONS  (PRN):  albuterol    90 MICROgram(s) HFA Inhaler 2 Puff(s) Inhalation every 6 hours PRN asthma  dextrose Oral Gel 15 Gram(s) Oral once PRN Blood Glucose LESS THAN 70 milliGRAM(s)/deciliter  haloperidol     Tablet 5 milliGRAM(s) Oral every 6 hours PRN agitation  haloperidol    Injectable 5 milliGRAM(s) IntraMuscular once PRN combative behavior  LORazepam     Tablet 2 milliGRAM(s) Oral every 6 hours PRN Agitation  LORazepam   Injectable 2 milliGRAM(s) IntraMuscular once PRN combative behavior  polyethylene glycol 3350 17 Gram(s) Oral daily PRN Constipation

## 2023-09-28 NOTE — BH INPATIENT PSYCHIATRY ASSESSMENT NOTE - MSE UNSTRUCTURED FT
The patient appears stated age, with fair hygiene, and is dressed appropriately.  She was calm and makes attempts to be cooperative with the interview.  She maintained appropriate eye contact.  No restlessness or slowing of movements observed.  Gait is steady.  The patient’s speech was fluent, normal in tone, rate, and volume.  The patient’s mood is “okay.”  Her affect is constricted, stable, appropriate.  The patient’s thoughts are grossly disorganized, tangential and loose.  She does not have any clear delusions and denies hallucinations.  She does not have any suicidal or homicidal ideation, intent, or plan.  Insight is limited.  Judgment is impaired.  Impulse control has been fair on the unit thus far.

## 2023-09-28 NOTE — BH PATIENT PROFILE - HOME MEDICATIONS
glimepiride 4 mg oral tablet , 1 tab(s) orally once a day  cloZAPine 25 mg oral tablet , 3 tab(s) orally once a day (at bedtime)  atorvastatin 20 mg oral tablet , 1 tab(s) orally once a day (at bedtime)

## 2023-09-28 NOTE — BH INPATIENT PSYCHIATRY ASSESSMENT NOTE - NSBHCHARTREVIEWVS_PSY_A_CORE FT
Vital Signs Last 24 Hrs  T(C): 36.6 (09-28-23 @ 02:45), Max: 36.7 (09-27-23 @ 18:07)  T(F): 97.9 (09-28-23 @ 02:45), Max: 98 (09-27-23 @ 18:07)  HR: 100 (09-27-23 @ 20:39) (100 - 115)  BP: 147/74 (09-27-23 @ 20:39) (147/74 - 155/85)  BP(mean): --  RR: 18 (09-28-23 @ 02:45) (17 - 18)  SpO2: 100% (09-28-23 @ 02:45) (98% - 100%)    Orthostatic VS  09-28-23 @ 02:45  Lying BP: --/-- HR: --  Sitting BP: 111/54 HR: 94  Standing BP: 103/57 HR: 96  Site: --  Mode: --

## 2023-09-28 NOTE — BH PATIENT PROFILE - FALL HARM RISK - HARM RISK INTERVENTIONS

## 2023-09-28 NOTE — BH PATIENT PROFILE - PRIMARY SOURCE OF SUPPORT/COMFORT
[facility]    [address] [address]    Phone:  [phone]                                       Sola Gan   MRN: [id number]    Department:  [facility]   Date of Visit:  3/22/2017           After Visit Summary Signature Page     I have received my discharge instructions, and my questions have been answered. I have discussed any challenges I see with this plan with the nurse or doctor.    ..........................................................................................................................................  Patient/Patient Representative Signature      ..........................................................................................................................................  Patient Representative Print Name and Relationship to Patient    ..................................................               ................................................  Date                                            Time    ..........................................................................................................................................  Reviewed by Signature/Title    ...................................................              ..............................................  Date                                                            Time           friend

## 2023-09-28 NOTE — BH PATIENT PROFILE - STATED REASON FOR ADMISSION
Patient is admitted from [facility] ER with diagnosis of Schizophrenia,  now with acute psychosis, disorganization, talking to self.   Patient is admitted from [facility] ER with diagnosis of Schizophrenia,  now with acute psychosis, disorganization, talking to self.  Patient presented to ED after fiance activated EMS for worsening  paranoia and disorganized thought process. Patient is talking to self, tangential and unable to care for herself. Patient is now medically cleared and transferred to [facility] to stabilize psychiatric symptoms.

## 2023-09-29 LAB
GLUCOSE BLDC GLUCOMTR-MCNC: 178 MG/DL — HIGH (ref 70–99)
GLUCOSE BLDC GLUCOMTR-MCNC: 182 MG/DL — HIGH (ref 70–99)
GLUCOSE BLDC GLUCOMTR-MCNC: 204 MG/DL — HIGH (ref 70–99)
GLUCOSE BLDC GLUCOMTR-MCNC: 324 MG/DL — HIGH (ref 70–99)

## 2023-09-29 PROCEDURE — 99232 SBSQ HOSP IP/OBS MODERATE 35: CPT

## 2023-09-29 RX ADMIN — SENNA PLUS 2 TABLET(S): 8.6 TABLET ORAL at 20:36

## 2023-09-29 RX ADMIN — CLOZAPINE 100 MILLIGRAM(S): 150 TABLET, ORALLY DISINTEGRATING ORAL at 20:36

## 2023-09-29 RX ADMIN — Medication 4 MILLIGRAM(S): at 12:32

## 2023-09-29 RX ADMIN — Medication 4: at 16:57

## 2023-09-29 RX ADMIN — Medication 1: at 12:28

## 2023-09-29 RX ADMIN — ATORVASTATIN CALCIUM 20 MILLIGRAM(S): 80 TABLET, FILM COATED ORAL at 20:36

## 2023-09-29 RX ADMIN — METFORMIN HYDROCHLORIDE 500 MILLIGRAM(S): 850 TABLET ORAL at 20:35

## 2023-09-29 RX ADMIN — LINAGLIPTIN 5 MILLIGRAM(S): 5 TABLET, FILM COATED ORAL at 12:32

## 2023-09-29 NOTE — BH INPATIENT PSYCHIATRY PROGRESS NOTE - NSBHCHARTREVIEWVS_PSY_A_CORE FT
Vital Signs Last 24 Hrs  T(C): 36.7 (09-29-23 @ 08:00), Max: 36.7 (09-28-23 @ 19:43)  T(F): 98.1 (09-29-23 @ 08:00), Max: 98.1 (09-29-23 @ 08:00)  HR: --  BP: --  BP(mean): --  RR: 18 (09-29-23 @ 08:00) (16 - 18)  SpO2: 98% (09-29-23 @ 08:00) (98% - 100%)    Orthostatic VS  09-29-23 @ 08:00  Lying BP: 107/58 HR: 81  Sitting BP: --/-- HR: --  Standing BP: --/-- HR: --  Site: upper left arm  Mode: electronic  Orthostatic VS  09-28-23 @ 19:43  Lying BP: --/-- HR: --  Sitting BP: 126/72 HR: 90  Standing BP: 117/67 HR: 98  Site: --  Mode: --  Orthostatic VS  09-28-23 @ 02:45  Lying BP: --/-- HR: --  Sitting BP: 111/54 HR: 94  Standing BP: 103/57 HR: 96  Site: --  Mode: --

## 2023-09-29 NOTE — BH INPATIENT PSYCHIATRY PROGRESS NOTE - CURRENT MEDICATION
MEDICATIONS  (STANDING):  atorvastatin 20 milliGRAM(s) Oral at bedtime  cloZAPine 100 milliGRAM(s) Oral at bedtime  glimepiride. 4 milliGRAM(s) Oral with breakfast  glucagon  Injectable 1 milliGRAM(s) IntraMuscular once  insulin lispro (ADMELOG) corrective regimen sliding scale   SubCutaneous at bedtime  insulin lispro (ADMELOG) corrective regimen sliding scale   SubCutaneous three times a day before meals  linagliptin 5 milliGRAM(s) Oral daily  metFORMIN 500 milliGRAM(s) Oral at bedtime  senna 2 Tablet(s) Oral at bedtime    MEDICATIONS  (PRN):  albuterol    90 MICROgram(s) HFA Inhaler 2 Puff(s) Inhalation every 6 hours PRN asthma  dextrose Oral Gel 15 Gram(s) Oral once PRN Blood Glucose LESS THAN 70 milliGRAM(s)/deciliter  haloperidol     Tablet 5 milliGRAM(s) Oral every 6 hours PRN agitation  haloperidol    Injectable 5 milliGRAM(s) IntraMuscular once PRN combative behavior  LORazepam     Tablet 2 milliGRAM(s) Oral every 6 hours PRN Agitation  LORazepam   Injectable 2 milliGRAM(s) IntraMuscular once PRN combative behavior  polyethylene glycol 3350 17 Gram(s) Oral daily PRN Constipation

## 2023-09-29 NOTE — BH INPATIENT PSYCHIATRY PROGRESS NOTE - NSBHMETABOLIC_PSY_ALL_CORE_FT
BMI: BMI (kg/m2): 21.8 (09-28-23 @ 02:45)  HbA1c: A1C with Estimated Average Glucose Result: 7.2 % (09-27-23 @ 19:00)    Glucose: POCT Blood Glucose.: 178 mg/dL (09-29-23 @ 11:56)    BP: 147/74 (09-27-23 @ 20:39) (147/74 - 155/85)  Lipid Panel: Date/Time: 07-07-23 @ 05:52  Cholesterol, Serum: 153  Direct LDL: --  HDL Cholesterol, Serum: 28  Total Cholesterol/HDL Ration Measurement: --  Triglycerides, Serum: 273

## 2023-09-29 NOTE — BH INPATIENT PSYCHIATRY PROGRESS NOTE - MSE UNSTRUCTURED FT
The patient appears stated age, with fair hygiene, and is dressed appropriately, lying in bed.  She was calm and makes attempts to be cooperative with the interview.  She maintained appropriate eye contact.  No restlessness or slowing of movements observed.  Gait is steady.  The patient’s speech was fluent, normal in tone, rate, and volume.  The patient’s mood is “fine.”  Her affect is constricted, stable, appropriate.  The patient’s thoughts are disorganized, tangential and loose.  She does not have any clear delusions and denies hallucinations.  She does not have any suicidal or homicidal ideation, intent, or plan.  Insight is limited.  Judgment is impaired.  Impulse control has been fair on the unit thus far.

## 2023-09-29 NOTE — BH INPATIENT PSYCHIATRY PROGRESS NOTE - NSBHFUPINTERVALHXFT_PSY_A_CORE
Patient seen for follow up of psychosis, chart reviewed, and case discussed with treatment team.  No events reported overnight.  The patient took medications last night, but refused diabetes medications this morning.  Education provided and patient took it later in the day.  The patient continues to be disorganized, loose in thoughts.  She states she doesn't speak English during the conversation.  She states her mood is okay, denies SI, behavior well controlled.  The patient reports eating and sleeping well.  She has been compliant with clozapine, tolerating it well.

## 2023-09-29 NOTE — BH INPATIENT PSYCHIATRY PROGRESS NOTE - PRN MEDS
MEDICATIONS  (PRN):  albuterol    90 MICROgram(s) HFA Inhaler 2 Puff(s) Inhalation every 6 hours PRN asthma  dextrose Oral Gel 15 Gram(s) Oral once PRN Blood Glucose LESS THAN 70 milliGRAM(s)/deciliter  haloperidol     Tablet 5 milliGRAM(s) Oral every 6 hours PRN agitation  haloperidol    Injectable 5 milliGRAM(s) IntraMuscular once PRN combative behavior  LORazepam     Tablet 2 milliGRAM(s) Oral every 6 hours PRN Agitation  LORazepam   Injectable 2 milliGRAM(s) IntraMuscular once PRN combative behavior  polyethylene glycol 3350 17 Gram(s) Oral daily PRN Constipation

## 2023-09-29 NOTE — BH INPATIENT PSYCHIATRY PROGRESS NOTE - NSBHASSESSSUMMFT_PSY_ALL_CORE
59 y/o female, single, noncaregiver, disabled, lives with [facility], history of Schizophrenia vs Schizoaffectove d/o, multiple past admissions at [facility] (most recent 2020 for decompensated psychosis), and also was hospitalized medically for lactic acidosis and followed by CL team in July 2023, followed by psychiatrist Dr. Love at [facility] (pt has no h/o developmental disability), prescribed Clozaril 100mg qhs , no h/o self-injurious behavior or suicide attempts, no h/o violence or legal issues, PMH Type II DM, HLD, asthma, no h/o substance abuse, BIBEMS activated by francisco (Rahat) for disorganization.  The patient is grossly disorganized in thoughts on admission.    9/29: The patient continues to be disorganized, but is taking medications.  Will continue clozapine.    No 1:1 needed, as the patient is calm, no SI.  -Continue clozapine 100mg hs - may need to titrate - will hold off for now, as compliance is unclear  -Continue Senna and Miralax prn for constipation  -Continue metformin, glimepiride and linagliptan for DM  	Check fingersticks qid  -Lipitor for cholesterol  -Albuterol prn for asthma  -Obtain collateral when patient gives consent

## 2023-09-30 LAB
GLUCOSE BLDC GLUCOMTR-MCNC: 166 MG/DL — HIGH (ref 70–99)
GLUCOSE BLDC GLUCOMTR-MCNC: 193 MG/DL — HIGH (ref 70–99)
GLUCOSE BLDC GLUCOMTR-MCNC: 279 MG/DL — HIGH (ref 70–99)

## 2023-09-30 PROCEDURE — 99231 SBSQ HOSP IP/OBS SF/LOW 25: CPT

## 2023-09-30 RX ADMIN — SENNA PLUS 2 TABLET(S): 8.6 TABLET ORAL at 20:11

## 2023-09-30 RX ADMIN — Medication 3: at 16:56

## 2023-09-30 RX ADMIN — CLOZAPINE 100 MILLIGRAM(S): 150 TABLET, ORALLY DISINTEGRATING ORAL at 20:11

## 2023-09-30 RX ADMIN — ATORVASTATIN CALCIUM 20 MILLIGRAM(S): 80 TABLET, FILM COATED ORAL at 20:11

## 2023-09-30 RX ADMIN — METFORMIN HYDROCHLORIDE 500 MILLIGRAM(S): 850 TABLET ORAL at 20:11

## 2023-09-30 NOTE — BH INPATIENT PSYCHIATRY PROGRESS NOTE - NSBHCHARTREVIEWVS_PSY_A_CORE FT
Vital Signs Last 24 Hrs  T(C): 36.4 (09-30-23 @ 05:50), Max: 36.7 (09-29-23 @ 19:47)  T(F): 97.6 (09-30-23 @ 05:50), Max: 98.1 (09-29-23 @ 19:47)  HR: --  BP: --  BP(mean): --  RR: 18 (09-29-23 @ 19:47) (18 - 18)  SpO2: 98% (09-29-23 @ 19:47) (98% - 98%)    Orthostatic VS  09-30-23 @ 05:50  Lying BP: 101/57 HR: 80  Sitting BP: --/-- HR: --  Standing BP: --/-- HR: --  Site: --  Mode: --  Orthostatic VS  09-29-23 @ 19:47  Lying BP: --/-- HR: --  Sitting BP: 154/87 HR: 105  Standing BP: 135/68 HR: 106  Site: --  Mode: --  Orthostatic VS  09-29-23 @ 08:00  Lying BP: 107/58 HR: 81  Sitting BP: --/-- HR: --  Standing BP: --/-- HR: --  Site: upper left arm  Mode: electronic  Orthostatic VS  09-28-23 @ 19:43  Lying BP: --/-- HR: --  Sitting BP: 126/72 HR: 90  Standing BP: 117/67 HR: 98  Site: --  Mode: --

## 2023-09-30 NOTE — BH INPATIENT PSYCHIATRY PROGRESS NOTE - NSBHASSESSSUMMFT_PSY_ALL_CORE
59 y/o female, single, noncaregiver, disabled, lives with [facility], history of Schizophrenia vs Schizoaffectove d/o, multiple past admissions at [facility] (most recent 2020 for decompensated psychosis), and also was hospitalized medically for lactic acidosis and followed by CL team in July 2023, followed by psychiatrist Dr. Love at [facility] (pt has no h/o developmental disability), prescribed Clozaril 100mg qhs , no h/o self-injurious behavior or suicide attempts, no h/o violence or legal issues, PMH Type II DM, HLD, asthma, no h/o substance abuse, BIBEMS activated by francisco (Rahat) for disorganization.  The patient is grossly disorganized in thoughts on admission.    9/29: The patient continues to be disorganized, but is taking medications.  Will continue clozapine.  9/30nPsychotic disorganized cont meds encourage cooperation with proper vital signs    No 1:1 needed, as the patient is calm, no SI.  -Continue clozapine 100mg hs - may need to titrate - will hold off for now, as compliance is unclear  -Continue Senna and Miralax prn for constipation  -Continue metformin, glimepiride and linagliptan for DM  	Check fingersticks qid  -Lipitor for cholesterol  -Albuterol prn for asthma  -Obtain collateral when patient gives consent

## 2023-09-30 NOTE — BH INPATIENT PSYCHIATRY PROGRESS NOTE - CURRENT MEDICATION
MEDICATIONS  (STANDING):  atorvastatin 20 milliGRAM(s) Oral at bedtime  cloZAPine 100 milliGRAM(s) Oral at bedtime  glimepiride. 4 milliGRAM(s) Oral with breakfast  glucagon  Injectable 1 milliGRAM(s) IntraMuscular once  insulin lispro (ADMELOG) corrective regimen sliding scale   SubCutaneous three times a day before meals  insulin lispro (ADMELOG) corrective regimen sliding scale   SubCutaneous at bedtime  linagliptin 5 milliGRAM(s) Oral daily  metFORMIN 500 milliGRAM(s) Oral at bedtime  senna 2 Tablet(s) Oral at bedtime    MEDICATIONS  (PRN):  albuterol    90 MICROgram(s) HFA Inhaler 2 Puff(s) Inhalation every 6 hours PRN asthma  dextrose Oral Gel 15 Gram(s) Oral once PRN Blood Glucose LESS THAN 70 milliGRAM(s)/deciliter  haloperidol     Tablet 5 milliGRAM(s) Oral every 6 hours PRN agitation  haloperidol    Injectable 5 milliGRAM(s) IntraMuscular once PRN combative behavior  LORazepam     Tablet 2 milliGRAM(s) Oral every 6 hours PRN Agitation  LORazepam   Injectable 2 milliGRAM(s) IntraMuscular once PRN combative behavior  polyethylene glycol 3350 17 Gram(s) Oral daily PRN Constipation

## 2023-09-30 NOTE — BH INPATIENT PSYCHIATRY PROGRESS NOTE - NSBHMETABOLIC_PSY_ALL_CORE_FT
BMI: BMI (kg/m2): 21.8 (09-28-23 @ 02:45)  HbA1c: A1C with Estimated Average Glucose Result: 7.2 % (09-27-23 @ 19:00)    Glucose: POCT Blood Glucose.: 166 mg/dL (09-30-23 @ 12:05)    BP: 147/74 (09-27-23 @ 20:39) (147/74 - 155/85)  Lipid Panel: Date/Time: 07-07-23 @ 05:52  Cholesterol, Serum: 153  Direct LDL: --  HDL Cholesterol, Serum: 28  Total Cholesterol/HDL Ration Measurement: --  Triglycerides, Serum: 273

## 2023-09-30 NOTE — BH INPATIENT PSYCHIATRY PROGRESS NOTE - MSE UNSTRUCTURED FT
The patient appears stated age, with fair hygiene, and is dressed appropriately, lying in bed tissue in ears.  She was calm , uncooperative She maintained appropriate eye contact.  No restlessness or slowing of movements observed.    The patient’s speech sparse.  The patient’s mood is “ok.”  Her affect is constricted, stable, appropriate.  The patient’s thoughts are disorganized, tangential and loose.  She does not have any clear delusions but suspected  hallucinations given tissues in ears.  She does not have any suicidal or homicidal ideation, intent, or plan.  Insight is limited.  Judgment is impaired.  Impulse control has been fair on the unit thus far.

## 2023-09-30 NOTE — BH INPATIENT PSYCHIATRY PROGRESS NOTE - NSBHFUPINTERVALHXFT_PSY_A_CORE
Patient seen for follow up of psychosis, chart reviewed, and case discussed with treatment team.  No events reported overnight.  The patient took medications last night, but refused diabetes medications this morning.  Education provided and patient took it later in the day.  The patient continues to be disorganized, loose in thoughts.  She states she doesn't speak English during the conversation.  She states her mood is okay, denies SI, behavior well controlled.  The patient reports eating and sleeping well.  She has been compliant with clozapine, tolerating it well.  9/30 Patient seen for psychosis, no overnight events Eating sleeping okay, accepted lying BP refused to stand attempting to redo, withdrawn, laying in bed with tissue in ears

## 2023-10-01 LAB
GLUCOSE BLDC GLUCOMTR-MCNC: 212 MG/DL — HIGH (ref 70–99)
GLUCOSE BLDC GLUCOMTR-MCNC: 220 MG/DL — HIGH (ref 70–99)
GLUCOSE BLDC GLUCOMTR-MCNC: 249 MG/DL — HIGH (ref 70–99)
GLUCOSE BLDC GLUCOMTR-MCNC: 260 MG/DL — HIGH (ref 70–99)

## 2023-10-01 PROCEDURE — 99231 SBSQ HOSP IP/OBS SF/LOW 25: CPT

## 2023-10-01 RX ADMIN — CLOZAPINE 100 MILLIGRAM(S): 150 TABLET, ORALLY DISINTEGRATING ORAL at 21:36

## 2023-10-01 RX ADMIN — SENNA PLUS 2 TABLET(S): 8.6 TABLET ORAL at 21:36

## 2023-10-01 RX ADMIN — LINAGLIPTIN 5 MILLIGRAM(S): 5 TABLET, FILM COATED ORAL at 08:35

## 2023-10-01 RX ADMIN — ATORVASTATIN CALCIUM 20 MILLIGRAM(S): 80 TABLET, FILM COATED ORAL at 21:36

## 2023-10-01 RX ADMIN — Medication 2: at 12:10

## 2023-10-01 RX ADMIN — METFORMIN HYDROCHLORIDE 500 MILLIGRAM(S): 850 TABLET ORAL at 21:36

## 2023-10-01 RX ADMIN — Medication 2: at 16:37

## 2023-10-01 RX ADMIN — Medication 3: at 08:26

## 2023-10-01 RX ADMIN — Medication 4 MILLIGRAM(S): at 08:26

## 2023-10-01 NOTE — BH INPATIENT PSYCHIATRY PROGRESS NOTE - NSBHFUPINTERVALHXFT_PSY_A_CORE
Patient seen for follow up of psychosis, chart reviewed, and case discussed with nursing team. Vitals are stable. No events reported overnight, no PRN needed. The patient took medications last night and this morning. On exam, Pt reports feeling fine. She appears disorganized with loosening of associations. Patient reports eating and sleeping well.  Denies any SI, HI.

## 2023-10-01 NOTE — BH INPATIENT PSYCHIATRY PROGRESS NOTE - NSBHASSESSSUMMFT_PSY_ALL_CORE
59 y/o female, single, noncaregiver, disabled, lives with [facility], history of Schizophrenia vs Schizoaffectove d/o, multiple past admissions at [facility] (most recent 2020 for decompensated psychosis), and also was hospitalized medically for lactic acidosis and followed by CL team in July 2023, followed by psychiatrist Dr. Love at [facility] (pt has no h/o developmental disability), prescribed Clozaril 100mg qhs , no h/o self-injurious behavior or suicide attempts, no h/o violence or legal issues, PMH Type II DM, HLD, asthma, no h/o substance abuse, BIBEMS activated by francisco (Rahat) for disorganization.  The patient is grossly disorganized in thoughts on admission.    9/29: The patient continues to be disorganized, but is taking medications.  Will continue clozapine.  9/30nPsychotic disorganized cont meds encourage cooperation with proper vital signs  10/1; disorganized, but more verbal, cooperative, no SI/HI.   No 1:1 needed, as the patient is calm, no SI.  -Continue clozapine 100mg hs - may need to titrate - will hold off for now, as compliance is unclear  -Continue Senna and Miralax prn for constipation  -Continue metformin, glimepiride and linagliptan for DM  	Check fingersticks qid  -Lipitor for cholesterol  -Albuterol prn for asthma  -Obtain collateral when patient gives consent

## 2023-10-01 NOTE — BH INPATIENT PSYCHIATRY PROGRESS NOTE - NSBHMETABOLIC_PSY_ALL_CORE_FT
BMI: BMI (kg/m2): 21.8 (09-28-23 @ 02:45)  HbA1c: A1C with Estimated Average Glucose Result: 7.2 % (09-27-23 @ 19:00)    Glucose: POCT Blood Glucose.: 249 mg/dL (10-01-23 @ 12:02)    BP: --  Lipid Panel: Date/Time: 07-07-23 @ 05:52  Cholesterol, Serum: 153  Direct LDL: --  HDL Cholesterol, Serum: 28  Total Cholesterol/HDL Ration Measurement: --  Triglycerides, Serum: 273

## 2023-10-01 NOTE — BH INPATIENT PSYCHIATRY PROGRESS NOTE - NSBHCHARTREVIEWVS_PSY_A_CORE FT
Vital Signs Last 24 Hrs  T(C): 36.4 (10-01-23 @ 00:20), Max: 37.1 (09-30-23 @ 19:40)  T(F): 97.5 (10-01-23 @ 00:20), Max: 98.8 (09-30-23 @ 19:40)  HR: --  BP: --  BP(mean): --  RR: 18 (09-30-23 @ 19:40) (18 - 18)  SpO2: 98% (09-30-23 @ 19:40) (98% - 98%)    Orthostatic VS  10-01-23 @ 00:20  Lying BP: --/-- HR: --  Sitting BP: 125/48 HR: 86  Standing BP: 113/70 HR: 94  Site: upper left arm  Mode: electronic  Orthostatic VS  09-30-23 @ 19:40  Lying BP: --/-- HR: --  Sitting BP: 120/69 HR: 94  Standing BP: 108/72 HR: 100  Site: --  Mode: --  Orthostatic VS  09-30-23 @ 05:50  Lying BP: 101/57 HR: 80  Sitting BP: --/-- HR: --  Standing BP: --/-- HR: --  Site: --  Mode: --  Orthostatic VS  09-29-23 @ 19:47  Lying BP: --/-- HR: --  Sitting BP: 154/87 HR: 105  Standing BP: 135/68 HR: 106  Site: --  Mode: --

## 2023-10-01 NOTE — BH INPATIENT PSYCHIATRY PROGRESS NOTE - MSE UNSTRUCTURED FT
The patient appears stated age, with fair hygiene, and is dressed appropriately, sitting in day room.  She was calm , cooperative.  She maintained appropriate eye contact.  No agitation, retardation or involuntary movements observed.  The patient’s speech is soft, relevant some answers, irrelevant and disorganized mostly.  The patient’s mood is “fine”.  Her affect is congruent, stable, appropriate.  The patient’s thoughts are disorganized, tangential and loose.  She does not express any clear delusions. No hallucinations expressed now. She does not have any suicidal or homicidal ideation, intent, or plan.  Insight is limited.  Judgment is impaired.  Impulse control has been fair on the unit thus far.

## 2023-10-02 LAB
GLUCOSE BLDC GLUCOMTR-MCNC: 171 MG/DL — HIGH (ref 70–99)
GLUCOSE BLDC GLUCOMTR-MCNC: 188 MG/DL — HIGH (ref 70–99)
GLUCOSE BLDC GLUCOMTR-MCNC: 189 MG/DL — HIGH (ref 70–99)
GLUCOSE BLDC GLUCOMTR-MCNC: 255 MG/DL — HIGH (ref 70–99)

## 2023-10-02 PROCEDURE — 99232 SBSQ HOSP IP/OBS MODERATE 35: CPT

## 2023-10-02 RX ADMIN — ATORVASTATIN CALCIUM 20 MILLIGRAM(S): 80 TABLET, FILM COATED ORAL at 20:47

## 2023-10-02 RX ADMIN — METFORMIN HYDROCHLORIDE 500 MILLIGRAM(S): 850 TABLET ORAL at 20:48

## 2023-10-02 RX ADMIN — Medication 1: at 16:33

## 2023-10-02 RX ADMIN — Medication 1: at 20:51

## 2023-10-02 RX ADMIN — CLOZAPINE 100 MILLIGRAM(S): 150 TABLET, ORALLY DISINTEGRATING ORAL at 20:48

## 2023-10-02 NOTE — BH INPATIENT PSYCHIATRY PROGRESS NOTE - NSBHFUPINTERVALHXFT_PSY_A_CORE
Patient seen for follow up of psychosis, chart reviewed, and case discussed with nursing team. No events reported overnight, no PRN needed.  The patient states she is tired despite sleeping well last night.  She was able to answer some questions and seems to be somewhat more organized today.  However, staff reports the patient continues to be internally preoccupied at times on the unit.  Her mood is good, no SI.  Behavior well controlled.  The patient has been eating well.  She has been compliant with medication, tolerating them well.

## 2023-10-02 NOTE — BH INPATIENT PSYCHIATRY PROGRESS NOTE - MSE UNSTRUCTURED FT
The patient appears stated age, with fair hygiene, and is dressed appropriately, lying in bed.  She was calm, generally cooperative with the interview.  She maintained limited eye contact.  No agitation, retardation or involuntary movements observed.  The patient’s speech is fluent, normal in tone, rate, and volume.  The patient’s mood is “tired”.  Her affect is constricted, stable, appropriate.  The patient’s thoughts are less disorganized.  She does not express any clear delusions. She denies hallucinations, but seen responding to internal stimuli at times.  She does not have any suicidal or homicidal ideation, intent, or plan.  Insight is limited.  Judgment is impaired.  Impulse control has been fair on the unit.

## 2023-10-02 NOTE — BH INPATIENT PSYCHIATRY PROGRESS NOTE - NSBHMETABOLIC_PSY_ALL_CORE_FT
BMI: BMI (kg/m2): 21.8 (09-28-23 @ 02:45)  HbA1c: A1C with Estimated Average Glucose Result: 7.2 % (09-27-23 @ 19:00)    Glucose: POCT Blood Glucose.: 188 mg/dL (10-02-23 @ 08:08)    BP: 113/61 (10-02-23 @ 07:44) (113/61 - 113/61)  Lipid Panel: Date/Time: 07-07-23 @ 05:52  Cholesterol, Serum: 153  Direct LDL: --  HDL Cholesterol, Serum: 28  Total Cholesterol/HDL Ration Measurement: --  Triglycerides, Serum: 273

## 2023-10-02 NOTE — BH INPATIENT PSYCHIATRY PROGRESS NOTE - NSBHCHARTREVIEWVS_PSY_A_CORE FT
Vital Signs Last 24 Hrs  T(C): 36.4 (10-02-23 @ 07:44), Max: 36.6 (10-01-23 @ 19:41)  T(F): 97.6 (10-02-23 @ 07:44), Max: 97.9 (10-01-23 @ 19:41)  HR: 75 (10-02-23 @ 07:44) (75 - 75)  BP: 113/61 (10-02-23 @ 07:44) (113/61 - 113/61)  BP(mean): --  RR: 17 (10-02-23 @ 07:44) (17 - 17)  SpO2: 97% (10-02-23 @ 07:44) (97% - 99%)    Orthostatic VS  10-01-23 @ 19:41  Lying BP: --/-- HR: --  Sitting BP: 140/60 HR: 89  Standing BP: 126/66 HR: 95  Site: --  Mode: --  Orthostatic VS  10-01-23 @ 00:20  Lying BP: --/-- HR: --  Sitting BP: 125/48 HR: 86  Standing BP: 113/70 HR: 94  Site: upper left arm  Mode: electronic  Orthostatic VS  09-30-23 @ 19:40  Lying BP: --/-- HR: --  Sitting BP: 120/69 HR: 94  Standing BP: 108/72 HR: 100  Site: --  Mode: --

## 2023-10-02 NOTE — BH INPATIENT PSYCHIATRY PROGRESS NOTE - NSBHASSESSSUMMFT_PSY_ALL_CORE
59 y/o female, single, noncaregiver, disabled, lives with [facility], history of Schizophrenia vs Schizoaffectove d/o, multiple past admissions at [facility] (most recent 2020 for decompensated psychosis), and also was hospitalized medically for lactic acidosis and followed by CL team in July 2023, followed by psychiatrist Dr. Love at [facility] (pt has no h/o developmental disability), prescribed Clozaril 100mg qhs , no h/o self-injurious behavior or suicide attempts, no h/o violence or legal issues, PMH Type II DM, HLD, asthma, no h/o substance abuse, BIBEMS activated by francisco (Rahat) for disorganization.  The patient is grossly disorganized in thoughts on admission.    9/29: The patient continues to be disorganized, but is taking medications.  Will continue clozapine.  9/30: Psychotic disorganized cont meds encourage cooperation with proper vital signs  10/1: disorganized, but more verbal, cooperative, no SI/HI.   10/2: The patient is a little more organized, but still internally preoccupied.  Sedated from clozapine.  Will continue current dose, consider increase if psychosis continues.      No 1:1 needed, as the patient is calm, no SI.  -Continue clozapine 100mg hs - may need to titrate - will hold off for now, patient with some sedation  	Check CBC Wednesdays  -Continue Senna and Miralax prn for constipation  -Continue metformin, glimepiride and linagliptan for DM  	Check fingersticks qid  -Lipitor for cholesterol  -Albuterol prn for asthma  -Obtain collateral when patient gives consent

## 2023-10-03 LAB
B PERT DNA SPEC QL NAA+PROBE: SIGNIFICANT CHANGE UP
B PERT+PARAPERT DNA PNL SPEC NAA+PROBE: SIGNIFICANT CHANGE UP
BORDETELLA PARAPERTUSSIS (RAPRVP): SIGNIFICANT CHANGE UP
C PNEUM DNA SPEC QL NAA+PROBE: SIGNIFICANT CHANGE UP
FLUAV SUBTYP SPEC NAA+PROBE: SIGNIFICANT CHANGE UP
FLUBV RNA SPEC QL NAA+PROBE: SIGNIFICANT CHANGE UP
GLUCOSE BLDC GLUCOMTR-MCNC: 124 MG/DL — HIGH (ref 70–99)
GLUCOSE BLDC GLUCOMTR-MCNC: 175 MG/DL — HIGH (ref 70–99)
GLUCOSE BLDC GLUCOMTR-MCNC: 179 MG/DL — HIGH (ref 70–99)
GLUCOSE BLDC GLUCOMTR-MCNC: 264 MG/DL — HIGH (ref 70–99)
HADV DNA SPEC QL NAA+PROBE: SIGNIFICANT CHANGE UP
HCOV 229E RNA SPEC QL NAA+PROBE: SIGNIFICANT CHANGE UP
HCOV HKU1 RNA SPEC QL NAA+PROBE: SIGNIFICANT CHANGE UP
HCOV NL63 RNA SPEC QL NAA+PROBE: SIGNIFICANT CHANGE UP
HCOV OC43 RNA SPEC QL NAA+PROBE: SIGNIFICANT CHANGE UP
HMPV RNA SPEC QL NAA+PROBE: SIGNIFICANT CHANGE UP
HPIV1 RNA SPEC QL NAA+PROBE: SIGNIFICANT CHANGE UP
HPIV2 RNA SPEC QL NAA+PROBE: SIGNIFICANT CHANGE UP
HPIV3 RNA SPEC QL NAA+PROBE: SIGNIFICANT CHANGE UP
HPIV4 RNA SPEC QL NAA+PROBE: SIGNIFICANT CHANGE UP
M PNEUMO DNA SPEC QL NAA+PROBE: SIGNIFICANT CHANGE UP
RAPID RVP RESULT: SIGNIFICANT CHANGE UP
RSV RNA SPEC QL NAA+PROBE: SIGNIFICANT CHANGE UP
RV+EV RNA SPEC QL NAA+PROBE: SIGNIFICANT CHANGE UP
SARS-COV-2 RNA SPEC QL NAA+PROBE: SIGNIFICANT CHANGE UP

## 2023-10-03 PROCEDURE — 99232 SBSQ HOSP IP/OBS MODERATE 35: CPT | Mod: GC

## 2023-10-03 RX ORDER — CLOZAPINE 150 MG/1
125 TABLET, ORALLY DISINTEGRATING ORAL AT BEDTIME
Refills: 0 | Status: DISCONTINUED | OUTPATIENT
Start: 2023-10-03 | End: 2023-10-05

## 2023-10-03 RX ADMIN — Medication 3: at 12:17

## 2023-10-03 RX ADMIN — Medication 30 MILLILITER(S): at 23:49

## 2023-10-03 RX ADMIN — CLOZAPINE 125 MILLIGRAM(S): 150 TABLET, ORALLY DISINTEGRATING ORAL at 20:52

## 2023-10-03 RX ADMIN — Medication 4 MILLIGRAM(S): at 09:23

## 2023-10-03 RX ADMIN — LINAGLIPTIN 5 MILLIGRAM(S): 5 TABLET, FILM COATED ORAL at 09:23

## 2023-10-03 RX ADMIN — ATORVASTATIN CALCIUM 20 MILLIGRAM(S): 80 TABLET, FILM COATED ORAL at 20:53

## 2023-10-03 RX ADMIN — METFORMIN HYDROCHLORIDE 500 MILLIGRAM(S): 850 TABLET ORAL at 20:52

## 2023-10-03 NOTE — BH INPATIENT PSYCHIATRY PROGRESS NOTE - MSE UNSTRUCTURED FT
The patient appears stated age, with fair hygiene, and is dressed appropriately with towel turban over hair, lying in bed.  She was calm, generally cooperative and able to engage with the interview.  She maintained limited eye contact.  No agitation, psychomotor retardation or involuntary movements observed.  The patient’s speech is fluent, normal in tone, rate, and volume.  The patient’s mood is “okay”.  Her affect is constricted, stable, appropriate, congruent to mood. The patient’s thought process remains disorganized.  Patient holds delusions that she is communicating with her twin. She denies auditory or visual hallucinations, but seen responding to internal stimuli at times.  She does not express any suicidal or homicidal ideation, intent, or plan.  Insight is limited.  Judgment is impaired.  Impulse control has been fair on the unit.

## 2023-10-03 NOTE — BH INPATIENT PSYCHIATRY PROGRESS NOTE - NSBHMETABOLIC_PSY_ALL_CORE_FT
BMI: BMI (kg/m2): 21.8 (09-28-23 @ 02:45)  HbA1c: A1C with Estimated Average Glucose Result: 7.2 % (09-27-23 @ 19:00)    Glucose: POCT Blood Glucose.: 179 mg/dL (10-03-23 @ 08:02)    BP: 113/61 (10-02-23 @ 07:44) (113/61 - 113/61)  Lipid Panel: Date/Time: 07-07-23 @ 05:52  Cholesterol, Serum: 153  Direct LDL: --  HDL Cholesterol, Serum: 28  Total Cholesterol/HDL Ration Measurement: --  Triglycerides, Serum: 273   BMI: BMI (kg/m2): 21.8 (09-28-23 @ 02:45)  HbA1c: A1C with Estimated Average Glucose Result: 7.2 % (09-27-23 @ 19:00)    Glucose: POCT Blood Glucose.: 264 mg/dL (10-03-23 @ 11:57)    BP: 113/61 (10-02-23 @ 07:44) (113/61 - 113/61)  Lipid Panel: Date/Time: 07-07-23 @ 05:52  Cholesterol, Serum: 153  Direct LDL: --  HDL Cholesterol, Serum: 28  Total Cholesterol/HDL Ration Measurement: --  Triglycerides, Serum: 273

## 2023-10-03 NOTE — BH INPATIENT PSYCHIATRY PROGRESS NOTE - NSBHCHARTREVIEWVS_PSY_A_CORE FT
Vital Signs Last 24 Hrs  T(C): 36.6 (10-03-23 @ 07:59), Max: 36.8 (10-02-23 @ 20:40)  T(F): 97.9 (10-03-23 @ 07:59), Max: 98.2 (10-02-23 @ 20:40)  HR: --  BP: --  BP(mean): --  RR: --  SpO2: 98% (10-02-23 @ 20:40) (98% - 98%)    Orthostatic VS  10-03-23 @ 07:59  Lying BP: 106/66 HR: 75  Sitting BP: 100/60 HR: 84  Standing BP: --/-- HR: --  Site: --  Mode: --  Orthostatic VS  10-02-23 @ 20:40  Lying BP: --/-- HR: --  Sitting BP: 139/61 HR: 92  Standing BP: 128/69 HR: 100  Site: --  Mode: --  Orthostatic VS  10-01-23 @ 19:41  Lying BP: --/-- HR: --  Sitting BP: 140/60 HR: 89  Standing BP: 126/66 HR: 95  Site: --  Mode: --

## 2023-10-03 NOTE — BH INPATIENT PSYCHIATRY PROGRESS NOTE - CURRENT MEDICATION
MEDICATIONS  (STANDING):  atorvastatin 20 milliGRAM(s) Oral at bedtime  cloZAPine 100 milliGRAM(s) Oral at bedtime  glimepiride. 4 milliGRAM(s) Oral with breakfast  glucagon  Injectable 1 milliGRAM(s) IntraMuscular once  insulin lispro (ADMELOG) corrective regimen sliding scale   SubCutaneous three times a day before meals  insulin lispro (ADMELOG) corrective regimen sliding scale   SubCutaneous at bedtime  linagliptin 5 milliGRAM(s) Oral daily  metFORMIN 500 milliGRAM(s) Oral at bedtime  senna 2 Tablet(s) Oral at bedtime    MEDICATIONS  (PRN):  albuterol    90 MICROgram(s) HFA Inhaler 2 Puff(s) Inhalation every 6 hours PRN asthma  dextrose Oral Gel 15 Gram(s) Oral once PRN Blood Glucose LESS THAN 70 milliGRAM(s)/deciliter  haloperidol     Tablet 5 milliGRAM(s) Oral every 6 hours PRN agitation  haloperidol    Injectable 5 milliGRAM(s) IntraMuscular once PRN combative behavior  LORazepam     Tablet 2 milliGRAM(s) Oral every 6 hours PRN Agitation  LORazepam   Injectable 2 milliGRAM(s) IntraMuscular once PRN combative behavior  polyethylene glycol 3350 17 Gram(s) Oral daily PRN Constipation   MEDICATIONS  (STANDING):  atorvastatin 20 milliGRAM(s) Oral at bedtime  cloZAPine 125 milliGRAM(s) Oral at bedtime  glimepiride. 4 milliGRAM(s) Oral with breakfast  glucagon  Injectable 1 milliGRAM(s) IntraMuscular once  insulin lispro (ADMELOG) corrective regimen sliding scale   SubCutaneous three times a day before meals  insulin lispro (ADMELOG) corrective regimen sliding scale   SubCutaneous at bedtime  linagliptin 5 milliGRAM(s) Oral daily  metFORMIN 500 milliGRAM(s) Oral at bedtime  senna 2 Tablet(s) Oral at bedtime    MEDICATIONS  (PRN):  albuterol    90 MICROgram(s) HFA Inhaler 2 Puff(s) Inhalation every 6 hours PRN asthma  dextrose Oral Gel 15 Gram(s) Oral once PRN Blood Glucose LESS THAN 70 milliGRAM(s)/deciliter  haloperidol     Tablet 5 milliGRAM(s) Oral every 6 hours PRN agitation  haloperidol    Injectable 5 milliGRAM(s) IntraMuscular once PRN combative behavior  LORazepam     Tablet 2 milliGRAM(s) Oral every 6 hours PRN Agitation  LORazepam   Injectable 2 milliGRAM(s) IntraMuscular once PRN combative behavior  polyethylene glycol 3350 17 Gram(s) Oral daily PRN Constipation   MEDICATIONS  (STANDING):  atorvastatin 20 milliGRAM(s) Oral at bedtime  cloZAPine 125 milliGRAM(s) Oral at bedtime  glimepiride. 4 milliGRAM(s) Oral with breakfast  glucagon  Injectable 1 milliGRAM(s) IntraMuscular once  insulin lispro (ADMELOG) corrective regimen sliding scale   SubCutaneous at bedtime  insulin lispro (ADMELOG) corrective regimen sliding scale   SubCutaneous three times a day before meals  linagliptin 5 milliGRAM(s) Oral daily  metFORMIN 500 milliGRAM(s) Oral at bedtime  senna 2 Tablet(s) Oral at bedtime    MEDICATIONS  (PRN):  albuterol    90 MICROgram(s) HFA Inhaler 2 Puff(s) Inhalation every 6 hours PRN asthma  dextrose Oral Gel 15 Gram(s) Oral once PRN Blood Glucose LESS THAN 70 milliGRAM(s)/deciliter  haloperidol     Tablet 5 milliGRAM(s) Oral every 6 hours PRN agitation  haloperidol    Injectable 5 milliGRAM(s) IntraMuscular once PRN combative behavior  LORazepam     Tablet 2 milliGRAM(s) Oral every 6 hours PRN Agitation  LORazepam   Injectable 2 milliGRAM(s) IntraMuscular once PRN combative behavior  polyethylene glycol 3350 17 Gram(s) Oral daily PRN Constipation

## 2023-10-03 NOTE — BH INPATIENT PSYCHIATRY PROGRESS NOTE - NSBHASSESSSUMMFT_PSY_ALL_CORE
59 y/o female, single, noncaregiver, disabled, lives with [facility], history of Schizophrenia vs Schizoaffectove d/o, multiple past admissions at [facility] (most recent 2020 for decompensated psychosis), and also was hospitalized medically for lactic acidosis and followed by CL team in July 2023, followed by psychiatrist Dr. Love at [facility] (pt has no h/o developmental disability), prescribed Clozaril 100mg qhs , no h/o self-injurious behavior or suicide attempts, no h/o violence or legal issues, PMH Type II DM, HLD, asthma, no h/o substance abuse, BIBEMS activated by zara (Rahat) for disorganization.  The patient is grossly disorganized in thoughts on admission.    9/29: The patient continues to be disorganized, but is taking medications.  Will continue clozapine.  9/30: Psychotic disorganized cont meds encourage cooperation with proper vital signs  10/1: disorganized, but more verbal, cooperative, no SI/HI.   10/2: The patient is a little more organized, but still internally preoccupied.  Sedated from clozapine.  Will continue current dose, consider increase if psychosis continues.  10/3: Patient remains disorganized but behaviorally controlled on the unit. Records show that patient was previously well maintained on Clozaril 300mg. Will titrate Clozaril today and monitor for efficacy and side effects.    Plan:  No 1:1 needed, as the patient is calm, no SI.  -Increase clozapine to 125mg hs  	Check CBC Wednesdays  -Continue Senna and Miralax prn for constipation  -Continue metformin, glimepiride and Linagliptin for DM  	Check fingersticks qid  -Lipitor for cholesterol  -Albuterol prn for asthma  -Patient provided verbal consent to speak with Zara Giang and patient's outpatient provider.

## 2023-10-03 NOTE — BH INPATIENT PSYCHIATRY PROGRESS NOTE - NSBHFUPINTERVALHXFT_PSY_A_CORE
Patient seen for follow up of psychosis, chart reviewed, and case discussed with interdisciplinary team. No acute events reported overnight, no PRN needed. On encounter, patient was seen laying in bed with her hair in a towel turban after having showered. She remains disorganized but can be engaged. On interview, she answers questions about her mood, the current date, and open ended questions with "I don't know." She reported having eaten breakfast but declines further food intake unless her fiance Rahat is able to bring her food. Patient reports that she is "schizophrenic" and that she communicates with her "twin" through unknown means but denies auditory or visual hallucinations at this time.  Behavior remains well controlled, continues to be compliant and tolerating medication. Patient seen for follow up of psychosis, chart reviewed, and case discussed with interdisciplinary team. No acute events reported overnight, no PRN needed. On encounter, patient was seen laying in bed with her hair in a towel turban after having showered. She remains disorganized but can be engaged. On interview, she answers questions about her mood, the current date, and open ended questions with "I don't know." She reported having eaten breakfast but declines further food intake unless her fiance Rahat is able to bring her food. Patient reports that she is "schizophrenic" and that she communicates with her "twin" through unknown means but denies auditory or visual hallucinations at this time.  Behavior remains well controlled, continues to be compliant and tolerating medication.    COLLATERAL: Zara Canseco [phone]  Zara has recently visited patient at the unit and notes that she is doing better now. He noted that she had started to regress a couple of weeks ago, where she was seen laughing hysterically to herself, sitting on the couch talking to herself, saying things that didn't make sense to anyone else. This time was the worst he has seen her during the course of her illness. At her best, she is noted to be functional and is able to cook for herself, wash herself, and take care of her two grandchildren.  Patient seen for follow up of psychosis, chart reviewed, and case discussed with interdisciplinary team. No acute events reported overnight, no PRN needed. On encounter, patient was seen laying in bed with her hair in a towel turban after having showered. She remains disorganized but can be engaged. On interview, she answers questions about her mood, the current date, and open ended questions with "I don't know." She reported having eaten breakfast but declines further food intake unless her fiance Rahat is able to bring her food. Patient reports that she is "schizophrenic" and that she communicates with her "twin" through unknown means but denies auditory or visual hallucinations at this time.  Behavior remains well controlled, continues to be compliant and tolerating medication.    COLLATERAL: Zara Canseco [phone]  Zara has recently visited patient at the unit and notes that she is doing better now. He noted that she had started to regress a couple of weeks ago, where she was seen laughing hysterically to herself, sitting on the couch talking to herself, saying things that didn't make sense to anyone else. This time was the worst he has seen her during the course of her illness. At her best, she is noted to be functional and is able to cook for herself, wash herself, and take care of her two grandchildren. She was hospitalized a few months ago at [facility] not for psychiatric reasons, where her medication was lowered to Clozapine 75mg and discharged on 100mg, though zara does not know why. He notes that she was functioning at her best on a Clozapine dose of 350mg.

## 2023-10-04 LAB
BASOPHILS # BLD AUTO: 0.03 K/UL — SIGNIFICANT CHANGE UP (ref 0–0.2)
BASOPHILS NFR BLD AUTO: 0.4 % — SIGNIFICANT CHANGE UP (ref 0–2)
CRP SERPL-MCNC: <3 MG/L — SIGNIFICANT CHANGE UP
EOSINOPHIL # BLD AUTO: 0.17 K/UL — SIGNIFICANT CHANGE UP (ref 0–0.5)
EOSINOPHIL NFR BLD AUTO: 2.4 % — SIGNIFICANT CHANGE UP (ref 0–6)
GLUCOSE BLDC GLUCOMTR-MCNC: 162 MG/DL — HIGH (ref 70–99)
GLUCOSE BLDC GLUCOMTR-MCNC: 179 MG/DL — HIGH (ref 70–99)
GLUCOSE BLDC GLUCOMTR-MCNC: 234 MG/DL — HIGH (ref 70–99)
GLUCOSE BLDC GLUCOMTR-MCNC: 234 MG/DL — HIGH (ref 70–99)
GLUCOSE BLDC GLUCOMTR-MCNC: 277 MG/DL — HIGH (ref 70–99)
HCT VFR BLD CALC: 34.8 % — SIGNIFICANT CHANGE UP (ref 34.5–45)
HGB BLD-MCNC: 11.7 G/DL — SIGNIFICANT CHANGE UP (ref 11.5–15.5)
IANC: 3.89 K/UL — SIGNIFICANT CHANGE UP (ref 1.8–7.4)
IMM GRANULOCYTES NFR BLD AUTO: 0.6 % — SIGNIFICANT CHANGE UP (ref 0–0.9)
LYMPHOCYTES # BLD AUTO: 2.49 K/UL — SIGNIFICANT CHANGE UP (ref 1–3.3)
LYMPHOCYTES # BLD AUTO: 35.2 % — SIGNIFICANT CHANGE UP (ref 13–44)
MCHC RBC-ENTMCNC: 27.1 PG — SIGNIFICANT CHANGE UP (ref 27–34)
MCHC RBC-ENTMCNC: 33.6 GM/DL — SIGNIFICANT CHANGE UP (ref 32–36)
MCV RBC AUTO: 80.6 FL — SIGNIFICANT CHANGE UP (ref 80–100)
MONOCYTES # BLD AUTO: 0.46 K/UL — SIGNIFICANT CHANGE UP (ref 0–0.9)
MONOCYTES NFR BLD AUTO: 6.5 % — SIGNIFICANT CHANGE UP (ref 2–14)
NEUTROPHILS # BLD AUTO: 3.89 K/UL — SIGNIFICANT CHANGE UP (ref 1.8–7.4)
NEUTROPHILS NFR BLD AUTO: 54.9 % — SIGNIFICANT CHANGE UP (ref 43–77)
NRBC # BLD: 0 /100 WBCS — SIGNIFICANT CHANGE UP (ref 0–0)
NRBC # FLD: 0 K/UL — SIGNIFICANT CHANGE UP (ref 0–0)
PLATELET # BLD AUTO: 304 K/UL — SIGNIFICANT CHANGE UP (ref 150–400)
RBC # BLD: 4.32 M/UL — SIGNIFICANT CHANGE UP (ref 3.8–5.2)
RBC # FLD: 13 % — SIGNIFICANT CHANGE UP (ref 10.3–14.5)
TROPONIN T, HIGH SENSITIVITY RESULT: 8 NG/L — SIGNIFICANT CHANGE UP
WBC # BLD: 7.08 K/UL — SIGNIFICANT CHANGE UP (ref 3.8–10.5)
WBC # FLD AUTO: 7.08 K/UL — SIGNIFICANT CHANGE UP (ref 3.8–10.5)

## 2023-10-04 PROCEDURE — 99232 SBSQ HOSP IP/OBS MODERATE 35: CPT | Mod: GC

## 2023-10-04 RX ADMIN — Medication 4 MILLIGRAM(S): at 08:30

## 2023-10-04 RX ADMIN — Medication 1: at 08:29

## 2023-10-04 RX ADMIN — LINAGLIPTIN 5 MILLIGRAM(S): 5 TABLET, FILM COATED ORAL at 08:30

## 2023-10-04 RX ADMIN — CLOZAPINE 125 MILLIGRAM(S): 150 TABLET, ORALLY DISINTEGRATING ORAL at 20:26

## 2023-10-04 RX ADMIN — Medication 2: at 17:28

## 2023-10-04 RX ADMIN — ATORVASTATIN CALCIUM 20 MILLIGRAM(S): 80 TABLET, FILM COATED ORAL at 20:26

## 2023-10-04 RX ADMIN — Medication 1: at 12:47

## 2023-10-04 RX ADMIN — Medication 0: at 20:42

## 2023-10-04 RX ADMIN — METFORMIN HYDROCHLORIDE 500 MILLIGRAM(S): 850 TABLET ORAL at 20:26

## 2023-10-04 NOTE — BH INPATIENT PSYCHIATRY PROGRESS NOTE - CURRENT MEDICATION
MEDICATIONS  (STANDING):  atorvastatin 20 milliGRAM(s) Oral at bedtime  cloZAPine 125 milliGRAM(s) Oral at bedtime  glimepiride. 4 milliGRAM(s) Oral with breakfast  insulin lispro (ADMELOG) corrective regimen sliding scale   SubCutaneous three times a day before meals  insulin lispro (ADMELOG) corrective regimen sliding scale   SubCutaneous at bedtime  linagliptin 5 milliGRAM(s) Oral daily  metFORMIN 500 milliGRAM(s) Oral at bedtime  senna 2 Tablet(s) Oral at bedtime    MEDICATIONS  (PRN):  albuterol    90 MICROgram(s) HFA Inhaler 2 Puff(s) Inhalation every 6 hours PRN asthma  aluminum hydroxide/magnesium hydroxide/simethicone Suspension 30 milliLiter(s) Oral every 8 hours PRN Dyspepsia  dextrose Oral Gel 15 Gram(s) Oral once PRN Blood Glucose LESS THAN 70 milliGRAM(s)/deciliter  haloperidol     Tablet 5 milliGRAM(s) Oral every 6 hours PRN agitation  haloperidol    Injectable 5 milliGRAM(s) IntraMuscular once PRN combative behavior  LORazepam     Tablet 2 milliGRAM(s) Oral every 6 hours PRN Agitation  LORazepam   Injectable 2 milliGRAM(s) IntraMuscular once PRN combative behavior  polyethylene glycol 3350 17 Gram(s) Oral daily PRN Constipation   MEDICATIONS  (STANDING):  atorvastatin 20 milliGRAM(s) Oral at bedtime  cloZAPine 125 milliGRAM(s) Oral at bedtime  glimepiride. 4 milliGRAM(s) Oral with breakfast  insulin lispro (ADMELOG) corrective regimen sliding scale   SubCutaneous at bedtime  insulin lispro (ADMELOG) corrective regimen sliding scale   SubCutaneous three times a day before meals  linagliptin 5 milliGRAM(s) Oral daily  metFORMIN 500 milliGRAM(s) Oral at bedtime  senna 2 Tablet(s) Oral at bedtime    MEDICATIONS  (PRN):  albuterol    90 MICROgram(s) HFA Inhaler 2 Puff(s) Inhalation every 6 hours PRN asthma  aluminum hydroxide/magnesium hydroxide/simethicone Suspension 30 milliLiter(s) Oral every 8 hours PRN Dyspepsia  dextrose Oral Gel 15 Gram(s) Oral once PRN Blood Glucose LESS THAN 70 milliGRAM(s)/deciliter  haloperidol     Tablet 5 milliGRAM(s) Oral every 6 hours PRN agitation  haloperidol    Injectable 5 milliGRAM(s) IntraMuscular once PRN combative behavior  LORazepam     Tablet 2 milliGRAM(s) Oral every 6 hours PRN Agitation  LORazepam   Injectable 2 milliGRAM(s) IntraMuscular once PRN combative behavior  polyethylene glycol 3350 17 Gram(s) Oral daily PRN Constipation

## 2023-10-04 NOTE — BH INPATIENT PSYCHIATRY PROGRESS NOTE - PRN MEDS
MEDICATIONS  (PRN):  albuterol    90 MICROgram(s) HFA Inhaler 2 Puff(s) Inhalation every 6 hours PRN asthma  aluminum hydroxide/magnesium hydroxide/simethicone Suspension 30 milliLiter(s) Oral every 8 hours PRN Dyspepsia  dextrose Oral Gel 15 Gram(s) Oral once PRN Blood Glucose LESS THAN 70 milliGRAM(s)/deciliter  haloperidol     Tablet 5 milliGRAM(s) Oral every 6 hours PRN agitation  haloperidol    Injectable 5 milliGRAM(s) IntraMuscular once PRN combative behavior  LORazepam     Tablet 2 milliGRAM(s) Oral every 6 hours PRN Agitation  LORazepam   Injectable 2 milliGRAM(s) IntraMuscular once PRN combative behavior  polyethylene glycol 3350 17 Gram(s) Oral daily PRN Constipation

## 2023-10-04 NOTE — BH INPATIENT PSYCHIATRY PROGRESS NOTE - NSBHASSESSSUMMFT_PSY_ALL_CORE
59 y/o female, single, noncaregiver, disabled, lives with [facility], history of Schizophrenia vs Schizoaffectove d/o, multiple past admissions at [facility] (most recent 2020 for decompensated psychosis), and also was hospitalized medically for lactic acidosis and followed by CL team in July 2023, followed by psychiatrist Dr. Love at [facility] (pt has no h/o developmental disability), prescribed Clozaril 100mg qhs , no h/o self-injurious behavior or suicide attempts, no h/o violence or legal issues, PMH Type II DM, HLD, asthma, no h/o substance abuse, BIBEMS activated by zara (Rahat) for disorganization.  The patient is grossly disorganized in thoughts on admission.    9/29: The patient continues to be disorganized, but is taking medications.  Will continue clozapine.  9/30: Psychotic disorganized cont meds encourage cooperation with proper vital signs  10/1: disorganized, but more verbal, cooperative, no SI/HI.   10/2: The patient is a little more organized, but still internally preoccupied.  Sedated from clozapine.  Will continue current dose, consider increase if psychosis continues.  10/3: Patient remains disorganized but behaviorally controlled on the unit. Records show that patient was previously well maintained on Clozaril 300mg. Will titrate Clozaril today and monitor for efficacy and side effects.  10/4:     Plan:  No 1:1 needed, as the patient is calm, no SI.  -Increase clozapine to 125mg hs  	Check CBC Wednesdays  -Continue Senna and Miralax prn for constipation  -Continue metformin, glimepiride and Linagliptin for DM  	Check fingersticks qid  -Lipitor for cholesterol  -Albuterol prn for asthma  -Patient provided verbal consent to speak with Zara Giang and patient's outpatient provider. 57 y/o female, single, noncaregiver, disabled, lives with [facility], history of Schizophrenia vs Schizoaffectove d/o, multiple past admissions at [facility] (most recent 2020 for decompensated psychosis), and also was hospitalized medically for lactic acidosis and followed by CL team in July 2023, followed by psychiatrist Dr. Love at [facility] (pt has no h/o developmental disability), prescribed Clozaril 100mg qhs , no h/o self-injurious behavior or suicide attempts, no h/o violence or legal issues, PMH Type II DM, HLD, asthma, no h/o substance abuse, BIBEMS activated by zara (Rahat) for disorganization.  The patient is grossly disorganized in thoughts on admission.    9/29: The patient continues to be disorganized, but is taking medications.  Will continue clozapine.  9/30: Psychotic disorganized cont meds encourage cooperation with proper vital signs  10/1: disorganized, but more verbal, cooperative, no SI/HI.   10/2: The patient is a little more organized, but still internally preoccupied.  Sedated from clozapine.  Will continue current dose, consider increase if psychosis continues.  10/3: Patient remains disorganized but behaviorally controlled on the unit. Records show that patient was previously well maintained on Clozaril 350mg. Will titrate Clozaril today and monitor for efficacy and side effects.  10/4: Patient shows slight improvement with organization but continues to be grossly disorganized in thought process overall. Will continue current dose of clozapine with plans to slowly titrate while monitoring for efficacy and side effects.    Plan:  No 1:1 needed, as the patient is calm, no SI.  -Continue clozapine to 125mg hs  	Check CBC Wednesdays  -Continue Senna and Miralax prn for constipation  -Continue metformin, glimepiride and Linagliptin for DM  	Check fingersticks qid  -Lipitor for cholesterol  -Albuterol prn for asthma  -Patient provided verbal consent to speak with Zara Giang and patient's outpatient provider. 59 y/o female, single, noncaregiver, disabled, lives with [facility] [place], history of Schizophrenia vs Schizoaffectove d/o, multiple past admissions at [facility] (most recent 2020 for decompensated psychosis), and also was hospitalized medically for lactic acidosis and followed by CL team in July 2023, followed by psychiatrist Dr. Love at [facility] (pt has no h/o developmental disability), prescribed Clozaril 100mg qhs , no h/o self-injurious behavior or suicide attempts, no h/o violence or legal issues, PMH Type II DM, HLD, asthma, no h/o substance abuse, BIBEMS activated by zara (Rahat) for disorganization.  The patient is grossly disorganized in thoughts on admission.    9/29: The patient continues to be disorganized, but is taking medications.  Will continue clozapine.  9/30: Psychotic disorganized cont meds encourage cooperation with proper vital signs  10/1: disorganized, but more verbal, cooperative, no SI/HI.   10/2: The patient is a little more organized, but still internally preoccupied.  Sedated from clozapine.  Will continue current dose, consider increase if psychosis continues.  10/3: Patient remains disorganized but behaviorally controlled on the unit. Records show that patient was previously well maintained on Clozaril 350mg. Will titrate Clozaril today and monitor for efficacy and side effects.  10/4: Patient shows slight improvement with organization but continues to be disorganized in thought process. Will continue current dose of clozapine with plans to slowly titrate while monitoring for efficacy and side effects.    Plan:  No 1:1 needed, as the patient is calm, no SI.  -Continue clozapine to 125mg hs  	Check CBC Wednesdays  -Continue Senna and Miralax prn for constipation  -Continue metformin, glimepiride and Linagliptin for DM  	Check fingersticks qid  -Lipitor for cholesterol  -Albuterol prn for asthma  -Patient provided verbal consent to speak with Zara Giang and patient's outpatient provider.

## 2023-10-04 NOTE — BH INPATIENT PSYCHIATRY PROGRESS NOTE - NSBHFUPINTERVALHXFT_PSY_A_CORE
Patient seen for follow up of psychosis, chart reviewed, and case discussed with interdisciplinary team. No acute events reported overnight, no PRN needed. On encounter, patient was seen sitting in the common area and appears less drowsy than previous encounter. She initially displayed improvement in thought organization, reporting that her mood is "good" and stating that she was able to sleep better with the titration of clozapine and was able to have a bowel movement this morning. However on further interview, she continues to be overall disorganized and continues to say that she is "schizophrenic" and communicates with her "twin". She continues to deny auditory or visual hallucinations at this time.  Patient seen for follow up of psychosis, chart reviewed, and case discussed with interdisciplinary team. No acute events reported overnight, no PRN needed. On encounter, patient was seen sitting in the common area and appears less drowsy than previous encounter. She initially displayed improvement in thought organization, reporting that her mood is "good" and stating that she was able to sleep better with the titration of clozapine and was able to have a bowel movement this morning. However on further interview, she continues to be overall disorganized and continues to say that she is "schizophrenic" and communicates with her "twin". She continues to deny auditory or visual hallucinations at this time.    Patient seen for follow up of psychosis, chart reviewed, and case discussed with interdisciplinary team. No acute events reported overnight, no PRN needed. On encounter, patient was seen sitting in the common area and appears less drowsy than previous encounter. She initially displayed improvement in thought organization, reporting that her mood is "good" and stating that she was able to sleep better with the titration of clozapine and was able to have a bowel movement this morning. However on further interview, she becomes more disorganized again, continues to say that she is "schizophrenic" and communicates with her "twin".  She continues to deny auditory or visual hallucinations at this time.

## 2023-10-04 NOTE — BH INPATIENT PSYCHIATRY PROGRESS NOTE - NSBHCHARTREVIEWVS_PSY_A_CORE FT
Vital Signs Last 24 Hrs  T(C): 36.4 (10-04-23 @ 06:01), Max: 36.8 (10-03-23 @ 20:41)  T(F): 97.5 (10-04-23 @ 06:01), Max: 98.2 (10-03-23 @ 20:41)  HR: --  BP: --  BP(mean): --  RR: 16 (10-04-23 @ 06:01) (16 - 16)  SpO2: 99% (10-04-23 @ 06:01) (99% - 99%)    Orthostatic VS  10-04-23 @ 06:01  Lying BP: 120/72 HR: 80  Sitting BP: --/-- HR: --  Standing BP: --/-- HR: --  Site: --  Mode: --  Orthostatic VS  10-03-23 @ 20:41  Lying BP: --/-- HR: --  Sitting BP: 114/61 HR: 88  Standing BP: 102/62 HR: 75  Site: --  Mode: --  Orthostatic VS  10-03-23 @ 07:59  Lying BP: 106/66 HR: 75  Sitting BP: 100/60 HR: 84  Standing BP: --/-- HR: --  Site: --  Mode: --  Orthostatic VS  10-02-23 @ 20:40  Lying BP: --/-- HR: --  Sitting BP: 139/61 HR: 92  Standing BP: 128/69 HR: 100  Site: --  Mode: --

## 2023-10-04 NOTE — BH INPATIENT PSYCHIATRY PROGRESS NOTE - MSE UNSTRUCTURED FT
The patient appears stated age, with fair hygiene, and is dressed appropriately with towel turban over hair, lying in bed.  She was calm, generally cooperative and able to engage with the interview.  She maintained limited eye contact.  No agitation, psychomotor retardation or involuntary movements observed.  The patient’s speech is fluent, normal in tone, rate, and volume.  The patient’s mood is “okay”.  Her affect is constricted, stable, appropriate, congruent to mood. The patient’s thought process remains disorganized.  Patient holds delusions that she is communicating with her twin. She denies auditory or visual hallucinations, but seen responding to internal stimuli at times.  She does not express any suicidal or homicidal ideation, intent, or plan.  Insight is limited.  Judgment is impaired.  Impulse control has been fair on the unit. The patient appears stated age, with improved hygiene and is dressed appropriately seen sitting in the common room.  She was calm, cooperative and engaging.  She maintained poor eye contact.  No agitation, psychomotor retardation or involuntary movements observed.  The patient’s speech is fluent, normal in tone, rate, and decreased volume.  The patient’s mood is “better”.  Her affect is constricted, stable, appropriate, congruent to mood. The patient’s thought process demonstrates improved organization but overall remains disorganized.  Patient continues to hold delusions that she is communicating with her twin. She denies auditory or visual hallucinations, but seen responding to internal stimuli at times.  She does not express any suicidal or homicidal ideation, intent, or plan.  Insight is limited.  Judgment is impaired.  Impulse control has been good on the unit.

## 2023-10-04 NOTE — BH INPATIENT PSYCHIATRY PROGRESS NOTE - NSBHMETABOLIC_PSY_ALL_CORE_FT
BMI: BMI (kg/m2): 21.8 (09-28-23 @ 02:45)  HbA1c: A1C with Estimated Average Glucose Result: 7.2 % (09-27-23 @ 19:00)    Glucose: POCT Blood Glucose.: 162 mg/dL (10-04-23 @ 08:09)    BP: 113/61 (10-02-23 @ 07:44) (113/61 - 113/61)  Lipid Panel: Date/Time: 07-07-23 @ 05:52  Cholesterol, Serum: 153  Direct LDL: --  HDL Cholesterol, Serum: 28  Total Cholesterol/HDL Ration Measurement: --  Triglycerides, Serum: 273   BMI: BMI (kg/m2): 21.8 (09-28-23 @ 02:45)  HbA1c: A1C with Estimated Average Glucose Result: 7.2 % (09-27-23 @ 19:00)    Glucose: POCT Blood Glucose.: 179 mg/dL (10-04-23 @ 12:01)    BP: 113/61 (10-02-23 @ 07:44) (113/61 - 113/61)  Lipid Panel: Date/Time: 07-07-23 @ 05:52  Cholesterol, Serum: 153  Direct LDL: --  HDL Cholesterol, Serum: 28  Total Cholesterol/HDL Ration Measurement: --  Triglycerides, Serum: 273

## 2023-10-05 LAB
GLUCOSE BLDC GLUCOMTR-MCNC: 145 MG/DL — HIGH (ref 70–99)
GLUCOSE BLDC GLUCOMTR-MCNC: 162 MG/DL — HIGH (ref 70–99)
GLUCOSE BLDC GLUCOMTR-MCNC: 219 MG/DL — HIGH (ref 70–99)

## 2023-10-05 PROCEDURE — 99232 SBSQ HOSP IP/OBS MODERATE 35: CPT | Mod: GC

## 2023-10-05 RX ORDER — CLOZAPINE 150 MG/1
150 TABLET, ORALLY DISINTEGRATING ORAL AT BEDTIME
Refills: 0 | Status: DISCONTINUED | OUTPATIENT
Start: 2023-10-05 | End: 2023-10-12

## 2023-10-05 RX ADMIN — CLOZAPINE 150 MILLIGRAM(S): 150 TABLET, ORALLY DISINTEGRATING ORAL at 20:33

## 2023-10-05 RX ADMIN — METFORMIN HYDROCHLORIDE 500 MILLIGRAM(S): 850 TABLET ORAL at 20:33

## 2023-10-05 RX ADMIN — LINAGLIPTIN 5 MILLIGRAM(S): 5 TABLET, FILM COATED ORAL at 09:29

## 2023-10-05 RX ADMIN — SENNA PLUS 2 TABLET(S): 8.6 TABLET ORAL at 20:33

## 2023-10-05 RX ADMIN — Medication 4 MILLIGRAM(S): at 09:28

## 2023-10-05 RX ADMIN — ATORVASTATIN CALCIUM 20 MILLIGRAM(S): 80 TABLET, FILM COATED ORAL at 20:33

## 2023-10-05 NOTE — BH INPATIENT PSYCHIATRY PROGRESS NOTE - NSBHASSESSSUMMFT_PSY_ALL_CORE
57 y/o female, single, noncaregiver, disabled, lives with [facility], history of Schizophrenia vs Schizoaffectove d/o, multiple past admissions at [facility] (most recent 2020 for decompensated psychosis), and also was hospitalized medically for lactic acidosis and followed by CL team in July 2023, followed by psychiatrist Dr. Love at [facility] (pt has no h/o developmental disability), prescribed Clozaril 100mg qhs , no h/o self-injurious behavior or suicide attempts, no h/o violence or legal issues, PMH Type II DM, HLD, asthma, no h/o substance abuse, BIBEMS activated by zara Nevarez) for disorganization.  The patient is grossly disorganized in thoughts on admission.    9/29: The patient continues to be disorganized, but is taking medications.  Will continue clozapine.  9/30: Psychotic disorganized cont meds encourage cooperation with proper vital signs  10/1: disorganized, but more verbal, cooperative, no SI/HI.   10/2: The patient is a little more organized, but still internally preoccupied.  Sedated from clozapine.  Will continue current dose, consider increase if psychosis continues.  10/3: Patient remains disorganized but behaviorally controlled on the unit. Records show that patient was previously well maintained on Clozaril 350mg. Will titrate Clozaril today and monitor for efficacy and side effects.  10/4: Patient shows slight improvement with organization but continues to be disorganized in thought process. Will continue current dose of clozapine with plans to slowly titrate while monitoring for efficacy and side effects.  10/5: Patient was irritable today and less cooperative than previous but continues to be disorganized during the day. Plan to continue titrating clozapine while monitoring for efficacy and side effects.    Plan:  No 1:1 needed, as the patient is calm, no SI.  -Increase clozapine to 150mg hs  	Check CBC Wednesdays  -Continue Senna and Miralax prn for constipation  -Continue metformin, glimepiride and Linagliptin for DM  	Check fingersticks qid  -Lipitor for cholesterol  -Albuterol prn for asthma  -Patient provided verbal consent to speak with Zara Giang and patient's outpatient provider.

## 2023-10-05 NOTE — BH INPATIENT PSYCHIATRY PROGRESS NOTE - NSBHCHARTREVIEWVS_PSY_A_CORE FT
Vital Signs Last 24 Hrs  T(C): 36.5 (10-05-23 @ 08:00), Max: 36.7 (10-04-23 @ 20:07)  T(F): 97.7 (10-05-23 @ 08:00), Max: 98.1 (10-04-23 @ 20:07)  HR: --  BP: --  BP(mean): --  RR: 16 (10-05-23 @ 08:00) (16 - 18)  SpO2: 100% (10-05-23 @ 08:00) (96% - 100%)    Orthostatic VS  10-05-23 @ 08:00  Lying BP: 106/52 HR: 74  Sitting BP: 114/62 HR: 80  Standing BP: --/-- HR: --  Site: --  Mode: --  Orthostatic VS  10-04-23 @ 20:07  Lying BP: --/-- HR: --  Sitting BP: 114/64 HR: 87  Standing BP: 109/65 HR: 93  Site: --  Mode: --  Orthostatic VS  10-04-23 @ 06:01  Lying BP: 120/72 HR: 80  Sitting BP: --/-- HR: --  Standing BP: --/-- HR: --  Site: --  Mode: --  Orthostatic VS  10-03-23 @ 20:41  Lying BP: --/-- HR: --  Sitting BP: 114/61 HR: 88  Standing BP: 102/62 HR: 75  Site: --  Mode: --

## 2023-10-05 NOTE — BH INPATIENT PSYCHIATRY PROGRESS NOTE - NSBHFUPINTERVALHXFT_PSY_A_CORE
Patient seen for follow up of psychosis, chart reviewed, and case discussed with interdisciplinary team. No acute events reported overnight, no PRN needed. Per staff, patient has had a recent bowel movement and demonstrates improvement in ADLs, having washed her hair but remains malodorous. On encounter, patient was seen laying in bed, minimally cooperative with interview. She reported that "it's still nighttime where she's from in [place]" and that she wished to continue sleeping. She denies any chest pain or other physical complaints at this time. Patient remained difficulty to engage when approached for interview later in the morning. Thus, psychiatric review of symptoms limited at this time.

## 2023-10-05 NOTE — BH INPATIENT PSYCHIATRY PROGRESS NOTE - MSE UNSTRUCTURED FT
The patient appears stated age, with fair hygiene and is dressed appropriately seen laying in bed in her room.  She was calm but irritable at times and minimally cooperative with interview.  She maintained no eye contact.  No agitation, psychomotor retardation or involuntary movements observed.  The patient’s speech is fluent, normal in tone, rate, and decreased volume.  The patient’s mood is “tired."  Her affect is constricted, stable, appropriate, congruent to mood. The patient’s thought process demonstrates improved organization but overall remains disorganized.  No delusions elicited. She denies auditory or visual hallucinations, but seen responding to internal stimuli at times.  She does not express any suicidal or homicidal ideation, intent, or plan.  Insight is limited.  Judgment is impaired.  Impulse control has been good on the unit.

## 2023-10-05 NOTE — BH INPATIENT PSYCHIATRY PROGRESS NOTE - CURRENT MEDICATION
MEDICATIONS  (STANDING):  atorvastatin 20 milliGRAM(s) Oral at bedtime  cloZAPine 125 milliGRAM(s) Oral at bedtime  glimepiride. 4 milliGRAM(s) Oral with breakfast  insulin lispro (ADMELOG) corrective regimen sliding scale   SubCutaneous three times a day before meals  insulin lispro (ADMELOG) corrective regimen sliding scale   SubCutaneous at bedtime  linagliptin 5 milliGRAM(s) Oral daily  metFORMIN 500 milliGRAM(s) Oral at bedtime  senna 2 Tablet(s) Oral at bedtime    MEDICATIONS  (PRN):  albuterol    90 MICROgram(s) HFA Inhaler 2 Puff(s) Inhalation every 6 hours PRN asthma  aluminum hydroxide/magnesium hydroxide/simethicone Suspension 30 milliLiter(s) Oral every 8 hours PRN Dyspepsia  dextrose Oral Gel 15 Gram(s) Oral once PRN Blood Glucose LESS THAN 70 milliGRAM(s)/deciliter  haloperidol     Tablet 5 milliGRAM(s) Oral every 6 hours PRN agitation  haloperidol    Injectable 5 milliGRAM(s) IntraMuscular once PRN combative behavior  LORazepam     Tablet 2 milliGRAM(s) Oral every 6 hours PRN Agitation  LORazepam   Injectable 2 milliGRAM(s) IntraMuscular once PRN combative behavior  polyethylene glycol 3350 17 Gram(s) Oral daily PRN Constipation   MEDICATIONS  (STANDING):  atorvastatin 20 milliGRAM(s) Oral at bedtime  cloZAPine 150 milliGRAM(s) Oral at bedtime  glimepiride. 4 milliGRAM(s) Oral with breakfast  insulin lispro (ADMELOG) corrective regimen sliding scale   SubCutaneous three times a day before meals  insulin lispro (ADMELOG) corrective regimen sliding scale   SubCutaneous at bedtime  linagliptin 5 milliGRAM(s) Oral daily  metFORMIN 500 milliGRAM(s) Oral at bedtime  senna 2 Tablet(s) Oral at bedtime    MEDICATIONS  (PRN):  albuterol    90 MICROgram(s) HFA Inhaler 2 Puff(s) Inhalation every 6 hours PRN asthma  aluminum hydroxide/magnesium hydroxide/simethicone Suspension 30 milliLiter(s) Oral every 8 hours PRN Dyspepsia  dextrose Oral Gel 15 Gram(s) Oral once PRN Blood Glucose LESS THAN 70 milliGRAM(s)/deciliter  haloperidol     Tablet 5 milliGRAM(s) Oral every 6 hours PRN agitation  haloperidol    Injectable 5 milliGRAM(s) IntraMuscular once PRN combative behavior  LORazepam     Tablet 2 milliGRAM(s) Oral every 6 hours PRN Agitation  LORazepam   Injectable 2 milliGRAM(s) IntraMuscular once PRN combative behavior  polyethylene glycol 3350 17 Gram(s) Oral daily PRN Constipation

## 2023-10-05 NOTE — BH INPATIENT PSYCHIATRY PROGRESS NOTE - NSBHMETABOLIC_PSY_ALL_CORE_FT
BMI: BMI (kg/m2): 21.8 (09-28-23 @ 02:45)  HbA1c: A1C with Estimated Average Glucose Result: 7.2 % (09-27-23 @ 19:00)    Glucose: POCT Blood Glucose.: 145 mg/dL (10-05-23 @ 08:33)    BP: --  Lipid Panel: Date/Time: 07-07-23 @ 05:52  Cholesterol, Serum: 153  Direct LDL: --  HDL Cholesterol, Serum: 28  Total Cholesterol/HDL Ration Measurement: --  Triglycerides, Serum: 273

## 2023-10-06 LAB
GLUCOSE BLDC GLUCOMTR-MCNC: 165 MG/DL — HIGH (ref 70–99)
GLUCOSE BLDC GLUCOMTR-MCNC: 179 MG/DL — HIGH (ref 70–99)
GLUCOSE BLDC GLUCOMTR-MCNC: 189 MG/DL — HIGH (ref 70–99)
GLUCOSE BLDC GLUCOMTR-MCNC: 226 MG/DL — HIGH (ref 70–99)

## 2023-10-06 PROCEDURE — 99232 SBSQ HOSP IP/OBS MODERATE 35: CPT | Mod: GC

## 2023-10-06 RX ADMIN — CLOZAPINE 150 MILLIGRAM(S): 150 TABLET, ORALLY DISINTEGRATING ORAL at 20:14

## 2023-10-06 RX ADMIN — ATORVASTATIN CALCIUM 20 MILLIGRAM(S): 80 TABLET, FILM COATED ORAL at 20:15

## 2023-10-06 RX ADMIN — METFORMIN HYDROCHLORIDE 500 MILLIGRAM(S): 850 TABLET ORAL at 20:14

## 2023-10-06 RX ADMIN — SENNA PLUS 2 TABLET(S): 8.6 TABLET ORAL at 20:15

## 2023-10-06 NOTE — BH INPATIENT PSYCHIATRY PROGRESS NOTE - NSBHMETABOLIC_PSY_ALL_CORE_FT
BMI: BMI (kg/m2): 21.8 (09-28-23 @ 02:45)  HbA1c: A1C with Estimated Average Glucose Result: 7.2 % (09-27-23 @ 19:00)    Glucose: POCT Blood Glucose.: 165 mg/dL (10-06-23 @ 08:05)    BP: --  Lipid Panel: Date/Time: 07-07-23 @ 05:52  Cholesterol, Serum: 153  Direct LDL: --  HDL Cholesterol, Serum: 28  Total Cholesterol/HDL Ration Measurement: --  Triglycerides, Serum: 273   BMI: BMI (kg/m2): 21.8 (09-28-23 @ 02:45)  HbA1c: A1C with Estimated Average Glucose Result: 7.2 % (09-27-23 @ 19:00)    Glucose: POCT Blood Glucose.: 179 mg/dL (10-06-23 @ 16:27)    BP: 110/60 (10-06-23 @ 12:10) (110/60 - 110/60)  Lipid Panel: Date/Time: 07-07-23 @ 05:52  Cholesterol, Serum: 153  Direct LDL: --  HDL Cholesterol, Serum: 28  Total Cholesterol/HDL Ration Measurement: --  Triglycerides, Serum: 273

## 2023-10-06 NOTE — BH INPATIENT PSYCHIATRY PROGRESS NOTE - CURRENT MEDICATION
MEDICATIONS  (STANDING):  atorvastatin 20 milliGRAM(s) Oral at bedtime  cloZAPine 150 milliGRAM(s) Oral at bedtime  glimepiride. 4 milliGRAM(s) Oral with breakfast  insulin lispro (ADMELOG) corrective regimen sliding scale   SubCutaneous at bedtime  insulin lispro (ADMELOG) corrective regimen sliding scale   SubCutaneous three times a day before meals  linagliptin 5 milliGRAM(s) Oral daily  metFORMIN 500 milliGRAM(s) Oral at bedtime  senna 2 Tablet(s) Oral at bedtime    MEDICATIONS  (PRN):  albuterol    90 MICROgram(s) HFA Inhaler 2 Puff(s) Inhalation every 6 hours PRN asthma  aluminum hydroxide/magnesium hydroxide/simethicone Suspension 30 milliLiter(s) Oral every 8 hours PRN Dyspepsia  dextrose Oral Gel 15 Gram(s) Oral once PRN Blood Glucose LESS THAN 70 milliGRAM(s)/deciliter  haloperidol     Tablet 5 milliGRAM(s) Oral every 6 hours PRN agitation  haloperidol    Injectable 5 milliGRAM(s) IntraMuscular once PRN combative behavior  LORazepam     Tablet 2 milliGRAM(s) Oral every 6 hours PRN Agitation  LORazepam   Injectable 2 milliGRAM(s) IntraMuscular once PRN combative behavior  polyethylene glycol 3350 17 Gram(s) Oral daily PRN Constipation   MEDICATIONS  (STANDING):  atorvastatin 20 milliGRAM(s) Oral at bedtime  cloZAPine 150 milliGRAM(s) Oral at bedtime  glimepiride. 4 milliGRAM(s) Oral with breakfast  insulin lispro (ADMELOG) corrective regimen sliding scale   SubCutaneous three times a day before meals  insulin lispro (ADMELOG) corrective regimen sliding scale   SubCutaneous at bedtime  linagliptin 5 milliGRAM(s) Oral daily  metFORMIN 500 milliGRAM(s) Oral at bedtime  senna 2 Tablet(s) Oral at bedtime    MEDICATIONS  (PRN):  albuterol    90 MICROgram(s) HFA Inhaler 2 Puff(s) Inhalation every 6 hours PRN asthma  aluminum hydroxide/magnesium hydroxide/simethicone Suspension 30 milliLiter(s) Oral every 8 hours PRN Dyspepsia  dextrose Oral Gel 15 Gram(s) Oral once PRN Blood Glucose LESS THAN 70 milliGRAM(s)/deciliter  haloperidol     Tablet 5 milliGRAM(s) Oral every 6 hours PRN agitation  haloperidol    Injectable 5 milliGRAM(s) IntraMuscular once PRN combative behavior  LORazepam     Tablet 2 milliGRAM(s) Oral every 6 hours PRN Agitation  LORazepam   Injectable 2 milliGRAM(s) IntraMuscular once PRN combative behavior  polyethylene glycol 3350 17 Gram(s) Oral daily PRN Constipation

## 2023-10-06 NOTE — BH INPATIENT PSYCHIATRY PROGRESS NOTE - NSBHCHARTREVIEWVS_PSY_A_CORE FT
Vital Signs Last 24 Hrs  T(C): 36.9 (10-06-23 @ 05:20), Max: 37.4 (10-05-23 @ 19:18)  T(F): 98.4 (10-06-23 @ 05:20), Max: 99.3 (10-05-23 @ 19:18)  HR: --  BP: --  BP(mean): --  RR: 16 (10-05-23 @ 19:18) (16 - 16)  SpO2: 100% (10-06-23 @ 05:20) (97% - 100%)    Orthostatic VS  10-06-23 @ 05:20  Lying BP: --/-- HR: --  Sitting BP: 99/79 HR: 86  Standing BP: 102/58 HR: 91  Site: upper left arm  Mode: electronic  Orthostatic VS  10-05-23 @ 19:18  Lying BP: --/-- HR: --  Sitting BP: 115/71 HR: 90  Standing BP: 116/64 HR: 96  Site: --  Mode: --  Orthostatic VS  10-05-23 @ 08:00  Lying BP: 106/52 HR: 74  Sitting BP: 114/62 HR: 80  Standing BP: --/-- HR: --  Site: --  Mode: --  Orthostatic VS  10-04-23 @ 20:07  Lying BP: --/-- HR: --  Sitting BP: 114/64 HR: 87  Standing BP: 109/65 HR: 93  Site: --  Mode: --   Vital Signs Last 24 Hrs  T(C): 36.9 (10-06-23 @ 05:20), Max: 37.4 (10-05-23 @ 19:18)  T(F): 98.4 (10-06-23 @ 05:20), Max: 99.3 (10-05-23 @ 19:18)  HR: 85 (10-06-23 @ 12:10) (85 - 85)  BP: 110/60 (10-06-23 @ 12:10) (110/60 - 110/60)  BP(mean): --  RR: 16 (10-05-23 @ 19:18) (16 - 16)  SpO2: 100% (10-06-23 @ 05:20) (97% - 100%)    Orthostatic VS  10-06-23 @ 05:20  Lying BP: --/-- HR: --  Sitting BP: 99/79 HR: 86  Standing BP: 102/58 HR: 91  Site: upper left arm  Mode: electronic  Orthostatic VS  10-05-23 @ 19:18  Lying BP: --/-- HR: --  Sitting BP: 115/71 HR: 90  Standing BP: 116/64 HR: 96  Site: --  Mode: --  Orthostatic VS  10-05-23 @ 08:00  Lying BP: 106/52 HR: 74  Sitting BP: 114/62 HR: 80  Standing BP: --/-- HR: --  Site: --  Mode: --  Orthostatic VS  10-04-23 @ 20:07  Lying BP: --/-- HR: --  Sitting BP: 114/64 HR: 87  Standing BP: 109/65 HR: 93  Site: --  Mode: --

## 2023-10-06 NOTE — BH INPATIENT PSYCHIATRY PROGRESS NOTE - NSBHASSESSSUMMFT_PSY_ALL_CORE
57 y/o female, single, noncaregiver, disabled, lives with [facility], history of Schizophrenia vs Schizoaffectove d/o, multiple past admissions at [facility] (most recent 2020 for decompensated psychosis), and also was hospitalized medically for lactic acidosis and followed by CL team in July 2023, followed by psychiatrist Dr. Love at [facility] (pt has no h/o developmental disability), prescribed Clozaril 100mg qhs , no h/o self-injurious behavior or suicide attempts, no h/o violence or legal issues, PMH Type II DM, HLD, asthma, no h/o substance abuse, BIBEMS activated by zara Nevarez) for disorganization.  The patient is grossly disorganized in thoughts on admission.    9/29: The patient continues to be disorganized, but is taking medications.  Will continue clozapine.  9/30: Psychotic disorganized cont meds encourage cooperation with proper vital signs  10/1: disorganized, but more verbal, cooperative, no SI/HI.   10/2: The patient is a little more organized, but still internally preoccupied.  Sedated from clozapine.  Will continue current dose, consider increase if psychosis continues.  10/3: Patient remains disorganized but behaviorally controlled on the unit. Records show that patient was previously well maintained on Clozaril 350mg. Will titrate Clozaril today and monitor for efficacy and side effects.  10/4: Patient shows slight improvement with organization but continues to be disorganized in thought process. Will continue current dose of clozapine with plans to slowly titrate while monitoring for efficacy and side effects.  10/5: Patient was irritable today and less cooperative than previous but continues to be disorganized during the day. Plan to continue titrating clozapine while monitoring for efficacy and side effects.  10/6: Patient shows improvement in mood and thought process and shows improvement in articulating auditory hallucinations. Will continue current dose of clozapine with plans to slowly titrate while monitoring for efficacy and side effects.    Plan:  No 1:1 needed, as the patient is calm, no SI.  -Continue clozapine 150mg hs  	Check CBC Wednesdays  -Continue Senna and Miralax prn for constipation  -Continue metformin, glimepiride and Linagliptin for DM  	Check fingersticks qid  -Lipitor for cholesterol  -Albuterol prn for asthma  -Patient provided verbal consent to speak with Zara Giang and patient's outpatient provider.

## 2023-10-06 NOTE — BH INPATIENT PSYCHIATRY PROGRESS NOTE - MSE UNSTRUCTURED FT
The patient appears stated age, with fair to poor hygiene and is dressed appropriately seen sitting in the common area.  She was calm, cooperative  and engaging.  She maintained appropriate eye contact.  No agitation, psychomotor retardation or involuntary movements observed.  The patient’s speech is fluent, normal in tone, rate, and decreased volume.  The patient’s mood is “well."  Her affect is constricted, stable, appropriate, congruent to mood. The patient’s thought process demonstrates improved organization and logic but overall remains disorganized.  Patient demonstrates paranoid delusions towards staff. She endorses auditory hallucinations and has been seen responding to internal stimuli at times.  She does not express any suicidal or homicidal ideation, intent, or plan.  Insight is limited.  Judgment is impaired.  Impulse control has been good on the unit.

## 2023-10-06 NOTE — BH INPATIENT PSYCHIATRY PROGRESS NOTE - NSBHFUPINTERVALHXFT_PSY_A_CORE
Patient seen for follow up of psychosis, chart reviewed, and case discussed with interdisciplinary team. No acute events reported overnight, no PRN needed. On encounter, patient was calm, cooperative and engaging, seen sitting in the common area. She endorsed feeling "well" this morning and exchanged pleasantries, asking how the provider was doing. She reports wanting to go home because she felt more comfortable living with her foster family and showering while at home, disliking being forced to shower while she is in the unit. She expressed distrust to certain nursing staff, saying that "they're trying to hurt me" and that the team has been giving her "haldol, benadryl" but not clozapine. Patient was counseled that she is receiving clozapine, where she then reported that clozapine had helped her "feel better" in the past. She reports that she is "schizophrenic" because she hears the voices of her two twins in which she is the eldest and that they communicate with her in her head, often speaking with her at night but not during the day. She denies command auditory hallucinations and notes that she tries to dismiss them. She denies any chest pain or constipation reporting that her last bowel movement was yesterday.

## 2023-10-07 LAB
GLUCOSE BLDC GLUCOMTR-MCNC: 157 MG/DL — HIGH (ref 70–99)
GLUCOSE BLDC GLUCOMTR-MCNC: 172 MG/DL — HIGH (ref 70–99)
GLUCOSE BLDC GLUCOMTR-MCNC: 188 MG/DL — HIGH (ref 70–99)
GLUCOSE BLDC GLUCOMTR-MCNC: 226 MG/DL — HIGH (ref 70–99)
SARS-COV-2 RNA SPEC QL NAA+PROBE: SIGNIFICANT CHANGE UP

## 2023-10-07 PROCEDURE — 99231 SBSQ HOSP IP/OBS SF/LOW 25: CPT

## 2023-10-07 RX ADMIN — METFORMIN HYDROCHLORIDE 500 MILLIGRAM(S): 850 TABLET ORAL at 20:13

## 2023-10-07 RX ADMIN — ATORVASTATIN CALCIUM 20 MILLIGRAM(S): 80 TABLET, FILM COATED ORAL at 20:10

## 2023-10-07 RX ADMIN — Medication 0: at 20:13

## 2023-10-07 RX ADMIN — CLOZAPINE 150 MILLIGRAM(S): 150 TABLET, ORALLY DISINTEGRATING ORAL at 20:10

## 2023-10-07 NOTE — BH INPATIENT PSYCHIATRY PROGRESS NOTE - NSBHCHARTREVIEWVS_PSY_A_CORE FT
Vital Signs Last 24 Hrs  T(C): 36.4 (10-06-23 @ 19:39), Max: 36.4 (10-06-23 @ 19:39)  T(F): 97.6 (10-06-23 @ 19:39), Max: 97.6 (10-06-23 @ 19:39)  HR: 85 (10-06-23 @ 12:10) (85 - 85)  BP: 110/60 (10-06-23 @ 12:10) (110/60 - 110/60)  BP(mean): --  RR: 16 (10-07-23 @ 05:45) (16 - 16)  SpO2: 98% (10-07-23 @ 05:45) (97% - 98%)    Orthostatic VS  10-07-23 @ 05:45  Lying BP: 103/57 HR: 71  Sitting BP: --/-- HR: --  Standing BP: --/-- HR: --  Site: --  Mode: --  Orthostatic VS  10-06-23 @ 19:39  Lying BP: --/-- HR: --  Sitting BP: 109/70 HR: 82  Standing BP: 93/66 HR: 88  Site: --  Mode: --  Orthostatic VS  10-06-23 @ 05:20  Lying BP: --/-- HR: --  Sitting BP: 99/79 HR: 86  Standing BP: 102/58 HR: 91  Site: upper left arm  Mode: electronic  Orthostatic VS  10-05-23 @ 19:18  Lying BP: --/-- HR: --  Sitting BP: 115/71 HR: 90  Standing BP: 116/64 HR: 96  Site: --  Mode: --

## 2023-10-07 NOTE — BH INPATIENT PSYCHIATRY PROGRESS NOTE - MSE UNSTRUCTURED FT
Patient is awake and alert. "I don't want to talk to you, come back tomorrow." Difficult to engage in exam. Poor eye contact. Guarded affect. speech fluent. TP is disjointed. "You sound like Dr. Carrizales, are you Dr. Carrizales?" Denies AH at time of exam. Not agitated at time of exam. Poor insight. No suicidal ideations.

## 2023-10-07 NOTE — BH INPATIENT PSYCHIATRY PROGRESS NOTE - CURRENT MEDICATION
MEDICATIONS  (STANDING):  atorvastatin 20 milliGRAM(s) Oral at bedtime  cloZAPine 150 milliGRAM(s) Oral at bedtime  glimepiride. 4 milliGRAM(s) Oral with breakfast  insulin lispro (ADMELOG) corrective regimen sliding scale   SubCutaneous three times a day before meals  insulin lispro (ADMELOG) corrective regimen sliding scale   SubCutaneous at bedtime  linagliptin 5 milliGRAM(s) Oral daily  metFORMIN 500 milliGRAM(s) Oral at bedtime  senna 2 Tablet(s) Oral at bedtime    MEDICATIONS  (PRN):  albuterol    90 MICROgram(s) HFA Inhaler 2 Puff(s) Inhalation every 6 hours PRN asthma  aluminum hydroxide/magnesium hydroxide/simethicone Suspension 30 milliLiter(s) Oral every 8 hours PRN Dyspepsia  dextrose Oral Gel 15 Gram(s) Oral once PRN Blood Glucose LESS THAN 70 milliGRAM(s)/deciliter  haloperidol     Tablet 5 milliGRAM(s) Oral every 6 hours PRN agitation  haloperidol    Injectable 5 milliGRAM(s) IntraMuscular once PRN combative behavior  LORazepam     Tablet 2 milliGRAM(s) Oral every 6 hours PRN Agitation  LORazepam   Injectable 2 milliGRAM(s) IntraMuscular once PRN combative behavior  polyethylene glycol 3350 17 Gram(s) Oral daily PRN Constipation

## 2023-10-07 NOTE — BH INPATIENT PSYCHIATRY PROGRESS NOTE - NSBHASSESSSUMMFT_PSY_ALL_CORE
57 y/o female, single, noncaregiver, disabled, lives with [facility], history of Schizophrenia vs Schizoaffectove d/o, multiple past admissions at [facility] (most recent 2020 for decompensated psychosis), and also was hospitalized medically for lactic acidosis and followed by CL team in July 2023, followed by psychiatrist Dr. Love at [facility] (pt has no h/o developmental disability), prescribed Clozaril 100mg qhs , no h/o self-injurious behavior or suicide attempts, no h/o violence or legal issues, PMH Type II DM, HLD, asthma, no h/o substance abuse, BIBEMS activated by zara (Rahat) for disorganization.  The patient is grossly disorganized in thoughts on admission.    9/29: The patient continues to be disorganized, but is taking medications.  Will continue clozapine.  9/30: Psychotic disorganized cont meds encourage cooperation with proper vital signs  10/1: disorganized, but more verbal, cooperative, no SI/HI.   10/2: The patient is a little more organized, but still internally preoccupied.  Sedated from clozapine.  Will continue current dose, consider increase if psychosis continues.  10/3: Patient remains disorganized but behaviorally controlled on the unit. Records show that patient was previously well maintained on Clozaril 350mg. Will titrate Clozaril today and monitor for efficacy and side effects.  10/4: Patient shows slight improvement with organization but continues to be disorganized in thought process. Will continue current dose of clozapine with plans to slowly titrate while monitoring for efficacy and side effects.  10/5: Patient was irritable today and less cooperative than previous but continues to be disorganized during the day. Plan to continue titrating clozapine while monitoring for efficacy and side effects.  10/6: Patient shows improvement in mood and thought process and shows improvement in articulating auditory hallucinations. Will continue current dose of clozapine with plans to slowly titrate while monitoring for efficacy and side effects.  10/7: Patient remains disorganized, resistive with treatment, no reports of AH today, continue clozapine titration      Plan:  No 1:1 needed, as the patient is calm, no SI.  -Continue clozapine 150mg hs  	Check CBC Wednesdays  -Continue Senna and Miralax prn for constipation  -Continue metformin, glimepiride and Linagliptin for DM  	Check fingersticks qid  -Lipitor for cholesterol  -Albuterol prn for asthma  -Patient provided verbal consent to speak with Zara Giang and patient's outpatient provider.

## 2023-10-07 NOTE — BH INPATIENT PSYCHIATRY PROGRESS NOTE - NSBHMETABOLIC_PSY_ALL_CORE_FT
BMI: BMI (kg/m2): 21.8 (09-28-23 @ 02:45)  HbA1c: A1C with Estimated Average Glucose Result: 7.2 % (09-27-23 @ 19:00)    Glucose: POCT Blood Glucose.: 157 mg/dL (10-07-23 @ 07:59)    BP: 110/60 (10-06-23 @ 12:10) (110/60 - 110/60)  Lipid Panel: Date/Time: 07-07-23 @ 05:52  Cholesterol, Serum: 153  Direct LDL: --  HDL Cholesterol, Serum: 28  Total Cholesterol/HDL Ration Measurement: --  Triglycerides, Serum: 273

## 2023-10-07 NOTE — BH INPATIENT PSYCHIATRY PROGRESS NOTE - NSBHFUPINTERVALHXFT_PSY_A_CORE
Patient followed for f/u psychosis, remains paranoid, guarded, refusing meds at time. Blood pressure on the low side, no dizziness.

## 2023-10-08 LAB
GLUCOSE BLDC GLUCOMTR-MCNC: 179 MG/DL — HIGH (ref 70–99)
GLUCOSE BLDC GLUCOMTR-MCNC: 186 MG/DL — HIGH (ref 70–99)
GLUCOSE BLDC GLUCOMTR-MCNC: 222 MG/DL — HIGH (ref 70–99)
GLUCOSE BLDC GLUCOMTR-MCNC: 232 MG/DL — HIGH (ref 70–99)

## 2023-10-08 PROCEDURE — 99231 SBSQ HOSP IP/OBS SF/LOW 25: CPT

## 2023-10-08 RX ADMIN — METFORMIN HYDROCHLORIDE 500 MILLIGRAM(S): 850 TABLET ORAL at 20:42

## 2023-10-08 RX ADMIN — Medication 0: at 20:51

## 2023-10-08 RX ADMIN — CLOZAPINE 150 MILLIGRAM(S): 150 TABLET, ORALLY DISINTEGRATING ORAL at 20:41

## 2023-10-08 RX ADMIN — LINAGLIPTIN 5 MILLIGRAM(S): 5 TABLET, FILM COATED ORAL at 08:34

## 2023-10-08 RX ADMIN — ATORVASTATIN CALCIUM 20 MILLIGRAM(S): 80 TABLET, FILM COATED ORAL at 20:41

## 2023-10-08 NOTE — BH INPATIENT PSYCHIATRY PROGRESS NOTE - NSBHCHARTREVIEWVS_PSY_A_CORE FT
Vital Signs Last 24 Hrs  T(C): 36.8 (10-08-23 @ 05:56), Max: 36.8 (10-07-23 @ 19:50)  T(F): 98.3 (10-08-23 @ 05:56), Max: 98.3 (10-07-23 @ 19:50)  HR: --  BP: --  BP(mean): --  RR: --  SpO2: 98% (10-07-23 @ 19:50) (98% - 98%)    Orthostatic VS  10-08-23 @ 05:56  Lying BP: 115/60 HR: 76  Sitting BP: 108/67 HR: 83  Standing BP: --/-- HR: --  Site: --  Mode: --  Orthostatic VS  10-07-23 @ 19:50  Lying BP: --/-- HR: --  Sitting BP: 131/66 HR: 89  Standing BP: 115/71 HR: 101  Site: --  Mode: --  Orthostatic VS  10-07-23 @ 05:45  Lying BP: 103/57 HR: 71  Sitting BP: --/-- HR: --  Standing BP: --/-- HR: --  Site: --  Mode: --  Orthostatic VS  10-06-23 @ 19:39  Lying BP: --/-- HR: --  Sitting BP: 109/70 HR: 82  Standing BP: 93/66 HR: 88  Site: --  Mode: --

## 2023-10-08 NOTE — BH INPATIENT PSYCHIATRY PROGRESS NOTE - MSE UNSTRUCTURED FT
Patient is awake and alert. Poorly engaged in exam. Denies that she is Shirley Bowling, gives a different name "Maria Isabel Daugherty."  Guarded. Suspicious of examiner. Denies AH at time of exam. Poor insight. No suicidal ideations. No EPS appreciated.

## 2023-10-08 NOTE — BH INPATIENT PSYCHIATRY PROGRESS NOTE - NSBHMETABOLIC_PSY_ALL_CORE_FT
BMI: BMI (kg/m2): 21.8 (09-28-23 @ 02:45)  HbA1c: A1C with Estimated Average Glucose Result: 7.2 % (09-27-23 @ 19:00)    Glucose: POCT Blood Glucose.: 186 mg/dL (10-08-23 @ 08:16)    BP: 110/60 (10-06-23 @ 12:10) (110/60 - 110/60)  Lipid Panel: Date/Time: 07-07-23 @ 05:52  Cholesterol, Serum: 153  Direct LDL: --  HDL Cholesterol, Serum: 28  Total Cholesterol/HDL Ration Measurement: --  Triglycerides, Serum: 273

## 2023-10-08 NOTE — BH INPATIENT PSYCHIATRY PROGRESS NOTE - NSBHFUPINTERVALHXFT_PSY_A_CORE
Patient followed for f/u psychosis, remains paranoid, guarded, refusing meds at time. Denies that she is Shirley Bowling.

## 2023-10-08 NOTE — BH INPATIENT PSYCHIATRY PROGRESS NOTE - NSBHASSESSSUMMFT_PSY_ALL_CORE
59 y/o female, single, noncaregiver, disabled, lives with [facility], history of Schizophrenia vs Schizoaffectove d/o, multiple past admissions at [facility] (most recent 2020 for decompensated psychosis), and also was hospitalized medically for lactic acidosis and followed by CL team in July 2023, followed by psychiatrist Dr. Love at [facility] (pt has no h/o developmental disability), prescribed Clozaril 100mg qhs , no h/o self-injurious behavior or suicide attempts, no h/o violence or legal issues, PMH Type II DM, HLD, asthma, no h/o substance abuse, BIBEMS activated by zara (Rahat) for disorganization.  The patient is grossly disorganized in thoughts on admission.    9/29: The patient continues to be disorganized, but is taking medications.  Will continue clozapine.  9/30: Psychotic disorganized cont meds encourage cooperation with proper vital signs  10/1: disorganized, but more verbal, cooperative, no SI/HI.   10/2: The patient is a little more organized, but still internally preoccupied.  Sedated from clozapine.  Will continue current dose, consider increase if psychosis continues.  10/3: Patient remains disorganized but behaviorally controlled on the unit. Records show that patient was previously well maintained on Clozaril 350mg. Will titrate Clozaril today and monitor for efficacy and side effects.  10/4: Patient shows slight improvement with organization but continues to be disorganized in thought process. Will continue current dose of clozapine with plans to slowly titrate while monitoring for efficacy and side effects.  10/5: Patient was irritable today and less cooperative than previous but continues to be disorganized during the day. Plan to continue titrating clozapine while monitoring for efficacy and side effects.  10/6: Patient shows improvement in mood and thought process and shows improvement in articulating auditory hallucinations. Will continue current dose of clozapine with plans to slowly titrate while monitoring for efficacy and side effects.  10/7: Patient remains disorganized, resistive with treatment, no reports of AH today, continue clozapine titration    10/8: Remains psychotic, suspicious, delusional about her identity, continue clozapine     Plan:  No 1:1 needed, as the patient is calm, no SI.  -Continue clozapine 150mg hs  	Check CBC Wednesdays  -Continue Senna and Miralax prn for constipation  -Continue metformin, glimepiride and Linagliptin for DM  	Check fingersticks qid  -Lipitor for cholesterol  -Albuterol prn for asthma  -Patient provided verbal consent to speak with Zara Giang and patient's outpatient provider.

## 2023-10-09 LAB
GLUCOSE BLDC GLUCOMTR-MCNC: 155 MG/DL — HIGH (ref 70–99)
GLUCOSE BLDC GLUCOMTR-MCNC: 182 MG/DL — HIGH (ref 70–99)
GLUCOSE BLDC GLUCOMTR-MCNC: 194 MG/DL — HIGH (ref 70–99)

## 2023-10-09 PROCEDURE — 99232 SBSQ HOSP IP/OBS MODERATE 35: CPT | Mod: GC

## 2023-10-09 RX ADMIN — SENNA PLUS 2 TABLET(S): 8.6 TABLET ORAL at 20:41

## 2023-10-09 RX ADMIN — LINAGLIPTIN 5 MILLIGRAM(S): 5 TABLET, FILM COATED ORAL at 08:26

## 2023-10-09 RX ADMIN — ATORVASTATIN CALCIUM 20 MILLIGRAM(S): 80 TABLET, FILM COATED ORAL at 20:41

## 2023-10-09 RX ADMIN — Medication 4 MILLIGRAM(S): at 08:27

## 2023-10-09 RX ADMIN — CLOZAPINE 150 MILLIGRAM(S): 150 TABLET, ORALLY DISINTEGRATING ORAL at 20:41

## 2023-10-09 RX ADMIN — METFORMIN HYDROCHLORIDE 500 MILLIGRAM(S): 850 TABLET ORAL at 20:42

## 2023-10-09 NOTE — BH INPATIENT PSYCHIATRY PROGRESS NOTE - NSBHMETABOLIC_PSY_ALL_CORE_FT
BMI: BMI (kg/m2): 21.8 (09-28-23 @ 02:45)  HbA1c: A1C with Estimated Average Glucose Result: 7.2 % (09-27-23 @ 19:00)    Glucose: POCT Blood Glucose.: 182 mg/dL (10-09-23 @ 12:04)    BP: --  Lipid Panel: Date/Time: 07-07-23 @ 05:52  Cholesterol, Serum: 153  Direct LDL: --  HDL Cholesterol, Serum: 28  Total Cholesterol/HDL Ration Measurement: --  Triglycerides, Serum: 273   BMI: BMI (kg/m2): 21.8 (09-28-23 @ 02:45)  HbA1c: A1C with Estimated Average Glucose Result: 7.2 % (09-27-23 @ 19:00)    Glucose: POCT Blood Glucose.: 155 mg/dL (10-09-23 @ 16:36)    BP: --  Lipid Panel: Date/Time: 07-07-23 @ 05:52  Cholesterol, Serum: 153  Direct LDL: --  HDL Cholesterol, Serum: 28  Total Cholesterol/HDL Ration Measurement: --  Triglycerides, Serum: 273

## 2023-10-09 NOTE — BH INPATIENT PSYCHIATRY PROGRESS NOTE - NSBHASSESSSUMMFT_PSY_ALL_CORE
59 y/o female, single, noncaregiver, disabled, lives with [facility], history of Schizophrenia vs Schizoaffectove d/o, multiple past admissions at [facility] (most recent 2020 for decompensated psychosis), and also was hospitalized medically for lactic acidosis and followed by CL team in July 2023, followed by psychiatrist Dr. Love at [facility] (pt has no h/o developmental disability), prescribed Clozaril 100mg qhs , no h/o self-injurious behavior or suicide attempts, no h/o violence or legal issues, PMH Type II DM, HLD, asthma, no h/o substance abuse, BIBEMS activated by zara (Rahat) for disorganization.  The patient is grossly disorganized in thoughts on admission.    9/29: The patient continues to be disorganized, but is taking medications.  Will continue clozapine.  9/30: Psychotic disorganized cont meds encourage cooperation with proper vital signs  10/1: disorganized, but more verbal, cooperative, no SI/HI.   10/2: The patient is a little more organized, but still internally preoccupied.  Sedated from clozapine.  Will continue current dose, consider increase if psychosis continues.  10/3: Patient remains disorganized but behaviorally controlled on the unit. Records show that patient was previously well maintained on Clozaril 350mg. Will titrate Clozaril today and monitor for efficacy and side effects.  10/4: Patient shows slight improvement with organization but continues to be disorganized in thought process. Will continue current dose of clozapine with plans to slowly titrate while monitoring for efficacy and side effects.  10/5: Patient was irritable today and less cooperative than previous but continues to be disorganized during the day. Plan to continue titrating clozapine while monitoring for efficacy and side effects.  10/6: Patient shows improvement in mood and thought process and shows improvement in articulating auditory hallucinations. Will continue current dose of clozapine with plans to slowly titrate while monitoring for efficacy and side effects.  10/7: Patient remains disorganized, resistive with treatment, no reports of AH today, continue clozapine titration    10/8: Remains psychotic, suspicious, delusional about her identity, continue clozapine   10/9: Patient remains disorganized but denies auditory hallucinations overnight. BP soft with orthostatics (+), will continue current dose of clozapine with plans to slowly titrate while monitoring for efficacy and side effects.    Plan:  No 1:1 needed, as the patient is calm, no SI.  -Continue clozapine 150mg hs  	Check CBC Wednesdays  -Continue Senna and Miralax prn for constipation  -Continue metformin, glimepiride and Linagliptin for DM  	Check fingersticks qid  -Lipitor for cholesterol  -Albuterol prn for asthma  -Patient provided verbal consent to speak with Zara Giang and patient's outpatient provider.

## 2023-10-09 NOTE — BH INPATIENT PSYCHIATRY PROGRESS NOTE - NSBHCHARTREVIEWVS_PSY_A_CORE FT
Vital Signs Last 24 Hrs  T(C): 36.6 (10-09-23 @ 08:36), Max: 37 (10-08-23 @ 20:20)  T(F): 97.8 (10-09-23 @ 08:36), Max: 98.6 (10-08-23 @ 20:20)  HR: --  BP: --  BP(mean): --  RR: 17 (10-09-23 @ 08:36) (16 - 17)  SpO2: 99% (10-09-23 @ 08:36) (99% - 99%)    Orthostatic VS  10-09-23 @ 08:36  Lying BP: --/-- HR: --  Sitting BP: 118/74 HR: 83  Standing BP: 100/59 HR: 83  Site: --  Mode: --  Orthostatic VS  10-08-23 @ 20:20  Lying BP: --/-- HR: --  Sitting BP: 127/77 HR: 95  Standing BP: 124/67 HR: 93  Site: --  Mode: --  Orthostatic VS  10-08-23 @ 05:56  Lying BP: 115/60 HR: 76  Sitting BP: 108/67 HR: 83  Standing BP: --/-- HR: --  Site: --  Mode: --  Orthostatic VS  10-07-23 @ 19:50  Lying BP: --/-- HR: --  Sitting BP: 131/66 HR: 89  Standing BP: 115/71 HR: 101  Site: --  Mode: --   Vital Signs Last 24 Hrs  T(C): 36.7 (10-09-23 @ 18:41), Max: 36.7 (10-09-23 @ 18:41)  T(F): 98.1 (10-09-23 @ 18:41), Max: 98.1 (10-09-23 @ 18:41)  HR: --  BP: --  BP(mean): --  RR: 16 (10-09-23 @ 18:41) (16 - 17)  SpO2: 96% (10-09-23 @ 18:41) (96% - 99%)    Orthostatic VS  10-09-23 @ 18:41  Lying BP: --/-- HR: --  Sitting BP: 114/70 HR: 84  Standing BP: 94/59 HR: 90  Site: --  Mode: --  Orthostatic VS  10-09-23 @ 08:36  Lying BP: --/-- HR: --  Sitting BP: 118/74 HR: 83  Standing BP: 100/59 HR: 83  Site: --  Mode: --  Orthostatic VS  10-08-23 @ 20:20  Lying BP: --/-- HR: --  Sitting BP: 127/77 HR: 95  Standing BP: 124/67 HR: 93  Site: --  Mode: --  Orthostatic VS  10-08-23 @ 05:56  Lying BP: 115/60 HR: 76  Sitting BP: 108/67 HR: 83  Standing BP: --/-- HR: --  Site: --  Mode: --

## 2023-10-09 NOTE — BH INPATIENT PSYCHIATRY PROGRESS NOTE - MSE UNSTRUCTURED FT
The patient appears stated age, with improved hygiene and is dressed appropriately seen laying in bed with hoodie over her head.  She was calm, cooperative and engaging.  She maintained appropriate eye contact.  No agitation, psychomotor retardation or involuntary movements observed.  The patient’s speech is fluent, normal in tone, rate, and normal volume.  The patient’s mood is “good."  Her affect is constricted, stable, appropriate, congruent to mood. The patient’s thought process demonstrates improved organization but overall remains disorganized with loose associations.  Patient demonstrates paranoid delusions towards staff. She denies auditory hallucinations.  She does not express any suicidal or homicidal ideation, intent, or plan.  Insight is limited.  Judgment is impaired. Impulse control has been good on the unit.

## 2023-10-09 NOTE — BH INPATIENT PSYCHIATRY PROGRESS NOTE - NSBHFUPINTERVALHXFT_PSY_A_CORE
Patient seen for follow up of psychosis, chart reviewed, and case discussed with interdisciplinary team. No acute events reported overnight, no PRN needed. Per staff, patient is paranoid with regards to her morning medication, refusing insulin coverage. On encounter, patient was calm, cooperative and engaging, seen laying in her bed and notably less irritable than previous encounters. She endorsed having slept well for the first time since admission because she did not hear the voices of her "twins" bothering her overnight and currently denies hearing auditory hallucinations. She remains overall disorganized in thought process with loose associations but with improvement, able to talk about her family while growing up and her experiences and was eager to talk about her past. She noted that she was able to shower this morning. She was encouraged to drink more water while on the unit. She denies any chest pain or constipation reporting that her last bowel movement was this morning.

## 2023-10-10 LAB
GLUCOSE BLDC GLUCOMTR-MCNC: 109 MG/DL — HIGH (ref 70–99)
GLUCOSE BLDC GLUCOMTR-MCNC: 144 MG/DL — HIGH (ref 70–99)
GLUCOSE BLDC GLUCOMTR-MCNC: 162 MG/DL — HIGH (ref 70–99)
GLUCOSE BLDC GLUCOMTR-MCNC: 235 MG/DL — HIGH (ref 70–99)

## 2023-10-10 PROCEDURE — 99232 SBSQ HOSP IP/OBS MODERATE 35: CPT

## 2023-10-10 RX ADMIN — Medication 4 MILLIGRAM(S): at 08:39

## 2023-10-10 RX ADMIN — Medication 1: at 12:24

## 2023-10-10 RX ADMIN — LINAGLIPTIN 5 MILLIGRAM(S): 5 TABLET, FILM COATED ORAL at 08:40

## 2023-10-10 NOTE — BH INPATIENT PSYCHIATRY PROGRESS NOTE - NSBHCHARTREVIEWVS_PSY_A_CORE FT
Vital Signs Last 24 Hrs  T(C): 36.4 (10-10-23 @ 07:50), Max: 36.7 (10-09-23 @ 18:41)  T(F): 97.6 (10-10-23 @ 07:50), Max: 98.1 (10-09-23 @ 18:41)  HR: --  BP: --  BP(mean): --  RR: 16 (10-10-23 @ 07:50) (16 - 16)  SpO2: 97% (10-10-23 @ 07:50) (96% - 97%)    Orthostatic VS  10-10-23 @ 07:50  Lying BP: 99/50 HR: 76  Sitting BP: --/-- HR: --  Standing BP: --/-- HR: --  Site: --  Mode: --  Orthostatic VS  10-10-23 @ 06:04  Lying BP: 90/50 HR: 76  Sitting BP: --/-- HR: --  Standing BP: --/-- HR: --  Site: --  Mode: --  Orthostatic VS  10-09-23 @ 18:41  Lying BP: --/-- HR: --  Sitting BP: 114/70 HR: 84  Standing BP: 94/59 HR: 90  Site: --  Mode: --  Orthostatic VS  10-09-23 @ 08:36  Lying BP: --/-- HR: --  Sitting BP: 118/74 HR: 83  Standing BP: 100/59 HR: 83  Site: --  Mode: --  Orthostatic VS  10-08-23 @ 20:20  Lying BP: --/-- HR: --  Sitting BP: 127/77 HR: 95  Standing BP: 124/67 HR: 93  Site: --  Mode: --

## 2023-10-10 NOTE — BH INPATIENT PSYCHIATRY PROGRESS NOTE - NSBHASSESSSUMMFT_PSY_ALL_CORE
59 y/o female, single, noncaregiver, disabled, lives with [facility], history of Schizophrenia vs Schizoaffectove d/o, multiple past admissions at [facility] (most recent 2020 for decompensated psychosis), and also was hospitalized medically for lactic acidosis and followed by CL team in July 2023, followed by psychiatrist Dr. Love at [facility] (pt has no h/o developmental disability), prescribed Clozaril 100mg qhs , no h/o self-injurious behavior or suicide attempts, no h/o violence or legal issues, PMH Type II DM, HLD, asthma, no h/o substance abuse, BIBEMS activated by zara (Rahat) for disorganization.  The patient is grossly disorganized in thoughts on admission.    9/29: The patient continues to be disorganized, but is taking medications.  Will continue clozapine.  9/30: Psychotic disorganized cont meds encourage cooperation with proper vital signs  10/1: disorganized, but more verbal, cooperative, no SI/HI.   10/2: The patient is a little more organized, but still internally preoccupied.  Sedated from clozapine.  Will continue current dose, consider increase if psychosis continues.  10/3: Patient remains disorganized but behaviorally controlled on the unit. Records show that patient was previously well maintained on Clozaril 350mg. Will titrate Clozaril today and monitor for efficacy and side effects.  10/4: Patient shows slight improvement with organization but continues to be disorganized in thought process. Will continue current dose of clozapine with plans to slowly titrate while monitoring for efficacy and side effects.  10/5: Patient was irritable today and less cooperative than previous but continues to be disorganized during the day. Plan to continue titrating clozapine while monitoring for efficacy and side effects.  10/6: Patient shows improvement in mood and thought process and shows improvement in articulating auditory hallucinations. Will continue current dose of clozapine with plans to slowly titrate while monitoring for efficacy and side effects.  10/7: Patient remains disorganized, resistive with treatment, no reports of AH today, continue clozapine titration    10/8: Remains psychotic, suspicious, delusional about her identity, continue clozapine   10/9: Patient remains disorganized but denies auditory hallucinations overnight. BP soft with orthostatics (+), will continue current dose of clozapine with plans to slowly titrate while monitoring for efficacy and side effects.    Plan:  No 1:1 needed, as the patient is calm, no SI.  -Continue clozapine 150mg hs  	Check CBC Wednesdays  -Continue Senna and Miralax prn for constipation  -Continue metformin, glimepiride and Linagliptin for DM  	Check fingersticks qid  -Lipitor for cholesterol  -Albuterol prn for asthma  -Patient provided verbal consent to speak with Zara Giang and patient's outpatient provider. 59 y/o female, single, noncaregiver, disabled, lives with [facility], history of Schizophrenia vs Schizoaffectove d/o, multiple past admissions at [facility] (most recent 2020 for decompensated psychosis), and also was hospitalized medically for lactic acidosis and followed by CL team in July 2023, followed by psychiatrist Dr. Love at [facility] (pt has no h/o developmental disability), prescribed Clozaril 100mg qhs , no h/o self-injurious behavior or suicide attempts, no h/o violence or legal issues, PMH Type II DM, HLD, asthma, no h/o substance abuse, BIBEMS activated by rosalizy (Rahat) for disorganization.  The patient is grossly disorganized in thoughts on admission.    9/29: The patient continues to be disorganized, but is taking medications.  Will continue clozapine.  9/30: Psychotic disorganized cont meds encourage cooperation with proper vital signs  10/1: disorganized, but more verbal, cooperative, no SI/HI.   10/2: The patient is a little more organized, but still internally preoccupied.  Sedated from clozapine.  Will continue current dose, consider increase if psychosis continues.  10/3: Patient remains disorganized but behaviorally controlled on the unit. Records show that patient was previously well maintained on Clozaril 350mg. Will titrate Clozaril today and monitor for efficacy and side effects.  10/4: Patient shows slight improvement with organization but continues to be disorganized in thought process. Will continue current dose of clozapine with plans to slowly titrate while monitoring for efficacy and side effects.  10/5: Patient was irritable today and less cooperative than previous but continues to be disorganized during the day. Plan to continue titrating clozapine while monitoring for efficacy and side effects.  10/6: Patient shows improvement in mood and thought process and shows improvement in articulating auditory hallucinations. Will continue current dose of clozapine with plans to slowly titrate while monitoring for efficacy and side effects.  10/7: Patient remains disorganized, resistive with treatment, no reports of AH today, continue clozapine titration    10/8: Remains psychotic, suspicious, delusional about her identity, continue clozapine   10/9: Patient remains disorganized but denies auditory hallucinations overnight. BP soft with orthostatics (+), will continue current dose of clozapine with plans to slowly titrate while monitoring for efficacy and side effects.  10/10: Patient remains disorganized and paranoid on the unit but with improved linear thinking since admission, though overall remains disorganized with loose associations. BP remains soft, will encourage PO intake, compression stockings and will continue current dose of clozapine with plans to slowly titrate while monitoring for efficacy and side effects.    Plan:  No 1:1 needed, as the patient is calm, no SI.  -Continue clozapine 150mg hs  	Check CBC Wednesdays  -Continue Senna and Miralax prn for constipation  -Continue metformin, glimepiride and Linagliptin for DM  	Check fingersticks qid  -Lipitor for cholesterol  -Albuterol prn for asthma  -Patient provided verbal consent to speak with Zara Giang and patient's outpatient provider.

## 2023-10-10 NOTE — BH INPATIENT PSYCHIATRY PROGRESS NOTE - MSE UNSTRUCTURED FT
The patient appears stated age, with improved hygiene and is dressed appropriately seen laying in bed with hoodie over her head.  She was calm, cooperative and engaging.  She maintained appropriate eye contact.  No agitation, psychomotor retardation or involuntary movements observed.  The patient’s speech is fluent, normal in tone, rate, and normal volume.  The patient’s mood is “good."  Her affect is constricted, stable, appropriate, congruent to mood. The patient’s thought process demonstrates improved organization but overall remains disorganized with loose associations.  Patient demonstrates paranoid delusions towards staff. She denies auditory hallucinations.  She does not express any suicidal or homicidal ideation, intent, or plan.  Insight is limited.  Judgment is impaired. Impulse control has been good on the unit. The patient appears stated age, with fair to poor hygiene and is dressed appropriately seen laying in bed with blanket over her head.  She was calm, superficially cooperative but minimally engaging.  She maintained intermittent eye contact.  No agitation, psychomotor retardation or involuntary movements observed.  The patient’s speech is fluent, normal in tone, rate, and normal volume.  The patient’s mood is “tired."  Her affect is constricted, stable, appropriate, congruent to mood. The patient’s thought process demonstrates improved organization but overall remains disorganized with loose associations.  Patient demonstrates paranoid delusions towards staff. She denies auditory hallucinations at this time.  She does not express any suicidal or homicidal ideation, intent, or plan.  Insight is limited.  Judgment is impaired. Impulse control has been good on the unit.

## 2023-10-10 NOTE — BH INPATIENT PSYCHIATRY PROGRESS NOTE - CURRENT MEDICATION
MEDICATIONS  (STANDING):  atorvastatin 20 milliGRAM(s) Oral at bedtime  cloZAPine 150 milliGRAM(s) Oral at bedtime  glimepiride. 4 milliGRAM(s) Oral with breakfast  insulin lispro (ADMELOG) corrective regimen sliding scale   SubCutaneous three times a day before meals  insulin lispro (ADMELOG) corrective regimen sliding scale   SubCutaneous at bedtime  linagliptin 5 milliGRAM(s) Oral daily  metFORMIN 500 milliGRAM(s) Oral at bedtime  senna 2 Tablet(s) Oral at bedtime    MEDICATIONS  (PRN):  albuterol    90 MICROgram(s) HFA Inhaler 2 Puff(s) Inhalation every 6 hours PRN asthma  aluminum hydroxide/magnesium hydroxide/simethicone Suspension 30 milliLiter(s) Oral every 8 hours PRN Dyspepsia  dextrose Oral Gel 15 Gram(s) Oral once PRN Blood Glucose LESS THAN 70 milliGRAM(s)/deciliter  haloperidol     Tablet 5 milliGRAM(s) Oral every 6 hours PRN agitation  haloperidol    Injectable 5 milliGRAM(s) IntraMuscular once PRN combative behavior  LORazepam     Tablet 2 milliGRAM(s) Oral every 6 hours PRN Agitation  LORazepam   Injectable 2 milliGRAM(s) IntraMuscular once PRN combative behavior  polyethylene glycol 3350 17 Gram(s) Oral daily PRN Constipation   MEDICATIONS  (STANDING):  atorvastatin 20 milliGRAM(s) Oral at bedtime  cloZAPine 150 milliGRAM(s) Oral at bedtime  glimepiride. 4 milliGRAM(s) Oral with breakfast  insulin lispro (ADMELOG) corrective regimen sliding scale   SubCutaneous at bedtime  insulin lispro (ADMELOG) corrective regimen sliding scale   SubCutaneous three times a day before meals  linagliptin 5 milliGRAM(s) Oral daily  metFORMIN 500 milliGRAM(s) Oral at bedtime  senna 2 Tablet(s) Oral at bedtime    MEDICATIONS  (PRN):  albuterol    90 MICROgram(s) HFA Inhaler 2 Puff(s) Inhalation every 6 hours PRN asthma  aluminum hydroxide/magnesium hydroxide/simethicone Suspension 30 milliLiter(s) Oral every 8 hours PRN Dyspepsia  dextrose Oral Gel 15 Gram(s) Oral once PRN Blood Glucose LESS THAN 70 milliGRAM(s)/deciliter  haloperidol     Tablet 5 milliGRAM(s) Oral every 6 hours PRN agitation  haloperidol    Injectable 5 milliGRAM(s) IntraMuscular once PRN combative behavior  LORazepam     Tablet 2 milliGRAM(s) Oral every 6 hours PRN Agitation  LORazepam   Injectable 2 milliGRAM(s) IntraMuscular once PRN combative behavior  polyethylene glycol 3350 17 Gram(s) Oral daily PRN Constipation

## 2023-10-10 NOTE — BH INPATIENT PSYCHIATRY PROGRESS NOTE - NSBHMETABOLIC_PSY_ALL_CORE_FT
BMI: BMI (kg/m2): 21.8 (09-28-23 @ 02:45)  HbA1c: A1C with Estimated Average Glucose Result: 7.2 % (09-27-23 @ 19:00)    Glucose: POCT Blood Glucose.: 144 mg/dL (10-10-23 @ 08:04)    BP: --  Lipid Panel: Date/Time: 07-07-23 @ 05:52  Cholesterol, Serum: 153  Direct LDL: --  HDL Cholesterol, Serum: 28  Total Cholesterol/HDL Ration Measurement: --  Triglycerides, Serum: 273   BMI: BMI (kg/m2): 21.8 (09-28-23 @ 02:45)  HbA1c: A1C with Estimated Average Glucose Result: 7.2 % (09-27-23 @ 19:00)    Glucose: POCT Blood Glucose.: 162 mg/dL (10-10-23 @ 12:01)    BP: --  Lipid Panel: Date/Time: 07-07-23 @ 05:52  Cholesterol, Serum: 153  Direct LDL: --  HDL Cholesterol, Serum: 28  Total Cholesterol/HDL Ration Measurement: --  Triglycerides, Serum: 273   BMI: BMI (kg/m2): 21.8 (09-28-23 @ 02:45)  HbA1c: A1C with Estimated Average Glucose Result: 7.2 % (09-27-23 @ 19:00)    Glucose: POCT Blood Glucose.: 109 mg/dL (10-10-23 @ 16:31)    BP: --  Lipid Panel: Date/Time: 07-07-23 @ 05:52  Cholesterol, Serum: 153  Direct LDL: --  HDL Cholesterol, Serum: 28  Total Cholesterol/HDL Ration Measurement: --  Triglycerides, Serum: 273

## 2023-10-10 NOTE — BH INPATIENT PSYCHIATRY PROGRESS NOTE - NSBHFUPINTERVALHXFT_PSY_A_CORE
Patient seen for follow up of psychosis, chart reviewed, and case discussed with interdisciplinary team. No acute events reported overnight, no PRN needed. Per staff, patient is paranoid with regards to her morning FS testing, refusing insulin coverage. On encounter, patient was calm but less willing to engage, though was cooperative with interview. She was seen laying in her bed, saying "I'm sleeping" followed by "I had a really scary nightmare last night." When asked to explain the contents of her dream, she stated that she did not remember and preceded to talk about the staff was trying to make her hypoglycemia by offering her insulin when testing her fingerstick glucose this morning. She stated that she was diagnosed with "hypoglycemia" and was never diagnosed with diabetes, saying that "the brown vitamin     She endorsed having slept well for the first time since admission because she did not hear the voices of her "twins" bothering her overnight and currently denies hearing auditory hallucinations. She remains overall disorganized in thought process with loose associations but with improvement, able to talk about her family while growing up and her experiences and was eager to talk about her past. She noted that she was able to shower this morning. She was encouraged to drink more water while on the unit. She denies any chest pain or constipation reporting that her last bowel movement was this morning. Patient seen for follow up of psychosis, chart reviewed, and case discussed with interdisciplinary team. No acute events reported overnight, no PRN needed. Per staff, patient is paranoid with regards to her morning FS testing, refusing insulin coverage. On encounter, patient was calm but less willing to engage, though was cooperative with interview. She was seen laying in her bed, saying "I'm sleeping" followed by "I had a really scary nightmare last night." When asked to explain the contents of her dream, she stated that she did not remember and preceded to talk about the staff was trying to make her hypoglycemia by offering her insulin when testing her fingerstick glucose this morning. She stated that she was diagnosed with "hypoglycemia" and was never diagnosed with diabetes, saying that "the brown vitamin" is making her feel sick.  She denies any chest pain or constipation at this time.

## 2023-10-11 LAB
BASOPHILS # BLD AUTO: 0.03 K/UL — SIGNIFICANT CHANGE UP (ref 0–0.2)
BASOPHILS NFR BLD AUTO: 0.4 % — SIGNIFICANT CHANGE UP (ref 0–2)
CRP SERPL-MCNC: <3 MG/L — SIGNIFICANT CHANGE UP
EOSINOPHIL # BLD AUTO: 0.17 K/UL — SIGNIFICANT CHANGE UP (ref 0–0.5)
EOSINOPHIL NFR BLD AUTO: 2.3 % — SIGNIFICANT CHANGE UP (ref 0–6)
GLUCOSE BLDC GLUCOMTR-MCNC: 150 MG/DL — HIGH (ref 70–99)
GLUCOSE BLDC GLUCOMTR-MCNC: 155 MG/DL — HIGH (ref 70–99)
GLUCOSE BLDC GLUCOMTR-MCNC: 182 MG/DL — HIGH (ref 70–99)
GLUCOSE BLDC GLUCOMTR-MCNC: 215 MG/DL — HIGH (ref 70–99)
HCT VFR BLD CALC: 34.4 % — LOW (ref 34.5–45)
HGB BLD-MCNC: 11.4 G/DL — LOW (ref 11.5–15.5)
IANC: 3.93 K/UL — SIGNIFICANT CHANGE UP (ref 1.8–7.4)
IMM GRANULOCYTES NFR BLD AUTO: 0.5 % — SIGNIFICANT CHANGE UP (ref 0–0.9)
LYMPHOCYTES # BLD AUTO: 2.84 K/UL — SIGNIFICANT CHANGE UP (ref 1–3.3)
LYMPHOCYTES # BLD AUTO: 37.9 % — SIGNIFICANT CHANGE UP (ref 13–44)
MCHC RBC-ENTMCNC: 26.6 PG — LOW (ref 27–34)
MCHC RBC-ENTMCNC: 33.1 GM/DL — SIGNIFICANT CHANGE UP (ref 32–36)
MCV RBC AUTO: 80.4 FL — SIGNIFICANT CHANGE UP (ref 80–100)
MONOCYTES # BLD AUTO: 0.48 K/UL — SIGNIFICANT CHANGE UP (ref 0–0.9)
MONOCYTES NFR BLD AUTO: 6.4 % — SIGNIFICANT CHANGE UP (ref 2–14)
NEUTROPHILS # BLD AUTO: 3.93 K/UL — SIGNIFICANT CHANGE UP (ref 1.8–7.4)
NEUTROPHILS NFR BLD AUTO: 52.5 % — SIGNIFICANT CHANGE UP (ref 43–77)
NRBC # BLD: 0 /100 WBCS — SIGNIFICANT CHANGE UP (ref 0–0)
NRBC # FLD: 0 K/UL — SIGNIFICANT CHANGE UP (ref 0–0)
PLATELET # BLD AUTO: 291 K/UL — SIGNIFICANT CHANGE UP (ref 150–400)
RBC # BLD: 4.28 M/UL — SIGNIFICANT CHANGE UP (ref 3.8–5.2)
RBC # FLD: 13.2 % — SIGNIFICANT CHANGE UP (ref 10.3–14.5)
TROPONIN T, HIGH SENSITIVITY RESULT: 7 NG/L — SIGNIFICANT CHANGE UP
WBC # BLD: 7.49 K/UL — SIGNIFICANT CHANGE UP (ref 3.8–10.5)
WBC # FLD AUTO: 7.49 K/UL — SIGNIFICANT CHANGE UP (ref 3.8–10.5)

## 2023-10-11 PROCEDURE — 99232 SBSQ HOSP IP/OBS MODERATE 35: CPT | Mod: GC

## 2023-10-11 RX ORDER — SODIUM CHLORIDE 9 MG/ML
1 INJECTION INTRAMUSCULAR; INTRAVENOUS; SUBCUTANEOUS
Refills: 0 | Status: DISCONTINUED | OUTPATIENT
Start: 2023-10-11 | End: 2023-10-20

## 2023-10-11 RX ADMIN — SODIUM CHLORIDE 1 GRAM(S): 9 INJECTION INTRAMUSCULAR; INTRAVENOUS; SUBCUTANEOUS at 20:59

## 2023-10-11 RX ADMIN — SENNA PLUS 2 TABLET(S): 8.6 TABLET ORAL at 20:57

## 2023-10-11 RX ADMIN — LINAGLIPTIN 5 MILLIGRAM(S): 5 TABLET, FILM COATED ORAL at 08:54

## 2023-10-11 RX ADMIN — ATORVASTATIN CALCIUM 20 MILLIGRAM(S): 80 TABLET, FILM COATED ORAL at 20:57

## 2023-10-11 RX ADMIN — Medication 2: at 17:20

## 2023-10-11 RX ADMIN — Medication 4 MILLIGRAM(S): at 08:56

## 2023-10-11 RX ADMIN — METFORMIN HYDROCHLORIDE 500 MILLIGRAM(S): 850 TABLET ORAL at 20:57

## 2023-10-11 RX ADMIN — CLOZAPINE 150 MILLIGRAM(S): 150 TABLET, ORALLY DISINTEGRATING ORAL at 20:57

## 2023-10-11 RX ADMIN — Medication 1: at 12:44

## 2023-10-11 NOTE — BH INPATIENT PSYCHIATRY PROGRESS NOTE - NSBHASSESSSUMMFT_PSY_ALL_CORE
59 y/o female, single, noncaregiver, disabled, lives with [facility], history of Schizophrenia vs Schizoaffectove d/o, multiple past admissions at [facility] (most recent 2020 for decompensated psychosis), and also was hospitalized medically for lactic acidosis and followed by CL team in July 2023, followed by psychiatrist Dr. Love at [facility] (pt has no h/o developmental disability), prescribed Clozaril 100mg qhs , no h/o self-injurious behavior or suicide attempts, no h/o violence or legal issues, PMH Type II DM, HLD, asthma, no h/o substance abuse, BIBEMS activated by rosalizy (Rahat) for disorganization.  The patient is grossly disorganized in thoughts on admission.    9/29: The patient continues to be disorganized, but is taking medications.  Will continue clozapine.  9/30: Psychotic disorganized cont meds encourage cooperation with proper vital signs  10/1: disorganized, but more verbal, cooperative, no SI/HI.   10/2: The patient is a little more organized, but still internally preoccupied.  Sedated from clozapine.  Will continue current dose, consider increase if psychosis continues.  10/3: Patient remains disorganized but behaviorally controlled on the unit. Records show that patient was previously well maintained on Clozaril 350mg. Will titrate Clozaril today and monitor for efficacy and side effects.  10/4: Patient shows slight improvement with organization but continues to be disorganized in thought process. Will continue current dose of clozapine with plans to slowly titrate while monitoring for efficacy and side effects.  10/5: Patient was irritable today and less cooperative than previous but continues to be disorganized during the day. Plan to continue titrating clozapine while monitoring for efficacy and side effects.  10/6: Patient shows improvement in mood and thought process and shows improvement in articulating auditory hallucinations. Will continue current dose of clozapine with plans to slowly titrate while monitoring for efficacy and side effects.  10/7: Patient remains disorganized, resistive with treatment, no reports of AH today, continue clozapine titration    10/8: Remains psychotic, suspicious, delusional about her identity, continue clozapine   10/9: Patient remains disorganized but denies auditory hallucinations overnight. BP soft with orthostatics (+), will continue current dose of clozapine with plans to slowly titrate while monitoring for efficacy and side effects.  10/10: Patient remains disorganized and paranoid on the unit but with improved linear thinking since admission, though overall remains disorganized with loose associations. BP remains soft, will encourage PO intake, compression stockings and will continue current dose of clozapine with plans to slowly titrate while monitoring for efficacy and side effects.    Plan:  No 1:1 needed, as the patient is calm, no SI.  -Continue clozapine 150mg hs  	Check CBC Wednesdays  -Continue Senna and Miralax prn for constipation  -Continue metformin, glimepiride and Linagliptin for DM  	Check fingersticks qid  -Lipitor for cholesterol  -Albuterol prn for asthma  -Patient provided verbal consent to speak with Zara Giang and patient's outpatient provider. 57 y/o female, single, noncaregiver, disabled, lives with [facility] history of Schizophrenia vs Schizoaffectove d/o, multiple past admissions at [facility] [place] (most recent 2020 for decompensated psychosis), and also was hospitalized medically for lactic acidosis and followed by CL team in July 2023, followed by psychiatrist Dr. Love at [facility] (pt has no h/o developmental disability), prescribed Clozaril 100mg qhs , no h/o self-injurious behavior or suicide attempts, no h/o violence or legal issues, PMH Type II DM, HLD, asthma, no h/o substance abuse, BIBEMS activated by rosalizy (Rahat) for disorganization.  The patient is grossly disorganized in thoughts on admission.    9/29: The patient continues to be disorganized, but is taking medications.  Will continue clozapine.  9/30: Psychotic disorganized cont meds encourage cooperation with proper vital signs  10/1: disorganized, but more verbal, cooperative, no SI/HI.   10/2: The patient is a little more organized, but still internally preoccupied.  Sedated from clozapine.  Will continue current dose, consider increase if psychosis continues.  10/3: Patient remains disorganized but behaviorally controlled on the unit. Records show that patient was previously well maintained on Clozaril 350mg. Will titrate Clozaril today and monitor for efficacy and side effects.  10/4: Patient shows slight improvement with organization but continues to be disorganized in thought process. Will continue current dose of clozapine with plans to slowly titrate while monitoring for efficacy and side effects.  10/5: Patient was irritable today and less cooperative than previous but continues to be disorganized during the day. Plan to continue titrating clozapine while monitoring for efficacy and side effects.  10/6: Patient shows improvement in mood and thought process and shows improvement in articulating auditory hallucinations. Will continue current dose of clozapine with plans to slowly titrate while monitoring for efficacy and side effects.  10/7: Patient remains disorganized, resistive with treatment, no reports of AH today, continue clozapine titration    10/8: Remains psychotic, suspicious, delusional about her identity, continue clozapine   10/9: Patient remains disorganized but denies auditory hallucinations overnight. BP soft with orthostatics (+), will continue current dose of clozapine with plans to slowly titrate while monitoring for efficacy and side effects.  10/10: Patient remains disorganized and paranoid on the unit but with improved linear thinking since admission, though overall remains disorganized with loose associations. BP remains soft, will encourage PO intake, compression stockings and will continue current dose of clozapine with plans to slowly titrate while monitoring for efficacy and side effects.  10/11: Patient remains disorganized on the unit and requires encouragement for PO intake. Refusing compression stockings. BP remains soft, will check CMP and start salt tablet prior to titrating clozapine.    Plan:  No 1:1 needed, as the patient is calm, no SI.  -Continue clozapine 150mg hs  	Check CBC Wednesdays  -Continue Senna and Miralax prn for constipation  -Continue metformin, glimepiride and Linagliptin for DM  	Check fingersticks qid  -Lipitor for cholesterol  -Albuterol prn for asthma  -Patient provided verbal consent to speak with Zara Giang and patient's outpatient provider. 59 y/o female, single, noncaregiver, disabled, lives with [facility], history of Schizophrenia vs Schizoaffectove d/o, multiple past admissions at [facility] (most recent 2020 for decompensated psychosis), and also was hospitalized medically for lactic acidosis and followed by CL team in July 2023, followed by psychiatrist Dr. Love at [facility] (pt has no h/o developmental disability), prescribed Clozaril 100mg qhs , no h/o self-injurious behavior or suicide attempts, no h/o violence or legal issues, PMH Type II DM, HLD, asthma, no h/o substance abuse, BIBEMS activated by rosalizy (Rahat) for disorganization.  The patient is grossly disorganized in thoughts on admission.    9/29: The patient continues to be disorganized, but is taking medications.  Will continue clozapine.  9/30: Psychotic disorganized cont meds encourage cooperation with proper vital signs  10/1: disorganized, but more verbal, cooperative, no SI/HI.   10/2: The patient is a little more organized, but still internally preoccupied.  Sedated from clozapine.  Will continue current dose, consider increase if psychosis continues.  10/3: Patient remains disorganized but behaviorally controlled on the unit. Records show that patient was previously well maintained on Clozaril 350mg. Will titrate Clozaril today and monitor for efficacy and side effects.  10/4: Patient shows slight improvement with organization but continues to be disorganized in thought process. Will continue current dose of clozapine with plans to slowly titrate while monitoring for efficacy and side effects.  10/5: Patient was irritable today and less cooperative than previous but continues to be disorganized during the day. Plan to continue titrating clozapine while monitoring for efficacy and side effects.  10/6: Patient shows improvement in mood and thought process and shows improvement in articulating auditory hallucinations. Will continue current dose of clozapine with plans to slowly titrate while monitoring for efficacy and side effects.  10/7: Patient remains disorganized, resistive with treatment, no reports of AH today, continue clozapine titration    10/8: Remains psychotic, suspicious, delusional about her identity, continue clozapine   10/9: Patient remains disorganized but denies auditory hallucinations overnight. BP soft with orthostatics (+), will continue current dose of clozapine with plans to slowly titrate while monitoring for efficacy and side effects.  10/10: Patient remains disorganized and paranoid on the unit but with improved linear thinking since admission, though overall remains disorganized with loose associations. BP remains soft, will encourage PO intake, compression stockings and will continue current dose of clozapine with plans to slowly titrate while monitoring for efficacy and side effects.  10/11: Patient remains disorganized on the unit and requires encouragement for PO intake. Refusing compression stockings. BP remains soft, will check CMP and start salt tablet prior to titrating clozapine.    Plan:  No 1:1 needed, as the patient is calm, no SI.  -Continue clozapine 150mg hs  	Check CBC Wednesdays  - Start NaCl 1g BID  -Continue Senna and Miralax prn for constipation  -Continue metformin, glimepiride and Linagliptin for DM  	Check fingersticks qid  -Lipitor for cholesterol  -Albuterol prn for asthma  -Patient provided verbal consent to speak with Zara Giang and patient's outpatient provider.

## 2023-10-11 NOTE — BH INPATIENT PSYCHIATRY PROGRESS NOTE - MSE UNSTRUCTURED FT
The patient appears stated age, with fair to poor hygiene and is dressed appropriately seen laying in bed with blanket over her head.  She was calm, superficially cooperative but minimally engaging.  She maintained intermittent eye contact.  No agitation, psychomotor retardation or involuntary movements observed.  The patient’s speech is fluent, normal in tone, rate, and normal volume.  The patient’s mood is “tired."  Her affect is constricted, stable, appropriate, congruent to mood. The patient’s thought process demonstrates improved organization but overall remains disorganized with loose associations.  Patient demonstrates paranoid delusions towards staff. She denies auditory hallucinations at this time.  She does not express any suicidal or homicidal ideation, intent, or plan.  Insight is limited.  Judgment is impaired. Impulse control has been good on the unit. The patient appears stated age, with fair to poor hygiene and is dressed appropriately seen laying in bed with blanket over her head.  She was irritable and minimally cooperative with .  She maintained intermittent eye contact.  No agitation, psychomotor retardation or involuntary movements observed.  The patient’s speech is fluent, normal in tone, rate, and normal volume.  The patient’s mood is “tired."  Her affect is constricted, stable, appropriate, congruent to mood. The patient’s thought process demonstrates improved organization but overall remains disorganized with loose associations.  Patient demonstrates paranoid delusions towards staff. She denies auditory hallucinations at this time.  She does not express any suicidal or homicidal ideation, intent, or plan.  Insight is limited.  Judgment is impaired. Impulse control has been good on the unit.

## 2023-10-11 NOTE — BH INPATIENT PSYCHIATRY PROGRESS NOTE - NSBHFUPINTERVALHXFT_PSY_A_CORE
Patient seen for follow up of psychosis. Chart reviewed and case discussed with interdisciplinary team. No acute events reported overnight, no PRN needed. On encounter, patient was irritable and somewhat cooperative with interview. She was seen laying in her bed, saying "I'm sleeping" and refused to remove the blanket over her head but continued to engage in interview. She denied any chest pain or dizziness. She denied wanting to eat breakfast this morning, saying "I'm not hungry, leave me alone" when counseled on increasing her PO intake in order to continue her treatment. She remains disorganized with loose associations, continuing to speak about topics unrelated to the original question when unprompted.     When asked to explain the contents of her dream, she stated that she did not remember and preceded to talk about the staff was trying to make her hypoglycemia by offering her insulin when testing her fingerstick glucose this morning. She stated that she was diagnosed with "hypoglycemia" and was never diagnosed with diabetes, saying that "the brown vitamin" is making her feel sick.  She denies any chest pain or constipation at this time. Patient seen for follow up of psychosis. Chart reviewed and case discussed with interdisciplinary team. No acute events reported overnight, no PRN needed. On encounter, patient was irritable and somewhat cooperative with interview. She was seen laying in her bed, saying "I'm sleeping" and refused to remove the blanket over her head but continued to engage in interview. She denied any chest pain, dizziness or constipation at this time. She denied wanting to eat breakfast this morning, saying "I'm not hungry, leave me alone" when counseled on increasing her PO intake in order to continue her treatment. She remains disorganized with loose associations, continuing to speak about topics unrelated to the original question when unprompted. She asked if she was being given "halcyon" and was reminded of her current medication regimen, to which she was amendable. She was also counseled on wearing compression stockings on the unit, to which she adamantly denied wanting to wear them but was disorganized in her explanation approach.

## 2023-10-11 NOTE — BH INPATIENT PSYCHIATRY PROGRESS NOTE - CURRENT MEDICATION
MEDICATIONS  (STANDING):  atorvastatin 20 milliGRAM(s) Oral at bedtime  cloZAPine 150 milliGRAM(s) Oral at bedtime  glimepiride. 4 milliGRAM(s) Oral with breakfast  insulin lispro (ADMELOG) corrective regimen sliding scale   SubCutaneous three times a day before meals  insulin lispro (ADMELOG) corrective regimen sliding scale   SubCutaneous at bedtime  linagliptin 5 milliGRAM(s) Oral daily  metFORMIN 500 milliGRAM(s) Oral at bedtime  senna 2 Tablet(s) Oral at bedtime    MEDICATIONS  (PRN):  albuterol    90 MICROgram(s) HFA Inhaler 2 Puff(s) Inhalation every 6 hours PRN asthma  aluminum hydroxide/magnesium hydroxide/simethicone Suspension 30 milliLiter(s) Oral every 8 hours PRN Dyspepsia  dextrose Oral Gel 15 Gram(s) Oral once PRN Blood Glucose LESS THAN 70 milliGRAM(s)/deciliter  haloperidol     Tablet 5 milliGRAM(s) Oral every 6 hours PRN agitation  haloperidol    Injectable 5 milliGRAM(s) IntraMuscular once PRN combative behavior  LORazepam     Tablet 2 milliGRAM(s) Oral every 6 hours PRN Agitation  LORazepam   Injectable 2 milliGRAM(s) IntraMuscular once PRN combative behavior  polyethylene glycol 3350 17 Gram(s) Oral daily PRN Constipation   MEDICATIONS  (STANDING):  atorvastatin 20 milliGRAM(s) Oral at bedtime  cloZAPine 150 milliGRAM(s) Oral at bedtime  glimepiride. 4 milliGRAM(s) Oral with breakfast  insulin lispro (ADMELOG) corrective regimen sliding scale   SubCutaneous three times a day before meals  insulin lispro (ADMELOG) corrective regimen sliding scale   SubCutaneous at bedtime  linagliptin 5 milliGRAM(s) Oral daily  metFORMIN 500 milliGRAM(s) Oral at bedtime  senna 2 Tablet(s) Oral at bedtime  sodium chloride 1 Gram(s) Oral two times a day    MEDICATIONS  (PRN):  albuterol    90 MICROgram(s) HFA Inhaler 2 Puff(s) Inhalation every 6 hours PRN asthma  aluminum hydroxide/magnesium hydroxide/simethicone Suspension 30 milliLiter(s) Oral every 8 hours PRN Dyspepsia  dextrose Oral Gel 15 Gram(s) Oral once PRN Blood Glucose LESS THAN 70 milliGRAM(s)/deciliter  haloperidol     Tablet 5 milliGRAM(s) Oral every 6 hours PRN agitation  haloperidol    Injectable 5 milliGRAM(s) IntraMuscular once PRN combative behavior  LORazepam     Tablet 2 milliGRAM(s) Oral every 6 hours PRN Agitation  LORazepam   Injectable 2 milliGRAM(s) IntraMuscular once PRN combative behavior  polyethylene glycol 3350 17 Gram(s) Oral daily PRN Constipation

## 2023-10-11 NOTE — BH INPATIENT PSYCHIATRY PROGRESS NOTE - NSBHMETABOLIC_PSY_ALL_CORE_FT
BMI: BMI (kg/m2): 21.8 (09-28-23 @ 02:45)  HbA1c: A1C with Estimated Average Glucose Result: 7.2 % (09-27-23 @ 19:00)    Glucose: POCT Blood Glucose.: 150 mg/dL (10-11-23 @ 08:13)    BP: 101/62 (10-11-23 @ 07:49) (101/62 - 101/62)  Lipid Panel: Date/Time: 07-07-23 @ 05:52  Cholesterol, Serum: 153  Direct LDL: --  HDL Cholesterol, Serum: 28  Total Cholesterol/HDL Ration Measurement: --  Triglycerides, Serum: 273   BMI: BMI (kg/m2): 21.8 (09-28-23 @ 02:45)  HbA1c: A1C with Estimated Average Glucose Result: 7.2 % (09-27-23 @ 19:00)    Glucose: POCT Blood Glucose.: 182 mg/dL (10-11-23 @ 12:05)    BP: 101/62 (10-11-23 @ 07:49) (101/62 - 101/62)  Lipid Panel: Date/Time: 07-07-23 @ 05:52  Cholesterol, Serum: 153  Direct LDL: --  HDL Cholesterol, Serum: 28  Total Cholesterol/HDL Ration Measurement: --  Triglycerides, Serum: 273   BMI: BMI (kg/m2): 21.8 (09-28-23 @ 02:45)  HbA1c: A1C with Estimated Average Glucose Result: 7.2 % (09-27-23 @ 19:00)    Glucose: POCT Blood Glucose.: 155 mg/dL (10-11-23 @ 20:19)    BP: 101/62 (10-11-23 @ 07:49) (101/62 - 101/62)  Lipid Panel: Date/Time: 07-07-23 @ 05:52  Cholesterol, Serum: 153  Direct LDL: --  HDL Cholesterol, Serum: 28  Total Cholesterol/HDL Ration Measurement: --  Triglycerides, Serum: 273

## 2023-10-11 NOTE — BH INPATIENT PSYCHIATRY PROGRESS NOTE - NSBHCHARTREVIEWVS_PSY_A_CORE FT
Vital Signs Last 24 Hrs  T(C): 36.5 (10-11-23 @ 07:49), Max: 36.6 (10-10-23 @ 19:26)  T(F): 97.7 (10-11-23 @ 07:49), Max: 97.8 (10-10-23 @ 19:26)  HR: 76 (10-11-23 @ 07:49) (76 - 76)  BP: 101/62 (10-11-23 @ 07:49) (101/62 - 101/62)  BP(mean): --  RR: 17 (10-11-23 @ 07:49) (16 - 17)  SpO2: 100% (10-11-23 @ 07:49) (98% - 100%)    Orthostatic VS  10-10-23 @ 21:30  Lying BP: 107/60 HR: 82  Sitting BP: 102/68 HR: 90  Standing BP: --/-- HR: --  Site: --  Mode: --  Orthostatic VS  10-10-23 @ 19:26  Lying BP: --/-- HR: --  Sitting BP: 121/64 HR: 93  Standing BP: 85/60 HR: 100  Site: --  Mode: --  Orthostatic VS  10-10-23 @ 07:50  Lying BP: 99/50 HR: 76  Sitting BP: --/-- HR: --  Standing BP: --/-- HR: --  Site: --  Mode: --  Orthostatic VS  10-10-23 @ 06:04  Lying BP: 90/50 HR: 76  Sitting BP: --/-- HR: --  Standing BP: --/-- HR: --  Site: --  Mode: --  Orthostatic VS  10-09-23 @ 18:41  Lying BP: --/-- HR: --  Sitting BP: 114/70 HR: 84  Standing BP: 94/59 HR: 90  Site: --  Mode: --   Vital Signs Last 24 Hrs  T(C): 36.7 (10-11-23 @ 18:38), Max: 36.7 (10-11-23 @ 18:38)  T(F): 98 (10-11-23 @ 18:38), Max: 98 (10-11-23 @ 18:38)  HR: 76 (10-11-23 @ 07:49) (76 - 76)  BP: 101/62 (10-11-23 @ 07:49) (101/62 - 101/62)  BP(mean): --  RR: 17 (10-11-23 @ 07:49) (17 - 17)  SpO2: 97% (10-11-23 @ 18:38) (97% - 100%)    Orthostatic VS  10-11-23 @ 18:38  Lying BP: --/-- HR: --  Sitting BP: 123/67 HR: 90  Standing BP: 104/65 HR: 96  Site: --  Mode: --  Orthostatic VS  10-10-23 @ 21:30  Lying BP: 107/60 HR: 82  Sitting BP: 102/68 HR: 90  Standing BP: --/-- HR: --  Site: --  Mode: --  Orthostatic VS  10-10-23 @ 19:26  Lying BP: --/-- HR: --  Sitting BP: 121/64 HR: 93  Standing BP: 85/60 HR: 100  Site: --  Mode: --  Orthostatic VS  10-10-23 @ 07:50  Lying BP: 99/50 HR: 76  Sitting BP: --/-- HR: --  Standing BP: --/-- HR: --  Site: --  Mode: --  Orthostatic VS  10-10-23 @ 06:04  Lying BP: 90/50 HR: 76  Sitting BP: --/-- HR: --  Standing BP: --/-- HR: --  Site: --  Mode: --

## 2023-10-12 LAB
ALBUMIN SERPL ELPH-MCNC: 4.3 G/DL — SIGNIFICANT CHANGE UP (ref 3.3–5)
ALP SERPL-CCNC: 103 U/L — SIGNIFICANT CHANGE UP (ref 40–120)
ALT FLD-CCNC: 18 U/L — SIGNIFICANT CHANGE UP (ref 4–33)
ANION GAP SERPL CALC-SCNC: 15 MMOL/L — HIGH (ref 7–14)
AST SERPL-CCNC: 16 U/L — SIGNIFICANT CHANGE UP (ref 4–32)
BILIRUB SERPL-MCNC: 0.9 MG/DL — SIGNIFICANT CHANGE UP (ref 0.2–1.2)
BUN SERPL-MCNC: 14 MG/DL — SIGNIFICANT CHANGE UP (ref 7–23)
CALCIUM SERPL-MCNC: 9.8 MG/DL — SIGNIFICANT CHANGE UP (ref 8.4–10.5)
CHLORIDE SERPL-SCNC: 103 MMOL/L — SIGNIFICANT CHANGE UP (ref 98–107)
CO2 SERPL-SCNC: 22 MMOL/L — SIGNIFICANT CHANGE UP (ref 22–31)
CREAT SERPL-MCNC: 0.68 MG/DL — SIGNIFICANT CHANGE UP (ref 0.5–1.3)
EGFR: 100 ML/MIN/1.73M2 — SIGNIFICANT CHANGE UP
GLUCOSE BLDC GLUCOMTR-MCNC: 175 MG/DL — HIGH (ref 70–99)
GLUCOSE BLDC GLUCOMTR-MCNC: 179 MG/DL — HIGH (ref 70–99)
GLUCOSE BLDC GLUCOMTR-MCNC: 179 MG/DL — HIGH (ref 70–99)
GLUCOSE BLDC GLUCOMTR-MCNC: 192 MG/DL — HIGH (ref 70–99)
GLUCOSE BLDC GLUCOMTR-MCNC: 201 MG/DL — HIGH (ref 70–99)
GLUCOSE SERPL-MCNC: 163 MG/DL — HIGH (ref 70–99)
POTASSIUM SERPL-MCNC: 3.9 MMOL/L — SIGNIFICANT CHANGE UP (ref 3.5–5.3)
POTASSIUM SERPL-SCNC: 3.9 MMOL/L — SIGNIFICANT CHANGE UP (ref 3.5–5.3)
PROT SERPL-MCNC: 6.9 G/DL — SIGNIFICANT CHANGE UP (ref 6–8.3)
SODIUM SERPL-SCNC: 140 MMOL/L — SIGNIFICANT CHANGE UP (ref 135–145)

## 2023-10-12 PROCEDURE — 99232 SBSQ HOSP IP/OBS MODERATE 35: CPT | Mod: GC

## 2023-10-12 RX ORDER — CLOZAPINE 150 MG/1
175 TABLET, ORALLY DISINTEGRATING ORAL AT BEDTIME
Refills: 0 | Status: DISCONTINUED | OUTPATIENT
Start: 2023-10-12 | End: 2023-10-17

## 2023-10-12 RX ORDER — FLUDROCORTISONE ACETATE 0.1 MG/1
0.1 TABLET ORAL DAILY
Refills: 0 | Status: DISCONTINUED | OUTPATIENT
Start: 2023-10-13 | End: 2023-10-20

## 2023-10-12 RX ADMIN — SENNA PLUS 2 TABLET(S): 8.6 TABLET ORAL at 20:25

## 2023-10-12 RX ADMIN — CLOZAPINE 175 MILLIGRAM(S): 150 TABLET, ORALLY DISINTEGRATING ORAL at 20:26

## 2023-10-12 RX ADMIN — METFORMIN HYDROCHLORIDE 500 MILLIGRAM(S): 850 TABLET ORAL at 20:26

## 2023-10-12 RX ADMIN — ATORVASTATIN CALCIUM 20 MILLIGRAM(S): 80 TABLET, FILM COATED ORAL at 20:26

## 2023-10-12 RX ADMIN — SODIUM CHLORIDE 1 GRAM(S): 9 INJECTION INTRAMUSCULAR; INTRAVENOUS; SUBCUTANEOUS at 20:26

## 2023-10-12 RX ADMIN — Medication 2: at 17:11

## 2023-10-12 NOTE — BH INPATIENT PSYCHIATRY PROGRESS NOTE - NSCGIIMPROVESX_PSY_ALL_CORE
3 = Minimally improved - slightly better with little or no clinically meaningful reduction of symptoms.  Represents very little change in basic clinical status, level of care, or functional capacity.

## 2023-10-12 NOTE — BH INPATIENT PSYCHIATRY PROGRESS NOTE - PRN MEDS
MEDICATIONS  (PRN):  albuterol    90 MICROgram(s) HFA Inhaler 2 Puff(s) Inhalation every 6 hours PRN asthma  aluminum hydroxide/magnesium hydroxide/simethicone Suspension 30 milliLiter(s) Oral every 8 hours PRN Dyspepsia  dextrose Oral Gel 15 Gram(s) Oral once PRN Blood Glucose LESS THAN 70 milliGRAM(s)/deciliter  haloperidol     Tablet 5 milliGRAM(s) Oral every 6 hours PRN agitation  haloperidol    Injectable 5 milliGRAM(s) IntraMuscular once PRN combative behavior  LORazepam     Tablet 2 milliGRAM(s) Oral every 6 hours PRN Agitation  polyethylene glycol 3350 17 Gram(s) Oral daily PRN Constipation

## 2023-10-12 NOTE — BH INPATIENT PSYCHIATRY PROGRESS NOTE - CURRENT MEDICATION
MEDICATIONS  (STANDING):  atorvastatin 20 milliGRAM(s) Oral at bedtime  cloZAPine 150 milliGRAM(s) Oral at bedtime  glimepiride. 4 milliGRAM(s) Oral with breakfast  insulin lispro (ADMELOG) corrective regimen sliding scale   SubCutaneous at bedtime  insulin lispro (ADMELOG) corrective regimen sliding scale   SubCutaneous three times a day before meals  linagliptin 5 milliGRAM(s) Oral daily  metFORMIN 500 milliGRAM(s) Oral at bedtime  senna 2 Tablet(s) Oral at bedtime  sodium chloride 1 Gram(s) Oral two times a day    MEDICATIONS  (PRN):  albuterol    90 MICROgram(s) HFA Inhaler 2 Puff(s) Inhalation every 6 hours PRN asthma  aluminum hydroxide/magnesium hydroxide/simethicone Suspension 30 milliLiter(s) Oral every 8 hours PRN Dyspepsia  dextrose Oral Gel 15 Gram(s) Oral once PRN Blood Glucose LESS THAN 70 milliGRAM(s)/deciliter  haloperidol     Tablet 5 milliGRAM(s) Oral every 6 hours PRN agitation  haloperidol    Injectable 5 milliGRAM(s) IntraMuscular once PRN combative behavior  LORazepam     Tablet 2 milliGRAM(s) Oral every 6 hours PRN Agitation  polyethylene glycol 3350 17 Gram(s) Oral daily PRN Constipation   MEDICATIONS  (STANDING):  atorvastatin 20 milliGRAM(s) Oral at bedtime  cloZAPine 175 milliGRAM(s) Oral at bedtime  glimepiride. 4 milliGRAM(s) Oral with breakfast  insulin lispro (ADMELOG) corrective regimen sliding scale   SubCutaneous three times a day before meals  insulin lispro (ADMELOG) corrective regimen sliding scale   SubCutaneous at bedtime  linagliptin 5 milliGRAM(s) Oral daily  metFORMIN 500 milliGRAM(s) Oral at bedtime  senna 2 Tablet(s) Oral at bedtime  sodium chloride 1 Gram(s) Oral two times a day    MEDICATIONS  (PRN):  albuterol    90 MICROgram(s) HFA Inhaler 2 Puff(s) Inhalation every 6 hours PRN asthma  aluminum hydroxide/magnesium hydroxide/simethicone Suspension 30 milliLiter(s) Oral every 8 hours PRN Dyspepsia  dextrose Oral Gel 15 Gram(s) Oral once PRN Blood Glucose LESS THAN 70 milliGRAM(s)/deciliter  haloperidol     Tablet 5 milliGRAM(s) Oral every 6 hours PRN agitation  haloperidol    Injectable 5 milliGRAM(s) IntraMuscular once PRN combative behavior  LORazepam     Tablet 2 milliGRAM(s) Oral every 6 hours PRN Agitation  polyethylene glycol 3350 17 Gram(s) Oral daily PRN Constipation   MEDICATIONS  (STANDING):  atorvastatin 20 milliGRAM(s) Oral at bedtime  cloZAPine 175 milliGRAM(s) Oral at bedtime  glimepiride. 4 milliGRAM(s) Oral with breakfast  insulin lispro (ADMELOG) corrective regimen sliding scale   SubCutaneous at bedtime  insulin lispro (ADMELOG) corrective regimen sliding scale   SubCutaneous three times a day before meals  linagliptin 5 milliGRAM(s) Oral daily  metFORMIN 500 milliGRAM(s) Oral at bedtime  senna 2 Tablet(s) Oral at bedtime  sodium chloride 1 Gram(s) Oral two times a day    MEDICATIONS  (PRN):  albuterol    90 MICROgram(s) HFA Inhaler 2 Puff(s) Inhalation every 6 hours PRN asthma  aluminum hydroxide/magnesium hydroxide/simethicone Suspension 30 milliLiter(s) Oral every 8 hours PRN Dyspepsia  dextrose Oral Gel 15 Gram(s) Oral once PRN Blood Glucose LESS THAN 70 milliGRAM(s)/deciliter  haloperidol     Tablet 5 milliGRAM(s) Oral every 6 hours PRN agitation  haloperidol    Injectable 5 milliGRAM(s) IntraMuscular once PRN combative behavior  LORazepam     Tablet 2 milliGRAM(s) Oral every 6 hours PRN Agitation  polyethylene glycol 3350 17 Gram(s) Oral daily PRN Constipation

## 2023-10-12 NOTE — BH INPATIENT PSYCHIATRY PROGRESS NOTE - MSE UNSTRUCTURED FT
The patient appears stated age, with improved hygiene and is dressed with the same yellow cardigan and wet hair after showering.  She was calm, cooperative and engaging with interview.  She maintained intermittent eye contact.  No agitation, psychomotor retardation or involuntary movements observed.  The patient’s speech is fluent, normal in tone, rate, and normal volume.  The patient’s mood is “good."  Her affect is constricted, stable, appropriate, congruent to mood. The patient’s thought process remains disorganized with loose associations.  Patient demonstrates paranoid delusions towards staff at times. She endorses auditory hallucinations and appears internally preoccupied.  She does not express any suicidal or homicidal ideation, intent, or plan.  Insight is limited.  Judgment is impaired. Impulse control has been good on the unit.

## 2023-10-12 NOTE — BH INPATIENT PSYCHIATRY PROGRESS NOTE - NSBHFUPINTERVALHXFT_PSY_A_CORE
Patient seen for follow up of psychosis. Chart reviewed and case discussed with interdisciplinary team. No acute events reported overnight, no PRN needed. On encounter, patient was awake, alert, cooperative and engaging, seen sitting near the patient phone but denied using it to call anyone. She immediately endorsed that she took a shower and pointed to her wet hair, saying that she does not like showering every day but showers 3 times a week at home. She also endorsed feeling sad because she wants to leave the unit. Patient was counseled on increasing PO intake, to which she promised that she ate breakfast this morning and prefers to drink juice. She was offered compression stockings and explained the need to increase her BP before titrating her medication but refused, saying that she prefers to wear loose pants and that another patient on the unit needs the stockings instead. She endorses that she has "schizophrenia" and that she hears the voices of her twins who talk to her "all the time" but denies that they are currently speaking to her. She denied any chest pain, dizziness or constipation with last bowel movement reportedly yesterday.

## 2023-10-12 NOTE — BH INPATIENT PSYCHIATRY PROGRESS NOTE - NSBHMETABOLIC_PSY_ALL_CORE_FT
BMI: BMI (kg/m2): 21.8 (09-28-23 @ 02:45)  HbA1c: A1C with Estimated Average Glucose Result: 7.2 % (09-27-23 @ 19:00)    Glucose: POCT Blood Glucose.: 175 mg/dL (10-12-23 @ 08:16)    BP: 101/62 (10-11-23 @ 07:49) (101/62 - 101/62)  Lipid Panel: Date/Time: 07-07-23 @ 05:52  Cholesterol, Serum: 153  Direct LDL: --  HDL Cholesterol, Serum: 28  Total Cholesterol/HDL Ration Measurement: --  Triglycerides, Serum: 273   BMI: BMI (kg/m2): 21.8 (09-28-23 @ 02:45)  HbA1c: A1C with Estimated Average Glucose Result: 7.2 % (09-27-23 @ 19:00)    Glucose: POCT Blood Glucose.: 179 mg/dL (10-12-23 @ 12:07)    BP: 101/62 (10-11-23 @ 07:49) (101/62 - 101/62)  Lipid Panel: Date/Time: 07-07-23 @ 05:52  Cholesterol, Serum: 153  Direct LDL: --  HDL Cholesterol, Serum: 28  Total Cholesterol/HDL Ration Measurement: --  Triglycerides, Serum: 273   BMI: BMI (kg/m2): 21.8 (09-28-23 @ 02:45)  HbA1c: A1C with Estimated Average Glucose Result: 7.2 % (09-27-23 @ 19:00)    Glucose: POCT Blood Glucose.: 201 mg/dL (10-12-23 @ 16:31)    BP: 101/62 (10-11-23 @ 07:49) (101/62 - 101/62)  Lipid Panel: Date/Time: 07-07-23 @ 05:52  Cholesterol, Serum: 153  Direct LDL: --  HDL Cholesterol, Serum: 28  Total Cholesterol/HDL Ration Measurement: --  Triglycerides, Serum: 273

## 2023-10-12 NOTE — BH INPATIENT PSYCHIATRY PROGRESS NOTE - NSBHCHARTREVIEWVS_PSY_A_CORE FT
Vital Signs Last 24 Hrs  T(C): 36.7 (10-12-23 @ 08:27), Max: 36.7 (10-11-23 @ 18:38)  T(F): 98 (10-12-23 @ 08:27), Max: 98 (10-11-23 @ 18:38)  HR: --  BP: --  BP(mean): --  RR: 18 (10-12-23 @ 08:27) (18 - 18)  SpO2: 99% (10-12-23 @ 08:27) (97% - 99%)    Orthostatic VS  10-12-23 @ 08:27  Lying BP: 107/60 HR: 80  Sitting BP: --/-- HR: --  Standing BP: --/-- HR: --  Site: upper right arm  Mode: electronic  Orthostatic VS  10-11-23 @ 18:38  Lying BP: --/-- HR: --  Sitting BP: 123/67 HR: 90  Standing BP: 104/65 HR: 96  Site: --  Mode: --  Orthostatic VS  10-10-23 @ 21:30  Lying BP: 107/60 HR: 82  Sitting BP: 102/68 HR: 90  Standing BP: --/-- HR: --  Site: --  Mode: --  Orthostatic VS  10-10-23 @ 19:26  Lying BP: --/-- HR: --  Sitting BP: 121/64 HR: 93  Standing BP: 85/60 HR: 100  Site: --  Mode: --   Vital Signs Last 24 Hrs  T(C): 36.7 (10-12-23 @ 08:27), Max: 36.7 (10-11-23 @ 18:38)  T(F): 98 (10-12-23 @ 08:27), Max: 98 (10-11-23 @ 18:38)  HR: --  BP: --  BP(mean): --  RR: 18 (10-12-23 @ 08:27) (18 - 18)  SpO2: 99% (10-12-23 @ 08:27) (97% - 99%)    Orthostatic VS  10-12-23 @ 11:08  Lying BP: --/-- HR: --  Sitting BP: 107/68 HR: 87  Standing BP: 101/57 HR: 95  Site: --  Mode: --  Orthostatic VS  10-12-23 @ 08:27  Lying BP: 107/60 HR: 80  Sitting BP: --/-- HR: --  Standing BP: --/-- HR: --  Site: upper right arm  Mode: electronic  Orthostatic VS  10-11-23 @ 18:38  Lying BP: --/-- HR: --  Sitting BP: 123/67 HR: 90  Standing BP: 104/65 HR: 96  Site: --  Mode: --  Orthostatic VS  10-10-23 @ 21:30  Lying BP: 107/60 HR: 82  Sitting BP: 102/68 HR: 90  Standing BP: --/-- HR: --  Site: --  Mode: --  Orthostatic VS  10-10-23 @ 19:26  Lying BP: --/-- HR: --  Sitting BP: 121/64 HR: 93  Standing BP: 85/60 HR: 100  Site: --  Mode: --

## 2023-10-12 NOTE — BH INPATIENT PSYCHIATRY PROGRESS NOTE - NSBHASSESSSUMMFT_PSY_ALL_CORE
57 y/o female, single, noncaregiver, disabled, lives with [facility], [place], history of Schizophrenia vs Schizoaffectove d/o, multiple past admissions at [facility] (most recent 2020 for decompensated psychosis), and also was hospitalized medically for lactic acidosis and followed by CL team in July 2023, followed by psychiatrist Dr. Love at [facility] (pt has no h/o developmental disability), prescribed Clozaril 100mg qhs , no h/o self-injurious behavior or suicide attempts, no h/o violence or legal issues, PMH Type II DM, HLD, asthma, no h/o substance abuse, BIBEMS activated by rosalizy (Rahat) for disorganization.  The patient is grossly disorganized in thoughts on admission.    9/29: The patient continues to be disorganized, but is taking medications.  Will continue clozapine.  9/30: Psychotic disorganized cont meds encourage cooperation with proper vital signs  10/1: disorganized, but more verbal, cooperative, no SI/HI.   10/2: The patient is a little more organized, but still internally preoccupied.  Sedated from clozapine.  Will continue current dose, consider increase if psychosis continues.  10/3: Patient remains disorganized but behaviorally controlled on the unit. Records show that patient was previously well maintained on Clozaril 350mg. Will titrate Clozaril today and monitor for efficacy and side effects.  10/4: Patient shows slight improvement with organization but continues to be disorganized in thought process. Will continue current dose of clozapine with plans to slowly titrate while monitoring for efficacy and side effects.  10/5: Patient was irritable today and less cooperative than previous but continues to be disorganized during the day. Plan to continue titrating clozapine while monitoring for efficacy and side effects.  10/6: Patient shows improvement in mood and thought process and shows improvement in articulating auditory hallucinations. Will continue current dose of clozapine with plans to slowly titrate while monitoring for efficacy and side effects.  10/7: Patient remains disorganized, resistive with treatment, no reports of AH today, continue clozapine titration    10/8: Remains psychotic, suspicious, delusional about her identity, continue clozapine   10/9: Patient remains disorganized but denies auditory hallucinations overnight. BP soft with orthostatics (+), will continue current dose of clozapine with plans to slowly titrate while monitoring for efficacy and side effects.  10/10: Patient remains disorganized and paranoid on the unit but with improved linear thinking since admission, though overall remains disorganized with loose associations. BP remains soft, will encourage PO intake, compression stockings and will continue current dose of clozapine with plans to slowly titrate while monitoring for efficacy and side effects.  10/11: Patient remains disorganized on the unit and requires encouragement for PO intake. Refusing compression stockings. BP remains soft, will check CMP and start salt tablet prior to titrating clozapine.  10/12: Patient remains disorganized on the unit and requires encouragement for PO intake.    Plan:  No 1:1 needed, as the patient is calm, no SI.  -Continue clozapine 150mg hs  	Check CBC Wednesdays  - Continue NaCl 1g BID  -Continue Senna and Miralax prn for constipation  -Continue metformin, glimepiride and Linagliptin for DM  	Check fingersticks qid  -Lipitor for cholesterol  -Albuterol prn for asthma  -Patient provided verbal consent to speak with Zara Giang and patient's outpatient provider. 57 y/o female, single, noncaregiver, disabled, lives with [facility], history of Schizophrenia vs Schizoaffectove d/o, multiple past admissions at [facility] (most recent 2020 for decompensated psychosis), and also was hospitalized medically for lactic acidosis and followed by CL team in July 2023, followed by psychiatrist Dr. Love at [facility] (pt has no h/o developmental disability), prescribed Clozaril 100mg qhs , no h/o self-injurious behavior or suicide attempts, no h/o violence or legal issues, PMH Type II DM, HLD, asthma, no h/o substance abuse, BIBEMS activated by rosalizy (Rahat) for disorganization.  The patient is grossly disorganized in thoughts on admission.    9/29: The patient continues to be disorganized, but is taking medications.  Will continue clozapine.  9/30: Psychotic disorganized cont meds encourage cooperation with proper vital signs  10/1: disorganized, but more verbal, cooperative, no SI/HI.   10/2: The patient is a little more organized, but still internally preoccupied.  Sedated from clozapine.  Will continue current dose, consider increase if psychosis continues.  10/3: Patient remains disorganized but behaviorally controlled on the unit. Records show that patient was previously well maintained on Clozaril 350mg. Will titrate Clozaril today and monitor for efficacy and side effects.  10/4: Patient shows slight improvement with organization but continues to be disorganized in thought process. Will continue current dose of clozapine with plans to slowly titrate while monitoring for efficacy and side effects.  10/5: Patient was irritable today and less cooperative than previous but continues to be disorganized during the day. Plan to continue titrating clozapine while monitoring for efficacy and side effects.  10/6: Patient shows improvement in mood and thought process and shows improvement in articulating auditory hallucinations. Will continue current dose of clozapine with plans to slowly titrate while monitoring for efficacy and side effects.  10/7: Patient remains disorganized, resistive with treatment, no reports of AH today, continue clozapine titration    10/8: Remains psychotic, suspicious, delusional about her identity, continue clozapine   10/9: Patient remains disorganized but denies auditory hallucinations overnight. BP soft with orthostatics (+), will continue current dose of clozapine with plans to slowly titrate while monitoring for efficacy and side effects.  10/10: Patient remains disorganized and paranoid on the unit but with improved linear thinking since admission, though overall remains disorganized with loose associations. BP remains soft, will encourage PO intake, compression stockings and will continue current dose of clozapine with plans to slowly titrate while monitoring for efficacy and side effects.  10/11: Patient remains disorganized on the unit and requires encouragement for PO intake. Refusing compression stockings. BP remains soft, will check CMP and start salt tablet prior to titrating clozapine.  10/12: Patient remains disorganized on the unit and requires encouragement for PO intake. Orthostatics negative, plan to titrate clozapine tonight and monitor for efficacy and side effects.    Plan:  No 1:1 needed, as the patient is calm, no SI.  -Increase clozapine 175 mg hs  	Check CBC Wednesdays  - Continue NaCl 1g BID  -Continue Senna and Miralax prn for constipation  -Continue metformin, glimepiride and Linagliptin for DM  	Check fingersticks qid  -Lipitor for cholesterol  -Albuterol prn for asthma  -Patient provided verbal consent to speak with Zara Giang and patient's outpatient provider. 57 y/o female, single, noncaregiver, disabled, lives with [facility], history of Schizophrenia vs Schizoaffectove d/o, multiple past admissions at [facility] (most recent 2020 for decompensated psychosis), and also was hospitalized medically for lactic acidosis and followed by CL team in July 2023, followed by psychiatrist Dr. Love at [facility] (pt has no h/o developmental disability), prescribed Clozaril 100mg qhs , no h/o self-injurious behavior or suicide attempts, no h/o violence or legal issues, PMH Type II DM, HLD, asthma, no h/o substance abuse, BIBEMS activated by rosalizy (Rahat) for disorganization.  The patient is grossly disorganized in thoughts on admission.    9/29: The patient continues to be disorganized, but is taking medications.  Will continue clozapine.  9/30: Psychotic disorganized cont meds encourage cooperation with proper vital signs  10/1: disorganized, but more verbal, cooperative, no SI/HI.   10/2: The patient is a little more organized, but still internally preoccupied.  Sedated from clozapine.  Will continue current dose, consider increase if psychosis continues.  10/3: Patient remains disorganized but behaviorally controlled on the unit. Records show that patient was previously well maintained on Clozaril 350mg. Will titrate Clozaril today and monitor for efficacy and side effects.  10/4: Patient shows slight improvement with organization but continues to be disorganized in thought process. Will continue current dose of clozapine with plans to slowly titrate while monitoring for efficacy and side effects.  10/5: Patient was irritable today and less cooperative than previous but continues to be disorganized during the day. Plan to continue titrating clozapine while monitoring for efficacy and side effects.  10/6: Patient shows improvement in mood and thought process and shows improvement in articulating auditory hallucinations. Will continue current dose of clozapine with plans to slowly titrate while monitoring for efficacy and side effects.  10/7: Patient remains disorganized, resistive with treatment, no reports of AH today, continue clozapine titration    10/8: Remains psychotic, suspicious, delusional about her identity, continue clozapine   10/9: Patient remains disorganized but denies auditory hallucinations overnight. BP soft with orthostatics (+), will continue current dose of clozapine with plans to slowly titrate while monitoring for efficacy and side effects.  10/10: Patient remains disorganized and paranoid on the unit but with improved linear thinking since admission, though overall remains disorganized with loose associations. BP remains soft, will encourage PO intake, compression stockings and will continue current dose of clozapine with plans to slowly titrate while monitoring for efficacy and side effects.  10/11: Patient remains disorganized on the unit and requires encouragement for PO intake. Refusing compression stockings. BP remains soft, will check CMP and start salt tablet prior to titrating clozapine.  10/12: Patient remains disorganized on the unit and requires encouragement for PO intake. CMP within normal limits and orthostatics negative, plan to titrate clozapine tonight and monitor for efficacy and side effects.    Plan:  No 1:1 needed, as the patient is calm, no SI.  -Increase clozapine 175 mg hs  	Check CBC Wednesdays  - Start Fludrocortisone 0.1mg daily with food for orthostatic hypotension  - Continue NaCl 1g BID  -Continue Senna and Miralax prn for constipation  -Continue metformin, glimepiride and Linagliptin for DM  	Check fingersticks qid  -Lipitor for cholesterol  -Albuterol prn for asthma  -Patient provided verbal consent to speak with Zara Giang and patient's outpatient provider.

## 2023-10-13 LAB
GLUCOSE BLDC GLUCOMTR-MCNC: 135 MG/DL — HIGH (ref 70–99)
GLUCOSE BLDC GLUCOMTR-MCNC: 162 MG/DL — HIGH (ref 70–99)
GLUCOSE BLDC GLUCOMTR-MCNC: 210 MG/DL — HIGH (ref 70–99)
GLUCOSE BLDC GLUCOMTR-MCNC: 94 MG/DL — SIGNIFICANT CHANGE UP (ref 70–99)

## 2023-10-13 PROCEDURE — 99232 SBSQ HOSP IP/OBS MODERATE 35: CPT

## 2023-10-13 RX ADMIN — ATORVASTATIN CALCIUM 20 MILLIGRAM(S): 80 TABLET, FILM COATED ORAL at 20:15

## 2023-10-13 RX ADMIN — SENNA PLUS 2 TABLET(S): 8.6 TABLET ORAL at 20:15

## 2023-10-13 RX ADMIN — SODIUM CHLORIDE 1 GRAM(S): 9 INJECTION INTRAMUSCULAR; INTRAVENOUS; SUBCUTANEOUS at 20:16

## 2023-10-13 RX ADMIN — FLUDROCORTISONE ACETATE 0.1 MILLIGRAM(S): 0.1 TABLET ORAL at 05:55

## 2023-10-13 RX ADMIN — Medication 0: at 20:37

## 2023-10-13 RX ADMIN — LINAGLIPTIN 5 MILLIGRAM(S): 5 TABLET, FILM COATED ORAL at 08:51

## 2023-10-13 RX ADMIN — Medication 4 MILLIGRAM(S): at 08:51

## 2023-10-13 RX ADMIN — SODIUM CHLORIDE 1 GRAM(S): 9 INJECTION INTRAMUSCULAR; INTRAVENOUS; SUBCUTANEOUS at 08:52

## 2023-10-13 RX ADMIN — METFORMIN HYDROCHLORIDE 500 MILLIGRAM(S): 850 TABLET ORAL at 20:15

## 2023-10-13 RX ADMIN — CLOZAPINE 175 MILLIGRAM(S): 150 TABLET, ORALLY DISINTEGRATING ORAL at 20:15

## 2023-10-13 RX ADMIN — Medication 2: at 12:28

## 2023-10-13 NOTE — BH INPATIENT PSYCHIATRY PROGRESS NOTE - CURRENT MEDICATION
MEDICATIONS  (STANDING):  atorvastatin 20 milliGRAM(s) Oral at bedtime  cloZAPine 175 milliGRAM(s) Oral at bedtime  fludroCORTISONE 0.1 milliGRAM(s) Oral daily  glimepiride. 4 milliGRAM(s) Oral with breakfast  insulin lispro (ADMELOG) corrective regimen sliding scale   SubCutaneous three times a day before meals  insulin lispro (ADMELOG) corrective regimen sliding scale   SubCutaneous at bedtime  linagliptin 5 milliGRAM(s) Oral daily  metFORMIN 500 milliGRAM(s) Oral at bedtime  senna 2 Tablet(s) Oral at bedtime  sodium chloride 1 Gram(s) Oral two times a day    MEDICATIONS  (PRN):  albuterol    90 MICROgram(s) HFA Inhaler 2 Puff(s) Inhalation every 6 hours PRN asthma  aluminum hydroxide/magnesium hydroxide/simethicone Suspension 30 milliLiter(s) Oral every 8 hours PRN Dyspepsia  dextrose Oral Gel 15 Gram(s) Oral once PRN Blood Glucose LESS THAN 70 milliGRAM(s)/deciliter  haloperidol     Tablet 5 milliGRAM(s) Oral every 6 hours PRN agitation  haloperidol    Injectable 5 milliGRAM(s) IntraMuscular once PRN combative behavior  LORazepam     Tablet 2 milliGRAM(s) Oral every 6 hours PRN Agitation  polyethylene glycol 3350 17 Gram(s) Oral daily PRN Constipation   MEDICATIONS  (STANDING):  atorvastatin 20 milliGRAM(s) Oral at bedtime  cloZAPine 175 milliGRAM(s) Oral at bedtime  fludroCORTISONE 0.1 milliGRAM(s) Oral daily  glimepiride. 4 milliGRAM(s) Oral with breakfast  insulin lispro (ADMELOG) corrective regimen sliding scale   SubCutaneous at bedtime  insulin lispro (ADMELOG) corrective regimen sliding scale   SubCutaneous three times a day before meals  linagliptin 5 milliGRAM(s) Oral daily  metFORMIN 500 milliGRAM(s) Oral at bedtime  senna 2 Tablet(s) Oral at bedtime  sodium chloride 1 Gram(s) Oral two times a day    MEDICATIONS  (PRN):  albuterol    90 MICROgram(s) HFA Inhaler 2 Puff(s) Inhalation every 6 hours PRN asthma  aluminum hydroxide/magnesium hydroxide/simethicone Suspension 30 milliLiter(s) Oral every 8 hours PRN Dyspepsia  dextrose Oral Gel 15 Gram(s) Oral once PRN Blood Glucose LESS THAN 70 milliGRAM(s)/deciliter  haloperidol     Tablet 5 milliGRAM(s) Oral every 6 hours PRN agitation  haloperidol    Injectable 5 milliGRAM(s) IntraMuscular once PRN combative behavior  LORazepam     Tablet 2 milliGRAM(s) Oral every 6 hours PRN Agitation  polyethylene glycol 3350 17 Gram(s) Oral daily PRN Constipation

## 2023-10-13 NOTE — BH INPATIENT PSYCHIATRY PROGRESS NOTE - NSBHASSESSSUMMFT_PSY_ALL_CORE
59 y/o female, single, noncaregiver, disabled, lives with [facility], history of Schizophrenia vs Schizoaffectove d/o, multiple past admissions at [facility] (most recent 2020 for decompensated psychosis), and also was hospitalized medically for lactic acidosis and followed by CL team in July 2023, followed by psychiatrist Dr. Love at [facility] (pt has no h/o developmental disability), prescribed Clozaril 100mg qhs , no h/o self-injurious behavior or suicide attempts, no h/o violence or legal issues, PMH Type II DM, HLD, asthma, no h/o substance abuse, BIBEMS activated by rosalizy (Rahat) for disorganization.  The patient is grossly disorganized in thoughts on admission.    9/29: The patient continues to be disorganized, but is taking medications.  Will continue clozapine.  9/30: Psychotic disorganized cont meds encourage cooperation with proper vital signs  10/1: disorganized, but more verbal, cooperative, no SI/HI.   10/2: The patient is a little more organized, but still internally preoccupied.  Sedated from clozapine.  Will continue current dose, consider increase if psychosis continues.  10/3: Patient remains disorganized but behaviorally controlled on the unit. Records show that patient was previously well maintained on Clozaril 350mg. Will titrate Clozaril today and monitor for efficacy and side effects.  10/4: Patient shows slight improvement with organization but continues to be disorganized in thought process. Will continue current dose of clozapine with plans to slowly titrate while monitoring for efficacy and side effects.  10/5: Patient was irritable today and less cooperative than previous but continues to be disorganized during the day. Plan to continue titrating clozapine while monitoring for efficacy and side effects.  10/6: Patient shows improvement in mood and thought process and shows improvement in articulating auditory hallucinations. Will continue current dose of clozapine with plans to slowly titrate while monitoring for efficacy and side effects.  10/7: Patient remains disorganized, resistive with treatment, no reports of AH today, continue clozapine titration    10/8: Remains psychotic, suspicious, delusional about her identity, continue clozapine   10/9: Patient remains disorganized but denies auditory hallucinations overnight. BP soft with orthostatics (+), will continue current dose of clozapine with plans to slowly titrate while monitoring for efficacy and side effects.  10/10: Patient remains disorganized and paranoid on the unit but with improved linear thinking since admission, though overall remains disorganized with loose associations. BP remains soft, will encourage PO intake, compression stockings and will continue current dose of clozapine with plans to slowly titrate while monitoring for efficacy and side effects.  10/11: Patient remains disorganized on the unit and requires encouragement for PO intake. Refusing compression stockings. BP remains soft, will check CMP and start salt tablet prior to titrating clozapine.  10/12: Patient remains disorganized on the unit and requires encouragement for PO intake. CMP within normal limits and orthostatics negative, plan to titrate clozapine tonight and monitor for efficacy and side effects.  10/13: Remains disorganized    Plan:  No 1:1 needed, as the patient is calm, no SI.  -Increased clozapine to 175 mg hs  	Check CBC Wednesdays  - Started Fludrocortisone 0.1mg daily with food for orthostatic hypotension; refusing AYAN stockings  - Continue NaCl 1g BID  -Continue Senna and Miralax prn for constipation  -Continue metformin, glimepiride and Linagliptin for DM  	Check fingersticks qid  -Lipitor for cholesterol  -Albuterol prn for asthma  -Patient provided verbal consent to speak with Zara Giang and patient's outpatient provider.

## 2023-10-13 NOTE — BH INPATIENT PSYCHIATRY PROGRESS NOTE - MSE UNSTRUCTURED FT
The patient appears stated age, with improved hygiene and is dressed with the same yellow cardigan and wet hair after showering.  She was calm, cooperative and engaging with interview.  She maintained intermittent eye contact.  No agitation, psychomotor retardation or involuntary movements observed.  The patient’s speech is fluent, normal in tone, rate, and normal volume.  The patient’s mood is “good."  Her affect is constricted, stable, appropriate, congruent to mood. The patient’s thought process remains disorganized with loose associations.  Patient demonstrates paranoid delusions towards staff at times. She endorses auditory hallucinations and appears internally preoccupied.  She does not express any suicidal or homicidal ideation, intent, or plan.  Insight is limited.  Judgment is impaired. Impulse control has been good on the unit. The patient appears stated age. Grooming limited, adequate hygiene. She was calm, cooperative and attentive but oddly related.  She maintained intermittent eye contact.  No agitation, psychomotor retardation or involuntary movements observed.  The patient’s speech is fluent, normal in tone, rate, and normal volume.  The patient’s mood is “not good"  Her affect is constricted, stable, appropriate, congruent to mood. The patient’s thought process remains disorganized with loose associations.  Illogical. Patient demonstrates paranoid delusions towards staff at times. She endorses auditory hallucinations and appears internally preoccupied.  She does not express any suicidal or homicidal ideation, intent, or plan.  Insight is limited.  Judgment is impaired. Impulse control has been good on the unit.
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
## 2023-10-13 NOTE — BH INPATIENT PSYCHIATRY PROGRESS NOTE - NSBHMETABOLIC_PSY_ALL_CORE_FT
BMI: BMI (kg/m2): 21.8 (09-28-23 @ 02:45)  HbA1c: A1C with Estimated Average Glucose Result: 7.2 % (09-27-23 @ 19:00)    Glucose: POCT Blood Glucose.: 94 mg/dL (10-13-23 @ 16:36)    BP: 101/62 (10-11-23 @ 07:49) (101/62 - 101/62)  Lipid Panel: Date/Time: 07-07-23 @ 05:52  Cholesterol, Serum: 153  Direct LDL: --  HDL Cholesterol, Serum: 28  Total Cholesterol/HDL Ration Measurement: --  Triglycerides, Serum: 273   BMI: BMI (kg/m2): 21.8 (09-28-23 @ 02:45)  HbA1c: A1C with Estimated Average Glucose Result: 7.2 % (09-27-23 @ 19:00)    Glucose: POCT Blood Glucose.: 162 mg/dL (10-13-23 @ 20:04)    BP: 101/62 (10-11-23 @ 07:49) (101/62 - 101/62)  Lipid Panel: Date/Time: 07-07-23 @ 05:52  Cholesterol, Serum: 153  Direct LDL: --  HDL Cholesterol, Serum: 28  Total Cholesterol/HDL Ration Measurement: --  Triglycerides, Serum: 273

## 2023-10-13 NOTE — BH INPATIENT PSYCHIATRY PROGRESS NOTE - NSBHCHARTREVIEWVS_PSY_A_CORE FT
Vital Signs Last 24 Hrs  T(C): 36.4 (10-13-23 @ 05:25), Max: 36.4 (10-13-23 @ 05:25)  T(F): 97.6 (10-13-23 @ 05:25), Max: 97.6 (10-13-23 @ 05:25)  HR: --  BP: --  BP(mean): --  RR: --  SpO2: --    Orthostatic VS  10-13-23 @ 05:25  Lying BP: 97/52 HR: 72  Sitting BP: 112/58 HR: 77  Standing BP: --/-- HR: --  Site: upper right arm  Mode: electronic  Orthostatic VS  10-12-23 @ 18:35  Lying BP: --/-- HR: --  Sitting BP: 110/71 HR: 91  Standing BP: 103/67 HR: 95  Site: --  Mode: --  Orthostatic VS  10-12-23 @ 11:08  Lying BP: --/-- HR: --  Sitting BP: 107/68 HR: 87  Standing BP: 101/57 HR: 95  Site: --  Mode: --  Orthostatic VS  10-12-23 @ 08:27  Lying BP: 107/60 HR: 80  Sitting BP: --/-- HR: --  Standing BP: --/-- HR: --  Site: upper right arm  Mode: electronic   Vital Signs Last 24 Hrs  T(C): 36.9 (10-13-23 @ 19:49), Max: 36.9 (10-13-23 @ 19:49)  T(F): 98.4 (10-13-23 @ 19:49), Max: 98.4 (10-13-23 @ 19:49)  HR: --  BP: --  BP(mean): --  RR: --  SpO2: --    Orthostatic VS  10-13-23 @ 19:49  Lying BP: --/-- HR: --  Sitting BP: 117/66 HR: 80  Standing BP: 97/77 HR: 85  Site: --  Mode: --  Orthostatic VS  10-13-23 @ 05:25  Lying BP: 97/52 HR: 72  Sitting BP: 112/58 HR: 77  Standing BP: --/-- HR: --  Site: upper right arm  Mode: electronic  Orthostatic VS  10-12-23 @ 18:35  Lying BP: --/-- HR: --  Sitting BP: 110/71 HR: 91  Standing BP: 103/67 HR: 95  Site: --  Mode: --  Orthostatic VS  10-12-23 @ 11:08  Lying BP: --/-- HR: --  Sitting BP: 107/68 HR: 87  Standing BP: 101/57 HR: 95  Site: --  Mode: --  Orthostatic VS  10-12-23 @ 08:27  Lying BP: 107/60 HR: 80  Sitting BP: --/-- HR: --  Standing BP: --/-- HR: --  Site: upper right arm  Mode: electronic

## 2023-10-14 LAB
GLUCOSE BLDC GLUCOMTR-MCNC: 100 MG/DL — HIGH (ref 70–99)
GLUCOSE BLDC GLUCOMTR-MCNC: 115 MG/DL — HIGH (ref 70–99)
GLUCOSE BLDC GLUCOMTR-MCNC: 159 MG/DL — HIGH (ref 70–99)
GLUCOSE BLDC GLUCOMTR-MCNC: 174 MG/DL — HIGH (ref 70–99)

## 2023-10-14 RX ADMIN — SODIUM CHLORIDE 1 GRAM(S): 9 INJECTION INTRAMUSCULAR; INTRAVENOUS; SUBCUTANEOUS at 20:09

## 2023-10-14 RX ADMIN — SENNA PLUS 2 TABLET(S): 8.6 TABLET ORAL at 20:08

## 2023-10-14 RX ADMIN — SODIUM CHLORIDE 1 GRAM(S): 9 INJECTION INTRAMUSCULAR; INTRAVENOUS; SUBCUTANEOUS at 08:40

## 2023-10-14 RX ADMIN — CLOZAPINE 175 MILLIGRAM(S): 150 TABLET, ORALLY DISINTEGRATING ORAL at 20:08

## 2023-10-14 RX ADMIN — LINAGLIPTIN 5 MILLIGRAM(S): 5 TABLET, FILM COATED ORAL at 08:41

## 2023-10-14 RX ADMIN — METFORMIN HYDROCHLORIDE 500 MILLIGRAM(S): 850 TABLET ORAL at 20:08

## 2023-10-14 RX ADMIN — Medication 0: at 20:38

## 2023-10-14 RX ADMIN — Medication 1: at 12:23

## 2023-10-14 RX ADMIN — ATORVASTATIN CALCIUM 20 MILLIGRAM(S): 80 TABLET, FILM COATED ORAL at 20:08

## 2023-10-14 RX ADMIN — Medication 1: at 16:53

## 2023-10-14 RX ADMIN — FLUDROCORTISONE ACETATE 0.1 MILLIGRAM(S): 0.1 TABLET ORAL at 08:41

## 2023-10-14 RX ADMIN — Medication 4 MILLIGRAM(S): at 08:41

## 2023-10-15 LAB
GLUCOSE BLDC GLUCOMTR-MCNC: 113 MG/DL — HIGH (ref 70–99)
GLUCOSE BLDC GLUCOMTR-MCNC: 152 MG/DL — HIGH (ref 70–99)
GLUCOSE BLDC GLUCOMTR-MCNC: 156 MG/DL — HIGH (ref 70–99)
GLUCOSE BLDC GLUCOMTR-MCNC: 203 MG/DL — HIGH (ref 70–99)

## 2023-10-15 RX ADMIN — ATORVASTATIN CALCIUM 20 MILLIGRAM(S): 80 TABLET, FILM COATED ORAL at 20:55

## 2023-10-15 RX ADMIN — Medication 1: at 16:41

## 2023-10-15 RX ADMIN — FLUDROCORTISONE ACETATE 0.1 MILLIGRAM(S): 0.1 TABLET ORAL at 09:03

## 2023-10-15 RX ADMIN — LINAGLIPTIN 5 MILLIGRAM(S): 5 TABLET, FILM COATED ORAL at 09:01

## 2023-10-15 RX ADMIN — METFORMIN HYDROCHLORIDE 500 MILLIGRAM(S): 850 TABLET ORAL at 20:55

## 2023-10-15 RX ADMIN — SODIUM CHLORIDE 1 GRAM(S): 9 INJECTION INTRAMUSCULAR; INTRAVENOUS; SUBCUTANEOUS at 09:02

## 2023-10-15 RX ADMIN — SODIUM CHLORIDE 1 GRAM(S): 9 INJECTION INTRAMUSCULAR; INTRAVENOUS; SUBCUTANEOUS at 20:56

## 2023-10-15 RX ADMIN — Medication 1: at 12:38

## 2023-10-15 RX ADMIN — SENNA PLUS 2 TABLET(S): 8.6 TABLET ORAL at 20:55

## 2023-10-15 RX ADMIN — Medication 4 MILLIGRAM(S): at 09:01

## 2023-10-15 RX ADMIN — CLOZAPINE 175 MILLIGRAM(S): 150 TABLET, ORALLY DISINTEGRATING ORAL at 20:55

## 2023-10-16 LAB
GLUCOSE BLDC GLUCOMTR-MCNC: 149 MG/DL — HIGH (ref 70–99)
GLUCOSE BLDC GLUCOMTR-MCNC: 151 MG/DL — HIGH (ref 70–99)
GLUCOSE BLDC GLUCOMTR-MCNC: 151 MG/DL — HIGH (ref 70–99)
GLUCOSE BLDC GLUCOMTR-MCNC: 163 MG/DL — HIGH (ref 70–99)
GLUCOSE BLDC GLUCOMTR-MCNC: 200 MG/DL — HIGH (ref 70–99)
GLUCOSE BLDC GLUCOMTR-MCNC: 200 MG/DL — HIGH (ref 70–99)

## 2023-10-16 PROCEDURE — 99232 SBSQ HOSP IP/OBS MODERATE 35: CPT | Mod: GC

## 2023-10-16 RX ORDER — CLOZAPINE 150 MG/1
25 TABLET, ORALLY DISINTEGRATING ORAL DAILY
Refills: 0 | Status: DISCONTINUED | OUTPATIENT
Start: 2023-10-16 | End: 2023-10-17

## 2023-10-16 RX ADMIN — SENNA PLUS 2 TABLET(S): 8.6 TABLET ORAL at 20:13

## 2023-10-16 RX ADMIN — FLUDROCORTISONE ACETATE 0.1 MILLIGRAM(S): 0.1 TABLET ORAL at 05:44

## 2023-10-16 RX ADMIN — METFORMIN HYDROCHLORIDE 500 MILLIGRAM(S): 850 TABLET ORAL at 20:13

## 2023-10-16 RX ADMIN — Medication 1: at 17:02

## 2023-10-16 RX ADMIN — ATORVASTATIN CALCIUM 20 MILLIGRAM(S): 80 TABLET, FILM COATED ORAL at 20:14

## 2023-10-16 RX ADMIN — SODIUM CHLORIDE 1 GRAM(S): 9 INJECTION INTRAMUSCULAR; INTRAVENOUS; SUBCUTANEOUS at 20:16

## 2023-10-16 RX ADMIN — CLOZAPINE 175 MILLIGRAM(S): 150 TABLET, ORALLY DISINTEGRATING ORAL at 20:13

## 2023-10-16 NOTE — BH INPATIENT PSYCHIATRY PROGRESS NOTE - PRN MEDS
MEDICATIONS  (PRN):  albuterol    90 MICROgram(s) HFA Inhaler 2 Puff(s) Inhalation every 6 hours PRN asthma  aluminum hydroxide/magnesium hydroxide/simethicone Suspension 30 milliLiter(s) Oral every 8 hours PRN Dyspepsia  dextrose Oral Gel 15 Gram(s) Oral once PRN Blood Glucose LESS THAN 70 milliGRAM(s)/deciliter  haloperidol     Tablet 5 milliGRAM(s) Oral every 6 hours PRN agitation  haloperidol    Injectable 5 milliGRAM(s) IntraMuscular once PRN combative behavior  LORazepam     Tablet 2 milliGRAM(s) Oral every 6 hours PRN Agitation  polyethylene glycol 3350 17 Gram(s) Oral daily PRN Constipation   MEDICATIONS  (PRN):  albuterol    90 MICROgram(s) HFA Inhaler 2 Puff(s) Inhalation every 6 hours PRN asthma  aluminum hydroxide/magnesium hydroxide/simethicone Suspension 30 milliLiter(s) Oral every 8 hours PRN Dyspepsia  dextrose Oral Gel 15 Gram(s) Oral once PRN Blood Glucose LESS THAN 70 milliGRAM(s)/deciliter  haloperidol     Tablet 5 milliGRAM(s) Oral every 6 hours PRN agitation  haloperidol    Injectable 5 milliGRAM(s) IntraMuscular once PRN combative behavior  LORazepam     Tablet 1 milliGRAM(s) Oral every 6 hours PRN Agitation  polyethylene glycol 3350 17 Gram(s) Oral daily PRN Constipation

## 2023-10-16 NOTE — BH INPATIENT PSYCHIATRY PROGRESS NOTE - NSBHFUPINTERVALHXFT_PSY_A_CORE
Chart reviewed and case discussed with interdisciplinary team. No acute events reported over the weekend, no PRN needed. On encounter, patient was awake, alert, cooperative and willing to engage, seen laying down in bed. She endorsed that she was feeling "good" and that she was able to sleep well last night. She denied hearing the voices of her twins overnight, which she explained was why she was able to sleep well. She endorsed having eaten breakfast this morning, though staff confirms that patient consistently denies eating breakfast despite endorsement. 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 Denies chest pain or difficulties with bowel movement at this time, last reported bowel movement last night.

## 2023-10-16 NOTE — BH INPATIENT PSYCHIATRY PROGRESS NOTE - MSE UNSTRUCTURED FT
The patient appears stated age. Grooming limited, adequate hygiene. She was calm, cooperative and attentive but oddly related.  She maintained intermittent eye contact.  No agitation, psychomotor retardation or involuntary movements observed.  The patient’s speech is fluent, normal in tone, rate, and normal volume.  The patient’s mood is “not good"  Her affect is constricted, stable, appropriate, congruent to mood. The patient’s thought process remains disorganized with loose associations.  Illogical. Patient demonstrates paranoid delusions towards staff at times. She endorses auditory hallucinations and appears internally preoccupied.  She does not express any suicidal or homicidal ideation, intent, or plan.  Insight is limited.  Judgment is impaired. Impulse control has been good on the unit.

## 2023-10-16 NOTE — BH INPATIENT PSYCHIATRY PROGRESS NOTE - NSBHASSESSSUMMFT_PSY_ALL_CORE
Pharmacy requesting refill    Refill Medication-Lasix     LOV-2/5/18    Last refill-3/15/18    Please advise     57 y/o female, single, noncaregiver, disabled, lives with [facility], history of Schizophrenia vs Schizoaffectove d/o, multiple past admissions at [facility] (most recent 2020 for decompensated psychosis), and also was hospitalized medically for lactic acidosis and followed by CL team in July 2023, followed by psychiatrist Dr. Love at [facility] (pt has no h/o developmental disability), prescribed Clozaril 100mg qhs , no h/o self-injurious behavior or suicide attempts, no h/o violence or legal issues, PMH Type II DM, HLD, asthma, no h/o substance abuse, BIBEMS activated by rosalizy (Rahat) for disorganization.  The patient is grossly disorganized in thoughts on admission.    9/29: The patient continues to be disorganized, but is taking medications.  Will continue clozapine.  9/30: Psychotic disorganized cont meds encourage cooperation with proper vital signs  10/1: disorganized, but more verbal, cooperative, no SI/HI.   10/2: The patient is a little more organized, but still internally preoccupied.  Sedated from clozapine.  Will continue current dose, consider increase if psychosis continues.  10/3: Patient remains disorganized but behaviorally controlled on the unit. Records show that patient was previously well maintained on Clozaril 350mg. Will titrate Clozaril today and monitor for efficacy and side effects.  10/4: Patient shows slight improvement with organization but continues to be disorganized in thought process. Will continue current dose of clozapine with plans to slowly titrate while monitoring for efficacy and side effects.  10/5: Patient was irritable today and less cooperative than previous but continues to be disorganized during the day. Plan to continue titrating clozapine while monitoring for efficacy and side effects.  10/6: Patient shows improvement in mood and thought process and shows improvement in articulating auditory hallucinations. Will continue current dose of clozapine with plans to slowly titrate while monitoring for efficacy and side effects.  10/7: Patient remains disorganized, resistive with treatment, no reports of AH today, continue clozapine titration    10/8: Remains psychotic, suspicious, delusional about her identity, continue clozapine   10/9: Patient remains disorganized but denies auditory hallucinations overnight. BP soft with orthostatics (+), will continue current dose of clozapine with plans to slowly titrate while monitoring for efficacy and side effects.  10/10: Patient remains disorganized and paranoid on the unit but with improved linear thinking since admission, though overall remains disorganized with loose associations. BP remains soft, will encourage PO intake, compression stockings and will continue current dose of clozapine with plans to slowly titrate while monitoring for efficacy and side effects.  10/11: Patient remains disorganized on the unit and requires encouragement for PO intake. Refusing compression stockings. BP remains soft, will check CMP and start salt tablet prior to titrating clozapine.  10/12: Patient remains disorganized on the unit and requires encouragement for PO intake. CMP within normal limits and orthostatics negative, plan to titrate clozapine tonight and monitor for efficacy and side effects.  10/13: Remains disorganized  10/16:     Plan:  No 1:1 needed, as the patient is calm, no SI.  -Continue clozapine 175 mg hs  	Check CBC Wednesdays  - Started Fludrocortisone 0.1mg daily with food for orthostatic hypotension; refusing AYAN stockings  - Continue NaCl 1g BID  -Continue Senna and Miralax prn for constipation  -Continue metformin, glimepiride and Linagliptin for DM  	Check fingersticks qid  -Lipitor for cholesterol  -Albuterol prn for asthma  -Patient provided verbal consent to speak with Zara Giang and patient's outpatient provider. 57 y/o female, single, noncaregiver, disabled, lives with [facility], history of Schizophrenia vs Schizoaffectove d/o, multiple past admissions at [facility] (most recent 2020 for decompensated psychosis), and also was hospitalized medically for lactic acidosis and followed by CL team in July 2023, followed by psychiatrist Dr. Love at [facility] (pt has no h/o developmental disability), prescribed Clozaril 100mg qhs , no h/o self-injurious behavior or suicide attempts, no h/o violence or legal issues, PMH Type II DM, HLD, asthma, no h/o substance abuse, BIBEMS activated by rosalizy (Rahat) for disorganization.  The patient is grossly disorganized in thoughts on admission.    9/29: The patient continues to be disorganized, but is taking medications.  Will continue clozapine.  9/30: Psychotic disorganized cont meds encourage cooperation with proper vital signs  10/1: disorganized, but more verbal, cooperative, no SI/HI.   10/2: The patient is a little more organized, but still internally preoccupied.  Sedated from clozapine.  Will continue current dose, consider increase if psychosis continues.  10/3: Patient remains disorganized but behaviorally controlled on the unit. Records show that patient was previously well maintained on Clozaril 350mg. Will titrate Clozaril today and monitor for efficacy and side effects.  10/4: Patient shows slight improvement with organization but continues to be disorganized in thought process. Will continue current dose of clozapine with plans to slowly titrate while monitoring for efficacy and side effects.  10/5: Patient was irritable today and less cooperative than previous but continues to be disorganized during the day. Plan to continue titrating clozapine while monitoring for efficacy and side effects.  10/6: Patient shows improvement in mood and thought process and shows improvement in articulating auditory hallucinations. Will continue current dose of clozapine with plans to slowly titrate while monitoring for efficacy and side effects.  10/7: Patient remains disorganized, resistive with treatment, no reports of AH today, continue clozapine titration    10/8: Remains psychotic, suspicious, delusional about her identity, continue clozapine   10/9: Patient remains disorganized but denies auditory hallucinations overnight. BP soft with orthostatics (+), will continue current dose of clozapine with plans to slowly titrate while monitoring for efficacy and side effects.  10/10: Patient remains disorganized and paranoid on the unit but with improved linear thinking since admission, though overall remains disorganized with loose associations. BP remains soft, will encourage PO intake, compression stockings and will continue current dose of clozapine with plans to slowly titrate while monitoring for efficacy and side effects.  10/11: Patient remains disorganized on the unit and requires encouragement for PO intake. Refusing compression stockings. BP remains soft, will check CMP and start salt tablet prior to titrating clozapine.  10/12: Patient remains disorganized on the unit and requires encouragement for PO intake. CMP within normal limits and orthostatics negative, plan to titrate clozapine tonight and monitor for efficacy and side effects.  10/13: Remains disorganized  10/16: Patient remains disorganized and with soft BP. Requires encouragement for PO intake. Will continue current dose of clozapine with plans to slowly titrate while monitoring for efficacy and side effects.    Plan:  No 1:1 needed, as the patient is calm, no SI.  -Continue clozapine 175 mg hs  	Check CBC Wednesdays  - Started Fludrocortisone 0.1mg daily with food for orthostatic hypotension; refusing AYAN stockings  - Continue NaCl 1g BID  -Continue Senna and Miralax prn for constipation  -Continue metformin, glimepiride and Linagliptin for DM  	Check fingersticks qid  -Lipitor for cholesterol  -Albuterol prn for asthma  -Patient provided verbal consent to speak with Zara Giang and patient's outpatient provider.

## 2023-10-16 NOTE — BH INPATIENT PSYCHIATRY PROGRESS NOTE - CURRENT MEDICATION
MEDICATIONS  (STANDING):  atorvastatin 20 milliGRAM(s) Oral at bedtime  cloZAPine 175 milliGRAM(s) Oral at bedtime  fludroCORTISONE 0.1 milliGRAM(s) Oral daily  glimepiride. 4 milliGRAM(s) Oral with breakfast  insulin lispro (ADMELOG) corrective regimen sliding scale   SubCutaneous three times a day before meals  insulin lispro (ADMELOG) corrective regimen sliding scale   SubCutaneous at bedtime  linagliptin 5 milliGRAM(s) Oral daily  metFORMIN 500 milliGRAM(s) Oral at bedtime  senna 2 Tablet(s) Oral at bedtime  sodium chloride 1 Gram(s) Oral two times a day    MEDICATIONS  (PRN):  albuterol    90 MICROgram(s) HFA Inhaler 2 Puff(s) Inhalation every 6 hours PRN asthma  aluminum hydroxide/magnesium hydroxide/simethicone Suspension 30 milliLiter(s) Oral every 8 hours PRN Dyspepsia  dextrose Oral Gel 15 Gram(s) Oral once PRN Blood Glucose LESS THAN 70 milliGRAM(s)/deciliter  haloperidol     Tablet 5 milliGRAM(s) Oral every 6 hours PRN agitation  haloperidol    Injectable 5 milliGRAM(s) IntraMuscular once PRN combative behavior  LORazepam     Tablet 2 milliGRAM(s) Oral every 6 hours PRN Agitation  polyethylene glycol 3350 17 Gram(s) Oral daily PRN Constipation   MEDICATIONS  (STANDING):  atorvastatin 20 milliGRAM(s) Oral at bedtime  cloZAPine 175 milliGRAM(s) Oral at bedtime  fludroCORTISONE 0.1 milliGRAM(s) Oral daily  glimepiride. 4 milliGRAM(s) Oral with breakfast  insulin lispro (ADMELOG) corrective regimen sliding scale   SubCutaneous at bedtime  insulin lispro (ADMELOG) corrective regimen sliding scale   SubCutaneous three times a day before meals  linagliptin 5 milliGRAM(s) Oral daily  metFORMIN 500 milliGRAM(s) Oral at bedtime  senna 2 Tablet(s) Oral at bedtime  sodium chloride 1 Gram(s) Oral two times a day    MEDICATIONS  (PRN):  albuterol    90 MICROgram(s) HFA Inhaler 2 Puff(s) Inhalation every 6 hours PRN asthma  aluminum hydroxide/magnesium hydroxide/simethicone Suspension 30 milliLiter(s) Oral every 8 hours PRN Dyspepsia  dextrose Oral Gel 15 Gram(s) Oral once PRN Blood Glucose LESS THAN 70 milliGRAM(s)/deciliter  haloperidol     Tablet 5 milliGRAM(s) Oral every 6 hours PRN agitation  haloperidol    Injectable 5 milliGRAM(s) IntraMuscular once PRN combative behavior  LORazepam     Tablet 2 milliGRAM(s) Oral every 6 hours PRN Agitation  polyethylene glycol 3350 17 Gram(s) Oral daily PRN Constipation   MEDICATIONS  (STANDING):  atorvastatin 20 milliGRAM(s) Oral at bedtime  cloZAPine 175 milliGRAM(s) Oral at bedtime  fludroCORTISONE 0.1 milliGRAM(s) Oral daily  glimepiride. 4 milliGRAM(s) Oral with breakfast  insulin lispro (ADMELOG) corrective regimen sliding scale   SubCutaneous three times a day before meals  insulin lispro (ADMELOG) corrective regimen sliding scale   SubCutaneous at bedtime  linagliptin 5 milliGRAM(s) Oral daily  metFORMIN 500 milliGRAM(s) Oral at bedtime  senna 2 Tablet(s) Oral at bedtime  sodium chloride 1 Gram(s) Oral two times a day    MEDICATIONS  (PRN):  albuterol    90 MICROgram(s) HFA Inhaler 2 Puff(s) Inhalation every 6 hours PRN asthma  aluminum hydroxide/magnesium hydroxide/simethicone Suspension 30 milliLiter(s) Oral every 8 hours PRN Dyspepsia  dextrose Oral Gel 15 Gram(s) Oral once PRN Blood Glucose LESS THAN 70 milliGRAM(s)/deciliter  haloperidol     Tablet 5 milliGRAM(s) Oral every 6 hours PRN agitation  haloperidol    Injectable 5 milliGRAM(s) IntraMuscular once PRN combative behavior  LORazepam     Tablet 1 milliGRAM(s) Oral every 6 hours PRN Agitation  polyethylene glycol 3350 17 Gram(s) Oral daily PRN Constipation   MEDICATIONS  (STANDING):  atorvastatin 20 milliGRAM(s) Oral at bedtime  cloZAPine 25 milliGRAM(s) Oral daily  cloZAPine 175 milliGRAM(s) Oral at bedtime  fludroCORTISONE 0.1 milliGRAM(s) Oral daily  glimepiride. 4 milliGRAM(s) Oral with breakfast  insulin lispro (ADMELOG) corrective regimen sliding scale   SubCutaneous three times a day before meals  insulin lispro (ADMELOG) corrective regimen sliding scale   SubCutaneous at bedtime  linagliptin 5 milliGRAM(s) Oral daily  metFORMIN 500 milliGRAM(s) Oral at bedtime  senna 2 Tablet(s) Oral at bedtime  sodium chloride 1 Gram(s) Oral two times a day    MEDICATIONS  (PRN):  albuterol    90 MICROgram(s) HFA Inhaler 2 Puff(s) Inhalation every 6 hours PRN asthma  aluminum hydroxide/magnesium hydroxide/simethicone Suspension 30 milliLiter(s) Oral every 8 hours PRN Dyspepsia  dextrose Oral Gel 15 Gram(s) Oral once PRN Blood Glucose LESS THAN 70 milliGRAM(s)/deciliter  haloperidol     Tablet 5 milliGRAM(s) Oral every 6 hours PRN agitation  haloperidol    Injectable 5 milliGRAM(s) IntraMuscular once PRN combative behavior  LORazepam     Tablet 1 milliGRAM(s) Oral every 6 hours PRN Agitation  polyethylene glycol 3350 17 Gram(s) Oral daily PRN Constipation

## 2023-10-16 NOTE — BH INPATIENT PSYCHIATRY PROGRESS NOTE - NSBHCHARTREVIEWVS_PSY_A_CORE FT
Vital Signs Last 24 Hrs  T(C): 36.6 (10-16-23 @ 05:43), Max: 36.9 (10-15-23 @ 21:08)  T(F): 97.8 (10-16-23 @ 05:43), Max: 98.5 (10-15-23 @ 21:08)  HR: --  BP: --  BP(mean): --  RR: 18 (10-16-23 @ 05:43) (18 - 18)  SpO2: 98% (10-16-23 @ 05:43) (94% - 98%)    Orthostatic VS  10-16-23 @ 05:43  Lying BP: 102/70 HR: 98  Sitting BP: --/-- HR: --  Standing BP: --/-- HR: --  Site: --  Mode: --  Orthostatic VS  10-15-23 @ 21:08  Lying BP: --/-- HR: --  Sitting BP: 128/64 HR: 83  Standing BP: 127/58 HR: 87  Site: --  Mode: --  Orthostatic VS  10-15-23 @ 08:30  Lying BP: --/-- HR: --  Sitting BP: 103/62 HR: 64  Standing BP: 100/60 HR: 72  Site: --  Mode: --  Orthostatic VS  10-14-23 @ 19:59  Lying BP: --/-- HR: --  Sitting BP: 110/82 HR: 79  Standing BP: 101/72 HR: 81  Site: --  Mode: --   Vital Signs Last 24 Hrs  T(C): 36.6 (10-16-23 @ 05:43), Max: 36.9 (10-15-23 @ 21:08)  T(F): 97.8 (10-16-23 @ 05:43), Max: 98.5 (10-15-23 @ 21:08)  HR: --  BP: --  BP(mean): --  RR: 18 (10-16-23 @ 05:43) (18 - 18)  SpO2: 98% (10-16-23 @ 05:43) (94% - 98%)    Orthostatic VS  10-16-23 @ 12:09  Lying BP: 106/60 HR: 70  Sitting BP: 104/58 HR: 62  Standing BP: --/-- HR: --  Site: --  Mode: --  Orthostatic VS  10-16-23 @ 05:43  Lying BP: 102/70 HR: 98  Sitting BP: --/-- HR: --  Standing BP: --/-- HR: --  Site: --  Mode: --  Orthostatic VS  10-15-23 @ 21:08  Lying BP: --/-- HR: --  Sitting BP: 128/64 HR: 83  Standing BP: 127/58 HR: 87  Site: --  Mode: --  Orthostatic VS  10-15-23 @ 08:30  Lying BP: --/-- HR: --  Sitting BP: 103/62 HR: 64  Standing BP: 100/60 HR: 72  Site: --  Mode: --

## 2023-10-16 NOTE — BH INPATIENT PSYCHIATRY PROGRESS NOTE - NSBHMETABOLIC_PSY_ALL_CORE_FT
BMI: BMI (kg/m2): 21.3 (10-14-23 @ 15:59)  HbA1c: A1C with Estimated Average Glucose Result: 7.2 % (09-27-23 @ 19:00)    Glucose: POCT Blood Glucose.: 163 mg/dL (10-16-23 @ 08:04)    BP: --  Lipid Panel: Date/Time: 07-07-23 @ 05:52  Cholesterol, Serum: 153  Direct LDL: --  HDL Cholesterol, Serum: 28  Total Cholesterol/HDL Ration Measurement: --  Triglycerides, Serum: 273   BMI: BMI (kg/m2): 21.3 (10-14-23 @ 15:59)  HbA1c: A1C with Estimated Average Glucose Result: 7.2 % (09-27-23 @ 19:00)    Glucose: POCT Blood Glucose.: 151 mg/dL (10-16-23 @ 16:34)    BP: --  Lipid Panel: Date/Time: 07-07-23 @ 05:52  Cholesterol, Serum: 153  Direct LDL: --  HDL Cholesterol, Serum: 28  Total Cholesterol/HDL Ration Measurement: --  Triglycerides, Serum: 273   BMI: BMI (kg/m2): 21.3 (10-14-23 @ 15:59)  HbA1c: A1C with Estimated Average Glucose Result: 7.2 % (09-27-23 @ 19:00)    Glucose: POCT Blood Glucose.: 200 mg/dL (10-16-23 @ 20:21)    BP: --  Lipid Panel: Date/Time: 07-07-23 @ 05:52  Cholesterol, Serum: 153  Direct LDL: --  HDL Cholesterol, Serum: 28  Total Cholesterol/HDL Ration Measurement: --  Triglycerides, Serum: 273

## 2023-10-17 LAB
GLUCOSE BLDC GLUCOMTR-MCNC: 136 MG/DL — HIGH (ref 70–99)
GLUCOSE BLDC GLUCOMTR-MCNC: 136 MG/DL — HIGH (ref 70–99)
GLUCOSE BLDC GLUCOMTR-MCNC: 149 MG/DL — HIGH (ref 70–99)
GLUCOSE BLDC GLUCOMTR-MCNC: 149 MG/DL — HIGH (ref 70–99)
GLUCOSE BLDC GLUCOMTR-MCNC: 226 MG/DL — HIGH (ref 70–99)
GLUCOSE BLDC GLUCOMTR-MCNC: 226 MG/DL — HIGH (ref 70–99)
GLUCOSE BLDC GLUCOMTR-MCNC: 253 MG/DL — HIGH (ref 70–99)
GLUCOSE BLDC GLUCOMTR-MCNC: 253 MG/DL — HIGH (ref 70–99)

## 2023-10-17 PROCEDURE — 99232 SBSQ HOSP IP/OBS MODERATE 35: CPT | Mod: GC

## 2023-10-17 RX ORDER — CLOZAPINE 150 MG/1
200 TABLET, ORALLY DISINTEGRATING ORAL AT BEDTIME
Refills: 0 | Status: DISCONTINUED | OUTPATIENT
Start: 2023-10-17 | End: 2023-10-20

## 2023-10-17 RX ADMIN — FLUDROCORTISONE ACETATE 0.1 MILLIGRAM(S): 0.1 TABLET ORAL at 06:30

## 2023-10-17 RX ADMIN — Medication 2: at 12:39

## 2023-10-17 RX ADMIN — CLOZAPINE 200 MILLIGRAM(S): 150 TABLET, ORALLY DISINTEGRATING ORAL at 20:16

## 2023-10-17 RX ADMIN — SENNA PLUS 2 TABLET(S): 8.6 TABLET ORAL at 20:16

## 2023-10-17 RX ADMIN — Medication 1: at 20:20

## 2023-10-17 RX ADMIN — SODIUM CHLORIDE 1 GRAM(S): 9 INJECTION INTRAMUSCULAR; INTRAVENOUS; SUBCUTANEOUS at 09:01

## 2023-10-17 RX ADMIN — LINAGLIPTIN 5 MILLIGRAM(S): 5 TABLET, FILM COATED ORAL at 09:01

## 2023-10-17 RX ADMIN — ATORVASTATIN CALCIUM 20 MILLIGRAM(S): 80 TABLET, FILM COATED ORAL at 20:16

## 2023-10-17 RX ADMIN — CLOZAPINE 25 MILLIGRAM(S): 150 TABLET, ORALLY DISINTEGRATING ORAL at 09:01

## 2023-10-17 RX ADMIN — SODIUM CHLORIDE 1 GRAM(S): 9 INJECTION INTRAMUSCULAR; INTRAVENOUS; SUBCUTANEOUS at 20:16

## 2023-10-17 RX ADMIN — Medication 4 MILLIGRAM(S): at 09:01

## 2023-10-17 RX ADMIN — METFORMIN HYDROCHLORIDE 500 MILLIGRAM(S): 850 TABLET ORAL at 20:17

## 2023-10-17 NOTE — BH INPATIENT PSYCHIATRY PROGRESS NOTE - CURRENT MEDICATION
MEDICATIONS  (STANDING):  atorvastatin 20 milliGRAM(s) Oral at bedtime  cloZAPine 25 milliGRAM(s) Oral daily  cloZAPine 200 milliGRAM(s) Oral at bedtime  fludroCORTISONE 0.1 milliGRAM(s) Oral daily  glimepiride. 4 milliGRAM(s) Oral with breakfast  insulin lispro (ADMELOG) corrective regimen sliding scale   SubCutaneous three times a day before meals  insulin lispro (ADMELOG) corrective regimen sliding scale   SubCutaneous at bedtime  linagliptin 5 milliGRAM(s) Oral daily  metFORMIN 500 milliGRAM(s) Oral at bedtime  senna 2 Tablet(s) Oral at bedtime  sodium chloride 1 Gram(s) Oral two times a day    MEDICATIONS  (PRN):  albuterol    90 MICROgram(s) HFA Inhaler 2 Puff(s) Inhalation every 6 hours PRN asthma  aluminum hydroxide/magnesium hydroxide/simethicone Suspension 30 milliLiter(s) Oral every 8 hours PRN Dyspepsia  dextrose Oral Gel 15 Gram(s) Oral once PRN Blood Glucose LESS THAN 70 milliGRAM(s)/deciliter  haloperidol     Tablet 5 milliGRAM(s) Oral every 6 hours PRN agitation  haloperidol    Injectable 5 milliGRAM(s) IntraMuscular once PRN combative behavior  LORazepam     Tablet 1 milliGRAM(s) Oral every 6 hours PRN Agitation  polyethylene glycol 3350 17 Gram(s) Oral daily PRN Constipation   MEDICATIONS  (STANDING):  atorvastatin 20 milliGRAM(s) Oral at bedtime  cloZAPine 200 milliGRAM(s) Oral at bedtime  fludroCORTISONE 0.1 milliGRAM(s) Oral daily  glimepiride. 4 milliGRAM(s) Oral with breakfast  insulin lispro (ADMELOG) corrective regimen sliding scale   SubCutaneous three times a day before meals  insulin lispro (ADMELOG) corrective regimen sliding scale   SubCutaneous at bedtime  linagliptin 5 milliGRAM(s) Oral daily  metFORMIN 500 milliGRAM(s) Oral at bedtime  senna 2 Tablet(s) Oral at bedtime  sodium chloride 1 Gram(s) Oral two times a day    MEDICATIONS  (PRN):  albuterol    90 MICROgram(s) HFA Inhaler 2 Puff(s) Inhalation every 6 hours PRN asthma  aluminum hydroxide/magnesium hydroxide/simethicone Suspension 30 milliLiter(s) Oral every 8 hours PRN Dyspepsia  dextrose Oral Gel 15 Gram(s) Oral once PRN Blood Glucose LESS THAN 70 milliGRAM(s)/deciliter  haloperidol     Tablet 5 milliGRAM(s) Oral every 6 hours PRN agitation  haloperidol    Injectable 5 milliGRAM(s) IntraMuscular once PRN combative behavior  LORazepam     Tablet 1 milliGRAM(s) Oral every 6 hours PRN Agitation  polyethylene glycol 3350 17 Gram(s) Oral daily PRN Constipation

## 2023-10-17 NOTE — BH INPATIENT PSYCHIATRY PROGRESS NOTE - NSBHMETABOLIC_PSY_ALL_CORE_FT
BMI: BMI (kg/m2): 21.3 (10-14-23 @ 15:59)  HbA1c: A1C with Estimated Average Glucose Result: 7.2 % (09-27-23 @ 19:00)    Glucose: POCT Blood Glucose.: 149 mg/dL (10-17-23 @ 08:11)    BP: --  Lipid Panel: Date/Time: 07-07-23 @ 05:52  Cholesterol, Serum: 153  Direct LDL: --  HDL Cholesterol, Serum: 28  Total Cholesterol/HDL Ration Measurement: --  Triglycerides, Serum: 273   BMI: BMI (kg/m2): 21.3 (10-14-23 @ 15:59)  HbA1c: A1C with Estimated Average Glucose Result: 7.2 % (09-27-23 @ 19:00)    Glucose: POCT Blood Glucose.: 136 mg/dL (10-17-23 @ 16:24)    BP: --  Lipid Panel: Date/Time: 07-07-23 @ 05:52  Cholesterol, Serum: 153  Direct LDL: --  HDL Cholesterol, Serum: 28  Total Cholesterol/HDL Ration Measurement: --  Triglycerides, Serum: 273

## 2023-10-17 NOTE — BH INPATIENT PSYCHIATRY PROGRESS NOTE - PRN MEDS
MEDICATIONS  (PRN):  albuterol    90 MICROgram(s) HFA Inhaler 2 Puff(s) Inhalation every 6 hours PRN asthma  aluminum hydroxide/magnesium hydroxide/simethicone Suspension 30 milliLiter(s) Oral every 8 hours PRN Dyspepsia  dextrose Oral Gel 15 Gram(s) Oral once PRN Blood Glucose LESS THAN 70 milliGRAM(s)/deciliter  haloperidol     Tablet 5 milliGRAM(s) Oral every 6 hours PRN agitation  haloperidol    Injectable 5 milliGRAM(s) IntraMuscular once PRN combative behavior  LORazepam     Tablet 1 milliGRAM(s) Oral every 6 hours PRN Agitation  polyethylene glycol 3350 17 Gram(s) Oral daily PRN Constipation

## 2023-10-17 NOTE — BH INPATIENT PSYCHIATRY PROGRESS NOTE - MSE UNSTRUCTURED FT
The patient appears stated age with limited grooming but adequate hygiene. She was calm, cooperative and attentive but oddly related.  She maintained intermittent eye contact.  No agitation, psychomotor retardation or involuntary movements observed.  The patient’s speech is fluent, normal in tone, rate, and normal volume.  The patient’s mood is “good"  Her affect is constricted, stable, appropriate, congruent to mood. The patient’s thought process remains disorganized with loose associations.  Illogical. Patient demonstrates paranoid delusions towards staff at times. She endorses auditory hallucinations and appears internally preoccupied.  She does not express any suicidal or homicidal ideation, intent, or plan.  Insight is limited.  Judgment is impaired. Impulse control has been good on the unit.

## 2023-10-17 NOTE — BH INPATIENT PSYCHIATRY PROGRESS NOTE - NSBHASSESSSUMMFT_PSY_ALL_CORE
57 y/o female, single, noncaregiver, disabled, lives with [facility], history of Schizophrenia vs Schizoaffectove d/o, multiple past admissions at [facility] (most recent 2020 for decompensated psychosis), and also was hospitalized medically for lactic acidosis and followed by CL team in July 2023, followed by psychiatrist Dr. Love at [facility] (pt has no h/o developmental disability), prescribed Clozaril 100mg qhs , no h/o self-injurious behavior or suicide attempts, no h/o violence or legal issues, PMH Type II DM, HLD, asthma, no h/o substance abuse, BIBEMS activated by rosalizy (Rahat) for disorganization.  The patient is grossly disorganized in thoughts on admission.    9/29: The patient continues to be disorganized, but is taking medications.  Will continue clozapine.  9/30: Psychotic disorganized cont meds encourage cooperation with proper vital signs  10/1: disorganized, but more verbal, cooperative, no SI/HI.   10/2: The patient is a little more organized, but still internally preoccupied.  Sedated from clozapine.  Will continue current dose, consider increase if psychosis continues.  10/3: Patient remains disorganized but behaviorally controlled on the unit. Records show that patient was previously well maintained on Clozaril 350mg. Will titrate Clozaril today and monitor for efficacy and side effects.  10/4: Patient shows slight improvement with organization but continues to be disorganized in thought process. Will continue current dose of clozapine with plans to slowly titrate while monitoring for efficacy and side effects.  10/5: Patient was irritable today and less cooperative than previous but continues to be disorganized during the day. Plan to continue titrating clozapine while monitoring for efficacy and side effects.  10/6: Patient shows improvement in mood and thought process and shows improvement in articulating auditory hallucinations. Will continue current dose of clozapine with plans to slowly titrate while monitoring for efficacy and side effects.  10/7: Patient remains disorganized, resistive with treatment, no reports of AH today, continue clozapine titration    10/8: Remains psychotic, suspicious, delusional about her identity, continue clozapine   10/9: Patient remains disorganized but denies auditory hallucinations overnight. BP soft with orthostatics (+), will continue current dose of clozapine with plans to slowly titrate while monitoring for efficacy and side effects.  10/10: Patient remains disorganized and paranoid on the unit but with improved linear thinking since admission, though overall remains disorganized with loose associations. BP remains soft, will encourage PO intake, compression stockings and will continue current dose of clozapine with plans to slowly titrate while monitoring for efficacy and side effects.  10/11: Patient remains disorganized on the unit and requires encouragement for PO intake. Refusing compression stockings. BP remains soft, will check CMP and start salt tablet prior to titrating clozapine.  10/12: Patient remains disorganized on the unit and requires encouragement for PO intake. CMP within normal limits and orthostatics negative, plan to titrate clozapine tonight and monitor for efficacy and side effects.  10/13: Remains disorganized  10/16: Patient remains disorganized and with soft BP. Requires encouragement for PO intake. Will continue current dose of clozapine with plans to slowly titrate while monitoring for efficacy and side effects.  10/17: Patient remains disorganized. BP stable, will titrate clozapine tonight and monitor for efficacy and side effects.    Plan:  No 1:1 needed, as the patient is calm, no SI.  -Increase clozapine 200mg hs  	Check CBC Wednesdays  - Started Fludrocortisone 0.1mg daily with food for orthostatic hypotension; refusing AYAN stockings  - Continue NaCl 1g BID  -Continue Senna and Miralax prn for constipation  -Continue metformin, glimepiride and Linagliptin for DM  	Check fingersticks qid  -Lipitor for cholesterol  -Albuterol prn for asthma  -Patient provided verbal consent to speak with Zara Giang and patient's outpatient provider.

## 2023-10-17 NOTE — BH INPATIENT PSYCHIATRY PROGRESS NOTE - NSBHCHARTREVIEWVS_PSY_A_CORE FT
Vital Signs Last 24 Hrs  T(C): 36.6 (10-16-23 @ 05:43), Max: 36.9 (10-15-23 @ 21:08)  T(F): 97.8 (10-16-23 @ 05:43), Max: 98.5 (10-15-23 @ 21:08)  HR: --  BP: --  BP(mean): --  RR: 18 (10-16-23 @ 05:43) (18 - 18)  SpO2: 98% (10-16-23 @ 05:43) (94% - 98%)    Orthostatic VS  10-16-23 @ 12:09  Lying BP: 106/60 HR: 70  Sitting BP: 104/58 HR: 62  Standing BP: --/-- HR: --  Site: --  Mode: --  Orthostatic VS  10-16-23 @ 05:43  Lying BP: 102/70 HR: 98  Sitting BP: --/-- HR: --  Standing BP: --/-- HR: --  Site: --  Mode: --  Orthostatic VS  10-15-23 @ 21:08  Lying BP: --/-- HR: --  Sitting BP: 128/64 HR: 83  Standing BP: 127/58 HR: 87  Site: --  Mode: --  Orthostatic VS  10-15-23 @ 08:30  Lying BP: --/-- HR: --  Sitting BP: 103/62 HR: 64  Standing BP: 100/60 HR: 72  Site: --  Mode: --

## 2023-10-17 NOTE — BH INPATIENT PSYCHIATRY PROGRESS NOTE - NSBHFUPINTERVALHXFT_PSY_A_CORE
Chart reviewed and case discussed with interdisciplinary team. No acute events overnight and no PRN needed. On encounter, patient was awake, alert, cooperative and engaging, seen entering her room to be interviewed while laying down in bed. She reported that she had showered this morning though appeared with dry hair, though she endorsed wetting her hair. She endorsed that she was able to sleep well last night. She denied hearing the voices of her twins overnight and appeared pleasantly surprised, saying that she was able to sleep and have "nightmares" which she attributes to being able to sleep without being woken up. On the unit, she continues to have disorganized thought process and appears internally preoccupied at times. Denies chest pain or difficulties with bowel movement at this time, last reported bowel movement this morning.

## 2023-10-18 LAB
24R-OH-CALCIDIOL SERPL-MCNC: 18.5 NG/ML — LOW (ref 30–80)
24R-OH-CALCIDIOL SERPL-MCNC: 18.5 NG/ML — LOW (ref 30–80)
BASOPHILS # BLD AUTO: 0.02 K/UL — SIGNIFICANT CHANGE UP (ref 0–0.2)
BASOPHILS # BLD AUTO: 0.02 K/UL — SIGNIFICANT CHANGE UP (ref 0–0.2)
BASOPHILS NFR BLD AUTO: 0.3 % — SIGNIFICANT CHANGE UP (ref 0–2)
BASOPHILS NFR BLD AUTO: 0.3 % — SIGNIFICANT CHANGE UP (ref 0–2)
CRP SERPL-MCNC: <3 MG/L — SIGNIFICANT CHANGE UP
CRP SERPL-MCNC: <3 MG/L — SIGNIFICANT CHANGE UP
EOSINOPHIL # BLD AUTO: 0.2 K/UL — SIGNIFICANT CHANGE UP (ref 0–0.5)
EOSINOPHIL # BLD AUTO: 0.2 K/UL — SIGNIFICANT CHANGE UP (ref 0–0.5)
EOSINOPHIL NFR BLD AUTO: 2.7 % — SIGNIFICANT CHANGE UP (ref 0–6)
EOSINOPHIL NFR BLD AUTO: 2.7 % — SIGNIFICANT CHANGE UP (ref 0–6)
FERRITIN SERPL-MCNC: 72 NG/ML — SIGNIFICANT CHANGE UP (ref 13–330)
FERRITIN SERPL-MCNC: 72 NG/ML — SIGNIFICANT CHANGE UP (ref 13–330)
FOLATE SERPL-MCNC: >20 NG/ML — HIGH (ref 3.1–17.5)
FOLATE SERPL-MCNC: >20 NG/ML — HIGH (ref 3.1–17.5)
GLUCOSE BLDC GLUCOMTR-MCNC: 142 MG/DL — HIGH (ref 70–99)
GLUCOSE BLDC GLUCOMTR-MCNC: 142 MG/DL — HIGH (ref 70–99)
GLUCOSE BLDC GLUCOMTR-MCNC: 167 MG/DL — HIGH (ref 70–99)
GLUCOSE BLDC GLUCOMTR-MCNC: 167 MG/DL — HIGH (ref 70–99)
GLUCOSE BLDC GLUCOMTR-MCNC: 174 MG/DL — HIGH (ref 70–99)
GLUCOSE BLDC GLUCOMTR-MCNC: 174 MG/DL — HIGH (ref 70–99)
GLUCOSE BLDC GLUCOMTR-MCNC: 236 MG/DL — HIGH (ref 70–99)
GLUCOSE BLDC GLUCOMTR-MCNC: 236 MG/DL — HIGH (ref 70–99)
HCT VFR BLD CALC: 33.9 % — LOW (ref 34.5–45)
HCT VFR BLD CALC: 33.9 % — LOW (ref 34.5–45)
HGB BLD-MCNC: 11.4 G/DL — LOW (ref 11.5–15.5)
HGB BLD-MCNC: 11.4 G/DL — LOW (ref 11.5–15.5)
IANC: 4.74 K/UL — SIGNIFICANT CHANGE UP (ref 1.8–7.4)
IANC: 4.74 K/UL — SIGNIFICANT CHANGE UP (ref 1.8–7.4)
IMM GRANULOCYTES NFR BLD AUTO: 0.4 % — SIGNIFICANT CHANGE UP (ref 0–0.9)
IMM GRANULOCYTES NFR BLD AUTO: 0.4 % — SIGNIFICANT CHANGE UP (ref 0–0.9)
IRON SATN MFR SERPL: 18 % — SIGNIFICANT CHANGE UP (ref 14–50)
IRON SATN MFR SERPL: 18 % — SIGNIFICANT CHANGE UP (ref 14–50)
IRON SATN MFR SERPL: 47 UG/DL — SIGNIFICANT CHANGE UP (ref 30–160)
IRON SATN MFR SERPL: 47 UG/DL — SIGNIFICANT CHANGE UP (ref 30–160)
LYMPHOCYTES # BLD AUTO: 1.92 K/UL — SIGNIFICANT CHANGE UP (ref 1–3.3)
LYMPHOCYTES # BLD AUTO: 1.92 K/UL — SIGNIFICANT CHANGE UP (ref 1–3.3)
LYMPHOCYTES # BLD AUTO: 26.3 % — SIGNIFICANT CHANGE UP (ref 13–44)
LYMPHOCYTES # BLD AUTO: 26.3 % — SIGNIFICANT CHANGE UP (ref 13–44)
MCHC RBC-ENTMCNC: 26.7 PG — LOW (ref 27–34)
MCHC RBC-ENTMCNC: 26.7 PG — LOW (ref 27–34)
MCHC RBC-ENTMCNC: 33.6 GM/DL — SIGNIFICANT CHANGE UP (ref 32–36)
MCHC RBC-ENTMCNC: 33.6 GM/DL — SIGNIFICANT CHANGE UP (ref 32–36)
MCV RBC AUTO: 79.4 FL — LOW (ref 80–100)
MCV RBC AUTO: 79.4 FL — LOW (ref 80–100)
MONOCYTES # BLD AUTO: 0.38 K/UL — SIGNIFICANT CHANGE UP (ref 0–0.9)
MONOCYTES # BLD AUTO: 0.38 K/UL — SIGNIFICANT CHANGE UP (ref 0–0.9)
MONOCYTES NFR BLD AUTO: 5.2 % — SIGNIFICANT CHANGE UP (ref 2–14)
MONOCYTES NFR BLD AUTO: 5.2 % — SIGNIFICANT CHANGE UP (ref 2–14)
NEUTROPHILS # BLD AUTO: 4.74 K/UL — SIGNIFICANT CHANGE UP (ref 1.8–7.4)
NEUTROPHILS # BLD AUTO: 4.74 K/UL — SIGNIFICANT CHANGE UP (ref 1.8–7.4)
NEUTROPHILS NFR BLD AUTO: 65.1 % — SIGNIFICANT CHANGE UP (ref 43–77)
NEUTROPHILS NFR BLD AUTO: 65.1 % — SIGNIFICANT CHANGE UP (ref 43–77)
NRBC # BLD: 0 /100 WBCS — SIGNIFICANT CHANGE UP (ref 0–0)
NRBC # BLD: 0 /100 WBCS — SIGNIFICANT CHANGE UP (ref 0–0)
NRBC # FLD: 0 K/UL — SIGNIFICANT CHANGE UP (ref 0–0)
NRBC # FLD: 0 K/UL — SIGNIFICANT CHANGE UP (ref 0–0)
PLATELET # BLD AUTO: 275 K/UL — SIGNIFICANT CHANGE UP (ref 150–400)
PLATELET # BLD AUTO: 275 K/UL — SIGNIFICANT CHANGE UP (ref 150–400)
RBC # BLD: 4.27 M/UL — SIGNIFICANT CHANGE UP (ref 3.8–5.2)
RBC # BLD: 4.27 M/UL — SIGNIFICANT CHANGE UP (ref 3.8–5.2)
RBC # FLD: 13.1 % — SIGNIFICANT CHANGE UP (ref 10.3–14.5)
RBC # FLD: 13.1 % — SIGNIFICANT CHANGE UP (ref 10.3–14.5)
TIBC SERPL-MCNC: 266 UG/DL — SIGNIFICANT CHANGE UP (ref 220–430)
TIBC SERPL-MCNC: 266 UG/DL — SIGNIFICANT CHANGE UP (ref 220–430)
TRANSFERRIN SERPL-MCNC: 220 MG/DL — SIGNIFICANT CHANGE UP (ref 200–360)
TRANSFERRIN SERPL-MCNC: 220 MG/DL — SIGNIFICANT CHANGE UP (ref 200–360)
TROPONIN T, HIGH SENSITIVITY RESULT: 7 NG/L — SIGNIFICANT CHANGE UP
TROPONIN T, HIGH SENSITIVITY RESULT: 7 NG/L — SIGNIFICANT CHANGE UP
UIBC SERPL-MCNC: 219 UG/DL — SIGNIFICANT CHANGE UP (ref 110–370)
UIBC SERPL-MCNC: 219 UG/DL — SIGNIFICANT CHANGE UP (ref 110–370)
VIT B12 SERPL-MCNC: 417 PG/ML — SIGNIFICANT CHANGE UP (ref 200–900)
VIT B12 SERPL-MCNC: 417 PG/ML — SIGNIFICANT CHANGE UP (ref 200–900)
WBC # BLD: 7.29 K/UL — SIGNIFICANT CHANGE UP (ref 3.8–10.5)
WBC # BLD: 7.29 K/UL — SIGNIFICANT CHANGE UP (ref 3.8–10.5)
WBC # FLD AUTO: 7.29 K/UL — SIGNIFICANT CHANGE UP (ref 3.8–10.5)
WBC # FLD AUTO: 7.29 K/UL — SIGNIFICANT CHANGE UP (ref 3.8–10.5)

## 2023-10-18 RX ADMIN — Medication 1: at 16:47

## 2023-10-18 RX ADMIN — FLUDROCORTISONE ACETATE 0.1 MILLIGRAM(S): 0.1 TABLET ORAL at 06:36

## 2023-10-18 RX ADMIN — ATORVASTATIN CALCIUM 20 MILLIGRAM(S): 80 TABLET, FILM COATED ORAL at 21:10

## 2023-10-18 RX ADMIN — LINAGLIPTIN 5 MILLIGRAM(S): 5 TABLET, FILM COATED ORAL at 08:44

## 2023-10-18 RX ADMIN — Medication 4 MILLIGRAM(S): at 08:42

## 2023-10-18 RX ADMIN — CLOZAPINE 200 MILLIGRAM(S): 150 TABLET, ORALLY DISINTEGRATING ORAL at 21:09

## 2023-10-18 RX ADMIN — SODIUM CHLORIDE 1 GRAM(S): 9 INJECTION INTRAMUSCULAR; INTRAVENOUS; SUBCUTANEOUS at 08:42

## 2023-10-18 RX ADMIN — SODIUM CHLORIDE 1 GRAM(S): 9 INJECTION INTRAMUSCULAR; INTRAVENOUS; SUBCUTANEOUS at 21:11

## 2023-10-18 RX ADMIN — Medication 2: at 13:03

## 2023-10-18 RX ADMIN — METFORMIN HYDROCHLORIDE 500 MILLIGRAM(S): 850 TABLET ORAL at 21:09

## 2023-10-18 NOTE — BH INPATIENT PSYCHIATRY PROGRESS NOTE - NSBHASSESSSUMMFT_PSY_ALL_CORE
57 y/o female, single, noncaregiver, disabled, lives with [facility], history of Schizophrenia vs Schizoaffectove d/o, multiple past admissions at [facility] (most recent 2020 for decompensated psychosis), and also was hospitalized medically for lactic acidosis and followed by CL team in July 2023, followed by psychiatrist Dr. Love at [facility] (pt has no h/o developmental disability), prescribed Clozaril 100mg qhs , no h/o self-injurious behavior or suicide attempts, no h/o violence or legal issues, PMH Type II DM, HLD, asthma, no h/o substance abuse, BIBEMS activated by rosalizy (Rahat) for disorganization.  The patient is grossly disorganized in thoughts on admission.    9/29: The patient continues to be disorganized, but is taking medications.  Will continue clozapine.  9/30: Psychotic disorganized cont meds encourage cooperation with proper vital signs  10/1: disorganized, but more verbal, cooperative, no SI/HI.   10/2: The patient is a little more organized, but still internally preoccupied.  Sedated from clozapine.  Will continue current dose, consider increase if psychosis continues.  10/3: Patient remains disorganized but behaviorally controlled on the unit. Records show that patient was previously well maintained on Clozaril 350mg. Will titrate Clozaril today and monitor for efficacy and side effects.  10/4: Patient shows slight improvement with organization but continues to be disorganized in thought process. Will continue current dose of clozapine with plans to slowly titrate while monitoring for efficacy and side effects.  10/5: Patient was irritable today and less cooperative than previous but continues to be disorganized during the day. Plan to continue titrating clozapine while monitoring for efficacy and side effects.  10/6: Patient shows improvement in mood and thought process and shows improvement in articulating auditory hallucinations. Will continue current dose of clozapine with plans to slowly titrate while monitoring for efficacy and side effects.  10/7: Patient remains disorganized, resistive with treatment, no reports of AH today, continue clozapine titration    10/8: Remains psychotic, suspicious, delusional about her identity, continue clozapine   10/9: Patient remains disorganized but denies auditory hallucinations overnight. BP soft with orthostatics (+), will continue current dose of clozapine with plans to slowly titrate while monitoring for efficacy and side effects.  10/10: Patient remains disorganized and paranoid on the unit but with improved linear thinking since admission, though overall remains disorganized with loose associations. BP remains soft, will encourage PO intake, compression stockings and will continue current dose of clozapine with plans to slowly titrate while monitoring for efficacy and side effects.  10/11: Patient remains disorganized on the unit and requires encouragement for PO intake. Refusing compression stockings. BP remains soft, will check CMP and start salt tablet prior to titrating clozapine.  10/12: Patient remains disorganized on the unit and requires encouragement for PO intake. CMP within normal limits and orthostatics negative, plan to titrate clozapine tonight and monitor for efficacy and side effects.  10/13: Remains disorganized  10/16: Patient remains disorganized and with soft BP. Requires encouragement for PO intake. Will continue current dose of clozapine with plans to slowly titrate while monitoring for efficacy and side effects.  10/17: Patient remains disorganized. BP stable, will titrate clozapine tonight and monitor for efficacy and side effects.  10/18: Patient remains disorganized. BP stable, tolerating clozapine titration well. Will continue current dose with plans to titrate if continuing to tolerate.    Plan:  No 1:1 needed, as the patient is calm, no SI.  -Continue clozapine 200mg hs  	Check CBC Wednesdays  - Started Fludrocortisone 0.1mg daily with food for orthostatic hypotension; refusing AYAN stockings  - Continue NaCl 1g BID  -Continue Senna and Miralax prn for constipation  -Continue metformin, glimepiride and Linagliptin for DM  	Check fingersticks qid  -Lipitor for cholesterol  -Albuterol prn for asthma  -Patient provided verbal consent to speak with Zara Giang and patient's outpatient provider.

## 2023-10-18 NOTE — BH INPATIENT PSYCHIATRY PROGRESS NOTE - NSBHFUPINTERVALHXFT_PSY_A_CORE
Chart reviewed and case discussed with interdisciplinary team. No acute events overnight and no PRN needed. On encounter, patient was more awake and alert than previous encounters, cooperative willing to engage in interview outside of her room. She appeared internally preoccupied during the interview but was not responding to internal stimuli. She continues to have disorganized thought process with loose associations, often requiring redirection during interview but denies any physical complaints at this time. She denied hearing auditory hallucinations of her "twins" last night and noted that she was able to sleep. She denied having nightmares last night but had a good "dream" where she recounted the foods she ate before coming to the hospital, though she did not recall upon waking. She notes good appetite but prefers not to eat breakfast, opting for juice and other liquids in the morning.

## 2023-10-18 NOTE — BH INPATIENT PSYCHIATRY PROGRESS NOTE - NSBHCHARTREVIEWVS_PSY_A_CORE FT
Vital Signs Last 24 Hrs  T(C): 36.6 (10-16-23 @ 05:43), Max: 36.9 (10-15-23 @ 21:08)  T(F): 97.8 (10-16-23 @ 05:43), Max: 98.5 (10-15-23 @ 21:08)  HR: --  BP: --  BP(mean): --  RR: 18 (10-16-23 @ 05:43) (18 - 18)  SpO2: 98% (10-16-23 @ 05:43) (94% - 98%)    Orthostatic VS  10-16-23 @ 12:09  Lying BP: 106/60 HR: 70  Sitting BP: 104/58 HR: 62  Standing BP: --/-- HR: --  Site: --  Mode: --  Orthostatic VS  10-16-23 @ 05:43  Lying BP: 102/70 HR: 98  Sitting BP: --/-- HR: --  Standing BP: --/-- HR: --  Site: --  Mode: --  Orthostatic VS  10-15-23 @ 21:08  Lying BP: --/-- HR: --  Sitting BP: 128/64 HR: 83  Standing BP: 127/58 HR: 87  Site: --  Mode: --  Orthostatic VS  10-15-23 @ 08:30  Lying BP: --/-- HR: --  Sitting BP: 103/62 HR: 64  Standing BP: 100/60 HR: 72  Site: --  Mode: --   Vital Signs Last 24 Hrs  T(C): 36.6 (10-18-23 @ 06:11), Max: 36.8 (10-17-23 @ 19:56)  T(F): 97.9 (10-18-23 @ 06:11), Max: 98.3 (10-17-23 @ 19:56)  HR: --  BP: --  BP(mean): --  RR: 18 (10-18-23 @ 06:11) (16 - 18)  SpO2: 98% (10-17-23 @ 19:56) (98% - 98%)    Orthostatic VS  10-18-23 @ 06:11  Lying BP: --/-- HR: --  Sitting BP: 109/66 HR: 82  Standing BP: 104/51 HR: 89  Site: upper left arm  Mode: electronic  Orthostatic VS  10-17-23 @ 19:56  Lying BP: --/-- HR: --  Sitting BP: 145/65 HR: 87  Standing BP: 130/60 HR: 96  Site: --  Mode: --  Orthostatic VS  10-17-23 @ 07:55  Lying BP: --/-- HR: --  Sitting BP: 113/57 HR: 79  Standing BP: --/-- HR: --  Site: --  Mode: --  Orthostatic VS  10-17-23 @ 06:38  Lying BP: 112/64 HR: 72  Sitting BP: 105/67 HR: 75  Standing BP: --/-- HR: --  Site: --  Mode: --  Orthostatic VS  10-16-23 @ 20:10  Lying BP: --/-- HR: --  Sitting BP: 107/60 HR: 70  Standing BP: 111/67 HR: 87  Site: --  Mode: --

## 2023-10-18 NOTE — BH INPATIENT PSYCHIATRY PROGRESS NOTE - MSE UNSTRUCTURED FT
The patient appears stated age with limited grooming but adequate hygiene, seen wearing a fuzzy robe with uncombed hair. She was calm, cooperative and alert but oddly related.  She maintained intermittent eye contact.  No agitation, psychomotor retardation or involuntary movements observed.  The patient’s speech is fluent, normal in tone, rate, and ranges from soft to normal volume.  The patient’s mood is “good".  Her affect is constricted, stable, appropriate, congruent to mood. The patient’s thought process remains disorganized and illogical with loose associations. No clear delusions elicited during interview. She denies auditory hallucinations but appears internally preoccupied.  She does not express any suicidal or homicidal ideation, intent, or plan.  Insight is limited.  Judgment is impaired. Impulse control has been good on the unit.

## 2023-10-18 NOTE — BH INPATIENT PSYCHIATRY PROGRESS NOTE - CURRENT MEDICATION
MEDICATIONS  (STANDING):  atorvastatin 20 milliGRAM(s) Oral at bedtime  cloZAPine 200 milliGRAM(s) Oral at bedtime  fludroCORTISONE 0.1 milliGRAM(s) Oral daily  glimepiride. 4 milliGRAM(s) Oral with breakfast  insulin lispro (ADMELOG) corrective regimen sliding scale   SubCutaneous at bedtime  insulin lispro (ADMELOG) corrective regimen sliding scale   SubCutaneous three times a day before meals  linagliptin 5 milliGRAM(s) Oral daily  metFORMIN 500 milliGRAM(s) Oral at bedtime  senna 2 Tablet(s) Oral at bedtime  sodium chloride 1 Gram(s) Oral two times a day    MEDICATIONS  (PRN):  albuterol    90 MICROgram(s) HFA Inhaler 2 Puff(s) Inhalation every 6 hours PRN asthma  aluminum hydroxide/magnesium hydroxide/simethicone Suspension 30 milliLiter(s) Oral every 8 hours PRN Dyspepsia  dextrose Oral Gel 15 Gram(s) Oral once PRN Blood Glucose LESS THAN 70 milliGRAM(s)/deciliter  haloperidol     Tablet 5 milliGRAM(s) Oral every 6 hours PRN agitation  haloperidol    Injectable 5 milliGRAM(s) IntraMuscular once PRN combative behavior  LORazepam     Tablet 1 milliGRAM(s) Oral every 6 hours PRN Agitation  polyethylene glycol 3350 17 Gram(s) Oral daily PRN Constipation   MEDICATIONS  (STANDING):  atorvastatin 20 milliGRAM(s) Oral at bedtime  cloZAPine 200 milliGRAM(s) Oral at bedtime  fludroCORTISONE 0.1 milliGRAM(s) Oral daily  glimepiride. 4 milliGRAM(s) Oral with breakfast  insulin lispro (ADMELOG) corrective regimen sliding scale   SubCutaneous three times a day before meals  insulin lispro (ADMELOG) corrective regimen sliding scale   SubCutaneous at bedtime  linagliptin 5 milliGRAM(s) Oral daily  metFORMIN 500 milliGRAM(s) Oral at bedtime  senna 2 Tablet(s) Oral at bedtime  sodium chloride 1 Gram(s) Oral two times a day    MEDICATIONS  (PRN):  albuterol    90 MICROgram(s) HFA Inhaler 2 Puff(s) Inhalation every 6 hours PRN asthma  aluminum hydroxide/magnesium hydroxide/simethicone Suspension 30 milliLiter(s) Oral every 8 hours PRN Dyspepsia  dextrose Oral Gel 15 Gram(s) Oral once PRN Blood Glucose LESS THAN 70 milliGRAM(s)/deciliter  haloperidol     Tablet 5 milliGRAM(s) Oral every 6 hours PRN agitation  haloperidol    Injectable 5 milliGRAM(s) IntraMuscular once PRN combative behavior  LORazepam     Tablet 1 milliGRAM(s) Oral every 6 hours PRN Agitation  polyethylene glycol 3350 17 Gram(s) Oral daily PRN Constipation

## 2023-10-18 NOTE — BH INPATIENT PSYCHIATRY PROGRESS NOTE - NSBHMETABOLIC_PSY_ALL_CORE_FT
BMI: BMI (kg/m2): 21.3 (10-14-23 @ 15:59)  HbA1c: A1C with Estimated Average Glucose Result: 7.2 % (09-27-23 @ 19:00)    Glucose: POCT Blood Glucose.: 236 mg/dL (10-18-23 @ 12:04)    BP: --  Lipid Panel: Date/Time: 07-07-23 @ 05:52  Cholesterol, Serum: 153  Direct LDL: --  HDL Cholesterol, Serum: 28  Total Cholesterol/HDL Ration Measurement: --  Triglycerides, Serum: 273   BMI: BMI (kg/m2): 21.3 (10-14-23 @ 15:59)  HbA1c: A1C with Estimated Average Glucose Result: 7.2 % (09-27-23 @ 19:00)    Glucose: POCT Blood Glucose.: 174 mg/dL (10-18-23 @ 16:29)    BP: --  Lipid Panel: Date/Time: 07-07-23 @ 05:52  Cholesterol, Serum: 153  Direct LDL: --  HDL Cholesterol, Serum: 28  Total Cholesterol/HDL Ration Measurement: --  Triglycerides, Serum: 273

## 2023-10-19 LAB
GLUCOSE BLDC GLUCOMTR-MCNC: 125 MG/DL — HIGH (ref 70–99)
GLUCOSE BLDC GLUCOMTR-MCNC: 125 MG/DL — HIGH (ref 70–99)
GLUCOSE BLDC GLUCOMTR-MCNC: 197 MG/DL — HIGH (ref 70–99)
GLUCOSE BLDC GLUCOMTR-MCNC: 197 MG/DL — HIGH (ref 70–99)
GLUCOSE BLDC GLUCOMTR-MCNC: 250 MG/DL — HIGH (ref 70–99)
GLUCOSE BLDC GLUCOMTR-MCNC: 250 MG/DL — HIGH (ref 70–99)
GLUCOSE BLDC GLUCOMTR-MCNC: 300 MG/DL — HIGH (ref 70–99)
GLUCOSE BLDC GLUCOMTR-MCNC: 300 MG/DL — HIGH (ref 70–99)

## 2023-10-19 PROCEDURE — 99232 SBSQ HOSP IP/OBS MODERATE 35: CPT

## 2023-10-19 RX ORDER — ERGOCALCIFEROL 1.25 MG/1
50000 CAPSULE ORAL
Refills: 0 | Status: DISCONTINUED | OUTPATIENT
Start: 2023-10-20 | End: 2023-11-09

## 2023-10-19 RX ADMIN — SODIUM CHLORIDE 1 GRAM(S): 9 INJECTION INTRAMUSCULAR; INTRAVENOUS; SUBCUTANEOUS at 12:17

## 2023-10-19 RX ADMIN — Medication 2: at 12:24

## 2023-10-19 RX ADMIN — FLUDROCORTISONE ACETATE 0.1 MILLIGRAM(S): 0.1 TABLET ORAL at 06:34

## 2023-10-19 RX ADMIN — CLOZAPINE 200 MILLIGRAM(S): 150 TABLET, ORALLY DISINTEGRATING ORAL at 21:33

## 2023-10-19 RX ADMIN — SODIUM CHLORIDE 1 GRAM(S): 9 INJECTION INTRAMUSCULAR; INTRAVENOUS; SUBCUTANEOUS at 21:33

## 2023-10-19 RX ADMIN — ATORVASTATIN CALCIUM 20 MILLIGRAM(S): 80 TABLET, FILM COATED ORAL at 21:33

## 2023-10-19 RX ADMIN — METFORMIN HYDROCHLORIDE 500 MILLIGRAM(S): 850 TABLET ORAL at 21:33

## 2023-10-19 RX ADMIN — Medication 1: at 17:05

## 2023-10-19 RX ADMIN — Medication 1: at 20:46

## 2023-10-19 RX ADMIN — Medication 4 MILLIGRAM(S): at 08:50

## 2023-10-19 RX ADMIN — LINAGLIPTIN 5 MILLIGRAM(S): 5 TABLET, FILM COATED ORAL at 08:50

## 2023-10-19 NOTE — BH INPATIENT PSYCHIATRY PROGRESS NOTE - NSBHFUPINTERVALHXFT_PSY_A_CORE
Chart reviewed and case discussed with treatment team. Staff report patient has been isolative, appearing to respond to internal stimuli though otherwise calmer. Patient was initially very labile, irritable, refusing assessment, "leave me alone." She is not interested in bloodwork results that were abnormal. She is disorganized and unable to have a meaningful conversation. Rambles on about having an " brother" and comments on writer's attire. Group participation has improved. No orthostasis.

## 2023-10-19 NOTE — BH INPATIENT PSYCHIATRY PROGRESS NOTE - NSBHASSESSSUMMFT_PSY_ALL_CORE
57 y/o female, single, noncaregiver, disabled, lives with [facility], history of Schizophrenia vs Schizoaffectove d/o, multiple past admissions at [facility] (most recent 2020 for decompensated psychosis), and also was hospitalized medically for lactic acidosis and followed by CL team in July 2023, followed by psychiatrist Dr. Love at [facility] (pt has no h/o developmental disability), prescribed Clozaril 100mg qhs , no h/o self-injurious behavior or suicide attempts, no h/o violence or legal issues, PMH Type II DM, HLD, asthma, no h/o substance abuse, BIBEMS activated by rosalizy (Rahat) for disorganization.  The patient is grossly disorganized in thoughts on admission.    9/29: The patient continues to be disorganized, but is taking medications.  Will continue clozapine.  9/30: Psychotic disorganized cont meds encourage cooperation with proper vital signs  10/1: disorganized, but more verbal, cooperative, no SI/HI.   10/2: The patient is a little more organized, but still internally preoccupied.  Sedated from clozapine.  Will continue current dose, consider increase if psychosis continues.  10/3: Patient remains disorganized but behaviorally controlled on the unit. Records show that patient was previously well maintained on Clozaril 350mg. Will titrate Clozaril today and monitor for efficacy and side effects.  10/4: Patient shows slight improvement with organization but continues to be disorganized in thought process. Will continue current dose of clozapine with plans to slowly titrate while monitoring for efficacy and side effects.  10/5: Patient was irritable today and less cooperative than previous but continues to be disorganized during the day. Plan to continue titrating clozapine while monitoring for efficacy and side effects.  10/6: Patient shows improvement in mood and thought process and shows improvement in articulating auditory hallucinations. Will continue current dose of clozapine with plans to slowly titrate while monitoring for efficacy and side effects.  10/7: Patient remains disorganized, resistive with treatment, no reports of AH today, continue clozapine titration    10/8: Remains psychotic, suspicious, delusional about her identity, continue clozapine   10/9: Patient remains disorganized but denies auditory hallucinations overnight. BP soft with orthostatics (+), will continue current dose of clozapine with plans to slowly titrate while monitoring for efficacy and side effects.  10/10: Patient remains disorganized and paranoid on the unit but with improved linear thinking since admission, though overall remains disorganized with loose associations. BP remains soft, will encourage PO intake, compression stockings and will continue current dose of clozapine with plans to slowly titrate while monitoring for efficacy and side effects.  10/11: Patient remains disorganized on the unit and requires encouragement for PO intake. Refusing compression stockings. BP remains soft, will check CMP and start salt tablet prior to titrating clozapine.  10/12: Patient remains disorganized on the unit and requires encouragement for PO intake. CMP within normal limits and orthostatics negative, plan to titrate clozapine tonight and monitor for efficacy and side effects.  10/13: Remains disorganized  10/16: Patient remains disorganized and with soft BP. Requires encouragement for PO intake. Will continue current dose of clozapine with plans to slowly titrate while monitoring for efficacy and side effects.  10/17: Patient remains disorganized. BP stable, will titrate clozapine tonight and monitor for efficacy and side effects.  10/18: Patient remains disorganized. BP stable, tolerating clozapine titration well. Will continue current dose with plans to titrate if continuing to tolerate.  10/19: Tolerating meds well. Continue current dose. Found to have Vit D deficiency - will start supplementation    Plan:  No 1:1 needed, as the patient is calm, no SI.  -Continue clozapine 200mg hs - will plan to increase dose tomorrow  	Check CBC Wednesdays  - Started Fludrocortisone 0.1mg daily with food for orthostatic hypotension; refusing AYAN stockings  - Continue NaCl 1g BID  -Continue Senna and Miralax prn for constipation  -Continue metformin, glimepiride and Linagliptin for DM  	Check fingersticks qid  -Lipitor for cholesterol  -Albuterol prn for asthma  - Vit D deficiency - start supplementation  -Patient provided verbal consent to speak with Zara Giang and patient's outpatient provider.

## 2023-10-19 NOTE — BH INPATIENT PSYCHIATRY PROGRESS NOTE - NSBHMETABOLIC_PSY_ALL_CORE_FT
BMI: BMI (kg/m2): 21.3 (10-14-23 @ 15:59)  HbA1c: A1C with Estimated Average Glucose Result: 7.2 % (09-27-23 @ 19:00)    Glucose: POCT Blood Glucose.: 197 mg/dL (10-19-23 @ 16:35)    BP: --  Lipid Panel: Date/Time: 07-07-23 @ 05:52  Cholesterol, Serum: 153  Direct LDL: --  HDL Cholesterol, Serum: 28  Total Cholesterol/HDL Ration Measurement: --  Triglycerides, Serum: 273

## 2023-10-19 NOTE — BH INPATIENT PSYCHIATRY PROGRESS NOTE - MSE UNSTRUCTURED FT
The patient appears stated age with limited grooming but intact hygiene. Poorly engaged and oddly related.  No agitation, psychomotor retardation or involuntary movements observed.  The patient’s speech is fluent, normal in tone, rate, and ranges from soft to normal volume.  The patient’s mood is irritable.  Her affect is constricted, stable, appropriate, congruent to mood. The patient’s thought process remains disorganized and illogical with loose associations. Multiple idiosyncratic delusions related to thought disorder. She denies auditory hallucinations.  She does not express any suicidal or homicidal ideation, intent, or plan.  Insight is limited.  Judgment is impaired. Impulse control has been good on the unit.

## 2023-10-19 NOTE — BH INPATIENT PSYCHIATRY PROGRESS NOTE - CURRENT MEDICATION
MEDICATIONS  (STANDING):  atorvastatin 20 milliGRAM(s) Oral at bedtime  cloZAPine 200 milliGRAM(s) Oral at bedtime  fludroCORTISONE 0.1 milliGRAM(s) Oral daily  glimepiride. 4 milliGRAM(s) Oral with breakfast  insulin lispro (ADMELOG) corrective regimen sliding scale   SubCutaneous three times a day before meals  insulin lispro (ADMELOG) corrective regimen sliding scale   SubCutaneous at bedtime  linagliptin 5 milliGRAM(s) Oral daily  metFORMIN 500 milliGRAM(s) Oral at bedtime  senna 2 Tablet(s) Oral at bedtime  sodium chloride 1 Gram(s) Oral two times a day    MEDICATIONS  (PRN):  albuterol    90 MICROgram(s) HFA Inhaler 2 Puff(s) Inhalation every 6 hours PRN asthma  aluminum hydroxide/magnesium hydroxide/simethicone Suspension 30 milliLiter(s) Oral every 8 hours PRN Dyspepsia  dextrose Oral Gel 15 Gram(s) Oral once PRN Blood Glucose LESS THAN 70 milliGRAM(s)/deciliter  haloperidol     Tablet 5 milliGRAM(s) Oral every 6 hours PRN agitation  haloperidol    Injectable 5 milliGRAM(s) IntraMuscular once PRN combative behavior  LORazepam     Tablet 1 milliGRAM(s) Oral every 6 hours PRN Agitation  polyethylene glycol 3350 17 Gram(s) Oral daily PRN Constipation

## 2023-10-19 NOTE — BH INPATIENT PSYCHIATRY PROGRESS NOTE - NSBHCHARTREVIEWVS_PSY_A_CORE FT
Vital Signs Last 24 Hrs  T(C): 36.3 (10-19-23 @ 05:43), Max: 36.3 (10-19-23 @ 05:43)  T(F): 97.3 (10-19-23 @ 05:43), Max: 97.3 (10-19-23 @ 05:43)  HR: --  BP: --  BP(mean): --  RR: 16 (10-19-23 @ 05:43) (16 - 16)  SpO2: 99% (10-19-23 @ 05:43) (99% - 99%)    Orthostatic VS  10-19-23 @ 05:43  Lying BP: 108/59 HR: 72  Sitting BP: --/-- HR: --  Standing BP: --/-- HR: --  Site: --  Mode: --  Orthostatic VS  10-18-23 @ 18:54  Lying BP: --/-- HR: --  Sitting BP: 104/60 HR: 80  Standing BP: 102/58 HR: 86  Site: --  Mode: --  Orthostatic VS  10-18-23 @ 06:11  Lying BP: --/-- HR: --  Sitting BP: 109/66 HR: 82  Standing BP: 104/51 HR: 89  Site: upper left arm  Mode: electronic

## 2023-10-20 DIAGNOSIS — E55.9 VITAMIN D DEFICIENCY, UNSPECIFIED: ICD-10-CM

## 2023-10-20 LAB
GLUCOSE BLDC GLUCOMTR-MCNC: 105 MG/DL — HIGH (ref 70–99)
GLUCOSE BLDC GLUCOMTR-MCNC: 105 MG/DL — HIGH (ref 70–99)
GLUCOSE BLDC GLUCOMTR-MCNC: 135 MG/DL — HIGH (ref 70–99)
GLUCOSE BLDC GLUCOMTR-MCNC: 135 MG/DL — HIGH (ref 70–99)
GLUCOSE BLDC GLUCOMTR-MCNC: 159 MG/DL — HIGH (ref 70–99)
GLUCOSE BLDC GLUCOMTR-MCNC: 159 MG/DL — HIGH (ref 70–99)
GLUCOSE BLDC GLUCOMTR-MCNC: 317 MG/DL — HIGH (ref 70–99)
GLUCOSE BLDC GLUCOMTR-MCNC: 317 MG/DL — HIGH (ref 70–99)

## 2023-10-20 PROCEDURE — 99232 SBSQ HOSP IP/OBS MODERATE 35: CPT | Mod: GC

## 2023-10-20 RX ORDER — SODIUM CHLORIDE 9 MG/ML
1 INJECTION INTRAMUSCULAR; INTRAVENOUS; SUBCUTANEOUS
Refills: 0 | Status: DISCONTINUED | OUTPATIENT
Start: 2023-10-20 | End: 2023-11-08

## 2023-10-20 RX ORDER — CLOZAPINE 150 MG/1
225 TABLET, ORALLY DISINTEGRATING ORAL AT BEDTIME
Refills: 0 | Status: DISCONTINUED | OUTPATIENT
Start: 2023-10-20 | End: 2023-10-23

## 2023-10-20 RX ORDER — FLUDROCORTISONE ACETATE 0.1 MG/1
0.1 TABLET ORAL
Refills: 0 | Status: DISCONTINUED | OUTPATIENT
Start: 2023-10-21 | End: 2023-11-08

## 2023-10-20 RX ADMIN — Medication 4 MILLIGRAM(S): at 08:57

## 2023-10-20 RX ADMIN — LINAGLIPTIN 5 MILLIGRAM(S): 5 TABLET, FILM COATED ORAL at 08:57

## 2023-10-20 RX ADMIN — ATORVASTATIN CALCIUM 20 MILLIGRAM(S): 80 TABLET, FILM COATED ORAL at 20:47

## 2023-10-20 RX ADMIN — FLUDROCORTISONE ACETATE 0.1 MILLIGRAM(S): 0.1 TABLET ORAL at 05:54

## 2023-10-20 RX ADMIN — METFORMIN HYDROCHLORIDE 500 MILLIGRAM(S): 850 TABLET ORAL at 20:47

## 2023-10-20 RX ADMIN — Medication 2: at 20:47

## 2023-10-20 RX ADMIN — Medication 1: at 12:53

## 2023-10-20 RX ADMIN — SODIUM CHLORIDE 1 GRAM(S): 9 INJECTION INTRAMUSCULAR; INTRAVENOUS; SUBCUTANEOUS at 08:57

## 2023-10-20 RX ADMIN — SODIUM CHLORIDE 1 GRAM(S): 9 INJECTION INTRAMUSCULAR; INTRAVENOUS; SUBCUTANEOUS at 20:47

## 2023-10-20 RX ADMIN — CLOZAPINE 225 MILLIGRAM(S): 150 TABLET, ORALLY DISINTEGRATING ORAL at 20:47

## 2023-10-20 NOTE — BH INPATIENT PSYCHIATRY PROGRESS NOTE - CURRENT MEDICATION
MEDICATIONS  (STANDING):  atorvastatin 20 milliGRAM(s) Oral at bedtime  cloZAPine 225 milliGRAM(s) Oral at bedtime  ergocalciferol 36558 Unit(s) Oral <User Schedule>  glimepiride. 4 milliGRAM(s) Oral with breakfast  insulin lispro (ADMELOG) corrective regimen sliding scale   SubCutaneous three times a day before meals  insulin lispro (ADMELOG) corrective regimen sliding scale   SubCutaneous at bedtime  linagliptin 5 milliGRAM(s) Oral daily  metFORMIN 500 milliGRAM(s) Oral at bedtime  senna 2 Tablet(s) Oral at bedtime  sodium chloride 1 Gram(s) Oral <User Schedule>    MEDICATIONS  (PRN):  albuterol    90 MICROgram(s) HFA Inhaler 2 Puff(s) Inhalation every 6 hours PRN asthma  aluminum hydroxide/magnesium hydroxide/simethicone Suspension 30 milliLiter(s) Oral every 8 hours PRN Dyspepsia  dextrose Oral Gel 15 Gram(s) Oral once PRN Blood Glucose LESS THAN 70 milliGRAM(s)/deciliter  haloperidol     Tablet 5 milliGRAM(s) Oral every 6 hours PRN agitation  haloperidol    Injectable 5 milliGRAM(s) IntraMuscular once PRN combative behavior  LORazepam     Tablet 1 milliGRAM(s) Oral every 6 hours PRN Agitation  polyethylene glycol 3350 17 Gram(s) Oral daily PRN Constipation

## 2023-10-20 NOTE — BH INPATIENT PSYCHIATRY PROGRESS NOTE - NSBHASSESSSUMMFT_PSY_ALL_CORE
59 y/o female, single, noncaregiver, disabled, lives with [facility], history of Schizophrenia vs Schizoaffectove d/o, multiple past admissions at [facility] (most recent 2020 for decompensated psychosis), and also was hospitalized medically for lactic acidosis and followed by CL team in July 2023, followed by psychiatrist Dr. Love at [facility] (pt has no h/o developmental disability), prescribed Clozaril 100mg qhs , no h/o self-injurious behavior or suicide attempts, no h/o violence or legal issues, PMH Type II DM, HLD, asthma, no h/o substance abuse, BIBEMS activated by rosalizy (Rahat) for disorganization.  The patient is grossly disorganized in thoughts on admission.    9/29: The patient continues to be disorganized, but is taking medications.  Will continue clozapine.  9/30: Psychotic disorganized cont meds encourage cooperation with proper vital signs  10/1: disorganized, but more verbal, cooperative, no SI/HI.   10/2: The patient is a little more organized, but still internally preoccupied.  Sedated from clozapine.  Will continue current dose, consider increase if psychosis continues.  10/3: Patient remains disorganized but behaviorally controlled on the unit. Records show that patient was previously well maintained on Clozaril 350mg. Will titrate Clozaril today and monitor for efficacy and side effects.  10/4: Patient shows slight improvement with organization but continues to be disorganized in thought process. Will continue current dose of clozapine with plans to slowly titrate while monitoring for efficacy and side effects.  10/5: Patient was irritable today and less cooperative than previous but continues to be disorganized during the day. Plan to continue titrating clozapine while monitoring for efficacy and side effects.  10/6: Patient shows improvement in mood and thought process and shows improvement in articulating auditory hallucinations. Will continue current dose of clozapine with plans to slowly titrate while monitoring for efficacy and side effects.  10/7: Patient remains disorganized, resistive with treatment, no reports of AH today, continue clozapine titration    10/8: Remains psychotic, suspicious, delusional about her identity, continue clozapine   10/9: Patient remains disorganized but denies auditory hallucinations overnight. BP soft with orthostatics (+), will continue current dose of clozapine with plans to slowly titrate while monitoring for efficacy and side effects.  10/10: Patient remains disorganized and paranoid on the unit but with improved linear thinking since admission, though overall remains disorganized with loose associations. BP remains soft, will encourage PO intake, compression stockings and will continue current dose of clozapine with plans to slowly titrate while monitoring for efficacy and side effects.  10/11: Patient remains disorganized on the unit and requires encouragement for PO intake. Refusing compression stockings. BP remains soft, will check CMP and start salt tablet prior to titrating clozapine.  10/12: Patient remains disorganized on the unit and requires encouragement for PO intake. CMP within normal limits and orthostatics negative, plan to titrate clozapine tonight and monitor for efficacy and side effects.  10/13: Remains disorganized  10/16: Patient remains disorganized and with soft BP. Requires encouragement for PO intake. Will continue current dose of clozapine with plans to slowly titrate while monitoring for efficacy and side effects.  10/17: Patient remains disorganized. BP stable, will titrate clozapine tonight and monitor for efficacy and side effects.  10/18: Patient remains disorganized. BP stable, tolerating clozapine titration well. Will continue current dose with plans to titrate if continuing to tolerate.  10/19: Tolerating meds well. Continue current dose. Found to have Vit D deficiency - will start supplementation  10/20: Patient remains disorganized but with improved mood, tolerating medication well. Will titrate clozapine    Plan:  No 1:1 needed, as the patient is calm, no SI.  -Increase clozapine 225mg hs  	Check CBC Wednesdays  - Started Fludrocortisone 0.1mg daily with food for orthostatic hypotension; refusing AYAN stockings  - Continue NaCl 1g BID  -Continue Senna and Miralax prn for constipation  -Continue metformin, glimepiride and Linagliptin for DM  	Check fingersticks qid  -Lipitor for cholesterol  -Albuterol prn for asthma  - Vit D deficiency - start supplementation  -Patient provided verbal consent to speak with Zara Giang and patient's outpatient provider.

## 2023-10-20 NOTE — BH INPATIENT PSYCHIATRY PROGRESS NOTE - MSE UNSTRUCTURED FT
The patient appears stated age with limited grooming and hygiene. Patient was calm, cooperative and engaging but oddly related.  No agitation, psychomotor retardation or involuntary movements observed. The patient’s speech is fluent, normal in tone, rate, and ranges from soft to normal volume.  The patient’s mood is "tired".  Her affect is constricted, stable, appropriate, congruent to mood. The patient’s thought process remains disorganized and illogical with loose associations. Multiple idiosyncratic delusions related to thought disorder. She denies auditory hallucinations.  She does not express any suicidal or homicidal ideation, intent, or plan.  Insight is limited.  Judgment is impaired. Impulse control has been good on the unit.

## 2023-10-20 NOTE — BH INPATIENT PSYCHIATRY PROGRESS NOTE - NSBHCHARTREVIEWVS_PSY_A_CORE FT
Vital Signs Last 24 Hrs  T(C): 36.6 (10-20-23 @ 06:04), Max: 36.6 (10-19-23 @ 20:55)  T(F): 97.8 (10-20-23 @ 06:04), Max: 97.9 (10-19-23 @ 20:55)  HR: --  BP: --  BP(mean): --  RR: 16 (10-20-23 @ 06:04) (16 - 16)  SpO2: 96% (10-20-23 @ 06:04) (96% - 98%)    Orthostatic VS  10-20-23 @ 06:04  Lying BP: 111/52 HR: 79  Sitting BP: 107/72 HR: 86  Standing BP: --/-- HR: --  Site: --  Mode: --  Orthostatic VS  10-19-23 @ 20:55  Lying BP: 106/70 HR: 89  Sitting BP: 116/71 HR: 85  Standing BP: --/-- HR: --  Site: --  Mode: --  Orthostatic VS  10-19-23 @ 05:43  Lying BP: 108/59 HR: 72  Sitting BP: --/-- HR: --  Standing BP: --/-- HR: --  Site: --  Mode: --  Orthostatic VS  10-18-23 @ 18:54  Lying BP: --/-- HR: --  Sitting BP: 104/60 HR: 80  Standing BP: 102/58 HR: 86  Site: --  Mode: --   Vital Signs Last 24 Hrs  T(C): 36.7 (10-20-23 @ 20:53), Max: 36.7 (10-20-23 @ 20:53)  T(F): 98 (10-20-23 @ 20:53), Max: 98 (10-20-23 @ 20:53)  HR: --  BP: --  BP(mean): --  RR: 16 (10-20-23 @ 06:04) (16 - 16)  SpO2: 98% (10-20-23 @ 20:53) (96% - 98%)    Orthostatic VS  10-20-23 @ 20:55  Lying BP: --/-- HR: --  Sitting BP: 129/65 HR: 86  Standing BP: 113/60 HR: 93  Site: --  Mode: --  Orthostatic VS  10-20-23 @ 20:53  Lying BP: --/-- HR: --  Sitting BP: 118/60 HR: 92  Standing BP: 146/78 HR: 93  Site: --  Mode: --  Orthostatic VS  10-20-23 @ 06:04  Lying BP: 111/52 HR: 79  Sitting BP: 107/72 HR: 86  Standing BP: --/-- HR: --  Site: --  Mode: --  Orthostatic VS  10-19-23 @ 20:55  Lying BP: 106/70 HR: 89  Sitting BP: 116/71 HR: 85  Standing BP: --/-- HR: --  Site: --  Mode: --  Orthostatic VS  10-19-23 @ 05:43  Lying BP: 108/59 HR: 72  Sitting BP: --/-- HR: --  Standing BP: --/-- HR: --  Site: --  Mode: --

## 2023-10-20 NOTE — BH INPATIENT PSYCHIATRY PROGRESS NOTE - NSICDXBHSECONDARYDX_PSY_ALL_CORE
Diabetes   E11.9  High cholesterol   E78.00  Asthma   J45.909   Diabetes   E11.9  High cholesterol   E78.00  Asthma   J45.909  Vitamin D deficiency   E55.9

## 2023-10-20 NOTE — BH INPATIENT PSYCHIATRY PROGRESS NOTE - NSBHFUPINTERVALHXFT_PSY_A_CORE
Chart reviewed and case discussed with treatment team. No acute overnight events with no prns needed. Per staff, patient remains disorganized, prefers to sleep late in the morning but will be hyperverbal if amendable to engagement. On encounter, during late morning, patient was seen laying in bed and was amendable to sitting up and engaging in interview when called, pleasant and cooperative. She endorsed that she did not sleep well last night because her roommate kept her awake but denied hearing the voices of her "twins" speaking to her at night. She reportedly does not remember the last time they had spoken to her. She introduces herself as "Shirley Foster" and denies having a last name. Throughout the interview, she would interject with unrelated responses such as "you're pretty" and "you look like my landlord" but was able to be redirected. Patient denies chest pain or constipation. No orthostasis. Patient overall remains grossly disorganized but with improved mood.

## 2023-10-20 NOTE — BH INPATIENT PSYCHIATRY PROGRESS NOTE - NSBHMETABOLIC_PSY_ALL_CORE_FT
BMI: BMI (kg/m2): 21.3 (10-14-23 @ 15:59)  HbA1c: A1C with Estimated Average Glucose Result: 7.2 % (09-27-23 @ 19:00)    Glucose: POCT Blood Glucose.: 135 mg/dL (10-20-23 @ 08:17)    BP: --  Lipid Panel: Date/Time: 07-07-23 @ 05:52  Cholesterol, Serum: 153  Direct LDL: --  HDL Cholesterol, Serum: 28  Total Cholesterol/HDL Ration Measurement: --  Triglycerides, Serum: 273   BMI: BMI (kg/m2): 21.3 (10-14-23 @ 15:59)  HbA1c: A1C with Estimated Average Glucose Result: 7.2 % (09-27-23 @ 19:00)    Glucose: POCT Blood Glucose.: 317 mg/dL (10-20-23 @ 20:19)    BP: --  Lipid Panel: Date/Time: 07-07-23 @ 05:52  Cholesterol, Serum: 153  Direct LDL: --  HDL Cholesterol, Serum: 28  Total Cholesterol/HDL Ration Measurement: --  Triglycerides, Serum: 273

## 2023-10-21 LAB
GLUCOSE BLDC GLUCOMTR-MCNC: 187 MG/DL — HIGH (ref 70–99)
GLUCOSE BLDC GLUCOMTR-MCNC: 187 MG/DL — HIGH (ref 70–99)
GLUCOSE BLDC GLUCOMTR-MCNC: 203 MG/DL — HIGH (ref 70–99)
GLUCOSE BLDC GLUCOMTR-MCNC: 203 MG/DL — HIGH (ref 70–99)
GLUCOSE BLDC GLUCOMTR-MCNC: 207 MG/DL — HIGH (ref 70–99)
GLUCOSE BLDC GLUCOMTR-MCNC: 207 MG/DL — HIGH (ref 70–99)
GLUCOSE BLDC GLUCOMTR-MCNC: 99 MG/DL — SIGNIFICANT CHANGE UP (ref 70–99)
GLUCOSE BLDC GLUCOMTR-MCNC: 99 MG/DL — SIGNIFICANT CHANGE UP (ref 70–99)

## 2023-10-21 RX ADMIN — SODIUM CHLORIDE 1 GRAM(S): 9 INJECTION INTRAMUSCULAR; INTRAVENOUS; SUBCUTANEOUS at 12:16

## 2023-10-21 RX ADMIN — METFORMIN HYDROCHLORIDE 500 MILLIGRAM(S): 850 TABLET ORAL at 20:11

## 2023-10-21 RX ADMIN — Medication 4 MILLIGRAM(S): at 08:29

## 2023-10-21 RX ADMIN — FLUDROCORTISONE ACETATE 0.1 MILLIGRAM(S): 0.1 TABLET ORAL at 12:16

## 2023-10-21 RX ADMIN — Medication 0: at 20:28

## 2023-10-21 RX ADMIN — CLOZAPINE 225 MILLIGRAM(S): 150 TABLET, ORALLY DISINTEGRATING ORAL at 20:11

## 2023-10-21 RX ADMIN — Medication 2: at 12:14

## 2023-10-21 RX ADMIN — ATORVASTATIN CALCIUM 20 MILLIGRAM(S): 80 TABLET, FILM COATED ORAL at 20:10

## 2023-10-21 RX ADMIN — LINAGLIPTIN 5 MILLIGRAM(S): 5 TABLET, FILM COATED ORAL at 08:29

## 2023-10-21 RX ADMIN — Medication 2: at 16:41

## 2023-10-21 RX ADMIN — SODIUM CHLORIDE 1 GRAM(S): 9 INJECTION INTRAMUSCULAR; INTRAVENOUS; SUBCUTANEOUS at 20:10

## 2023-10-22 LAB
GLUCOSE BLDC GLUCOMTR-MCNC: 118 MG/DL — HIGH (ref 70–99)
GLUCOSE BLDC GLUCOMTR-MCNC: 118 MG/DL — HIGH (ref 70–99)
GLUCOSE BLDC GLUCOMTR-MCNC: 181 MG/DL — HIGH (ref 70–99)
GLUCOSE BLDC GLUCOMTR-MCNC: 181 MG/DL — HIGH (ref 70–99)
GLUCOSE BLDC GLUCOMTR-MCNC: 191 MG/DL — HIGH (ref 70–99)
GLUCOSE BLDC GLUCOMTR-MCNC: 191 MG/DL — HIGH (ref 70–99)
GLUCOSE BLDC GLUCOMTR-MCNC: 196 MG/DL — HIGH (ref 70–99)
GLUCOSE BLDC GLUCOMTR-MCNC: 196 MG/DL — HIGH (ref 70–99)

## 2023-10-22 RX ADMIN — Medication 1: at 12:22

## 2023-10-22 RX ADMIN — ATORVASTATIN CALCIUM 20 MILLIGRAM(S): 80 TABLET, FILM COATED ORAL at 20:27

## 2023-10-22 RX ADMIN — SODIUM CHLORIDE 1 GRAM(S): 9 INJECTION INTRAMUSCULAR; INTRAVENOUS; SUBCUTANEOUS at 20:27

## 2023-10-22 RX ADMIN — LINAGLIPTIN 5 MILLIGRAM(S): 5 TABLET, FILM COATED ORAL at 08:32

## 2023-10-22 RX ADMIN — Medication 1: at 16:40

## 2023-10-22 RX ADMIN — METFORMIN HYDROCHLORIDE 500 MILLIGRAM(S): 850 TABLET ORAL at 20:27

## 2023-10-22 RX ADMIN — CLOZAPINE 225 MILLIGRAM(S): 150 TABLET, ORALLY DISINTEGRATING ORAL at 20:27

## 2023-10-22 RX ADMIN — Medication 4 MILLIGRAM(S): at 08:32

## 2023-10-22 RX ADMIN — SODIUM CHLORIDE 1 GRAM(S): 9 INJECTION INTRAMUSCULAR; INTRAVENOUS; SUBCUTANEOUS at 12:33

## 2023-10-22 RX ADMIN — Medication 0: at 20:30

## 2023-10-22 RX ADMIN — FLUDROCORTISONE ACETATE 0.1 MILLIGRAM(S): 0.1 TABLET ORAL at 12:33

## 2023-10-23 LAB
GLUCOSE BLDC GLUCOMTR-MCNC: 120 MG/DL — HIGH (ref 70–99)
GLUCOSE BLDC GLUCOMTR-MCNC: 120 MG/DL — HIGH (ref 70–99)
GLUCOSE BLDC GLUCOMTR-MCNC: 182 MG/DL — HIGH (ref 70–99)
GLUCOSE BLDC GLUCOMTR-MCNC: 182 MG/DL — HIGH (ref 70–99)
GLUCOSE BLDC GLUCOMTR-MCNC: 186 MG/DL — HIGH (ref 70–99)
GLUCOSE BLDC GLUCOMTR-MCNC: 186 MG/DL — HIGH (ref 70–99)
GLUCOSE BLDC GLUCOMTR-MCNC: 338 MG/DL — HIGH (ref 70–99)
GLUCOSE BLDC GLUCOMTR-MCNC: 338 MG/DL — HIGH (ref 70–99)
GLUCOSE BLDC GLUCOMTR-MCNC: 387 MG/DL — HIGH (ref 70–99)
GLUCOSE BLDC GLUCOMTR-MCNC: 387 MG/DL — HIGH (ref 70–99)

## 2023-10-23 PROCEDURE — 99232 SBSQ HOSP IP/OBS MODERATE 35: CPT | Mod: GC

## 2023-10-23 RX ORDER — CLOZAPINE 150 MG/1
250 TABLET, ORALLY DISINTEGRATING ORAL AT BEDTIME
Refills: 0 | Status: DISCONTINUED | OUTPATIENT
Start: 2023-10-23 | End: 2023-10-25

## 2023-10-23 RX ADMIN — Medication 4 MILLIGRAM(S): at 08:44

## 2023-10-23 RX ADMIN — METFORMIN HYDROCHLORIDE 500 MILLIGRAM(S): 850 TABLET ORAL at 20:39

## 2023-10-23 RX ADMIN — FLUDROCORTISONE ACETATE 0.1 MILLIGRAM(S): 0.1 TABLET ORAL at 12:37

## 2023-10-23 RX ADMIN — SODIUM CHLORIDE 1 GRAM(S): 9 INJECTION INTRAMUSCULAR; INTRAVENOUS; SUBCUTANEOUS at 20:39

## 2023-10-23 RX ADMIN — ATORVASTATIN CALCIUM 20 MILLIGRAM(S): 80 TABLET, FILM COATED ORAL at 20:39

## 2023-10-23 RX ADMIN — Medication 1: at 12:53

## 2023-10-23 RX ADMIN — Medication 1: at 08:43

## 2023-10-23 RX ADMIN — LINAGLIPTIN 5 MILLIGRAM(S): 5 TABLET, FILM COATED ORAL at 08:44

## 2023-10-23 RX ADMIN — SODIUM CHLORIDE 1 GRAM(S): 9 INJECTION INTRAMUSCULAR; INTRAVENOUS; SUBCUTANEOUS at 12:37

## 2023-10-23 RX ADMIN — Medication 3: at 21:46

## 2023-10-23 RX ADMIN — SENNA PLUS 2 TABLET(S): 8.6 TABLET ORAL at 20:39

## 2023-10-23 RX ADMIN — CLOZAPINE 250 MILLIGRAM(S): 150 TABLET, ORALLY DISINTEGRATING ORAL at 20:38

## 2023-10-23 NOTE — BH INPATIENT PSYCHIATRY PROGRESS NOTE - NSBHCHARTREVIEWVS_PSY_A_CORE FT
Vital Signs Last 24 Hrs  T(C): 36.7 (10-23-23 @ 05:46), Max: 36.9 (10-22-23 @ 20:17)  T(F): 98.1 (10-23-23 @ 05:46), Max: 98.5 (10-22-23 @ 20:17)  HR: --  BP: --  BP(mean): --  RR: --  SpO2: --    Orthostatic VS  10-23-23 @ 10:58  Lying BP: 119/62 HR: 90  Sitting BP: 126/64 HR: 91  Standing BP: --/-- HR: --  Site: --  Mode: --  Orthostatic VS  10-23-23 @ 05:46  Lying BP: 137/75 HR: 83  Sitting BP: 125/50 HR: 87  Standing BP: --/-- HR: --  Site: --  Mode: --  Orthostatic VS  10-22-23 @ 20:17  Lying BP: 120/78 HR: 80  Sitting BP: 113/78 HR: 85  Standing BP: --/-- HR: --  Site: --  Mode: --  Orthostatic VS  10-22-23 @ 05:49  Lying BP: 130/59 HR: 82  Sitting BP: 120/63 HR: 84  Standing BP: --/-- HR: --  Site: --  Mode: --  Orthostatic VS  10-21-23 @ 19:47  Lying BP: --/-- HR: --  Sitting BP: 129/68 HR: 89  Standing BP: 117/60 HR: 92  Site: --  Mode: --

## 2023-10-23 NOTE — BH INPATIENT PSYCHIATRY PROGRESS NOTE - NSBHASSESSSUMMFT_PSY_ALL_CORE
57 y/o female, single, noncaregiver, disabled, lives with [facility], history of Schizophrenia vs Schizoaffectove d/o, multiple past admissions at [facility] (most recent 2020 for decompensated psychosis), and also was hospitalized medically for lactic acidosis and followed by CL team in July 2023, followed by psychiatrist Dr. Love at [facility] (pt has no h/o developmental disability), prescribed Clozaril 100mg qhs , no h/o self-injurious behavior or suicide attempts, no h/o violence or legal issues, PMH Type II DM, HLD, asthma, no h/o substance abuse, BIBEMS activated by rosalizy (Rahat) for disorganization.  The patient is grossly disorganized in thoughts on admission.    9/29: The patient continues to be disorganized, but is taking medications.  Will continue clozapine.  9/30: Psychotic disorganized cont meds encourage cooperation with proper vital signs  10/1: disorganized, but more verbal, cooperative, no SI/HI.   10/2: The patient is a little more organized, but still internally preoccupied.  Sedated from clozapine.  Will continue current dose, consider increase if psychosis continues.  10/3: Patient remains disorganized but behaviorally controlled on the unit. Records show that patient was previously well maintained on Clozaril 350mg. Will titrate Clozaril today and monitor for efficacy and side effects.  10/4: Patient shows slight improvement with organization but continues to be disorganized in thought process. Will continue current dose of clozapine with plans to slowly titrate while monitoring for efficacy and side effects.  10/5: Patient was irritable today and less cooperative than previous but continues to be disorganized during the day. Plan to continue titrating clozapine while monitoring for efficacy and side effects.  10/6: Patient shows improvement in mood and thought process and shows improvement in articulating auditory hallucinations. Will continue current dose of clozapine with plans to slowly titrate while monitoring for efficacy and side effects.  10/7: Patient remains disorganized, resistive with treatment, no reports of AH today, continue clozapine titration    10/8: Remains psychotic, suspicious, delusional about her identity, continue clozapine   10/9: Patient remains disorganized but denies auditory hallucinations overnight. BP soft with orthostatics (+), will continue current dose of clozapine with plans to slowly titrate while monitoring for efficacy and side effects.  10/10: Patient remains disorganized and paranoid on the unit but with improved linear thinking since admission, though overall remains disorganized with loose associations. BP remains soft, will encourage PO intake, compression stockings and will continue current dose of clozapine with plans to slowly titrate while monitoring for efficacy and side effects.  10/11: Patient remains disorganized on the unit and requires encouragement for PO intake. Refusing compression stockings. BP remains soft, will check CMP and start salt tablet prior to titrating clozapine.  10/12: Patient remains disorganized on the unit and requires encouragement for PO intake. CMP within normal limits and orthostatics negative, plan to titrate clozapine tonight and monitor for efficacy and side effects.  10/13: Remains disorganized  10/16: Patient remains disorganized and with soft BP. Requires encouragement for PO intake. Will continue current dose of clozapine with plans to slowly titrate while monitoring for efficacy and side effects.  10/17: Patient remains disorganized. BP stable, will titrate clozapine tonight and monitor for efficacy and side effects.  10/18: Patient remains disorganized. BP stable, tolerating clozapine titration well. Will continue current dose with plans to titrate if continuing to tolerate.  10/19: Tolerating meds well. Continue current dose. Found to have Vit D deficiency - will start supplementation  10/20: Patient remains disorganized but with improved mood, tolerating medication well. Will titrate clozapine.  10/23: Patient remains disorganized but continues to tolerate medication. Will titrate clozapine tonight    Plan:  No 1:1 needed, as the patient is calm, no SI.  -Increase clozapine 250mg hs  	Check CBC Wednesdays  - Started Fludrocortisone 0.1mg daily with food for orthostatic hypotension; refusing AYAN stockings  - Continue NaCl 1g BID  -Continue Senna and Miralax prn for constipation  -Continue metformin, glimepiride and Linagliptin for DM  	Check fingersticks qid  -Lipitor for cholesterol  -Albuterol prn for asthma  - Vit D deficiency - start supplementation  -Patient provided verbal consent to speak with Zara Giang and patient's outpatient provider.

## 2023-10-23 NOTE — BH INPATIENT PSYCHIATRY PROGRESS NOTE - MSE UNSTRUCTURED FT
The patient appears stated age with limited grooming and hygiene. Patient was calm, cooperative and engaging but oddly related.  No agitation, psychomotor retardation or involuntary movements observed. The patient’s speech is fluent, normal in tone, rate, and ranges from soft to normal volume.  The patient’s mood is "good".  Her affect is constricted, stable, appropriate, congruent to mood. The patient’s thought process remains disorganized and illogical with loose associations. Multiple idiosyncratic delusions related to thought disorder. She denies auditory hallucinations at this time.  She does not express any suicidal or homicidal ideation, intent, or plan.  Insight is limited.  Judgment is impaired. Impulse control has been good on the unit.

## 2023-10-23 NOTE — BH INPATIENT PSYCHIATRY PROGRESS NOTE - NSBHFUPINTERVALHXFT_PSY_A_CORE
Chart reviewed and case discussed with treatment team. No acute events over the weekend with no prns needed. Per staff, patient remains disorganized and is often guarded and paranoid towards staff. On encounter, patient was seen sitting in the common room area with a towel turban, internally preoccupied and speaking quietly to herself. Her thought process remains disorganized with loose associations but appears to have more energy in the morning compared to previous encounters. Patient denies chest pain or constipation. No orthostasis.

## 2023-10-23 NOTE — BH INPATIENT PSYCHIATRY PROGRESS NOTE - CURRENT MEDICATION
MEDICATIONS  (STANDING):  atorvastatin 20 milliGRAM(s) Oral at bedtime  cloZAPine 250 milliGRAM(s) Oral at bedtime  ergocalciferol 54003 Unit(s) Oral <User Schedule>  fludroCORTISONE 0.1 milliGRAM(s) Oral <User Schedule>  glimepiride. 4 milliGRAM(s) Oral with breakfast  insulin lispro (ADMELOG) corrective regimen sliding scale   SubCutaneous at bedtime  insulin lispro (ADMELOG) corrective regimen sliding scale   SubCutaneous three times a day before meals  linagliptin 5 milliGRAM(s) Oral daily  metFORMIN 500 milliGRAM(s) Oral at bedtime  senna 2 Tablet(s) Oral at bedtime  sodium chloride 1 Gram(s) Oral <User Schedule>    MEDICATIONS  (PRN):  albuterol    90 MICROgram(s) HFA Inhaler 2 Puff(s) Inhalation every 6 hours PRN asthma  aluminum hydroxide/magnesium hydroxide/simethicone Suspension 30 milliLiter(s) Oral every 8 hours PRN Dyspepsia  dextrose Oral Gel 15 Gram(s) Oral once PRN Blood Glucose LESS THAN 70 milliGRAM(s)/deciliter  haloperidol     Tablet 5 milliGRAM(s) Oral every 6 hours PRN agitation  haloperidol    Injectable 5 milliGRAM(s) IntraMuscular once PRN combative behavior  LORazepam     Tablet 1 milliGRAM(s) Oral every 6 hours PRN Agitation  polyethylene glycol 3350 17 Gram(s) Oral daily PRN Constipation   MEDICATIONS  (STANDING):  atorvastatin 20 milliGRAM(s) Oral at bedtime  cloZAPine 250 milliGRAM(s) Oral at bedtime  ergocalciferol 34182 Unit(s) Oral <User Schedule>  fludroCORTISONE 0.1 milliGRAM(s) Oral <User Schedule>  glimepiride. 4 milliGRAM(s) Oral with breakfast  insulin lispro (ADMELOG) corrective regimen sliding scale   SubCutaneous three times a day before meals  insulin lispro (ADMELOG) corrective regimen sliding scale   SubCutaneous at bedtime  linagliptin 5 milliGRAM(s) Oral daily  metFORMIN 500 milliGRAM(s) Oral at bedtime  senna 2 Tablet(s) Oral at bedtime  sodium chloride 1 Gram(s) Oral <User Schedule>    MEDICATIONS  (PRN):  albuterol    90 MICROgram(s) HFA Inhaler 2 Puff(s) Inhalation every 6 hours PRN asthma  aluminum hydroxide/magnesium hydroxide/simethicone Suspension 30 milliLiter(s) Oral every 8 hours PRN Dyspepsia  dextrose Oral Gel 15 Gram(s) Oral once PRN Blood Glucose LESS THAN 70 milliGRAM(s)/deciliter  haloperidol     Tablet 5 milliGRAM(s) Oral every 6 hours PRN agitation  haloperidol    Injectable 5 milliGRAM(s) IntraMuscular once PRN combative behavior  LORazepam     Tablet 1 milliGRAM(s) Oral every 6 hours PRN Agitation  polyethylene glycol 3350 17 Gram(s) Oral daily PRN Constipation

## 2023-10-23 NOTE — BH INPATIENT PSYCHIATRY PROGRESS NOTE - NSBHMETABOLIC_PSY_ALL_CORE_FT
BMI: BMI (kg/m2): 21.3 (10-14-23 @ 15:59)  HbA1c: A1C with Estimated Average Glucose Result: 7.2 % (09-27-23 @ 19:00)    Glucose: POCT Blood Glucose.: 186 mg/dL (10-23-23 @ 12:09)    BP: --  Lipid Panel: Date/Time: 07-07-23 @ 05:52  Cholesterol, Serum: 153  Direct LDL: --  HDL Cholesterol, Serum: 28  Total Cholesterol/HDL Ration Measurement: --  Triglycerides, Serum: 273

## 2023-10-24 LAB
GLUCOSE BLDC GLUCOMTR-MCNC: 133 MG/DL — HIGH (ref 70–99)
GLUCOSE BLDC GLUCOMTR-MCNC: 133 MG/DL — HIGH (ref 70–99)
GLUCOSE BLDC GLUCOMTR-MCNC: 154 MG/DL — HIGH (ref 70–99)
GLUCOSE BLDC GLUCOMTR-MCNC: 154 MG/DL — HIGH (ref 70–99)
GLUCOSE BLDC GLUCOMTR-MCNC: 196 MG/DL — HIGH (ref 70–99)
GLUCOSE BLDC GLUCOMTR-MCNC: 196 MG/DL — HIGH (ref 70–99)
GLUCOSE BLDC GLUCOMTR-MCNC: 231 MG/DL — HIGH (ref 70–99)
GLUCOSE BLDC GLUCOMTR-MCNC: 231 MG/DL — HIGH (ref 70–99)

## 2023-10-24 PROCEDURE — 99232 SBSQ HOSP IP/OBS MODERATE 35: CPT

## 2023-10-24 RX ADMIN — Medication 1: at 12:29

## 2023-10-24 RX ADMIN — SENNA PLUS 2 TABLET(S): 8.6 TABLET ORAL at 20:10

## 2023-10-24 RX ADMIN — CLOZAPINE 250 MILLIGRAM(S): 150 TABLET, ORALLY DISINTEGRATING ORAL at 20:10

## 2023-10-24 RX ADMIN — Medication 4 MILLIGRAM(S): at 09:03

## 2023-10-24 RX ADMIN — FLUDROCORTISONE ACETATE 0.1 MILLIGRAM(S): 0.1 TABLET ORAL at 13:41

## 2023-10-24 RX ADMIN — METFORMIN HYDROCHLORIDE 500 MILLIGRAM(S): 850 TABLET ORAL at 20:10

## 2023-10-24 RX ADMIN — Medication 1: at 17:22

## 2023-10-24 RX ADMIN — SODIUM CHLORIDE 1 GRAM(S): 9 INJECTION INTRAMUSCULAR; INTRAVENOUS; SUBCUTANEOUS at 13:40

## 2023-10-24 RX ADMIN — SODIUM CHLORIDE 1 GRAM(S): 9 INJECTION INTRAMUSCULAR; INTRAVENOUS; SUBCUTANEOUS at 20:21

## 2023-10-24 RX ADMIN — ATORVASTATIN CALCIUM 20 MILLIGRAM(S): 80 TABLET, FILM COATED ORAL at 20:11

## 2023-10-24 RX ADMIN — LINAGLIPTIN 5 MILLIGRAM(S): 5 TABLET, FILM COATED ORAL at 09:02

## 2023-10-24 NOTE — BH PATIENT CHARACTERISTICS SURVEY - OTHER DEVELOPMENTAL DISABILITY (EPILEPSY, CEREBRAL PALSY, NEUROLOGICAL IMPAIRMENT):
[facility]  Operative Note    Michael Denis Location: OR   CSN [id number] MRN [id number]    1950 Age 74year old   Admission Date 2023 Operation Date 2023   Attending Physician Dami John MD Operating Physician Ngozi Beck MD   PCP [facility]          Patient Name: Michael Denis    Preoperative Diagnosis: Ventral hernia without obstruction or gangrene [K43.9]    Postoperative Diagnosis: Same as preoperative diagnosis    Primary Surgeon: Ngozi Beck MD    Assistant: Waleska Arias PA-C    Anesthesia: General    Procedures: Robotic ventral hernia repair with mesh    Implants: [place] Synecore preperitoneal mesh 10 cm x 15 cm    Specimen: none     Drains: none    Estimated Blood Loss: 5 cc    Complications: None    Condition: Good    Indications for Surgery:   Beulah Savage is a 74year old female who presents with a painful enlarging bulge in the epigastric region. Physical exam reveals an umbilical hernia and a ventral epigastric hernia. CT scan shows multiple defects in the midline at the umbilicus and the epigastric area. The patient presents today for elective hernia repair. Surgical Findings:   7 cm hernia defect    Description of Procedure: The patient was transported to the operating room and placed on the operating table in supine position. General endotracheal anesthesia was administered. The abdomen was prepped and draped in sterile fashion. Pre-operative antibiotics were given. A time-out was performed. In the left upper quadrant a stab incision was made and a veress needle was placed. Pneumoperitoneum was achieved to a pressure of 15mmHg. An 8 mm trocar was placed under direct vision using a optiview technique. Initial diagnostic survey reveals no injury secondary to our entry. The hernia is noted with the umbilicus and the epigastric area. No other acute pathology is identified.  Two more 8 mm trocars were placed under direct visualization; one in the left upper abdomen out laterally just below the costal margin and one in the left lower quadrant just off the ASIS. The robot was docked and instruments were placed under direct vision. The hernia contents are reduced. An incision is made in the peritoneum and a combination of blunt dissection and electrocautery are used to dissect a preperitoneal pocket. Dissection was taken towards the hernia. The hernia sac was carefully dissected out of th defect, taking care to avoid creating defects in the peritoneum. The preperitoneal pocket is extended cephalad, caudad, and contralaterally to accommodate a mesh. Pneumoperitoneum is reduced to 12 mmHg. The hernia defect is closed primarily with running #1 Strattafix suture with good approximation. A 10 cm x 15 cm preperitoneal Synecore mesh is placed. The mesh is initially approximated to the abdominal wall with the initial fascia closure stitch to ensure appropriate positioning. 2-0 v-Loc suture is used to affix the perimeter of the mesh to the anterior abdominal wall. Additional sutures used to fix the midportion of the mesh to the anterior abdominal wall. The peritoneum flap is then closed with 2-0 V-Loc. All peritoneal holes were closed using a 3-0 Vicryl suture   All needles are recovered from the abdomen. The instruments were removed and pneumoperitoneum was released. All wounds were cleansed and irrigated and the skin is reapproximated using 4-0 Monocryl in subcuticular fashion. Local anesthetic was injected at all incision sites which were then covered with Surgical glue. The drapes were removed and the patient was awakened from anesthesia. The patient was transported to the recovery room in good condition, having tolerated the procedure well without apparent intraoperative complication. All needle, sponge, instrument counts were correct at the end of procedure.      Leopoldo Dan MD  7/12/2023  12:45 PM No

## 2023-10-24 NOTE — BH PATIENT CHARACTERISTICS SURVEY - NSPCSEMPLOYMENT_PSY_ALL_CORE
Not In Labor Force: unemployed but not looking for work, retired, homemaker, student, incarcerated, or psychiatric inpatient, underage   of employment/below working age

## 2023-10-24 NOTE — BH INPATIENT PSYCHIATRY PROGRESS NOTE - CURRENT MEDICATION
MEDICATIONS  (STANDING):  atorvastatin 20 milliGRAM(s) Oral at bedtime  cloZAPine 250 milliGRAM(s) Oral at bedtime  ergocalciferol 10451 Unit(s) Oral <User Schedule>  fludroCORTISONE 0.1 milliGRAM(s) Oral <User Schedule>  glimepiride. 4 milliGRAM(s) Oral with breakfast  insulin lispro (ADMELOG) corrective regimen sliding scale   SubCutaneous three times a day before meals  insulin lispro (ADMELOG) corrective regimen sliding scale   SubCutaneous at bedtime  linagliptin 5 milliGRAM(s) Oral daily  metFORMIN 500 milliGRAM(s) Oral at bedtime  senna 2 Tablet(s) Oral at bedtime  sodium chloride 1 Gram(s) Oral <User Schedule>    MEDICATIONS  (PRN):  albuterol    90 MICROgram(s) HFA Inhaler 2 Puff(s) Inhalation every 6 hours PRN asthma  aluminum hydroxide/magnesium hydroxide/simethicone Suspension 30 milliLiter(s) Oral every 8 hours PRN Dyspepsia  dextrose Oral Gel 15 Gram(s) Oral once PRN Blood Glucose LESS THAN 70 milliGRAM(s)/deciliter  haloperidol     Tablet 5 milliGRAM(s) Oral every 6 hours PRN agitation  haloperidol    Injectable 5 milliGRAM(s) IntraMuscular once PRN combative behavior  LORazepam     Tablet 1 milliGRAM(s) Oral every 6 hours PRN Agitation  polyethylene glycol 3350 17 Gram(s) Oral daily PRN Constipation

## 2023-10-24 NOTE — BH INPATIENT PSYCHIATRY PROGRESS NOTE - NSBHASSESSSUMMFT_PSY_ALL_CORE
59 y/o female, single, noncaregiver, disabled, lives with [facility], history of Schizophrenia vs Schizoaffectove d/o, multiple past admissions at [facility] (most recent 2020 for decompensated psychosis), and also was hospitalized medically for lactic acidosis and followed by CL team in July 2023, followed by psychiatrist Dr. Love at [facility] (pt has no h/o developmental disability), prescribed Clozaril 100mg qhs , no h/o self-injurious behavior or suicide attempts, no h/o violence or legal issues, PMH Type II DM, HLD, asthma, no h/o substance abuse, BIBEMS activated by rosalizy (Rahat) for disorganization.  The patient is grossly disorganized in thoughts on admission.    9/29: The patient continues to be disorganized, but is taking medications.  Will continue clozapine.  9/30: Psychotic disorganized cont meds encourage cooperation with proper vital signs  10/1: disorganized, but more verbal, cooperative, no SI/HI.   10/2: The patient is a little more organized, but still internally preoccupied.  Sedated from clozapine.  Will continue current dose, consider increase if psychosis continues.  10/3: Patient remains disorganized but behaviorally controlled on the unit. Records show that patient was previously well maintained on Clozaril 350mg. Will titrate Clozaril today and monitor for efficacy and side effects.  10/4: Patient shows slight improvement with organization but continues to be disorganized in thought process. Will continue current dose of clozapine with plans to slowly titrate while monitoring for efficacy and side effects.  10/5: Patient was irritable today and less cooperative than previous but continues to be disorganized during the day. Plan to continue titrating clozapine while monitoring for efficacy and side effects.  10/6: Patient shows improvement in mood and thought process and shows improvement in articulating auditory hallucinations. Will continue current dose of clozapine with plans to slowly titrate while monitoring for efficacy and side effects.  10/7: Patient remains disorganized, resistive with treatment, no reports of AH today, continue clozapine titration    10/8: Remains psychotic, suspicious, delusional about her identity, continue clozapine   10/9: Patient remains disorganized but denies auditory hallucinations overnight. BP soft with orthostatics (+), will continue current dose of clozapine with plans to slowly titrate while monitoring for efficacy and side effects.  10/10: Patient remains disorganized and paranoid on the unit but with improved linear thinking since admission, though overall remains disorganized with loose associations. BP remains soft, will encourage PO intake, compression stockings and will continue current dose of clozapine with plans to slowly titrate while monitoring for efficacy and side effects.  10/11: Patient remains disorganized on the unit and requires encouragement for PO intake. Refusing compression stockings. BP remains soft, will check CMP and start salt tablet prior to titrating clozapine.  10/12: Patient remains disorganized on the unit and requires encouragement for PO intake. CMP within normal limits and orthostatics negative, plan to titrate clozapine tonight and monitor for efficacy and side effects.  10/13: Remains disorganized  10/16: Patient remains disorganized and with soft BP. Requires encouragement for PO intake. Will continue current dose of clozapine with plans to slowly titrate while monitoring for efficacy and side effects.  10/17: Patient remains disorganized. BP stable, will titrate clozapine tonight and monitor for efficacy and side effects.  10/18: Patient remains disorganized. BP stable, tolerating clozapine titration well. Will continue current dose with plans to titrate if continuing to tolerate.  10/19: Tolerating meds well. Continue current dose. Found to have Vit D deficiency - will start supplementation  10/20: Patient remains disorganized but with improved mood, tolerating medication well. Will titrate clozapine.  10/23: Patient remains disorganized but continues to tolerate medication. Will titrate clozapine tonight  10/24: More visible, still disorganized, AH ongoing    Plan:  No 1:1 needed, as the patient is calm, no SI.  -continue clozapine 250mg hs  	Check CBC Wednesdays  - Started Fludrocortisone 0.1mg daily with food for orthostatic hypotension; refusing AYAN stockings  - Continue NaCl 1g BID  -Continue Senna and Miralax prn for constipation  -Continue metformin, glimepiride and Linagliptin for DM  	Check fingersticks qid  -Lipitor for cholesterol  -Albuterol prn for asthma  - Vit D deficiency - start supplementation  -Patient provided verbal consent to speak with Zara Giang and patient's outpatient provider.

## 2023-10-24 NOTE — BH INPATIENT PSYCHIATRY PROGRESS NOTE - NSBHMETABOLIC_PSY_ALL_CORE_FT
BMI: BMI (kg/m2): 21.3 (10-14-23 @ 15:59)  HbA1c: A1C with Estimated Average Glucose Result: 7.2 % (09-27-23 @ 19:00)    Glucose: POCT Blood Glucose.: 231 mg/dL (10-24-23 @ 20:04)    BP: --  Lipid Panel: Date/Time: 07-07-23 @ 05:52  Cholesterol, Serum: 153  Direct LDL: --  HDL Cholesterol, Serum: 28  Total Cholesterol/HDL Ration Measurement: --  Triglycerides, Serum: 273

## 2023-10-24 NOTE — BH INPATIENT PSYCHIATRY PROGRESS NOTE - NSBHFUPINTERVALHXFT_PSY_A_CORE
Chart reviewed and case discussed with treatment team. Staff report patient continues to appear internally preoccupied. Self care somewhat improved. More visible. She is less irritable. She continues to endorses hearing the voices of "the twins" and reports that it causes distress. Med compliant.

## 2023-10-25 LAB
BASOPHILS # BLD AUTO: 0.02 K/UL — SIGNIFICANT CHANGE UP (ref 0–0.2)
BASOPHILS # BLD AUTO: 0.02 K/UL — SIGNIFICANT CHANGE UP (ref 0–0.2)
BASOPHILS NFR BLD AUTO: 0.3 % — SIGNIFICANT CHANGE UP (ref 0–2)
BASOPHILS NFR BLD AUTO: 0.3 % — SIGNIFICANT CHANGE UP (ref 0–2)
CLOZAPINE SERPL-MCNC: <68 NG/ML — LOW (ref 350–600)
CLOZAPINE SERPL-MCNC: <68 NG/ML — LOW (ref 350–600)
CRP SERPL-MCNC: 8.2 MG/L — HIGH
CRP SERPL-MCNC: 8.2 MG/L — HIGH
EOSINOPHIL # BLD AUTO: 0.24 K/UL — SIGNIFICANT CHANGE UP (ref 0–0.5)
EOSINOPHIL # BLD AUTO: 0.24 K/UL — SIGNIFICANT CHANGE UP (ref 0–0.5)
EOSINOPHIL NFR BLD AUTO: 3.3 % — SIGNIFICANT CHANGE UP (ref 0–6)
EOSINOPHIL NFR BLD AUTO: 3.3 % — SIGNIFICANT CHANGE UP (ref 0–6)
GLUCOSE BLDC GLUCOMTR-MCNC: 134 MG/DL — HIGH (ref 70–99)
GLUCOSE BLDC GLUCOMTR-MCNC: 134 MG/DL — HIGH (ref 70–99)
GLUCOSE BLDC GLUCOMTR-MCNC: 159 MG/DL — HIGH (ref 70–99)
GLUCOSE BLDC GLUCOMTR-MCNC: 159 MG/DL — HIGH (ref 70–99)
GLUCOSE BLDC GLUCOMTR-MCNC: 169 MG/DL — HIGH (ref 70–99)
GLUCOSE BLDC GLUCOMTR-MCNC: 169 MG/DL — HIGH (ref 70–99)
GLUCOSE BLDC GLUCOMTR-MCNC: 187 MG/DL — HIGH (ref 70–99)
GLUCOSE BLDC GLUCOMTR-MCNC: 187 MG/DL — HIGH (ref 70–99)
HCT VFR BLD CALC: 32.3 % — LOW (ref 34.5–45)
HCT VFR BLD CALC: 32.3 % — LOW (ref 34.5–45)
HGB BLD-MCNC: 10.6 G/DL — LOW (ref 11.5–15.5)
HGB BLD-MCNC: 10.6 G/DL — LOW (ref 11.5–15.5)
IANC: 4.89 K/UL — SIGNIFICANT CHANGE UP (ref 1.8–7.4)
IANC: 4.89 K/UL — SIGNIFICANT CHANGE UP (ref 1.8–7.4)
IMM GRANULOCYTES NFR BLD AUTO: 0.4 % — SIGNIFICANT CHANGE UP (ref 0–0.9)
IMM GRANULOCYTES NFR BLD AUTO: 0.4 % — SIGNIFICANT CHANGE UP (ref 0–0.9)
LYMPHOCYTES # BLD AUTO: 1.72 K/UL — SIGNIFICANT CHANGE UP (ref 1–3.3)
LYMPHOCYTES # BLD AUTO: 1.72 K/UL — SIGNIFICANT CHANGE UP (ref 1–3.3)
LYMPHOCYTES # BLD AUTO: 23.8 % — SIGNIFICANT CHANGE UP (ref 13–44)
LYMPHOCYTES # BLD AUTO: 23.8 % — SIGNIFICANT CHANGE UP (ref 13–44)
MCHC RBC-ENTMCNC: 26.5 PG — LOW (ref 27–34)
MCHC RBC-ENTMCNC: 26.5 PG — LOW (ref 27–34)
MCHC RBC-ENTMCNC: 32.8 GM/DL — SIGNIFICANT CHANGE UP (ref 32–36)
MCHC RBC-ENTMCNC: 32.8 GM/DL — SIGNIFICANT CHANGE UP (ref 32–36)
MCV RBC AUTO: 80.8 FL — SIGNIFICANT CHANGE UP (ref 80–100)
MCV RBC AUTO: 80.8 FL — SIGNIFICANT CHANGE UP (ref 80–100)
MONOCYTES # BLD AUTO: 0.33 K/UL — SIGNIFICANT CHANGE UP (ref 0–0.9)
MONOCYTES # BLD AUTO: 0.33 K/UL — SIGNIFICANT CHANGE UP (ref 0–0.9)
MONOCYTES NFR BLD AUTO: 4.6 % — SIGNIFICANT CHANGE UP (ref 2–14)
MONOCYTES NFR BLD AUTO: 4.6 % — SIGNIFICANT CHANGE UP (ref 2–14)
NEUTROPHILS # BLD AUTO: 4.89 K/UL — SIGNIFICANT CHANGE UP (ref 1.8–7.4)
NEUTROPHILS # BLD AUTO: 4.89 K/UL — SIGNIFICANT CHANGE UP (ref 1.8–7.4)
NEUTROPHILS NFR BLD AUTO: 67.6 % — SIGNIFICANT CHANGE UP (ref 43–77)
NEUTROPHILS NFR BLD AUTO: 67.6 % — SIGNIFICANT CHANGE UP (ref 43–77)
NRBC # BLD: 0 /100 WBCS — SIGNIFICANT CHANGE UP (ref 0–0)
NRBC # BLD: 0 /100 WBCS — SIGNIFICANT CHANGE UP (ref 0–0)
NRBC # FLD: 0 K/UL — SIGNIFICANT CHANGE UP (ref 0–0)
NRBC # FLD: 0 K/UL — SIGNIFICANT CHANGE UP (ref 0–0)
PLATELET # BLD AUTO: 265 K/UL — SIGNIFICANT CHANGE UP (ref 150–400)
PLATELET # BLD AUTO: 265 K/UL — SIGNIFICANT CHANGE UP (ref 150–400)
RBC # BLD: 4 M/UL — SIGNIFICANT CHANGE UP (ref 3.8–5.2)
RBC # BLD: 4 M/UL — SIGNIFICANT CHANGE UP (ref 3.8–5.2)
RBC # FLD: 13.2 % — SIGNIFICANT CHANGE UP (ref 10.3–14.5)
RBC # FLD: 13.2 % — SIGNIFICANT CHANGE UP (ref 10.3–14.5)
TROPONIN T, HIGH SENSITIVITY RESULT: 8 NG/L — SIGNIFICANT CHANGE UP
TROPONIN T, HIGH SENSITIVITY RESULT: 8 NG/L — SIGNIFICANT CHANGE UP
WBC # BLD: 7.23 K/UL — SIGNIFICANT CHANGE UP (ref 3.8–10.5)
WBC # BLD: 7.23 K/UL — SIGNIFICANT CHANGE UP (ref 3.8–10.5)
WBC # FLD AUTO: 7.23 K/UL — SIGNIFICANT CHANGE UP (ref 3.8–10.5)
WBC # FLD AUTO: 7.23 K/UL — SIGNIFICANT CHANGE UP (ref 3.8–10.5)

## 2023-10-25 PROCEDURE — 99232 SBSQ HOSP IP/OBS MODERATE 35: CPT | Mod: GC

## 2023-10-25 RX ORDER — CLOZAPINE 150 MG/1
275 TABLET, ORALLY DISINTEGRATING ORAL AT BEDTIME
Refills: 0 | Status: DISCONTINUED | OUTPATIENT
Start: 2023-10-25 | End: 2023-10-26

## 2023-10-25 RX ADMIN — Medication 4 MILLIGRAM(S): at 09:13

## 2023-10-25 RX ADMIN — LINAGLIPTIN 5 MILLIGRAM(S): 5 TABLET, FILM COATED ORAL at 09:14

## 2023-10-25 RX ADMIN — ATORVASTATIN CALCIUM 20 MILLIGRAM(S): 80 TABLET, FILM COATED ORAL at 20:31

## 2023-10-25 RX ADMIN — CLOZAPINE 275 MILLIGRAM(S): 150 TABLET, ORALLY DISINTEGRATING ORAL at 20:31

## 2023-10-25 RX ADMIN — Medication 1: at 12:39

## 2023-10-25 RX ADMIN — SODIUM CHLORIDE 1 GRAM(S): 9 INJECTION INTRAMUSCULAR; INTRAVENOUS; SUBCUTANEOUS at 13:26

## 2023-10-25 RX ADMIN — Medication 1: at 16:43

## 2023-10-25 RX ADMIN — SENNA PLUS 2 TABLET(S): 8.6 TABLET ORAL at 20:31

## 2023-10-25 RX ADMIN — FLUDROCORTISONE ACETATE 0.1 MILLIGRAM(S): 0.1 TABLET ORAL at 13:27

## 2023-10-25 RX ADMIN — METFORMIN HYDROCHLORIDE 500 MILLIGRAM(S): 850 TABLET ORAL at 20:31

## 2023-10-25 RX ADMIN — SODIUM CHLORIDE 1 GRAM(S): 9 INJECTION INTRAMUSCULAR; INTRAVENOUS; SUBCUTANEOUS at 20:33

## 2023-10-25 NOTE — BH INPATIENT PSYCHIATRY PROGRESS NOTE - MSE UNSTRUCTURED FT
The patient appears stated age with limited grooming and hygiene. Patient was calm, cooperative and engaging but oddly related.  No agitation, psychomotor retardation or involuntary movements observed. The patient’s speech is fluent, normal in tone, rate, and ranges from soft to normal volume.  The patient’s mood is "good".  Her affect is constricted, stable, appropriate, congruent to mood. The patient’s thought process shows improved linearity but remains illogical with loose associations. Multiple idiosyncratic delusions related to thought disorder. She endorses auditory hallucinations, appears internally preoccupied at times. She does not express any suicidal or homicidal ideation, intent, or plan.  Insight is limited.  Judgment is impaired. Impulse control has been good on the unit.

## 2023-10-25 NOTE — BH INPATIENT PSYCHIATRY PROGRESS NOTE - NSBHMETABOLIC_PSY_ALL_CORE_FT
BMI: BMI (kg/m2): 21.3 (10-14-23 @ 15:59)  HbA1c: A1C with Estimated Average Glucose Result: 7.2 % (09-27-23 @ 19:00)    Glucose: POCT Blood Glucose.: 134 mg/dL (10-25-23 @ 08:14)    BP: --  Lipid Panel: Date/Time: 07-07-23 @ 05:52  Cholesterol, Serum: 153  Direct LDL: --  HDL Cholesterol, Serum: 28  Total Cholesterol/HDL Ration Measurement: --  Triglycerides, Serum: 273   BMI: BMI (kg/m2): 21.3 (10-14-23 @ 15:59)  HbA1c: A1C with Estimated Average Glucose Result: 7.2 % (09-27-23 @ 19:00)    Glucose: POCT Blood Glucose.: 159 mg/dL (10-25-23 @ 16:30)    BP: --  Lipid Panel: Date/Time: 07-07-23 @ 05:52  Cholesterol, Serum: 153  Direct LDL: --  HDL Cholesterol, Serum: 28  Total Cholesterol/HDL Ration Measurement: --  Triglycerides, Serum: 273

## 2023-10-25 NOTE — BH INPATIENT PSYCHIATRY PROGRESS NOTE - CURRENT MEDICATION
MEDICATIONS  (STANDING):  atorvastatin 20 milliGRAM(s) Oral at bedtime  cloZAPine 275 milliGRAM(s) Oral at bedtime  ergocalciferol 53276 Unit(s) Oral <User Schedule>  fludroCORTISONE 0.1 milliGRAM(s) Oral <User Schedule>  glimepiride. 4 milliGRAM(s) Oral with breakfast  insulin lispro (ADMELOG) corrective regimen sliding scale   SubCutaneous three times a day before meals  insulin lispro (ADMELOG) corrective regimen sliding scale   SubCutaneous at bedtime  linagliptin 5 milliGRAM(s) Oral daily  metFORMIN 500 milliGRAM(s) Oral at bedtime  senna 2 Tablet(s) Oral at bedtime  sodium chloride 1 Gram(s) Oral <User Schedule>    MEDICATIONS  (PRN):  albuterol    90 MICROgram(s) HFA Inhaler 2 Puff(s) Inhalation every 6 hours PRN asthma  aluminum hydroxide/magnesium hydroxide/simethicone Suspension 30 milliLiter(s) Oral every 8 hours PRN Dyspepsia  dextrose Oral Gel 15 Gram(s) Oral once PRN Blood Glucose LESS THAN 70 milliGRAM(s)/deciliter  haloperidol     Tablet 5 milliGRAM(s) Oral every 6 hours PRN agitation  haloperidol    Injectable 5 milliGRAM(s) IntraMuscular once PRN combative behavior  LORazepam     Tablet 1 milliGRAM(s) Oral every 6 hours PRN Agitation  polyethylene glycol 3350 17 Gram(s) Oral daily PRN Constipation   MEDICATIONS  (STANDING):  atorvastatin 20 milliGRAM(s) Oral at bedtime  cloZAPine 275 milliGRAM(s) Oral at bedtime  ergocalciferol 92575 Unit(s) Oral <User Schedule>  fludroCORTISONE 0.1 milliGRAM(s) Oral <User Schedule>  glimepiride. 4 milliGRAM(s) Oral with breakfast  insulin lispro (ADMELOG) corrective regimen sliding scale   SubCutaneous at bedtime  insulin lispro (ADMELOG) corrective regimen sliding scale   SubCutaneous three times a day before meals  linagliptin 5 milliGRAM(s) Oral daily  metFORMIN 500 milliGRAM(s) Oral at bedtime  senna 2 Tablet(s) Oral at bedtime  sodium chloride 1 Gram(s) Oral <User Schedule>    MEDICATIONS  (PRN):  albuterol    90 MICROgram(s) HFA Inhaler 2 Puff(s) Inhalation every 6 hours PRN asthma  aluminum hydroxide/magnesium hydroxide/simethicone Suspension 30 milliLiter(s) Oral every 8 hours PRN Dyspepsia  dextrose Oral Gel 15 Gram(s) Oral once PRN Blood Glucose LESS THAN 70 milliGRAM(s)/deciliter  haloperidol     Tablet 5 milliGRAM(s) Oral every 6 hours PRN agitation  haloperidol    Injectable 5 milliGRAM(s) IntraMuscular once PRN combative behavior  LORazepam     Tablet 1 milliGRAM(s) Oral every 6 hours PRN Agitation  polyethylene glycol 3350 17 Gram(s) Oral daily PRN Constipation

## 2023-10-25 NOTE — DIETITIAN INITIAL EVALUATION ADULT - OTHER INFO
Pt is a 57 y/o female with PPHx of Schizophrenia vs. SAD, PMHx of Type 2 diabetes, Hyperlipidemia, Asthma, no h/o Substance abuse. Pt BIB EMS activated by francisco for disorganization.    Saw Pt in day room. Reports good appetite/po intake at present. No GI distress noted. Provided verbal education re: Healthy Plate for people with diabetes. Pt verbalized fair understanding. NKFA.

## 2023-10-25 NOTE — BH INPATIENT PSYCHIATRY PROGRESS NOTE - NSBHCHARTREVIEWVS_PSY_A_CORE FT
Vital Signs Last 24 Hrs  T(C): 36.2 (10-25-23 @ 08:12), Max: 36.6 (10-24-23 @ 19:52)  T(F): 97.1 (10-25-23 @ 08:12), Max: 97.9 (10-24-23 @ 19:52)  HR: --  BP: --  BP(mean): --  RR: --  SpO2: 99% (10-24-23 @ 19:52) (99% - 99%)    Orthostatic VS  10-25-23 @ 08:12  Lying BP: --/-- HR: --  Sitting BP: 100/64 HR: 84  Standing BP: 99/66 HR: 98  Site: --  Mode: --  Orthostatic VS  10-24-23 @ 19:52  Lying BP: --/-- HR: --  Sitting BP: 133/69 HR: 93  Standing BP: 129/57 HR: 96  Site: --  Mode: --  Orthostatic VS  10-24-23 @ 13:30  Lying BP: --/-- HR: --  Sitting BP: 110/85 HR: 92  Standing BP: 105/59 HR: 91  Site: --  Mode: --  Orthostatic VS  10-24-23 @ 05:59  Lying BP: 116/60 HR: 75  Sitting BP: 107/62 HR: 75  Standing BP: --/-- HR: --  Site: --  Mode: --  Orthostatic VS  10-23-23 @ 19:59  Lying BP: 132/69 HR: 84  Sitting BP: 129/67 HR: 82  Standing BP: --/-- HR: --  Site: --  Mode: --

## 2023-10-25 NOTE — DIETITIAN INITIAL EVALUATION ADULT - PERTINENT LABORATORY DATA
CAPILLARY BLOOD GLUCOSE    POCT Blood Glucose.: 187 mg/dL (25 Oct 2023 12:11)  POCT Blood Glucose.: 134 mg/dL (25 Oct 2023 08:14)  POCT Blood Glucose.: 231 mg/dL (24 Oct 2023 20:04)  POCT Blood Glucose.: 154 mg/dL (24 Oct 2023 16:35)    A1C with Estimated Average Glucose Result: 7.2 % (09-27-23 @ 19:00)

## 2023-10-25 NOTE — DIETITIAN INITIAL EVALUATION ADULT - PERTINENT MEDS FT
MEDICATIONS  (STANDING):  atorvastatin 20 milliGRAM(s) Oral at bedtime  cloZAPine 275 milliGRAM(s) Oral at bedtime  ergocalciferol 85283 Unit(s) Oral <User Schedule>  fludroCORTISONE 0.1 milliGRAM(s) Oral <User Schedule>  glimepiride. 4 milliGRAM(s) Oral with breakfast  insulin lispro (ADMELOG) corrective regimen sliding scale   SubCutaneous at bedtime  insulin lispro (ADMELOG) corrective regimen sliding scale   SubCutaneous three times a day before meals  linagliptin 5 milliGRAM(s) Oral daily  metFORMIN 500 milliGRAM(s) Oral at bedtime  senna 2 Tablet(s) Oral at bedtime  sodium chloride 1 Gram(s) Oral <User Schedule>    MEDICATIONS  (PRN):  albuterol    90 MICROgram(s) HFA Inhaler 2 Puff(s) Inhalation every 6 hours PRN asthma  aluminum hydroxide/magnesium hydroxide/simethicone Suspension 30 milliLiter(s) Oral every 8 hours PRN Dyspepsia  dextrose Oral Gel 15 Gram(s) Oral once PRN Blood Glucose LESS THAN 70 milliGRAM(s)/deciliter  haloperidol     Tablet 5 milliGRAM(s) Oral every 6 hours PRN agitation  haloperidol    Injectable 5 milliGRAM(s) IntraMuscular once PRN combative behavior  LORazepam     Tablet 1 milliGRAM(s) Oral every 6 hours PRN Agitation  polyethylene glycol 3350 17 Gram(s) Oral daily PRN Constipation

## 2023-10-25 NOTE — BH INPATIENT PSYCHIATRY PROGRESS NOTE - NSBHASSESSSUMMFT_PSY_ALL_CORE
57 y/o female, single, noncaregiver, disabled, lives with [facility], history of Schizophrenia vs Schizoaffectove d/o, multiple past admissions at [facility] (most recent 2020 for decompensated psychosis), and also was hospitalized medically for lactic acidosis and followed by CL team in July 2023, followed by psychiatrist Dr. Love at [facility] (pt has no h/o developmental disability), prescribed Clozaril 100mg qhs , no h/o self-injurious behavior or suicide attempts, no h/o violence or legal issues, PMH Type II DM, HLD, asthma, no h/o substance abuse, BIBEMS activated by rosalizy (Rahat) for disorganization.  The patient is grossly disorganized in thoughts on admission.    9/29: The patient continues to be disorganized, but is taking medications.  Will continue clozapine.  9/30: Psychotic disorganized cont meds encourage cooperation with proper vital signs  10/1: disorganized, but more verbal, cooperative, no SI/HI.   10/2: The patient is a little more organized, but still internally preoccupied.  Sedated from clozapine.  Will continue current dose, consider increase if psychosis continues.  10/3: Patient remains disorganized but behaviorally controlled on the unit. Records show that patient was previously well maintained on Clozaril 350mg. Will titrate Clozaril today and monitor for efficacy and side effects.  10/4: Patient shows slight improvement with organization but continues to be disorganized in thought process. Will continue current dose of clozapine with plans to slowly titrate while monitoring for efficacy and side effects.  10/5: Patient was irritable today and less cooperative than previous but continues to be disorganized during the day. Plan to continue titrating clozapine while monitoring for efficacy and side effects.  10/6: Patient shows improvement in mood and thought process and shows improvement in articulating auditory hallucinations. Will continue current dose of clozapine with plans to slowly titrate while monitoring for efficacy and side effects.  10/7: Patient remains disorganized, resistive with treatment, no reports of AH today, continue clozapine titration    10/8: Remains psychotic, suspicious, delusional about her identity, continue clozapine   10/9: Patient remains disorganized but denies auditory hallucinations overnight. BP soft with orthostatics (+), will continue current dose of clozapine with plans to slowly titrate while monitoring for efficacy and side effects.  10/10: Patient remains disorganized and paranoid on the unit but with improved linear thinking since admission, though overall remains disorganized with loose associations. BP remains soft, will encourage PO intake, compression stockings and will continue current dose of clozapine with plans to slowly titrate while monitoring for efficacy and side effects.  10/11: Patient remains disorganized on the unit and requires encouragement for PO intake. Refusing compression stockings. BP remains soft, will check CMP and start salt tablet prior to titrating clozapine.  10/12: Patient remains disorganized on the unit and requires encouragement for PO intake. CMP within normal limits and orthostatics negative, plan to titrate clozapine tonight and monitor for efficacy and side effects.  10/13: Remains disorganized  10/16: Patient remains disorganized and with soft BP. Requires encouragement for PO intake. Will continue current dose of clozapine with plans to slowly titrate while monitoring for efficacy and side effects.  10/17: Patient remains disorganized. BP stable, will titrate clozapine tonight and monitor for efficacy and side effects.  10/18: Patient remains disorganized. BP stable, tolerating clozapine titration well. Will continue current dose with plans to titrate if continuing to tolerate.  10/19: Tolerating meds well. Continue current dose. Found to have Vit D deficiency - will start supplementation  10/20: Patient remains disorganized but with improved mood, tolerating medication well. Will titrate clozapine.  10/23: Patient remains disorganized but continues to tolerate medication. Will titrate clozapine tonight  10/24: More visible, still disorganized, AH ongoing  10/25: Patient shows improved thought process but still endorses AH. Will titrate clozapine tonight.    Plan:  No 1:1 needed, as the patient is calm, no SI.  -Increase clozapine 275 hs  	Check CBC Wednesdays  - Started Fludrocortisone 0.1mg daily with food for orthostatic hypotension; refusing AYAN stockings  - Continue NaCl 1g BID  -Continue Senna and Miralax prn for constipation  -Continue metformin, glimepiride and Linagliptin for DM  	Check fingersticks qid  -Lipitor for cholesterol  -Albuterol prn for asthma  - Vit D deficiency - started supplementation  -Patient provided verbal consent to speak with Zara Giang and patient's outpatient provider.

## 2023-10-25 NOTE — BH INPATIENT PSYCHIATRY PROGRESS NOTE - NSBHFUPINTERVALHXFT_PSY_A_CORE
Chart reviewed and case discussed with treatment team. No acute events overnight with no prns needed. Per staff, patient has demonstrated improvement on the unit, is notably more visible in the mornings and more social than previous. On encounter, patient was seen sitting in the common room area, appearing internally preoccupied but was cooperative and engaging on interview. Her thought process demonstrated improved linearity but still maintains loose associations, seen reading the provider's name and listing similar names. Patient endorses not sleeping well and still hears voices of her "twins, Mariana and Kimberly." Patient denies chest pain or constipation. No orthostasis.

## 2023-10-26 LAB
GLUCOSE BLDC GLUCOMTR-MCNC: 113 MG/DL — HIGH (ref 70–99)
GLUCOSE BLDC GLUCOMTR-MCNC: 113 MG/DL — HIGH (ref 70–99)
GLUCOSE BLDC GLUCOMTR-MCNC: 193 MG/DL — HIGH (ref 70–99)
GLUCOSE BLDC GLUCOMTR-MCNC: 193 MG/DL — HIGH (ref 70–99)
GLUCOSE BLDC GLUCOMTR-MCNC: 210 MG/DL — HIGH (ref 70–99)
GLUCOSE BLDC GLUCOMTR-MCNC: 210 MG/DL — HIGH (ref 70–99)
GLUCOSE BLDC GLUCOMTR-MCNC: 214 MG/DL — HIGH (ref 70–99)
GLUCOSE BLDC GLUCOMTR-MCNC: 214 MG/DL — HIGH (ref 70–99)

## 2023-10-26 RX ORDER — CLOZAPINE 150 MG/1
275 TABLET, ORALLY DISINTEGRATING ORAL AT BEDTIME
Refills: 0 | Status: DISCONTINUED | OUTPATIENT
Start: 2023-10-26 | End: 2023-11-01

## 2023-10-26 RX ADMIN — LINAGLIPTIN 5 MILLIGRAM(S): 5 TABLET, FILM COATED ORAL at 08:51

## 2023-10-26 RX ADMIN — Medication 2: at 12:48

## 2023-10-26 RX ADMIN — METFORMIN HYDROCHLORIDE 500 MILLIGRAM(S): 850 TABLET ORAL at 20:17

## 2023-10-26 RX ADMIN — SODIUM CHLORIDE 1 GRAM(S): 9 INJECTION INTRAMUSCULAR; INTRAVENOUS; SUBCUTANEOUS at 20:18

## 2023-10-26 RX ADMIN — FLUDROCORTISONE ACETATE 0.1 MILLIGRAM(S): 0.1 TABLET ORAL at 12:54

## 2023-10-26 RX ADMIN — SODIUM CHLORIDE 1 GRAM(S): 9 INJECTION INTRAMUSCULAR; INTRAVENOUS; SUBCUTANEOUS at 12:54

## 2023-10-26 RX ADMIN — Medication 4 MILLIGRAM(S): at 08:51

## 2023-10-26 RX ADMIN — CLOZAPINE 275 MILLIGRAM(S): 150 TABLET, ORALLY DISINTEGRATING ORAL at 20:17

## 2023-10-26 RX ADMIN — ATORVASTATIN CALCIUM 20 MILLIGRAM(S): 80 TABLET, FILM COATED ORAL at 20:17

## 2023-10-26 NOTE — BH INPATIENT PSYCHIATRY PROGRESS NOTE - NSBHASSESSSUMMFT_PSY_ALL_CORE
57 y/o female, single, noncaregiver, disabled, lives with [facility], history of Schizophrenia vs Schizoaffectove d/o, multiple past admissions at [facility] (most recent 2020 for decompensated psychosis), and also was hospitalized medically for lactic acidosis and followed by CL team in July 2023, followed by psychiatrist Dr. Love at [facility] (pt has no h/o developmental disability), prescribed Clozaril 100mg qhs , no h/o self-injurious behavior or suicide attempts, no h/o violence or legal issues, PMH Type II DM, HLD, asthma, no h/o substance abuse, BIBEMS activated by rosalizy (Rahat) for disorganization.  The patient is grossly disorganized in thoughts on admission.    9/29: The patient continues to be disorganized, but is taking medications.  Will continue clozapine.  9/30: Psychotic disorganized cont meds encourage cooperation with proper vital signs  10/1: disorganized, but more verbal, cooperative, no SI/HI.   10/2: The patient is a little more organized, but still internally preoccupied.  Sedated from clozapine.  Will continue current dose, consider increase if psychosis continues.  10/3: Patient remains disorganized but behaviorally controlled on the unit. Records show that patient was previously well maintained on Clozaril 350mg. Will titrate Clozaril today and monitor for efficacy and side effects.  10/4: Patient shows slight improvement with organization but continues to be disorganized in thought process. Will continue current dose of clozapine with plans to slowly titrate while monitoring for efficacy and side effects.  10/5: Patient was irritable today and less cooperative than previous but continues to be disorganized during the day. Plan to continue titrating clozapine while monitoring for efficacy and side effects.  10/6: Patient shows improvement in mood and thought process and shows improvement in articulating auditory hallucinations. Will continue current dose of clozapine with plans to slowly titrate while monitoring for efficacy and side effects.  10/7: Patient remains disorganized, resistive with treatment, no reports of AH today, continue clozapine titration    10/8: Remains psychotic, suspicious, delusional about her identity, continue clozapine   10/9: Patient remains disorganized but denies auditory hallucinations overnight. BP soft with orthostatics (+), will continue current dose of clozapine with plans to slowly titrate while monitoring for efficacy and side effects.  10/10: Patient remains disorganized and paranoid on the unit but with improved linear thinking since admission, though overall remains disorganized with loose associations. BP remains soft, will encourage PO intake, compression stockings and will continue current dose of clozapine with plans to slowly titrate while monitoring for efficacy and side effects.  10/11: Patient remains disorganized on the unit and requires encouragement for PO intake. Refusing compression stockings. BP remains soft, will check CMP and start salt tablet prior to titrating clozapine.  10/12: Patient remains disorganized on the unit and requires encouragement for PO intake. CMP within normal limits and orthostatics negative, plan to titrate clozapine tonight and monitor for efficacy and side effects.  10/13: Remains disorganized  10/16: Patient remains disorganized and with soft BP. Requires encouragement for PO intake. Will continue current dose of clozapine with plans to slowly titrate while monitoring for efficacy and side effects.  10/17: Patient remains disorganized. BP stable, will titrate clozapine tonight and monitor for efficacy and side effects.  10/18: Patient remains disorganized. BP stable, tolerating clozapine titration well. Will continue current dose with plans to titrate if continuing to tolerate.  10/19: Tolerating meds well. Continue current dose. Found to have Vit D deficiency - will start supplementation  10/20: Patient remains disorganized but with improved mood, tolerating medication well. Will titrate clozapine.  10/23: Patient remains disorganized but continues to tolerate medication. Will titrate clozapine tonight  10/24: More visible, still disorganized, AH ongoing  10/25: Patient shows improved thought process but still endorses AH. Will titrate clozapine tonight.  10/26: Overall disorganized but improvement with AM sedation. Plan to titrate clozapine tomorrow.     Plan:  No 1:1 needed, as the patient is calm, no SI.  -Continue clozapine 275 hs  	Check CBC Wednesdays  - Started Fludrocortisone 0.1mg daily with food for orthostatic hypotension; refusing AYAN stockings  - Continue NaCl 1g BID  -Continue Senna and Miralax prn for constipation  -Continue metformin, glimepiride and Linagliptin for DM  	Check fingersticks qid  -Lipitor for cholesterol  -Albuterol prn for asthma  - Vit D deficiency - started supplementation  -Patient provided verbal consent to speak with Zara Giang and patient's outpatient provider.

## 2023-10-26 NOTE — BH INPATIENT PSYCHIATRY PROGRESS NOTE - NSBHFUPINTERVALHXFT_PSY_A_CORE
Chart reviewed and case discussed with treatment team. No acute events overnight with no prns needed. Per staff, patient has continues to be more visible on the unit with improved oral intake, especially in the morning. On encounter, patient was seen sitting in the common room area and appeared internally preoccupied but not responding to internal stimuli. Her thought process was more disorganized than yesterday with loose associations, demonstrated by inappropriate responses with clanging. Patient endorses having slept yesterday and denied hearing the voices of her "twins." Patient denies chest pain or constipation. No orthostasis.

## 2023-10-26 NOTE — BH INPATIENT PSYCHIATRY PROGRESS NOTE - NSBHATTESTCOMMENTATTENDFT_PSY_A_CORE
Agree with assessment and plan. 
ANC 4740 - continue clozaril therapy
Remains disorganized, illogical, unable to care for self. Needs further inpatient care for rapid clozaril titration. Agree with clozaril dose increase. Tolerating well. Refused vit D started this morning. Encouraged compliance. 
ANC 4890. Continue clozaril therapy CRP slightly elevated from baseline but patient denies cardiac symptoms, no eosinophilia, troponin wnl and afebrile. 
Agree with assessment and plan.
Clozapine level <68 yesterday despite significant dose. Will start mouth checks to ensure compliance. 
Patient continues to require inpatient care. At baseline, patient is linear, logical, able to care for children of family. Will increase clozaril to 25mg po daily and 175mg po qhs as BP stable without orthostasis today. 
Patient seen by me, chart reviewed, and case discussed with treatment team.  Reviewed and agree with above progress note, assessment and plan; corrections and modification made where appropriate.  The patient remains disorganized, internally preoccupied, but behavior has been well controlled.  Tolerating clozapine well thus far, will continue to titrate.
Agree with assessment and plan. 
See resident note for details. I saw and evaluated patient. I agree with assessment and plan of resident. Patient hearing voices, disorganized though less and with suspiciousness. Plan cont titrate Clozapine if BP better can go to 175mg hs then 200mg hs as in past  was on 350mg 
Patient seen by me, chart reviewed, and case discussed with treatment team.  Reviewed and agree with above progress note, assessment and plan; corrections and modification made where appropriate.  The patient has shown some small improvement, but remains disorganized, internally preoccupied.  Tolerating clozapine well, will increase to 125mg hs.  Patient previously on doses up to 350mg.
Patient seen by me, chart reviewed, and case discussed with treatment team.  Reviewed and agree with above progress note, assessment and plan; corrections and modification made where appropriate.  The patient continues to be disorganized, but is showing small improvement.  Slept well last night.  Tolerating clozapine well, will plan to continue to titrate.  CBC WNL.
Patient remains illogical, disorganized. Unable to have conversation relevant to treatment or situation. Will increase clozaril after compression stockings obtained. 
Agree with assessment and plan. Will add Nacl 1g po bid with plan to add florinef tomorrow pending CMP. 
Patient remains illogical, inappropriate, unable to care for self. Not at baseline. Continue inpatient care. Titrate clozaril as tolerated.

## 2023-10-26 NOTE — BH TREATMENT PLAN - NSTXCAREGIVERPARTICIPATE_PSY_P_CORE
Family/Caregiver participated in identification of needs/problems/goals for treatment/Family/Caregiver participated in defining interventions
Family/Caregiver participated in identification of needs/problems/goals for treatment/Family/Caregiver participated in defining interventions
No, patient unwilling to involve family/caregiver
Family/Caregiver participated in identification of needs/problems/goals for treatment/Family/Caregiver participated in defining interventions

## 2023-10-26 NOTE — BH TREATMENT PLAN - NSTXPSYCHOINTERRN_PSY_ALL_CORE
Provide reality orientation and redirection as needed. Encourage medication compliance.
Establish trust and rapport. Deal with hallucinations by presenting reality. Promote compliance and monitor drug therapy.
Assess behavior. encourage  logical thinking, and addressing any disorganized thought processes. Reality orientation as needed and  gently reminding patients of the current time, place, and situation,  Medication as per MD order.
Assess mood and behavior, minimize external stimuli.   Encourage pt to verbalize her feelings and concerns with staff.   Provide reality testing as tolerated.

## 2023-10-26 NOTE — BH INPATIENT PSYCHIATRY PROGRESS NOTE - NSBHMETABOLIC_PSY_ALL_CORE_FT
BMI: BMI (kg/m2): 21.3 (10-14-23 @ 15:59)  HbA1c: A1C with Estimated Average Glucose Result: 7.2 % (09-27-23 @ 19:00)    Glucose: POCT Blood Glucose.: 113 mg/dL (10-26-23 @ 08:06)    BP: --  Lipid Panel: Date/Time: 07-07-23 @ 05:52  Cholesterol, Serum: 153  Direct LDL: --  HDL Cholesterol, Serum: 28  Total Cholesterol/HDL Ration Measurement: --  Triglycerides, Serum: 273   BMI: BMI (kg/m2): 21.3 (10-14-23 @ 15:59)  HbA1c: A1C with Estimated Average Glucose Result: 7.2 % (09-27-23 @ 19:00)    Glucose: POCT Blood Glucose.: 210 mg/dL (10-26-23 @ 11:50)    BP: --  Lipid Panel: Date/Time: 07-07-23 @ 05:52  Cholesterol, Serum: 153  Direct LDL: --  HDL Cholesterol, Serum: 28  Total Cholesterol/HDL Ration Measurement: --  Triglycerides, Serum: 273

## 2023-10-26 NOTE — BH INPATIENT PSYCHIATRY PROGRESS NOTE - MSE UNSTRUCTURED FT
The patient appears stated age with limited grooming and hygiene. Patient was somewhat guarded but cooperative, engaging and oddly related.  No agitation, psychomotor retardation or involuntary movements observed. The patient’s speech is fluent, normal in tone, rate, and ranges from soft to normal volume.  The patient’s mood is "okay".  Her affect is constricted, stable, appropriate, congruent to mood. The patient’s thought process is disorganized and remains illogical with loose associations. Multiple idiosyncratic delusions related to thought disorder. She denies auditory hallucinations, appears internally preoccupied at times. She does not express any suicidal or homicidal ideation, intent, or plan.  Insight is limited.  Judgment is impaired. Impulse control has been good on the unit.

## 2023-10-26 NOTE — BH INPATIENT PSYCHIATRY PROGRESS NOTE - CURRENT MEDICATION
MEDICATIONS  (STANDING):  atorvastatin 20 milliGRAM(s) Oral at bedtime  cloZAPine 275 milliGRAM(s) Oral at bedtime  ergocalciferol 09532 Unit(s) Oral <User Schedule>  fludroCORTISONE 0.1 milliGRAM(s) Oral <User Schedule>  glimepiride. 4 milliGRAM(s) Oral with breakfast  insulin lispro (ADMELOG) corrective regimen sliding scale   SubCutaneous three times a day before meals  insulin lispro (ADMELOG) corrective regimen sliding scale   SubCutaneous at bedtime  linagliptin 5 milliGRAM(s) Oral daily  metFORMIN 500 milliGRAM(s) Oral at bedtime  senna 2 Tablet(s) Oral at bedtime  sodium chloride 1 Gram(s) Oral <User Schedule>    MEDICATIONS  (PRN):  albuterol    90 MICROgram(s) HFA Inhaler 2 Puff(s) Inhalation every 6 hours PRN asthma  aluminum hydroxide/magnesium hydroxide/simethicone Suspension 30 milliLiter(s) Oral every 8 hours PRN Dyspepsia  dextrose Oral Gel 15 Gram(s) Oral once PRN Blood Glucose LESS THAN 70 milliGRAM(s)/deciliter  haloperidol     Tablet 5 milliGRAM(s) Oral every 6 hours PRN agitation  haloperidol    Injectable 5 milliGRAM(s) IntraMuscular once PRN combative behavior  LORazepam     Tablet 1 milliGRAM(s) Oral every 6 hours PRN Agitation  polyethylene glycol 3350 17 Gram(s) Oral daily PRN Constipation

## 2023-10-26 NOTE — BH INPATIENT PSYCHIATRY PROGRESS NOTE - NSBHCHARTREVIEWVS_PSY_A_CORE FT
Vital Signs Last 24 Hrs  T(C): 36.2 (10-25-23 @ 08:12), Max: 36.6 (10-24-23 @ 19:52)  T(F): 97.1 (10-25-23 @ 08:12), Max: 97.9 (10-24-23 @ 19:52)  HR: --  BP: --  BP(mean): --  RR: --  SpO2: 99% (10-24-23 @ 19:52) (99% - 99%)    Orthostatic VS  10-25-23 @ 08:12  Lying BP: --/-- HR: --  Sitting BP: 100/64 HR: 84  Standing BP: 99/66 HR: 98  Site: --  Mode: --  Orthostatic VS  10-24-23 @ 19:52  Lying BP: --/-- HR: --  Sitting BP: 133/69 HR: 93  Standing BP: 129/57 HR: 96  Site: --  Mode: --  Orthostatic VS  10-24-23 @ 13:30  Lying BP: --/-- HR: --  Sitting BP: 110/85 HR: 92  Standing BP: 105/59 HR: 91  Site: --  Mode: --  Orthostatic VS  10-24-23 @ 05:59  Lying BP: 116/60 HR: 75  Sitting BP: 107/62 HR: 75  Standing BP: --/-- HR: --  Site: --  Mode: --  Orthostatic VS  10-23-23 @ 19:59  Lying BP: 132/69 HR: 84  Sitting BP: 129/67 HR: 82  Standing BP: --/-- HR: --  Site: --  Mode: --   Vital Signs Last 24 Hrs  T(C): 36.6 (10-26-23 @ 06:43), Max: 36.6 (10-26-23 @ 06:43)  T(F): 97.9 (10-26-23 @ 06:43), Max: 97.9 (10-26-23 @ 06:43)  HR: --  BP: --  BP(mean): --  RR: 19 (10-26-23 @ 06:43) (19 - 19)  SpO2: 99% (10-26-23 @ 06:43) (97% - 99%)    Orthostatic VS  10-26-23 @ 06:43  Lying BP: 132/61 HR: 77  Sitting BP: 140/66 HR: 80  Standing BP: --/-- HR: --  Site: upper right arm  Mode: --  Orthostatic VS  10-25-23 @ 19:52  Lying BP: --/-- HR: --  Sitting BP: 120/69 HR: 85  Standing BP: 121/55 HR: 85  Site: upper right arm  Mode: electronic  Orthostatic VS  10-25-23 @ 13:00  Lying BP: --/-- HR: --  Sitting BP: 110/64 HR: 85  Standing BP: 103/63 HR: 92  Site: --  Mode: --  Orthostatic VS  10-25-23 @ 08:12  Lying BP: --/-- HR: --  Sitting BP: 100/64 HR: 84  Standing BP: 99/66 HR: 98  Site: --  Mode: --  Orthostatic VS  10-24-23 @ 19:52  Lying BP: --/-- HR: --  Sitting BP: 133/69 HR: 93  Standing BP: 129/57 HR: 96  Site: --  Mode: --

## 2023-10-27 LAB
GLUCOSE BLDC GLUCOMTR-MCNC: 136 MG/DL — HIGH (ref 70–99)
GLUCOSE BLDC GLUCOMTR-MCNC: 136 MG/DL — HIGH (ref 70–99)
GLUCOSE BLDC GLUCOMTR-MCNC: 160 MG/DL — HIGH (ref 70–99)
GLUCOSE BLDC GLUCOMTR-MCNC: 160 MG/DL — HIGH (ref 70–99)
GLUCOSE BLDC GLUCOMTR-MCNC: 188 MG/DL — HIGH (ref 70–99)
GLUCOSE BLDC GLUCOMTR-MCNC: 188 MG/DL — HIGH (ref 70–99)
GLUCOSE BLDC GLUCOMTR-MCNC: 345 MG/DL — HIGH (ref 70–99)
GLUCOSE BLDC GLUCOMTR-MCNC: 345 MG/DL — HIGH (ref 70–99)

## 2023-10-27 PROCEDURE — 99232 SBSQ HOSP IP/OBS MODERATE 35: CPT

## 2023-10-27 RX ADMIN — ERGOCALCIFEROL 50000 UNIT(S): 1.25 CAPSULE ORAL at 08:56

## 2023-10-27 RX ADMIN — ATORVASTATIN CALCIUM 20 MILLIGRAM(S): 80 TABLET, FILM COATED ORAL at 20:28

## 2023-10-27 RX ADMIN — Medication 1: at 17:25

## 2023-10-27 RX ADMIN — Medication 30 MILLILITER(S): at 20:30

## 2023-10-27 RX ADMIN — Medication 2: at 20:30

## 2023-10-27 RX ADMIN — CLOZAPINE 275 MILLIGRAM(S): 150 TABLET, ORALLY DISINTEGRATING ORAL at 20:28

## 2023-10-27 RX ADMIN — Medication 4 MILLIGRAM(S): at 08:56

## 2023-10-27 RX ADMIN — SODIUM CHLORIDE 1 GRAM(S): 9 INJECTION INTRAMUSCULAR; INTRAVENOUS; SUBCUTANEOUS at 20:30

## 2023-10-27 RX ADMIN — METFORMIN HYDROCHLORIDE 500 MILLIGRAM(S): 850 TABLET ORAL at 20:27

## 2023-10-27 RX ADMIN — SODIUM CHLORIDE 1 GRAM(S): 9 INJECTION INTRAMUSCULAR; INTRAVENOUS; SUBCUTANEOUS at 12:52

## 2023-10-27 RX ADMIN — Medication 1: at 12:25

## 2023-10-27 RX ADMIN — LINAGLIPTIN 5 MILLIGRAM(S): 5 TABLET, FILM COATED ORAL at 08:59

## 2023-10-27 RX ADMIN — FLUDROCORTISONE ACETATE 0.1 MILLIGRAM(S): 0.1 TABLET ORAL at 12:26

## 2023-10-27 NOTE — BH INPATIENT PSYCHIATRY PROGRESS NOTE - NSBHMETABOLIC_PSY_ALL_CORE_FT
BMI: BMI (kg/m2): 21.3 (10-14-23 @ 15:59)  HbA1c: A1C with Estimated Average Glucose Result: 7.2 % (09-27-23 @ 19:00)    Glucose: POCT Blood Glucose.: 188 mg/dL (10-27-23 @ 12:06)    BP: 109/60 (10-27-23 @ 08:00) (109/60 - 109/60)  Lipid Panel: Date/Time: 07-07-23 @ 05:52  Cholesterol, Serum: 153  Direct LDL: --  HDL Cholesterol, Serum: 28  Total Cholesterol/HDL Ration Measurement: --  Triglycerides, Serum: 273

## 2023-10-27 NOTE — BH INPATIENT PSYCHIATRY PROGRESS NOTE - MSE UNSTRUCTURED FT
Adequate hygiene, neat grooming but wearing a beanie despite warm temperature. Patient was oddly related.  No agitation, psychomotor retardation or involuntary movements observed. The patient’s speech is fluent, normal in tone, rate, and ranges from soft to normal volume.  The patient’s mood is "fine".  Her affect is constricted, stable, congruent to mood. The patient’s thought process is disorganized and remains illogical with loose associations. Multiple idiosyncratic delusions related to thought disorder. She denies current auditory hallucinations. She does not express any suicidal or homicidal ideation, intent, or plan.  Insight is limited.  Judgment is impaired. Impulse control has been good on the unit.

## 2023-10-27 NOTE — BH INPATIENT PSYCHIATRY PROGRESS NOTE - NSBHFUPINTERVALHXFT_PSY_A_CORE
Chart reviewed and case discussed with treatment team. Staff report patient has been more engaging in groups but still very disorganized. She is pleasant and visible on the unit. She endorses AH from the "twins" but does not respond when inquired about content. Continues to have paranoia - possibly related to AH. Patient reports she is taking medications when informed meds will be crushed.

## 2023-10-27 NOTE — BH INPATIENT PSYCHIATRY PROGRESS NOTE - CURRENT MEDICATION
MEDICATIONS  (STANDING):  atorvastatin 20 milliGRAM(s) Oral at bedtime  cloZAPine 275 milliGRAM(s) Oral at bedtime  ergocalciferol 83139 Unit(s) Oral <User Schedule>  fludroCORTISONE 0.1 milliGRAM(s) Oral <User Schedule>  glimepiride. 4 milliGRAM(s) Oral with breakfast  insulin lispro (ADMELOG) corrective regimen sliding scale   SubCutaneous three times a day before meals  insulin lispro (ADMELOG) corrective regimen sliding scale   SubCutaneous at bedtime  linagliptin 5 milliGRAM(s) Oral daily  metFORMIN 500 milliGRAM(s) Oral at bedtime  senna 2 Tablet(s) Oral at bedtime  sodium chloride 1 Gram(s) Oral <User Schedule>    MEDICATIONS  (PRN):  albuterol    90 MICROgram(s) HFA Inhaler 2 Puff(s) Inhalation every 6 hours PRN asthma  aluminum hydroxide/magnesium hydroxide/simethicone Suspension 30 milliLiter(s) Oral every 8 hours PRN Dyspepsia  dextrose Oral Gel 15 Gram(s) Oral once PRN Blood Glucose LESS THAN 70 milliGRAM(s)/deciliter  haloperidol     Tablet 5 milliGRAM(s) Oral every 6 hours PRN agitation  haloperidol    Injectable 5 milliGRAM(s) IntraMuscular once PRN combative behavior  LORazepam     Tablet 1 milliGRAM(s) Oral every 6 hours PRN Agitation  polyethylene glycol 3350 17 Gram(s) Oral daily PRN Constipation

## 2023-10-27 NOTE — BH INPATIENT PSYCHIATRY PROGRESS NOTE - NSBHCHARTREVIEWVS_PSY_A_CORE FT
Vital Signs Last 24 Hrs  T(C): 36.6 (10-27-23 @ 08:00), Max: 36.6 (10-26-23 @ 20:11)  T(F): 97.9 (10-27-23 @ 08:00), Max: 97.9 (10-26-23 @ 20:11)  HR: 79 (10-27-23 @ 08:00) (79 - 79)  BP: 109/60 (10-27-23 @ 08:00) (109/60 - 109/60)  BP(mean): --  RR: 16 (10-27-23 @ 08:00) (16 - 16)  SpO2: 98% (10-27-23 @ 08:00) (98% - 98%)    Orthostatic VS  10-26-23 @ 20:11  Lying BP: --/-- HR: --  Sitting BP: 148/62 HR: 81  Standing BP: 142/55 HR: 84  Site: upper left arm  Mode: electronic  Orthostatic VS  10-26-23 @ 06:43  Lying BP: 132/61 HR: 77  Sitting BP: 140/66 HR: 80  Standing BP: --/-- HR: --  Site: upper right arm  Mode: --  Orthostatic VS  10-25-23 @ 19:52  Lying BP: --/-- HR: --  Sitting BP: 120/69 HR: 85  Standing BP: 121/55 HR: 85  Site: upper right arm  Mode: electronic

## 2023-10-27 NOTE — BH INPATIENT PSYCHIATRY PROGRESS NOTE - NSBHASSESSSUMMFT_PSY_ALL_CORE
59 y/o female, single, noncaregiver, disabled, lives with [facility], history of Schizophrenia vs Schizoaffectove d/o, multiple past admissions at [facility] (most recent 2020 for decompensated psychosis), and also was hospitalized medically for lactic acidosis and followed by CL team in July 2023, followed by psychiatrist Dr. Love at [facility] (pt has no h/o developmental disability), prescribed Clozaril 100mg qhs , no h/o self-injurious behavior or suicide attempts, no h/o violence or legal issues, PMH Type II DM, HLD, asthma, no h/o substance abuse, BIBEMS activated by rosalizy (Rahat) for disorganization.  The patient is grossly disorganized in thoughts on admission.    9/29: The patient continues to be disorganized, but is taking medications.  Will continue clozapine.  9/30: Psychotic disorganized cont meds encourage cooperation with proper vital signs  10/1: disorganized, but more verbal, cooperative, no SI/HI.   10/2: The patient is a little more organized, but still internally preoccupied.  Sedated from clozapine.  Will continue current dose, consider increase if psychosis continues.  10/3: Patient remains disorganized but behaviorally controlled on the unit. Records show that patient was previously well maintained on Clozaril 350mg. Will titrate Clozaril today and monitor for efficacy and side effects.  10/4: Patient shows slight improvement with organization but continues to be disorganized in thought process. Will continue current dose of clozapine with plans to slowly titrate while monitoring for efficacy and side effects.  10/5: Patient was irritable today and less cooperative than previous but continues to be disorganized during the day. Plan to continue titrating clozapine while monitoring for efficacy and side effects.  10/6: Patient shows improvement in mood and thought process and shows improvement in articulating auditory hallucinations. Will continue current dose of clozapine with plans to slowly titrate while monitoring for efficacy and side effects.  10/7: Patient remains disorganized, resistive with treatment, no reports of AH today, continue clozapine titration    10/8: Remains psychotic, suspicious, delusional about her identity, continue clozapine   10/9: Patient remains disorganized but denies auditory hallucinations overnight. BP soft with orthostatics (+), will continue current dose of clozapine with plans to slowly titrate while monitoring for efficacy and side effects.  10/10: Patient remains disorganized and paranoid on the unit but with improved linear thinking since admission, though overall remains disorganized with loose associations. BP remains soft, will encourage PO intake, compression stockings and will continue current dose of clozapine with plans to slowly titrate while monitoring for efficacy and side effects.  10/11: Patient remains disorganized on the unit and requires encouragement for PO intake. Refusing compression stockings. BP remains soft, will check CMP and start salt tablet prior to titrating clozapine.  10/12: Patient remains disorganized on the unit and requires encouragement for PO intake. CMP within normal limits and orthostatics negative, plan to titrate clozapine tonight and monitor for efficacy and side effects.  10/13: Remains disorganized  10/16: Patient remains disorganized and with soft BP. Requires encouragement for PO intake. Will continue current dose of clozapine with plans to slowly titrate while monitoring for efficacy and side effects.  10/17: Patient remains disorganized. BP stable, will titrate clozapine tonight and monitor for efficacy and side effects.  10/18: Patient remains disorganized. BP stable, tolerating clozapine titration well. Will continue current dose with plans to titrate if continuing to tolerate.  10/19: Tolerating meds well. Continue current dose. Found to have Vit D deficiency - will start supplementation  10/20: Patient remains disorganized but with improved mood, tolerating medication well. Will titrate clozapine.  10/23: Patient remains disorganized but continues to tolerate medication. Will titrate clozapine tonight  10/24: More visible, still disorganized, AH ongoing  10/25: Patient shows improved thought process but still endorses AH. Will titrate clozapine tonight.  10/26: Overall disorganized but improvement with AM sedation. Plan to titrate clozapine tomorrow.   10/27: Will hold off on clozaril titration since we have ordered crushed meds due to possibility of noncompliance on the unit given low clozaril level - no appearance of excessive sedation    Plan:  No 1:1 needed, as the patient is calm, no SI.  -Continue clozapine 275 hs  	Check CBC Wednesdays  - Started Fludrocortisone 0.1mg daily with food for orthostatic hypotension; refusing AYAN stockings  - Continue NaCl 1g BID  -Continue Senna and Miralax prn for constipation  -Continue metformin, glimepiride and Linagliptin for DM  	Check fingersticks qid  -Lipitor for cholesterol  -Albuterol prn for asthma  - Vit D deficiency - started supplementation  -Patient provided verbal consent to speak with Zara Giang and patient's outpatient provider.

## 2023-10-28 LAB
GLUCOSE BLDC GLUCOMTR-MCNC: 172 MG/DL — HIGH (ref 70–99)
GLUCOSE BLDC GLUCOMTR-MCNC: 172 MG/DL — HIGH (ref 70–99)
GLUCOSE BLDC GLUCOMTR-MCNC: 201 MG/DL — HIGH (ref 70–99)
GLUCOSE BLDC GLUCOMTR-MCNC: 201 MG/DL — HIGH (ref 70–99)
GLUCOSE BLDC GLUCOMTR-MCNC: 217 MG/DL — HIGH (ref 70–99)
GLUCOSE BLDC GLUCOMTR-MCNC: 217 MG/DL — HIGH (ref 70–99)
GLUCOSE BLDC GLUCOMTR-MCNC: 235 MG/DL — HIGH (ref 70–99)
GLUCOSE BLDC GLUCOMTR-MCNC: 235 MG/DL — HIGH (ref 70–99)

## 2023-10-28 PROCEDURE — 99231 SBSQ HOSP IP/OBS SF/LOW 25: CPT

## 2023-10-28 RX ADMIN — Medication 2: at 12:23

## 2023-10-28 RX ADMIN — SODIUM CHLORIDE 1 GRAM(S): 9 INJECTION INTRAMUSCULAR; INTRAVENOUS; SUBCUTANEOUS at 12:31

## 2023-10-28 RX ADMIN — LINAGLIPTIN 5 MILLIGRAM(S): 5 TABLET, FILM COATED ORAL at 09:09

## 2023-10-28 RX ADMIN — Medication 4 MILLIGRAM(S): at 09:09

## 2023-10-28 RX ADMIN — CLOZAPINE 275 MILLIGRAM(S): 150 TABLET, ORALLY DISINTEGRATING ORAL at 20:55

## 2023-10-28 RX ADMIN — Medication 2: at 08:37

## 2023-10-28 RX ADMIN — Medication 1: at 16:55

## 2023-10-28 RX ADMIN — METFORMIN HYDROCHLORIDE 500 MILLIGRAM(S): 850 TABLET ORAL at 20:55

## 2023-10-28 RX ADMIN — FLUDROCORTISONE ACETATE 0.1 MILLIGRAM(S): 0.1 TABLET ORAL at 12:32

## 2023-10-28 RX ADMIN — ATORVASTATIN CALCIUM 20 MILLIGRAM(S): 80 TABLET, FILM COATED ORAL at 20:54

## 2023-10-28 NOTE — BH INPATIENT PSYCHIATRY PROGRESS NOTE - NSBHFUPINTERVALHXFT_PSY_A_CORE
Chart reviewed and case discussed with treatment team. Staff report patient has been more engaging in groups but still very disorganized. She is pleasant and visible on the unit. She endorses AH from the "twins" but does not respond when inquired about content. Continues to have paranoia - possibly related to AH. Patient reports she is taking medications when informed meds will be crushed.   10/28 Patient seen for psychosis. No overnight events. Eating, sleeping okay taking meds, VSS

## 2023-10-28 NOTE — BH INPATIENT PSYCHIATRY PROGRESS NOTE - NSBHASSESSSUMMFT_PSY_ALL_CORE
59 y/o female, single, noncaregiver, disabled, lives with [facility], history of Schizophrenia vs Schizoaffectove d/o, multiple past admissions at [facility] (most recent 2020 for decompensated psychosis), and also was hospitalized medically for lactic acidosis and followed by CL team in July 2023, followed by psychiatrist Dr. Love at [facility] (pt has no h/o developmental disability), prescribed Clozaril 100mg qhs , no h/o self-injurious behavior or suicide attempts, no h/o violence or legal issues, PMH Type II DM, HLD, asthma, no h/o substance abuse, BIBEMS activated by rosalizy (Rahat) for disorganization.  The patient is grossly disorganized in thoughts on admission.    9/29: The patient continues to be disorganized, but is taking medications.  Will continue clozapine.  9/30: Psychotic disorganized cont meds encourage cooperation with proper vital signs  10/1: disorganized, but more verbal, cooperative, no SI/HI.   10/2: The patient is a little more organized, but still internally preoccupied.  Sedated from clozapine.  Will continue current dose, consider increase if psychosis continues.  10/3: Patient remains disorganized but behaviorally controlled on the unit. Records show that patient was previously well maintained on Clozaril 350mg. Will titrate Clozaril today and monitor for efficacy and side effects.  10/4: Patient shows slight improvement with organization but continues to be disorganized in thought process. Will continue current dose of clozapine with plans to slowly titrate while monitoring for efficacy and side effects.  10/5: Patient was irritable today and less cooperative than previous but continues to be disorganized during the day. Plan to continue titrating clozapine while monitoring for efficacy and side effects.  10/6: Patient shows improvement in mood and thought process and shows improvement in articulating auditory hallucinations. Will continue current dose of clozapine with plans to slowly titrate while monitoring for efficacy and side effects.  10/7: Patient remains disorganized, resistive with treatment, no reports of AH today, continue clozapine titration    10/8: Remains psychotic, suspicious, delusional about her identity, continue clozapine   10/9: Patient remains disorganized but denies auditory hallucinations overnight. BP soft with orthostatics (+), will continue current dose of clozapine with plans to slowly titrate while monitoring for efficacy and side effects.  10/10: Patient remains disorganized and paranoid on the unit but with improved linear thinking since admission, though overall remains disorganized with loose associations. BP remains soft, will encourage PO intake, compression stockings and will continue current dose of clozapine with plans to slowly titrate while monitoring for efficacy and side effects.  10/11: Patient remains disorganized on the unit and requires encouragement for PO intake. Refusing compression stockings. BP remains soft, will check CMP and start salt tablet prior to titrating clozapine.  10/12: Patient remains disorganized on the unit and requires encouragement for PO intake. CMP within normal limits and orthostatics negative, plan to titrate clozapine tonight and monitor for efficacy and side effects.  10/13: Remains disorganized  10/16: Patient remains disorganized and with soft BP. Requires encouragement for PO intake. Will continue current dose of clozapine with plans to slowly titrate while monitoring for efficacy and side effects.  10/17: Patient remains disorganized. BP stable, will titrate clozapine tonight and monitor for efficacy and side effects.  10/18: Patient remains disorganized. BP stable, tolerating clozapine titration well. Will continue current dose with plans to titrate if continuing to tolerate.  10/19: Tolerating meds well. Continue current dose. Found to have Vit D deficiency - will start supplementation  10/20: Patient remains disorganized but with improved mood, tolerating medication well. Will titrate clozapine.  10/23: Patient remains disorganized but continues to tolerate medication. Will titrate clozapine tonight  10/24: More visible, still disorganized, AH ongoing  10/25: Patient shows improved thought process but still endorses AH. Will titrate clozapine tonight.  10/26: Overall disorganized but improvement with AM sedation. Plan to titrate clozapine tomorrow.   10/27: Will hold off on clozaril titration since we have ordered crushed meds due to possibility of noncompliance on the unit given low clozaril level - no appearance of excessive sedation  10/28 Disorganized , in control, thougth disordered . Tolerating clozapine in crushed form to avoid cheeking cont meds, could also consider Fazclo liquid clozapine    Plan:  No 1:1 needed, as the patient is calm, no SI.  -Continue clozapine 275 hs  	Check CBC Wednesdays  - Started Fludrocortisone 0.1mg daily with food for orthostatic hypotension; refusing AYAN stockings  - Continue NaCl 1g BID  -Continue Senna and Miralax prn for constipation  -Continue metformin, glimepiride and Linagliptin for DM  	Check fingersticks qid  -Lipitor for cholesterol  -Albuterol prn for asthma  - Vit D deficiency - started supplementation  -Patient provided verbal consent to speak with Zara Giang and patient's outpatient provider.

## 2023-10-28 NOTE — BH INPATIENT PSYCHIATRY PROGRESS NOTE - NSBHCHARTREVIEWVS_PSY_A_CORE FT
Vital Signs Last 24 Hrs  T(C): 36.8 (10-28-23 @ 05:46), Max: 36.8 (10-27-23 @ 19:39)  T(F): 98.2 (10-28-23 @ 05:46), Max: 98.3 (10-27-23 @ 19:39)  HR: --  BP: --  BP(mean): --  RR: --  SpO2: --    Orthostatic VS  10-28-23 @ 05:46  Lying BP: 121/75 HR: 84  Sitting BP: 127/65 HR: 80  Standing BP: --/-- HR: --  Site: --  Mode: --  Orthostatic VS  10-27-23 @ 19:39  Lying BP: --/-- HR: --  Sitting BP: 123/64 HR: 96  Standing BP: 126/59 HR: 98  Site: upper left arm  Mode: electronic  Orthostatic VS  10-26-23 @ 20:11  Lying BP: --/-- HR: --  Sitting BP: 148/62 HR: 81  Standing BP: 142/55 HR: 84  Site: upper left arm  Mode: electronic

## 2023-10-28 NOTE — BH INPATIENT PSYCHIATRY PROGRESS NOTE - MSE UNSTRUCTURED FT
Adequate hygiene, neat grooming but wearing a beanie despite warm temperature. Patient was oddly related, insists on correcting writers pronunciation of a sound at end of name.  No agitation, psychomotor retardation or involuntary movements observed. The patient’s speech is fluent, normal in tone, rate, and ranges from soft to normal volume.  The patient’s mood is "fine".  Her affect is constricted, stable, congruent to mood. The patient’s thought process is disorganized and remains illogical with loose associations. Multiple idiosyncratic delusions  She denies current auditory hallucinations. She does not express any suicidal or homicidal ideation, intent, or plan.  Insight is limited.  Judgment is impaired. Impulse control has been good on the unit.

## 2023-10-28 NOTE — BH INPATIENT PSYCHIATRY PROGRESS NOTE - CURRENT MEDICATION
MEDICATIONS  (STANDING):  atorvastatin 20 milliGRAM(s) Oral at bedtime  cloZAPine 275 milliGRAM(s) Oral at bedtime  ergocalciferol 48375 Unit(s) Oral <User Schedule>  fludroCORTISONE 0.1 milliGRAM(s) Oral <User Schedule>  glimepiride. 4 milliGRAM(s) Oral with breakfast  insulin lispro (ADMELOG) corrective regimen sliding scale   SubCutaneous three times a day before meals  insulin lispro (ADMELOG) corrective regimen sliding scale   SubCutaneous at bedtime  linagliptin 5 milliGRAM(s) Oral daily  metFORMIN 500 milliGRAM(s) Oral at bedtime  senna 2 Tablet(s) Oral at bedtime  sodium chloride 1 Gram(s) Oral <User Schedule>    MEDICATIONS  (PRN):  albuterol    90 MICROgram(s) HFA Inhaler 2 Puff(s) Inhalation every 6 hours PRN asthma  aluminum hydroxide/magnesium hydroxide/simethicone Suspension 30 milliLiter(s) Oral every 8 hours PRN Dyspepsia  dextrose Oral Gel 15 Gram(s) Oral once PRN Blood Glucose LESS THAN 70 milliGRAM(s)/deciliter  haloperidol     Tablet 5 milliGRAM(s) Oral every 6 hours PRN agitation  haloperidol    Injectable 5 milliGRAM(s) IntraMuscular once PRN combative behavior  LORazepam     Tablet 1 milliGRAM(s) Oral every 6 hours PRN Agitation  polyethylene glycol 3350 17 Gram(s) Oral daily PRN Constipation

## 2023-10-28 NOTE — BH INPATIENT PSYCHIATRY PROGRESS NOTE - NSBHMETABOLIC_PSY_ALL_CORE_FT
BMI: BMI (kg/m2): 21.3 (10-14-23 @ 15:59)  HbA1c: A1C with Estimated Average Glucose Result: 7.2 % (09-27-23 @ 19:00)    Glucose: POCT Blood Glucose.: 217 mg/dL (10-28-23 @ 08:04)    BP: 109/60 (10-27-23 @ 08:00) (109/60 - 109/60)  Lipid Panel: Date/Time: 07-07-23 @ 05:52  Cholesterol, Serum: 153  Direct LDL: --  HDL Cholesterol, Serum: 28  Total Cholesterol/HDL Ration Measurement: --  Triglycerides, Serum: 273

## 2023-10-29 LAB
GLUCOSE BLDC GLUCOMTR-MCNC: 101 MG/DL — HIGH (ref 70–99)
GLUCOSE BLDC GLUCOMTR-MCNC: 101 MG/DL — HIGH (ref 70–99)
GLUCOSE BLDC GLUCOMTR-MCNC: 173 MG/DL — HIGH (ref 70–99)
GLUCOSE BLDC GLUCOMTR-MCNC: 173 MG/DL — HIGH (ref 70–99)
GLUCOSE BLDC GLUCOMTR-MCNC: 197 MG/DL — HIGH (ref 70–99)

## 2023-10-29 RX ADMIN — ATORVASTATIN CALCIUM 20 MILLIGRAM(S): 80 TABLET, FILM COATED ORAL at 20:27

## 2023-10-29 RX ADMIN — METFORMIN HYDROCHLORIDE 500 MILLIGRAM(S): 850 TABLET ORAL at 20:27

## 2023-10-29 RX ADMIN — Medication 4 MILLIGRAM(S): at 08:33

## 2023-10-29 RX ADMIN — Medication 1: at 08:33

## 2023-10-29 RX ADMIN — LINAGLIPTIN 5 MILLIGRAM(S): 5 TABLET, FILM COATED ORAL at 08:31

## 2023-10-29 RX ADMIN — FLUDROCORTISONE ACETATE 0.1 MILLIGRAM(S): 0.1 TABLET ORAL at 12:19

## 2023-10-29 RX ADMIN — SENNA PLUS 2 TABLET(S): 8.6 TABLET ORAL at 20:27

## 2023-10-29 RX ADMIN — SODIUM CHLORIDE 1 GRAM(S): 9 INJECTION INTRAMUSCULAR; INTRAVENOUS; SUBCUTANEOUS at 12:20

## 2023-10-29 RX ADMIN — Medication 1: at 12:19

## 2023-10-29 RX ADMIN — CLOZAPINE 275 MILLIGRAM(S): 150 TABLET, ORALLY DISINTEGRATING ORAL at 20:27

## 2023-10-30 LAB
GLUCOSE BLDC GLUCOMTR-MCNC: 154 MG/DL — HIGH (ref 70–99)
GLUCOSE BLDC GLUCOMTR-MCNC: 154 MG/DL — HIGH (ref 70–99)
GLUCOSE BLDC GLUCOMTR-MCNC: 168 MG/DL — HIGH (ref 70–99)
GLUCOSE BLDC GLUCOMTR-MCNC: 168 MG/DL — HIGH (ref 70–99)
GLUCOSE BLDC GLUCOMTR-MCNC: 170 MG/DL — HIGH (ref 70–99)
GLUCOSE BLDC GLUCOMTR-MCNC: 170 MG/DL — HIGH (ref 70–99)
GLUCOSE BLDC GLUCOMTR-MCNC: 268 MG/DL — HIGH (ref 70–99)
GLUCOSE BLDC GLUCOMTR-MCNC: 268 MG/DL — HIGH (ref 70–99)

## 2023-10-30 PROCEDURE — 99232 SBSQ HOSP IP/OBS MODERATE 35: CPT

## 2023-10-30 RX ADMIN — Medication 4 MILLIGRAM(S): at 08:59

## 2023-10-30 RX ADMIN — FLUDROCORTISONE ACETATE 0.1 MILLIGRAM(S): 0.1 TABLET ORAL at 13:06

## 2023-10-30 RX ADMIN — METFORMIN HYDROCHLORIDE 500 MILLIGRAM(S): 850 TABLET ORAL at 20:27

## 2023-10-30 RX ADMIN — Medication 1: at 20:53

## 2023-10-30 RX ADMIN — Medication 1: at 08:55

## 2023-10-30 RX ADMIN — Medication 1: at 12:58

## 2023-10-30 RX ADMIN — LINAGLIPTIN 5 MILLIGRAM(S): 5 TABLET, FILM COATED ORAL at 08:59

## 2023-10-30 RX ADMIN — Medication 1: at 17:10

## 2023-10-30 RX ADMIN — SODIUM CHLORIDE 1 GRAM(S): 9 INJECTION INTRAMUSCULAR; INTRAVENOUS; SUBCUTANEOUS at 13:05

## 2023-10-30 RX ADMIN — SODIUM CHLORIDE 1 GRAM(S): 9 INJECTION INTRAMUSCULAR; INTRAVENOUS; SUBCUTANEOUS at 20:28

## 2023-10-30 RX ADMIN — ATORVASTATIN CALCIUM 20 MILLIGRAM(S): 80 TABLET, FILM COATED ORAL at 20:27

## 2023-10-30 RX ADMIN — CLOZAPINE 275 MILLIGRAM(S): 150 TABLET, ORALLY DISINTEGRATING ORAL at 20:27

## 2023-10-30 NOTE — BH INPATIENT PSYCHIATRY PROGRESS NOTE - MSE UNSTRUCTURED FT
Poor hygiene, fair grooming. Patient was oddly related, has poor boundaries.  No agitation, psychomotor retardation or involuntary movements observed. The patient’s speech is fluent, normal in tone, rate, and ranges from soft to normal volume.  The patient’s mood is "not bad".  Her affect is constricted, stable, congruent to mood. The patient’s thought process is disorganized and remains illogical with loose associations. Still has idiosyncratic delusions - no set system of delusions.  She denies current auditory hallucinations. She does not express any suicidal or homicidal ideation, intent, or plan.  Insight is limited.  Judgment is impaired. Impulse control has been good on the unit.

## 2023-10-30 NOTE — BH INPATIENT PSYCHIATRY PROGRESS NOTE - NSBHCHARTREVIEWVS_PSY_A_CORE FT
Vital Signs Last 24 Hrs  T(C): 36.4 (10-30-23 @ 08:26), Max: 36.8 (10-29-23 @ 19:27)  T(F): 97.6 (10-30-23 @ 08:26), Max: 98.2 (10-29-23 @ 19:27)  HR: 96 (10-30-23 @ 13:23) (96 - 96)  BP: 92/78 (10-30-23 @ 13:23) (92/78 - 92/78)  BP(mean): --  RR: 17 (10-29-23 @ 19:27) (17 - 17)  SpO2: 99% (10-30-23 @ 08:26) (98% - 99%)    Orthostatic VS  10-30-23 @ 08:26  Lying BP: --/-- HR: --  Sitting BP: 127/64 HR: 84  Standing BP: 107/52 HR: 86  Site: --  Mode: --  Orthostatic VS  10-29-23 @ 19:27  Lying BP: --/-- HR: --  Sitting BP: 121/73 HR: 79  Standing BP: 120/64 HR: 82  Site: --  Mode: --  Orthostatic VS  10-29-23 @ 08:22  Lying BP: --/-- HR: --  Sitting BP: 121/62 HR: 78  Standing BP: 110/61 HR: 84  Site: upper right arm  Mode: electronic  Orthostatic VS  10-28-23 @ 19:47  Lying BP: --/-- HR: --  Sitting BP: 126/57 HR: 85  Standing BP: 132/104 HR: 83  Site: --  Mode: --

## 2023-10-30 NOTE — BH INPATIENT PSYCHIATRY PROGRESS NOTE - NSBHMETABOLIC_PSY_ALL_CORE_FT
BMI: BMI (kg/m2): 21.3 (10-14-23 @ 15:59)  HbA1c: A1C with Estimated Average Glucose Result: 7.2 % (09-27-23 @ 19:00)    Glucose: POCT Blood Glucose.: 168 mg/dL (10-30-23 @ 16:15)    BP: 92/78 (10-30-23 @ 13:23) (92/78 - 92/78)  Lipid Panel: Date/Time: 07-07-23 @ 05:52  Cholesterol, Serum: 153  Direct LDL: --  HDL Cholesterol, Serum: 28  Total Cholesterol/HDL Ration Measurement: --  Triglycerides, Serum: 273

## 2023-10-30 NOTE — BH INPATIENT PSYCHIATRY PROGRESS NOTE - NSBHFUPINTERVALHXFT_PSY_A_CORE
Patient has been med compliant. Has been poorly engaged in groups. Patient is somewhat malodorous today. Med compliant. Reports ongoing AH but unable to provided details. States "I don't know" to many questions. Comments on writer's appearance again today.

## 2023-10-30 NOTE — BH INPATIENT PSYCHIATRY PROGRESS NOTE - NSBHASSESSSUMMFT_PSY_ALL_CORE
59 y/o female, single, noncaregiver, disabled, lives with [facility], history of Schizophrenia vs Schizoaffectove d/o, multiple past admissions at [facility] (most recent 2020 for decompensated psychosis), and also was hospitalized medically for lactic acidosis and followed by CL team in July 2023, followed by psychiatrist Dr. Love at [facility] (pt has no h/o developmental disability), prescribed Clozaril 100mg qhs , no h/o self-injurious behavior or suicide attempts, no h/o violence or legal issues, PMH Type II DM, HLD, asthma, no h/o substance abuse, BIBEMS activated by rosalizy (Rahat) for disorganization.  The patient is grossly disorganized in thoughts on admission.    9/29: The patient continues to be disorganized, but is taking medications.  Will continue clozapine.  9/30: Psychotic disorganized cont meds encourage cooperation with proper vital signs  10/1: disorganized, but more verbal, cooperative, no SI/HI.   10/2: The patient is a little more organized, but still internally preoccupied.  Sedated from clozapine.  Will continue current dose, consider increase if psychosis continues.  10/3: Patient remains disorganized but behaviorally controlled on the unit. Records show that patient was previously well maintained on Clozaril 350mg. Will titrate Clozaril today and monitor for efficacy and side effects.  10/4: Patient shows slight improvement with organization but continues to be disorganized in thought process. Will continue current dose of clozapine with plans to slowly titrate while monitoring for efficacy and side effects.  10/5: Patient was irritable today and less cooperative than previous but continues to be disorganized during the day. Plan to continue titrating clozapine while monitoring for efficacy and side effects.  10/6: Patient shows improvement in mood and thought process and shows improvement in articulating auditory hallucinations. Will continue current dose of clozapine with plans to slowly titrate while monitoring for efficacy and side effects.  10/7: Patient remains disorganized, resistive with treatment, no reports of AH today, continue clozapine titration    10/8: Remains psychotic, suspicious, delusional about her identity, continue clozapine   10/9: Patient remains disorganized but denies auditory hallucinations overnight. BP soft with orthostatics (+), will continue current dose of clozapine with plans to slowly titrate while monitoring for efficacy and side effects.  10/10: Patient remains disorganized and paranoid on the unit but with improved linear thinking since admission, though overall remains disorganized with loose associations. BP remains soft, will encourage PO intake, compression stockings and will continue current dose of clozapine with plans to slowly titrate while monitoring for efficacy and side effects.  10/11: Patient remains disorganized on the unit and requires encouragement for PO intake. Refusing compression stockings. BP remains soft, will check CMP and start salt tablet prior to titrating clozapine.  10/12: Patient remains disorganized on the unit and requires encouragement for PO intake. CMP within normal limits and orthostatics negative, plan to titrate clozapine tonight and monitor for efficacy and side effects.  10/13: Remains disorganized  10/16: Patient remains disorganized and with soft BP. Requires encouragement for PO intake. Will continue current dose of clozapine with plans to slowly titrate while monitoring for efficacy and side effects.  10/17: Patient remains disorganized. BP stable, will titrate clozapine tonight and monitor for efficacy and side effects.  10/18: Patient remains disorganized. BP stable, tolerating clozapine titration well. Will continue current dose with plans to titrate if continuing to tolerate.  10/19: Tolerating meds well. Continue current dose. Found to have Vit D deficiency - will start supplementation  10/20: Patient remains disorganized but with improved mood, tolerating medication well. Will titrate clozapine.  10/23: Patient remains disorganized but continues to tolerate medication. Will titrate clozapine tonight  10/24: More visible, still disorganized, AH ongoing  10/25: Patient shows improved thought process but still endorses AH. Will titrate clozapine tonight.  10/26: Overall disorganized but improvement with AM sedation. Plan to titrate clozapine tomorrow.   10/27: Will hold off on clozaril titration since we have ordered crushed meds due to possibility of noncompliance on the unit given low clozaril level - no appearance of excessive sedation  10/30: Limited improvement since crushing medications. Will recheck clozaril level tomorrow; left msg with guardian    Plan:  No 1:1 needed, as the patient is calm, no SI.  -Continue clozapine 275 hs  	Check CBC Wednesdays  - Started Fludrocortisone 0.1mg daily with food for orthostatic hypotension; refusing AYAN stockings  - Continue NaCl 1g BID  -Continue Senna and Miralax prn for constipation  -Continue metformin, glimepiride and Linagliptin for DM  	Check fingersticks qid  -Lipitor for cholesterol  -Albuterol prn for asthma  - Vit D deficiency - started supplementation  -Patient provided verbal consent to speak with Zara Giang and patient's outpatient provider.

## 2023-10-30 NOTE — BH INPATIENT PSYCHIATRY PROGRESS NOTE - CURRENT MEDICATION
MEDICATIONS  (STANDING):  atorvastatin 20 milliGRAM(s) Oral at bedtime  cloZAPine 275 milliGRAM(s) Oral at bedtime  ergocalciferol 32072 Unit(s) Oral <User Schedule>  fludroCORTISONE 0.1 milliGRAM(s) Oral <User Schedule>  glimepiride. 4 milliGRAM(s) Oral with breakfast  insulin lispro (ADMELOG) corrective regimen sliding scale   SubCutaneous three times a day before meals  insulin lispro (ADMELOG) corrective regimen sliding scale   SubCutaneous at bedtime  linagliptin 5 milliGRAM(s) Oral daily  metFORMIN 500 milliGRAM(s) Oral at bedtime  senna 2 Tablet(s) Oral at bedtime  sodium chloride 1 Gram(s) Oral <User Schedule>    MEDICATIONS  (PRN):  albuterol    90 MICROgram(s) HFA Inhaler 2 Puff(s) Inhalation every 6 hours PRN asthma  aluminum hydroxide/magnesium hydroxide/simethicone Suspension 30 milliLiter(s) Oral every 8 hours PRN Dyspepsia  dextrose Oral Gel 15 Gram(s) Oral once PRN Blood Glucose LESS THAN 70 milliGRAM(s)/deciliter  haloperidol     Tablet 5 milliGRAM(s) Oral every 6 hours PRN agitation  haloperidol    Injectable 5 milliGRAM(s) IntraMuscular once PRN combative behavior  LORazepam     Tablet 1 milliGRAM(s) Oral every 6 hours PRN Agitation  polyethylene glycol 3350 17 Gram(s) Oral daily PRN Constipation

## 2023-10-31 LAB
BASOPHILS # BLD AUTO: 0.02 K/UL — SIGNIFICANT CHANGE UP (ref 0–0.2)
BASOPHILS # BLD AUTO: 0.02 K/UL — SIGNIFICANT CHANGE UP (ref 0–0.2)
BASOPHILS NFR BLD AUTO: 0.2 % — SIGNIFICANT CHANGE UP (ref 0–2)
BASOPHILS NFR BLD AUTO: 0.2 % — SIGNIFICANT CHANGE UP (ref 0–2)
CLOZAPINE SERPL-MCNC: 1201 NG/ML — HIGH (ref 350–600)
CLOZAPINE SERPL-MCNC: 1201 NG/ML — HIGH (ref 350–600)
EOSINOPHIL # BLD AUTO: 0 K/UL — SIGNIFICANT CHANGE UP (ref 0–0.5)
EOSINOPHIL # BLD AUTO: 0 K/UL — SIGNIFICANT CHANGE UP (ref 0–0.5)
EOSINOPHIL NFR BLD AUTO: 0 % — SIGNIFICANT CHANGE UP (ref 0–6)
EOSINOPHIL NFR BLD AUTO: 0 % — SIGNIFICANT CHANGE UP (ref 0–6)
GLUCOSE BLDC GLUCOMTR-MCNC: 121 MG/DL — HIGH (ref 70–99)
GLUCOSE BLDC GLUCOMTR-MCNC: 121 MG/DL — HIGH (ref 70–99)
GLUCOSE BLDC GLUCOMTR-MCNC: 135 MG/DL — HIGH (ref 70–99)
GLUCOSE BLDC GLUCOMTR-MCNC: 135 MG/DL — HIGH (ref 70–99)
GLUCOSE BLDC GLUCOMTR-MCNC: 225 MG/DL — HIGH (ref 70–99)
GLUCOSE BLDC GLUCOMTR-MCNC: 225 MG/DL — HIGH (ref 70–99)
GLUCOSE BLDC GLUCOMTR-MCNC: 266 MG/DL — HIGH (ref 70–99)
GLUCOSE BLDC GLUCOMTR-MCNC: 266 MG/DL — HIGH (ref 70–99)
HCT VFR BLD CALC: 34.7 % — SIGNIFICANT CHANGE UP (ref 34.5–45)
HCT VFR BLD CALC: 34.7 % — SIGNIFICANT CHANGE UP (ref 34.5–45)
HGB BLD-MCNC: 11.2 G/DL — LOW (ref 11.5–15.5)
HGB BLD-MCNC: 11.2 G/DL — LOW (ref 11.5–15.5)
IANC: 5.9 K/UL — SIGNIFICANT CHANGE UP (ref 1.8–7.4)
IANC: 5.9 K/UL — SIGNIFICANT CHANGE UP (ref 1.8–7.4)
IMM GRANULOCYTES NFR BLD AUTO: 0.5 % — SIGNIFICANT CHANGE UP (ref 0–0.9)
IMM GRANULOCYTES NFR BLD AUTO: 0.5 % — SIGNIFICANT CHANGE UP (ref 0–0.9)
LYMPHOCYTES # BLD AUTO: 2.03 K/UL — SIGNIFICANT CHANGE UP (ref 1–3.3)
LYMPHOCYTES # BLD AUTO: 2.03 K/UL — SIGNIFICANT CHANGE UP (ref 1–3.3)
LYMPHOCYTES # BLD AUTO: 23.9 % — SIGNIFICANT CHANGE UP (ref 13–44)
LYMPHOCYTES # BLD AUTO: 23.9 % — SIGNIFICANT CHANGE UP (ref 13–44)
MCHC RBC-ENTMCNC: 26.7 PG — LOW (ref 27–34)
MCHC RBC-ENTMCNC: 26.7 PG — LOW (ref 27–34)
MCHC RBC-ENTMCNC: 32.3 GM/DL — SIGNIFICANT CHANGE UP (ref 32–36)
MCHC RBC-ENTMCNC: 32.3 GM/DL — SIGNIFICANT CHANGE UP (ref 32–36)
MCV RBC AUTO: 82.8 FL — SIGNIFICANT CHANGE UP (ref 80–100)
MCV RBC AUTO: 82.8 FL — SIGNIFICANT CHANGE UP (ref 80–100)
MONOCYTES # BLD AUTO: 0.49 K/UL — SIGNIFICANT CHANGE UP (ref 0–0.9)
MONOCYTES # BLD AUTO: 0.49 K/UL — SIGNIFICANT CHANGE UP (ref 0–0.9)
MONOCYTES NFR BLD AUTO: 5.8 % — SIGNIFICANT CHANGE UP (ref 2–14)
MONOCYTES NFR BLD AUTO: 5.8 % — SIGNIFICANT CHANGE UP (ref 2–14)
NEUTROPHILS # BLD AUTO: 5.9 K/UL — SIGNIFICANT CHANGE UP (ref 1.8–7.4)
NEUTROPHILS # BLD AUTO: 5.9 K/UL — SIGNIFICANT CHANGE UP (ref 1.8–7.4)
NEUTROPHILS NFR BLD AUTO: 69.6 % — SIGNIFICANT CHANGE UP (ref 43–77)
NEUTROPHILS NFR BLD AUTO: 69.6 % — SIGNIFICANT CHANGE UP (ref 43–77)
NRBC # BLD: 0 /100 WBCS — SIGNIFICANT CHANGE UP (ref 0–0)
NRBC # BLD: 0 /100 WBCS — SIGNIFICANT CHANGE UP (ref 0–0)
NRBC # FLD: 0 K/UL — SIGNIFICANT CHANGE UP (ref 0–0)
NRBC # FLD: 0 K/UL — SIGNIFICANT CHANGE UP (ref 0–0)
PLATELET # BLD AUTO: 304 K/UL — SIGNIFICANT CHANGE UP (ref 150–400)
PLATELET # BLD AUTO: 304 K/UL — SIGNIFICANT CHANGE UP (ref 150–400)
RBC # BLD: 4.19 M/UL — SIGNIFICANT CHANGE UP (ref 3.8–5.2)
RBC # BLD: 4.19 M/UL — SIGNIFICANT CHANGE UP (ref 3.8–5.2)
RBC # FLD: 13.3 % — SIGNIFICANT CHANGE UP (ref 10.3–14.5)
RBC # FLD: 13.3 % — SIGNIFICANT CHANGE UP (ref 10.3–14.5)
WBC # BLD: 8.48 K/UL — SIGNIFICANT CHANGE UP (ref 3.8–10.5)
WBC # BLD: 8.48 K/UL — SIGNIFICANT CHANGE UP (ref 3.8–10.5)
WBC # FLD AUTO: 8.48 K/UL — SIGNIFICANT CHANGE UP (ref 3.8–10.5)
WBC # FLD AUTO: 8.48 K/UL — SIGNIFICANT CHANGE UP (ref 3.8–10.5)

## 2023-10-31 PROCEDURE — 99232 SBSQ HOSP IP/OBS MODERATE 35: CPT

## 2023-10-31 RX ADMIN — SODIUM CHLORIDE 1 GRAM(S): 9 INJECTION INTRAMUSCULAR; INTRAVENOUS; SUBCUTANEOUS at 20:07

## 2023-10-31 RX ADMIN — ATORVASTATIN CALCIUM 20 MILLIGRAM(S): 80 TABLET, FILM COATED ORAL at 20:07

## 2023-10-31 RX ADMIN — Medication 0: at 20:28

## 2023-10-31 RX ADMIN — LINAGLIPTIN 5 MILLIGRAM(S): 5 TABLET, FILM COATED ORAL at 08:42

## 2023-10-31 RX ADMIN — SENNA PLUS 2 TABLET(S): 8.6 TABLET ORAL at 20:07

## 2023-10-31 RX ADMIN — Medication 3: at 12:29

## 2023-10-31 RX ADMIN — FLUDROCORTISONE ACETATE 0.1 MILLIGRAM(S): 0.1 TABLET ORAL at 12:58

## 2023-10-31 RX ADMIN — CLOZAPINE 275 MILLIGRAM(S): 150 TABLET, ORALLY DISINTEGRATING ORAL at 20:07

## 2023-10-31 RX ADMIN — METFORMIN HYDROCHLORIDE 500 MILLIGRAM(S): 850 TABLET ORAL at 20:08

## 2023-10-31 RX ADMIN — SODIUM CHLORIDE 1 GRAM(S): 9 INJECTION INTRAMUSCULAR; INTRAVENOUS; SUBCUTANEOUS at 12:58

## 2023-10-31 RX ADMIN — Medication 4 MILLIGRAM(S): at 08:43

## 2023-10-31 NOTE — BH INPATIENT PSYCHIATRY PROGRESS NOTE - NSBHCHARTREVIEWVS_PSY_A_CORE FT
Vital Signs Last 24 Hrs  T(C): 36.7 (10-31-23 @ 05:53), Max: 36.7 (10-31-23 @ 05:53)  T(F): 98.1 (10-31-23 @ 05:53), Max: 98.1 (10-31-23 @ 05:53)  HR: --  BP: --  BP(mean): --  RR: --  SpO2: 98% (10-30-23 @ 19:23) (98% - 98%)    Orthostatic VS  10-31-23 @ 12:56  Lying BP: --/-- HR: --  Sitting BP: 113/64 HR: 86  Standing BP: 109/59 HR: 88  Site: --  Mode: --  Orthostatic VS  10-31-23 @ 05:53  Lying BP: 107/65 HR: 88  Sitting BP: --/-- HR: --  Standing BP: --/-- HR: --  Site: --  Mode: --  Orthostatic VS  10-30-23 @ 19:23  Lying BP: --/-- HR: --  Sitting BP: 136/56 HR: 96  Standing BP: 132/55 HR: 96  Site: --  Mode: --  Orthostatic VS  10-30-23 @ 08:26  Lying BP: --/-- HR: --  Sitting BP: 127/64 HR: 84  Standing BP: 107/52 HR: 86  Site: --  Mode: --  Orthostatic VS  10-29-23 @ 19:27  Lying BP: --/-- HR: --  Sitting BP: 121/73 HR: 79  Standing BP: 120/64 HR: 82  Site: --  Mode: --

## 2023-10-31 NOTE — BH INPATIENT PSYCHIATRY PROGRESS NOTE - NSBHFUPINTERVALHXFT_PSY_A_CORE
Chart reviewed and case discussed with treatment team. Staff report patient defecated in her bed but otherwise had no other issues. Med compliant. She is friendly. No overt psychosis other than ongoing disorganized through process. She also mentions various things that are random and cannot be confirmed.

## 2023-10-31 NOTE — BH INPATIENT PSYCHIATRY PROGRESS NOTE - CURRENT MEDICATION
MEDICATIONS  (STANDING):  atorvastatin 20 milliGRAM(s) Oral at bedtime  cloZAPine 275 milliGRAM(s) Oral at bedtime  ergocalciferol 55598 Unit(s) Oral <User Schedule>  fludroCORTISONE 0.1 milliGRAM(s) Oral <User Schedule>  glimepiride. 4 milliGRAM(s) Oral with breakfast  insulin lispro (ADMELOG) corrective regimen sliding scale   SubCutaneous three times a day before meals  insulin lispro (ADMELOG) corrective regimen sliding scale   SubCutaneous at bedtime  linagliptin 5 milliGRAM(s) Oral daily  metFORMIN 500 milliGRAM(s) Oral at bedtime  senna 2 Tablet(s) Oral at bedtime  sodium chloride 1 Gram(s) Oral <User Schedule>    MEDICATIONS  (PRN):  albuterol    90 MICROgram(s) HFA Inhaler 2 Puff(s) Inhalation every 6 hours PRN asthma  aluminum hydroxide/magnesium hydroxide/simethicone Suspension 30 milliLiter(s) Oral every 8 hours PRN Dyspepsia  dextrose Oral Gel 15 Gram(s) Oral once PRN Blood Glucose LESS THAN 70 milliGRAM(s)/deciliter  haloperidol     Tablet 5 milliGRAM(s) Oral every 6 hours PRN agitation  haloperidol    Injectable 5 milliGRAM(s) IntraMuscular once PRN combative behavior  LORazepam     Tablet 1 milliGRAM(s) Oral every 6 hours PRN Agitation  polyethylene glycol 3350 17 Gram(s) Oral daily PRN Constipation

## 2023-10-31 NOTE — BH INPATIENT PSYCHIATRY PROGRESS NOTE - NSBHMETABOLIC_PSY_ALL_CORE_FT
BMI: BMI (kg/m2): 21.3 (10-14-23 @ 15:59)  HbA1c: A1C with Estimated Average Glucose Result: 7.2 % (09-27-23 @ 19:00)    Glucose: POCT Blood Glucose.: 266 mg/dL (10-31-23 @ 12:01)    BP: 92/78 (10-30-23 @ 13:23) (92/78 - 92/78)  Lipid Panel: Date/Time: 07-07-23 @ 05:52  Cholesterol, Serum: 153  Direct LDL: --  HDL Cholesterol, Serum: 28  Total Cholesterol/HDL Ration Measurement: --  Triglycerides, Serum: 273

## 2023-10-31 NOTE — BH INPATIENT PSYCHIATRY PROGRESS NOTE - MSE UNSTRUCTURED FT
Adequate hygiene, intact grooming. Patient was oddly related, still has poor boundaries.  No agitation, psychomotor retardation or involuntary movements observed. The patient’s speech is fluent, normal in tone, and ranges from soft to normal volume.  Can be pressured, rambling. The patient’s mood is "ok".  Her affect is neutral, stable, congruent to mood. The patient’s thought process is disorganized and remains illogical with loose associations. Still has idiosyncratic delusions and thoughts that are out of context.  She denies current auditory hallucinations. She does not express any suicidal or homicidal ideation, intent, or plan.  Insight is limited.  Judgment is impaired. Impulse control has been good on the unit.

## 2023-10-31 NOTE — BH INPATIENT PSYCHIATRY PROGRESS NOTE - NSBHASSESSSUMMFT_PSY_ALL_CORE
59 y/o female, single, noncaregiver, disabled, lives with [facility], history of Schizophrenia vs Schizoaffectove d/o, multiple past admissions at [facility] (most recent 2020 for decompensated psychosis), and also was hospitalized medically for lactic acidosis and followed by CL team in July 2023, followed by psychiatrist Dr. Love at [facility] (pt has no h/o developmental disability), prescribed Clozaril 100mg qhs , no h/o self-injurious behavior or suicide attempts, no h/o violence or legal issues, PMH Type II DM, HLD, asthma, no h/o substance abuse, BIBEMS activated by rosalizy (Rahat) for disorganization.  The patient is grossly disorganized in thoughts on admission.    9/29: The patient continues to be disorganized, but is taking medications.  Will continue clozapine.  9/30: Psychotic disorganized cont meds encourage cooperation with proper vital signs  10/1: disorganized, but more verbal, cooperative, no SI/HI.   10/2: The patient is a little more organized, but still internally preoccupied.  Sedated from clozapine.  Will continue current dose, consider increase if psychosis continues.  10/3: Patient remains disorganized but behaviorally controlled on the unit. Records show that patient was previously well maintained on Clozaril 350mg. Will titrate Clozaril today and monitor for efficacy and side effects.  10/4: Patient shows slight improvement with organization but continues to be disorganized in thought process. Will continue current dose of clozapine with plans to slowly titrate while monitoring for efficacy and side effects.  10/5: Patient was irritable today and less cooperative than previous but continues to be disorganized during the day. Plan to continue titrating clozapine while monitoring for efficacy and side effects.  10/6: Patient shows improvement in mood and thought process and shows improvement in articulating auditory hallucinations. Will continue current dose of clozapine with plans to slowly titrate while monitoring for efficacy and side effects.  10/7: Patient remains disorganized, resistive with treatment, no reports of AH today, continue clozapine titration    10/8: Remains psychotic, suspicious, delusional about her identity, continue clozapine   10/9: Patient remains disorganized but denies auditory hallucinations overnight. BP soft with orthostatics (+), will continue current dose of clozapine with plans to slowly titrate while monitoring for efficacy and side effects.  10/10: Patient remains disorganized and paranoid on the unit but with improved linear thinking since admission, though overall remains disorganized with loose associations. BP remains soft, will encourage PO intake, compression stockings and will continue current dose of clozapine with plans to slowly titrate while monitoring for efficacy and side effects.  10/11: Patient remains disorganized on the unit and requires encouragement for PO intake. Refusing compression stockings. BP remains soft, will check CMP and start salt tablet prior to titrating clozapine.  10/12: Patient remains disorganized on the unit and requires encouragement for PO intake. CMP within normal limits and orthostatics negative, plan to titrate clozapine tonight and monitor for efficacy and side effects.  10/13: Remains disorganized  10/16: Patient remains disorganized and with soft BP. Requires encouragement for PO intake. Will continue current dose of clozapine with plans to slowly titrate while monitoring for efficacy and side effects.  10/17: Patient remains disorganized. BP stable, will titrate clozapine tonight and monitor for efficacy and side effects.  10/18: Patient remains disorganized. BP stable, tolerating clozapine titration well. Will continue current dose with plans to titrate if continuing to tolerate.  10/19: Tolerating meds well. Continue current dose. Found to have Vit D deficiency - will start supplementation  10/20: Patient remains disorganized but with improved mood, tolerating medication well. Will titrate clozapine.  10/23: Patient remains disorganized but continues to tolerate medication. Will titrate clozapine tonight  10/24: More visible, still disorganized, AH ongoing  10/25: Patient shows improved thought process but still endorses AH. Will titrate clozapine tonight.  10/26: Overall disorganized but improvement with AM sedation. Plan to titrate clozapine tomorrow.   10/27: Will hold off on clozaril titration since we have ordered crushed meds due to possibility of noncompliance on the unit given low clozaril level - no appearance of excessive sedation  10/30: Limited improvement since crushing medications. Will recheck clozaril level tomorrow; left msg with guardian  10/31: Clozapine level pending, some improvement in unit activity engagement, left msg for guardian to discuss baseline and possible discharge    Plan:  No 1:1 needed, as the patient is calm, no SI.  -Continue clozapine 275 hs  	Check CBC Tuesdays  - Started Fludrocortisone 0.1mg daily with food for orthostatic hypotension; refusing AYAN stockings  - Continue NaCl 1g BID  -Continue Senna and Miralax prn for constipation  -Continue metformin, glimepiride and Linagliptin for DM  	Check fingersticks qid  -Lipitor for cholesterol  -Albuterol prn for asthma  - Vit D deficiency - started supplementation  -Patient provided verbal consent to speak with Zara Giang and patient's outpatient provider.

## 2023-11-01 LAB
GLUCOSE BLDC GLUCOMTR-MCNC: 150 MG/DL — HIGH (ref 70–99)
GLUCOSE BLDC GLUCOMTR-MCNC: 150 MG/DL — HIGH (ref 70–99)
GLUCOSE BLDC GLUCOMTR-MCNC: 180 MG/DL — HIGH (ref 70–99)
GLUCOSE BLDC GLUCOMTR-MCNC: 180 MG/DL — HIGH (ref 70–99)
GLUCOSE BLDC GLUCOMTR-MCNC: 227 MG/DL — HIGH (ref 70–99)
GLUCOSE BLDC GLUCOMTR-MCNC: 227 MG/DL — HIGH (ref 70–99)
GLUCOSE BLDC GLUCOMTR-MCNC: 275 MG/DL — HIGH (ref 70–99)
GLUCOSE BLDC GLUCOMTR-MCNC: 275 MG/DL — HIGH (ref 70–99)

## 2023-11-01 PROCEDURE — 99232 SBSQ HOSP IP/OBS MODERATE 35: CPT

## 2023-11-01 RX ORDER — CLOZAPINE 150 MG/1
100 TABLET, ORALLY DISINTEGRATING ORAL AT BEDTIME
Refills: 0 | Status: DISCONTINUED | OUTPATIENT
Start: 2023-11-02 | End: 2023-11-02

## 2023-11-01 RX ADMIN — LINAGLIPTIN 5 MILLIGRAM(S): 5 TABLET, FILM COATED ORAL at 08:24

## 2023-11-01 RX ADMIN — Medication 1: at 20:50

## 2023-11-01 RX ADMIN — FLUDROCORTISONE ACETATE 0.1 MILLIGRAM(S): 0.1 TABLET ORAL at 12:43

## 2023-11-01 RX ADMIN — SODIUM CHLORIDE 1 GRAM(S): 9 INJECTION INTRAMUSCULAR; INTRAVENOUS; SUBCUTANEOUS at 12:48

## 2023-11-01 RX ADMIN — SODIUM CHLORIDE 1 GRAM(S): 9 INJECTION INTRAMUSCULAR; INTRAVENOUS; SUBCUTANEOUS at 21:50

## 2023-11-01 RX ADMIN — Medication 4 MILLIGRAM(S): at 08:24

## 2023-11-01 RX ADMIN — ATORVASTATIN CALCIUM 20 MILLIGRAM(S): 80 TABLET, FILM COATED ORAL at 21:42

## 2023-11-01 RX ADMIN — Medication 1: at 08:21

## 2023-11-01 RX ADMIN — Medication 2: at 12:30

## 2023-11-01 RX ADMIN — SENNA PLUS 2 TABLET(S): 8.6 TABLET ORAL at 21:42

## 2023-11-01 RX ADMIN — METFORMIN HYDROCHLORIDE 500 MILLIGRAM(S): 850 TABLET ORAL at 21:42

## 2023-11-01 NOTE — BH INPATIENT PSYCHIATRY PROGRESS NOTE - CURRENT MEDICATION
MEDICATIONS  (STANDING):  atorvastatin 20 milliGRAM(s) Oral at bedtime  ergocalciferol 04755 Unit(s) Oral <User Schedule>  fludroCORTISONE 0.1 milliGRAM(s) Oral <User Schedule>  glimepiride. 4 milliGRAM(s) Oral with breakfast  insulin lispro (ADMELOG) corrective regimen sliding scale   SubCutaneous at bedtime  insulin lispro (ADMELOG) corrective regimen sliding scale   SubCutaneous three times a day before meals  linagliptin 5 milliGRAM(s) Oral daily  metFORMIN 500 milliGRAM(s) Oral at bedtime  senna 2 Tablet(s) Oral at bedtime  sodium chloride 1 Gram(s) Oral <User Schedule>    MEDICATIONS  (PRN):  albuterol    90 MICROgram(s) HFA Inhaler 2 Puff(s) Inhalation every 6 hours PRN asthma  aluminum hydroxide/magnesium hydroxide/simethicone Suspension 30 milliLiter(s) Oral every 8 hours PRN Dyspepsia  dextrose Oral Gel 15 Gram(s) Oral once PRN Blood Glucose LESS THAN 70 milliGRAM(s)/deciliter  haloperidol     Tablet 5 milliGRAM(s) Oral every 6 hours PRN agitation  haloperidol    Injectable 5 milliGRAM(s) IntraMuscular once PRN combative behavior  LORazepam     Tablet 1 milliGRAM(s) Oral every 6 hours PRN Agitation  polyethylene glycol 3350 17 Gram(s) Oral daily PRN Constipation

## 2023-11-01 NOTE — BH INPATIENT PSYCHIATRY PROGRESS NOTE - MSE UNSTRUCTURED FT
Adequate hygiene, intact grooming. Patient was oddly related, still has poor boundaries.  No agitation, psychomotor retardation or involuntary movements observed. The patient’s speech is fluent, normal in tone, and ranges from soft to normal volume.  Can be pressured, rambling. The patient’s mood is "good".  Her affect is neutral, stable, congruent to mood. The patient’s thought process is disorganized and remains illogical with loose associations. Still has idiosyncratic delusions and thoughts that are out of context.  She denies current auditory hallucinations. She does not express any suicidal or homicidal ideation, intent, or plan.  Insight is limited.  Judgment is impaired. Impulse control has been good on the unit.

## 2023-11-01 NOTE — BH INPATIENT PSYCHIATRY PROGRESS NOTE - NSBHCHARTREVIEWVS_PSY_A_CORE FT
Vital Signs Last 24 Hrs  T(C): 36.4 (11-01-23 @ 07:52), Max: 36.9 (10-31-23 @ 19:41)  T(F): 97.5 (11-01-23 @ 07:52), Max: 98.4 (10-31-23 @ 19:41)  HR: --  BP: --  BP(mean): --  RR: 16 (10-31-23 @ 19:41) (16 - 16)  SpO2: 99% (11-01-23 @ 07:52) (99% - 99%)    Orthostatic VS  11-01-23 @ 07:52  Lying BP: --/-- HR: --  Sitting BP: 133/63 HR: 90  Standing BP: 134/61 HR: 94  Site: --  Mode: --  Orthostatic VS  10-31-23 @ 19:41  Lying BP: --/-- HR: --  Sitting BP: 130/89 HR: 80  Standing BP: 127/62 HR: 84  Site: --  Mode: --  Orthostatic VS  10-31-23 @ 12:56  Lying BP: --/-- HR: --  Sitting BP: 113/64 HR: 86  Standing BP: 109/59 HR: 88  Site: --  Mode: --  Orthostatic VS  10-31-23 @ 05:53  Lying BP: 107/65 HR: 88  Sitting BP: --/-- HR: --  Standing BP: --/-- HR: --  Site: --  Mode: --  Orthostatic VS  10-30-23 @ 19:23  Lying BP: --/-- HR: --  Sitting BP: 136/56 HR: 96  Standing BP: 132/55 HR: 96  Site: --  Mode: --

## 2023-11-01 NOTE — BH INPATIENT PSYCHIATRY PROGRESS NOTE - NSBHFUPINTERVALHXFT_PSY_A_CORE
Patient denies complaints. 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

## 2023-11-01 NOTE — BH INPATIENT PSYCHIATRY PROGRESS NOTE - NSBHMETABOLIC_PSY_ALL_CORE_FT
BMI: BMI (kg/m2): 21.3 (10-14-23 @ 15:59)  HbA1c: A1C with Estimated Average Glucose Result: 7.2 % (09-27-23 @ 19:00)    Glucose: POCT Blood Glucose.: 150 mg/dL (11-01-23 @ 16:33)    BP: 92/78 (10-30-23 @ 13:23) (92/78 - 92/78)  Lipid Panel: Date/Time: 07-07-23 @ 05:52  Cholesterol, Serum: 153  Direct LDL: --  HDL Cholesterol, Serum: 28  Total Cholesterol/HDL Ration Measurement: --  Triglycerides, Serum: 273

## 2023-11-02 LAB
CLOZAPINE SERPL-MCNC: <68 NG/ML — LOW (ref 350–600)
CLOZAPINE SERPL-MCNC: <68 NG/ML — LOW (ref 350–600)
GLUCOSE BLDC GLUCOMTR-MCNC: 166 MG/DL — HIGH (ref 70–99)
GLUCOSE BLDC GLUCOMTR-MCNC: 166 MG/DL — HIGH (ref 70–99)
GLUCOSE BLDC GLUCOMTR-MCNC: 189 MG/DL — HIGH (ref 70–99)
GLUCOSE BLDC GLUCOMTR-MCNC: 189 MG/DL — HIGH (ref 70–99)
GLUCOSE BLDC GLUCOMTR-MCNC: 193 MG/DL — HIGH (ref 70–99)
GLUCOSE BLDC GLUCOMTR-MCNC: 193 MG/DL — HIGH (ref 70–99)
GLUCOSE BLDC GLUCOMTR-MCNC: 222 MG/DL — HIGH (ref 70–99)
GLUCOSE BLDC GLUCOMTR-MCNC: 222 MG/DL — HIGH (ref 70–99)

## 2023-11-02 PROCEDURE — 99232 SBSQ HOSP IP/OBS MODERATE 35: CPT

## 2023-11-02 RX ORDER — METFORMIN HYDROCHLORIDE 850 MG/1
500 TABLET ORAL
Refills: 0 | Status: DISCONTINUED | OUTPATIENT
Start: 2023-11-02 | End: 2023-11-09

## 2023-11-02 RX ORDER — CLOZAPINE 150 MG/1
250 TABLET, ORALLY DISINTEGRATING ORAL AT BEDTIME
Refills: 0 | Status: DISCONTINUED | OUTPATIENT
Start: 2023-11-02 | End: 2023-11-09

## 2023-11-02 RX ADMIN — SENNA PLUS 2 TABLET(S): 8.6 TABLET ORAL at 20:22

## 2023-11-02 RX ADMIN — SODIUM CHLORIDE 1 GRAM(S): 9 INJECTION INTRAMUSCULAR; INTRAVENOUS; SUBCUTANEOUS at 13:04

## 2023-11-02 RX ADMIN — Medication 1: at 08:12

## 2023-11-02 RX ADMIN — ATORVASTATIN CALCIUM 20 MILLIGRAM(S): 80 TABLET, FILM COATED ORAL at 20:23

## 2023-11-02 RX ADMIN — Medication 1: at 16:47

## 2023-11-02 RX ADMIN — SODIUM CHLORIDE 1 GRAM(S): 9 INJECTION INTRAMUSCULAR; INTRAVENOUS; SUBCUTANEOUS at 20:25

## 2023-11-02 RX ADMIN — Medication 4 MILLIGRAM(S): at 08:55

## 2023-11-02 RX ADMIN — Medication 1: at 12:26

## 2023-11-02 RX ADMIN — METFORMIN HYDROCHLORIDE 500 MILLIGRAM(S): 850 TABLET ORAL at 20:22

## 2023-11-02 RX ADMIN — FLUDROCORTISONE ACETATE 0.1 MILLIGRAM(S): 0.1 TABLET ORAL at 13:04

## 2023-11-02 RX ADMIN — LINAGLIPTIN 5 MILLIGRAM(S): 5 TABLET, FILM COATED ORAL at 08:54

## 2023-11-02 RX ADMIN — CLOZAPINE 250 MILLIGRAM(S): 150 TABLET, ORALLY DISINTEGRATING ORAL at 20:23

## 2023-11-02 NOTE — BH INPATIENT PSYCHIATRY PROGRESS NOTE - NSBHASSESSSUMMFT_PSY_ALL_CORE
59 y/o female, single, noncaregiver, disabled, lives with [facility], history of Schizophrenia vs Schizoaffectove d/o, multiple past admissions at [facility] (most recent 2020 for decompensated psychosis), and also was hospitalized medically for lactic acidosis and followed by CL team in July 2023, followed by psychiatrist Dr. Love at [facility] (pt has no h/o developmental disability), prescribed Clozaril 100mg qhs , no h/o self-injurious behavior or suicide attempts, no h/o violence or legal issues, PMH Type II DM, HLD, asthma, no h/o substance abuse, BIBEMS activated by rosalizy (Rahat) for disorganization.  The patient is grossly disorganized in thoughts on admission.    9/29: The patient continues to be disorganized, but is taking medications.  Will continue clozapine.  9/30: Psychotic disorganized cont meds encourage cooperation with proper vital signs  10/1: disorganized, but more verbal, cooperative, no SI/HI.   10/2: The patient is a little more organized, but still internally preoccupied.  Sedated from clozapine.  Will continue current dose, consider increase if psychosis continues.  10/3: Patient remains disorganized but behaviorally controlled on the unit. Records show that patient was previously well maintained on Clozaril 350mg. Will titrate Clozaril today and monitor for efficacy and side effects.  10/4: Patient shows slight improvement with organization but continues to be disorganized in thought process. Will continue current dose of clozapine with plans to slowly titrate while monitoring for efficacy and side effects.  10/5: Patient was irritable today and less cooperative than previous but continues to be disorganized during the day. Plan to continue titrating clozapine while monitoring for efficacy and side effects.  10/6: Patient shows improvement in mood and thought process and shows improvement in articulating auditory hallucinations. Will continue current dose of clozapine with plans to slowly titrate while monitoring for efficacy and side effects.  10/7: Patient remains disorganized, resistive with treatment, no reports of AH today, continue clozapine titration    10/8: Remains psychotic, suspicious, delusional about her identity, continue clozapine   10/9: Patient remains disorganized but denies auditory hallucinations overnight. BP soft with orthostatics (+), will continue current dose of clozapine with plans to slowly titrate while monitoring for efficacy and side effects.  10/10: Patient remains disorganized and paranoid on the unit but with improved linear thinking since admission, though overall remains disorganized with loose associations. BP remains soft, will encourage PO intake, compression stockings and will continue current dose of clozapine with plans to slowly titrate while monitoring for efficacy and side effects.  10/11: Patient remains disorganized on the unit and requires encouragement for PO intake. Refusing compression stockings. BP remains soft, will check CMP and start salt tablet prior to titrating clozapine.  10/12: Patient remains disorganized on the unit and requires encouragement for PO intake. CMP within normal limits and orthostatics negative, plan to titrate clozapine tonight and monitor for efficacy and side effects.  10/13: Remains disorganized  10/16: Patient remains disorganized and with soft BP. Requires encouragement for PO intake. Will continue current dose of clozapine with plans to slowly titrate while monitoring for efficacy and side effects.  10/17: Patient remains disorganized. BP stable, will titrate clozapine tonight and monitor for efficacy and side effects.  10/18: Patient remains disorganized. BP stable, tolerating clozapine titration well. Will continue current dose with plans to titrate if continuing to tolerate.  10/19: Tolerating meds well. Continue current dose. Found to have Vit D deficiency - will start supplementation  10/20: Patient remains disorganized but with improved mood, tolerating medication well. Will titrate clozapine.  10/23: Patient remains disorganized but continues to tolerate medication. Will titrate clozapine tonight  10/24: More visible, still disorganized, AH ongoing  10/25: Patient shows improved thought process but still endorses AH. Will titrate clozapine tonight.  10/26: Overall disorganized but improvement with AM sedation. Plan to titrate clozapine tomorrow.   10/27: Will hold off on clozaril titration since we have ordered crushed meds due to possibility of noncompliance on the unit given low clozaril level - no appearance of excessive sedation  10/30: Limited improvement since crushing medications. Will recheck clozaril level tomorrow; left msg with guardian  10/31: Clozapine level pending, some improvement in unit activity engagement, left msg for guardian to discuss baseline and possible discharge  11/1: Clozapine level went from <68 to 1201 on same dose of medications - only difference was it was given crushed. Will recheck tomorrow in case of lab error. Will hold dose for tonight, and start at 100mg po qhs tomorrow; message left with guardian to obtain more information regarding baseline  11/2: Clozapine level today <68 - contacted lab to review this discrepancy,  to return call; will restart clozaril at 250mg po qhs for now as per pharmacy, patient used to be on 300mg/day.     Plan:  No 1:1 needed, as the patient is calm, no SI.  -Continue clozapine 275 hs  	Check CBC Tuesdays  - Started Fludrocortisone 0.1mg daily with food for orthostatic hypotension; refusing AYAN stockings  - Continue NaCl 1g BID  -Continue Senna and Miralax prn for constipation  -DMII: increase metformin to 500mg po bid, continue glimepiride 4mg po daily and Linagliptin 5mg po daily  	Check fingersticks qid  -Lipitor for cholesterol  -Albuterol prn for asthma  - Vit D deficiency - continue supplementation  -Patient provided verbal consent to speak with Zara Giang and patient's outpatient provider.  [facility]ma [facility] reportedly has been out, will be back Monday but they were able to provided contact info for outpatient psychiatric providers

## 2023-11-02 NOTE — BH TREATMENT PLAN - NSTXPATIENTPARTICIPATE_PSY_ALL_CORE
No, patient unwilling to participate
Patient participated in defining interventions
Patient participated in identification of needs/problems/goals for treatment
Patient participated in defining interventions
Patient participated in defining interventions

## 2023-11-02 NOTE — BH INPATIENT PSYCHIATRY PROGRESS NOTE - NSBHCHARTREVIEWVS_PSY_A_CORE FT
Vital Signs Last 24 Hrs  T(C): 36.8 (11-02-23 @ 08:06), Max: 36.8 (11-02-23 @ 08:06)  T(F): 98.2 (11-02-23 @ 08:06), Max: 98.2 (11-02-23 @ 08:06)  HR: --  BP: --  BP(mean): --  RR: 18 (11-02-23 @ 08:06) (18 - 18)  SpO2: 99% (11-02-23 @ 08:06) (97% - 99%)    Orthostatic VS  11-02-23 @ 08:06  Lying BP: 122/64 HR: 79  Sitting BP: 122/60 HR: 79  Standing BP: --/-- HR: --  Site: --  Mode: --  Orthostatic VS  11-01-23 @ 19:19  Lying BP: --/-- HR: --  Sitting BP: 125/61 HR: 85  Standing BP: 122/58 HR: 84  Site: --  Mode: --  Orthostatic VS  11-01-23 @ 07:52  Lying BP: --/-- HR: --  Sitting BP: 133/63 HR: 90  Standing BP: 134/61 HR: 94  Site: --  Mode: --  Orthostatic VS  10-31-23 @ 19:41  Lying BP: --/-- HR: --  Sitting BP: 130/89 HR: 80  Standing BP: 127/62 HR: 84  Site: --  Mode: --

## 2023-11-02 NOTE — BH INPATIENT PSYCHIATRY PROGRESS NOTE - NSBHMETABOLIC_PSY_ALL_CORE_FT
BMI: BMI (kg/m2): 21.3 (10-14-23 @ 15:59)  HbA1c: A1C with Estimated Average Glucose Result: 7.2 % (09-27-23 @ 19:00)    Glucose: POCT Blood Glucose.: 166 mg/dL (11-02-23 @ 16:26)    BP: --  Lipid Panel: Date/Time: 07-07-23 @ 05:52  Cholesterol, Serum: 153  Direct LDL: --  HDL Cholesterol, Serum: 28  Total Cholesterol/HDL Ration Measurement: --  Triglycerides, Serum: 273

## 2023-11-02 NOTE — BH TREATMENT PLAN - NSTXPLANTHERAPYSESSIONSFT_PSY_ALL_CORE
10-11-23  Type of therapy: Other, Due to acutness of illness, pt. has not attended group activities  Type of session: Individual  Level of patient participation: Engaged  Duration of participation: 15 minutes  Therapy conducted by: Psych rehab  Therapy Summary: Writer attempted to meet with patient for an individual session to review progress towards psychiatric rehabilitation goal of demonstrating the ability to maintain and communicate clearly with others during 2 conversations with staff daily. Patient appeared open to meeting with the writer. The patient reported that she did not sleep well and has not been eating. The patient appeared disorganized evidenced by her statements. The patient was unable to inform the writer about her situation or support system. The patient appeared unable to engage in the session after 15 minutes due to expressing overstimulation to sounds and lights. The patient has adhered to treatment. Patient has not been observes attending minimal group activities. The patient appears to be in fair behavioral control. Due to the COVID-19 pandemic, unit structure and activities are re-evaluated on a consistent basis in effort to maintain the safety of patients and staff.  
  10-25-23  Type of therapy: Leisure development, Psychoeducation  Type of session: Individual  Level of patient participation: Attentive, Participates  Duration of participation: 15 minutes  Therapy conducted by: Psych rehab  Therapy Summary: Writer met with patient for an individual session to review progress towards psychiatric rehabilitation goal of demonstrating the ability to maintain and communicate clearly with others during 2 conversations with staff daily. Patient appeared open to meeting with the writer. The patient reported that she has been sleeping and eating well. The patient appears to be making progress towards her goal. The patient was observed making more linear conversation about the situation she is currently in. The patient was able to express her current relationships and activities that she enjoys. Patient has been observed attending minimal group activities. The patient appears to be in fair behavioral control. Due to the COVID-19 pandemic, unit structure and activities are re-evaluated on a consistent basis in effort to maintain the safety of patients and staff.  
  10-04-23  Type of therapy: Other, Due to acutness of illness, pt. has not attended group activities  Type of session: Individual  Level of patient participation: Participated with encouragement  Duration of participation: 15 minutes  Therapy conducted by: Psych rehab  Therapy Summary: Writer attempted to meet with patient for an individual session to review progress towards psychiatric rehabilitation goal of demonstrating the ability to maintain and communicate clearly with others during 2 conversations with staff daily. Patient appeared hesitant to meet with the writer evidenced by her body language. The patient did engage in the session with encouragement from the writer. The patient reported taking a shower. The patient reported feeling hungry after eating. The patient appeared disorganized evidenced by her responses to the writers prompts. The patient appears to be malodorous. The patient has adhered to treatment. Patient has not been observes attending minimal group activities. The patient appears to be in fair behavioral control. Due to the COVID-19 pandemic, unit structure and activities are re-evaluated on a consistent basis in effort to maintain the safety of patients and staff.  
  11-01-23  Type of therapy: Creative arts therapy, Leisure development, Mindfulness, Spirituality  Type of session: Individual  Level of patient participation: Engaged  Duration of participation: 15 minutes  Therapy conducted by: Psych rehab  Therapy Summary: Writer met with patient for an individual session to review progress towards psychiatric rehabilitation goal of demonstrating the ability to maintain and communicate clearly with others during 2 conversations with staff daily. Patient appeared open to meeting with the writer. The patient expressed that she slept well. The patient reported that she ate breakfast. The patient appeared tangential and disorganized in her thought process. The patient was unable to stay on task and needed to be redirected in the conversation. Patient has been observed attending minimal group activities. The patient appears to be in fair behavioral control. Due to the COVID-19 pandemic, unit structure and activities are re-evaluated on a consistent basis in effort to maintain the safety of patients and staff.

## 2023-11-02 NOTE — BH INPATIENT PSYCHIATRY PROGRESS NOTE - CURRENT MEDICATION
MEDICATIONS  (STANDING):  atorvastatin 20 milliGRAM(s) Oral at bedtime  cloZAPine 100 milliGRAM(s) Oral at bedtime  ergocalciferol 76374 Unit(s) Oral <User Schedule>  fludroCORTISONE 0.1 milliGRAM(s) Oral <User Schedule>  glimepiride. 4 milliGRAM(s) Oral with breakfast  insulin lispro (ADMELOG) corrective regimen sliding scale   SubCutaneous three times a day before meals  insulin lispro (ADMELOG) corrective regimen sliding scale   SubCutaneous at bedtime  linagliptin 5 milliGRAM(s) Oral daily  metFORMIN 500 milliGRAM(s) Oral at bedtime  senna 2 Tablet(s) Oral at bedtime  sodium chloride 1 Gram(s) Oral <User Schedule>    MEDICATIONS  (PRN):  albuterol    90 MICROgram(s) HFA Inhaler 2 Puff(s) Inhalation every 6 hours PRN asthma  aluminum hydroxide/magnesium hydroxide/simethicone Suspension 30 milliLiter(s) Oral every 8 hours PRN Dyspepsia  dextrose Oral Gel 15 Gram(s) Oral once PRN Blood Glucose LESS THAN 70 milliGRAM(s)/deciliter  haloperidol     Tablet 5 milliGRAM(s) Oral every 6 hours PRN agitation  haloperidol    Injectable 5 milliGRAM(s) IntraMuscular once PRN combative behavior  LORazepam     Tablet 1 milliGRAM(s) Oral every 6 hours PRN Agitation  polyethylene glycol 3350 17 Gram(s) Oral daily PRN Constipation   MEDICATIONS  (STANDING):  atorvastatin 20 milliGRAM(s) Oral at bedtime  cloZAPine 250 milliGRAM(s) Oral at bedtime  ergocalciferol 32388 Unit(s) Oral <User Schedule>  fludroCORTISONE 0.1 milliGRAM(s) Oral <User Schedule>  glimepiride. 4 milliGRAM(s) Oral with breakfast  insulin lispro (ADMELOG) corrective regimen sliding scale   SubCutaneous three times a day before meals  insulin lispro (ADMELOG) corrective regimen sliding scale   SubCutaneous at bedtime  linagliptin 5 milliGRAM(s) Oral daily  metFORMIN 500 milliGRAM(s) Oral at bedtime  senna 2 Tablet(s) Oral at bedtime  sodium chloride 1 Gram(s) Oral <User Schedule>    MEDICATIONS  (PRN):  albuterol    90 MICROgram(s) HFA Inhaler 2 Puff(s) Inhalation every 6 hours PRN asthma  aluminum hydroxide/magnesium hydroxide/simethicone Suspension 30 milliLiter(s) Oral every 8 hours PRN Dyspepsia  dextrose Oral Gel 15 Gram(s) Oral once PRN Blood Glucose LESS THAN 70 milliGRAM(s)/deciliter  haloperidol     Tablet 5 milliGRAM(s) Oral every 6 hours PRN agitation  haloperidol    Injectable 5 milliGRAM(s) IntraMuscular once PRN combative behavior  LORazepam     Tablet 1 milliGRAM(s) Oral every 6 hours PRN Agitation  polyethylene glycol 3350 17 Gram(s) Oral daily PRN Constipation

## 2023-11-02 NOTE — BH TREATMENT PLAN - NSCMSPTSTRENGTHS_PSY_ALL_CORE
Strong support system
Supportive family
Strong support system
Strong support system
Future/goal oriented

## 2023-11-02 NOTE — BH TREATMENT PLAN - NSTXDCSOCINTERSW_PSY_ALL_CORE
Pt would benefit from compliance with Tx team recommendations.
Pt would benefit from compliance with Tx team recommendations.  Pt would benefit from increase group activities to gain additional coping skills.
Pt would benefit from compliance with Tx team recommendations.  Pt would benefit from increase group activities to gain additional coping skills.
SW will coordinate care appropriately with pts fiancee and guardian to increase socialization once back in the community

## 2023-11-02 NOTE — BH INPATIENT PSYCHIATRY PROGRESS NOTE - NSBHFUPINTERVALHXFT_PSY_A_CORE
Chart reviewed and case discussed with treatment team. Staff report       Called [facility] in [address]:  atorvastatin 20mg po qhs  Clozaril 300mg po qhs  glimepiride 4mg po daily  metformin 1000mg po bid  losartan 50mg po daily  januvia 100mg po daily  vascepa 2g po bid Chart reviewed and case discussed with treatment team. Staff report patient has been disorganized. No overt psychosis but can be confabulatory. FS consistently elevated. A1c 7.2 on admission. Med compliant. Patient continues to make illogical statements, frequently commenting on writer's appearance - "you looks so pretty...I like your shoes...you're dressed for success" despite writer's frequent reminders to stay focused on treatment discussion. Staff does report affect brighter. Needs some prompting for ADLs.       Called [facility] in [address]:  atorvastatin 20mg po qhs  Clozaril 300mg po qhs  glimepiride 4mg po daily  metformin 1000mg po bid  losartan 50mg po daily  januvia 100mg po daily  vascepa 2g po bid

## 2023-11-02 NOTE — BH TREATMENT PLAN - NSTXPSYCHOGOAL_PSY_ALL_CORE
Will engage in a 15 minute conversation with no irrational content
Will be able to report experiencing hallucinations to staff
Will identify 1 thought/feeling/behavior associated with hallucinations
Will be able to report experiencing hallucinations to staff
Will be able to report experiencing hallucinations to staff

## 2023-11-02 NOTE — BH TREATMENT PLAN - NSTXDISORGINTERPR_PSY_ALL_CORE
Psychiatric rehabilitation recommends patient continue to attend groups, engage in individual sessions, and participate in activities in the milieu to continue exploring coping skills to manage symptoms.

## 2023-11-02 NOTE — BH TREATMENT PLAN - NSTXDISORGINTERRN_PSY_ALL_CORE
Give acknowledgment and recognition for positive steps the client takes in increasing social skills and appropriate interactions with others.

## 2023-11-02 NOTE — BH TREATMENT PLAN - NSBHPRIMARYDX_PSY_ALL_CORE
Disorganized schizophrenia    

## 2023-11-02 NOTE — BH TREATMENT PLAN - NSPTSTATEDGOAL_PSY_ALL_CORE
Pt will be encouraged to provide at least one goal.  
Unable to participate
Pt will be encouraged to provide at least one goal.  
I don't know
Pt will be encouraged to provide at least one goal.

## 2023-11-02 NOTE — BH TREATMENT PLAN - NSTXDISORGGOAL_PSY_ALL_CORE
Will demonstrate the ability to maintain reality orientation and communicate clearly with others during 2 conversations with staff daily

## 2023-11-03 LAB
GLUCOSE BLDC GLUCOMTR-MCNC: 115 MG/DL — HIGH (ref 70–99)
GLUCOSE BLDC GLUCOMTR-MCNC: 115 MG/DL — HIGH (ref 70–99)
GLUCOSE BLDC GLUCOMTR-MCNC: 172 MG/DL — HIGH (ref 70–99)
GLUCOSE BLDC GLUCOMTR-MCNC: 172 MG/DL — HIGH (ref 70–99)
GLUCOSE BLDC GLUCOMTR-MCNC: 224 MG/DL — HIGH (ref 70–99)
GLUCOSE BLDC GLUCOMTR-MCNC: 224 MG/DL — HIGH (ref 70–99)
GLUCOSE BLDC GLUCOMTR-MCNC: 233 MG/DL — HIGH (ref 70–99)
GLUCOSE BLDC GLUCOMTR-MCNC: 233 MG/DL — HIGH (ref 70–99)

## 2023-11-03 PROCEDURE — 99232 SBSQ HOSP IP/OBS MODERATE 35: CPT

## 2023-11-03 RX ADMIN — Medication 2: at 12:22

## 2023-11-03 RX ADMIN — Medication 1: at 08:09

## 2023-11-03 RX ADMIN — ERGOCALCIFEROL 50000 UNIT(S): 1.25 CAPSULE ORAL at 08:26

## 2023-11-03 RX ADMIN — ATORVASTATIN CALCIUM 20 MILLIGRAM(S): 80 TABLET, FILM COATED ORAL at 20:37

## 2023-11-03 RX ADMIN — SODIUM CHLORIDE 1 GRAM(S): 9 INJECTION INTRAMUSCULAR; INTRAVENOUS; SUBCUTANEOUS at 12:39

## 2023-11-03 RX ADMIN — SENNA PLUS 2 TABLET(S): 8.6 TABLET ORAL at 20:37

## 2023-11-03 RX ADMIN — FLUDROCORTISONE ACETATE 0.1 MILLIGRAM(S): 0.1 TABLET ORAL at 12:39

## 2023-11-03 RX ADMIN — Medication 4 MILLIGRAM(S): at 08:26

## 2023-11-03 RX ADMIN — LINAGLIPTIN 5 MILLIGRAM(S): 5 TABLET, FILM COATED ORAL at 08:27

## 2023-11-03 RX ADMIN — SODIUM CHLORIDE 1 GRAM(S): 9 INJECTION INTRAMUSCULAR; INTRAVENOUS; SUBCUTANEOUS at 20:36

## 2023-11-03 RX ADMIN — METFORMIN HYDROCHLORIDE 500 MILLIGRAM(S): 850 TABLET ORAL at 08:27

## 2023-11-03 RX ADMIN — CLOZAPINE 250 MILLIGRAM(S): 150 TABLET, ORALLY DISINTEGRATING ORAL at 20:37

## 2023-11-03 RX ADMIN — METFORMIN HYDROCHLORIDE 500 MILLIGRAM(S): 850 TABLET ORAL at 20:38

## 2023-11-03 NOTE — BH INPATIENT PSYCHIATRY PROGRESS NOTE - MSE UNSTRUCTURED FT
Adequate hygiene, intact grooming. Patient was oddly related, not intrusive but can have poor boundaries in conversation.  No agitation, psychomotor retardation or involuntary movements observed. The patient’s speech is fluent, normal in tone, and ranges from soft to normal volume.  Can be rambling but no longer pressured. The patient’s mood is "good".  Her affect is neutral, stable, congruent to mood. The patient’s thought process remains somewhat disorganized and out of context. Confabulatory.  She denies current auditory hallucinations. Denies SI/HI.  Insight is limited.  Judgment is impaired. Impulse control has been good on the unit.

## 2023-11-03 NOTE — BH INPATIENT PSYCHIATRY PROGRESS NOTE - NSBHASSESSSUMMFT_PSY_ALL_CORE
59 y/o female, single, noncaregiver, disabled, lives with [facility], history of Schizophrenia vs Schizoaffectove d/o, multiple past admissions at [facility] (most recent 2020 for decompensated psychosis), and also was hospitalized medically for lactic acidosis and followed by CL team in July 2023, followed by psychiatrist Dr. Love at [facility] (pt has no h/o developmental disability), prescribed Clozaril 100mg qhs , no h/o self-injurious behavior or suicide attempts, no h/o violence or legal issues, PMH Type II DM, HLD, asthma, no h/o substance abuse, BIBEMS activated by rosalizy (Rahat) for disorganization.  The patient is grossly disorganized in thoughts on admission.    9/29: The patient continues to be disorganized, but is taking medications.  Will continue clozapine.  9/30: Psychotic disorganized cont meds encourage cooperation with proper vital signs  10/1: disorganized, but more verbal, cooperative, no SI/HI.   10/2: The patient is a little more organized, but still internally preoccupied.  Sedated from clozapine.  Will continue current dose, consider increase if psychosis continues.  10/3: Patient remains disorganized but behaviorally controlled on the unit. Records show that patient was previously well maintained on Clozaril 350mg. Will titrate Clozaril today and monitor for efficacy and side effects.  10/4: Patient shows slight improvement with organization but continues to be disorganized in thought process. Will continue current dose of clozapine with plans to slowly titrate while monitoring for efficacy and side effects.  10/5: Patient was irritable today and less cooperative than previous but continues to be disorganized during the day. Plan to continue titrating clozapine while monitoring for efficacy and side effects.  10/6: Patient shows improvement in mood and thought process and shows improvement in articulating auditory hallucinations. Will continue current dose of clozapine with plans to slowly titrate while monitoring for efficacy and side effects.  10/7: Patient remains disorganized, resistive with treatment, no reports of AH today, continue clozapine titration    10/8: Remains psychotic, suspicious, delusional about her identity, continue clozapine   10/9: Patient remains disorganized but denies auditory hallucinations overnight. BP soft with orthostatics (+), will continue current dose of clozapine with plans to slowly titrate while monitoring for efficacy and side effects.  10/10: Patient remains disorganized and paranoid on the unit but with improved linear thinking since admission, though overall remains disorganized with loose associations. BP remains soft, will encourage PO intake, compression stockings and will continue current dose of clozapine with plans to slowly titrate while monitoring for efficacy and side effects.  10/11: Patient remains disorganized on the unit and requires encouragement for PO intake. Refusing compression stockings. BP remains soft, will check CMP and start salt tablet prior to titrating clozapine.  10/12: Patient remains disorganized on the unit and requires encouragement for PO intake. CMP within normal limits and orthostatics negative, plan to titrate clozapine tonight and monitor for efficacy and side effects.  10/13: Remains disorganized  10/16: Patient remains disorganized and with soft BP. Requires encouragement for PO intake. Will continue current dose of clozapine with plans to slowly titrate while monitoring for efficacy and side effects.  10/17: Patient remains disorganized. BP stable, will titrate clozapine tonight and monitor for efficacy and side effects.  10/18: Patient remains disorganized. BP stable, tolerating clozapine titration well. Will continue current dose with plans to titrate if continuing to tolerate.  10/19: Tolerating meds well. Continue current dose. Found to have Vit D deficiency - will start supplementation  10/20: Patient remains disorganized but with improved mood, tolerating medication well. Will titrate clozapine.  10/23: Patient remains disorganized but continues to tolerate medication. Will titrate clozapine tonight  10/24: More visible, still disorganized, AH ongoing  10/25: Patient shows improved thought process but still endorses AH. Will titrate clozapine tonight.  10/26: Overall disorganized but improvement with AM sedation. Plan to titrate clozapine tomorrow.   10/27: Will hold off on clozaril titration since we have ordered crushed meds due to possibility of noncompliance on the unit given low clozaril level - no appearance of excessive sedation  10/30: Limited improvement since crushing medications. Will recheck clozaril level tomorrow; left msg with guardian  10/31: Clozapine level pending, some improvement in unit activity engagement, left msg for guardian to discuss baseline and possible discharge  11/1: Clozapine level went from <68 to 1201 on same dose of medications - only difference was it was given crushed. Will recheck tomorrow in case of lab error. Will hold dose for tonight, and start at 100mg po qhs tomorrow; message left with guardian to obtain more information regarding baseline  11/2: Clozapine level today <68 - contacted lab to review this discrepancy,  to return call; will restart clozaril at 250mg po qhs for now as per pharmacy, patient used to be on 300mg/day.   11/3: Clozapine level reported yesterday was lab error - level is more appropriate. Will continue clozaril 250mg po qhs and repeat level on Monday    Plan:  No 1:1 needed, as the patient is calm, no SI.  -Continue clozapine 250mg po qhs (CBC is monthly per outpatient provider)  - Started Fludrocortisone 0.1mg daily with food for orthostatic hypotension; refusing AYAN stockings  - Continue NaCl 1g BID - aim to discontinue prior to discharge  -Continue Senna and Miralax prn for constipation  -DMII: continue metformin to 500mg po bid, continue glimepiride 4mg po daily and Linagliptin 5mg po daily  	Check fingersticks qid  -Lipitor for cholesterol  -Albuterol prn for asthma  - Vit D deficiency - continue supplementation  -Patient provided verbal consent to speak with Zara Giang and patient's outpatient provider.  - Spoke to guardianship agency - Irma Gonzales reportedly has been out, will be back Monday but they were able to provided contact info for outpatient psychiatric providers

## 2023-11-03 NOTE — BH INPATIENT PSYCHIATRY PROGRESS NOTE - CURRENT MEDICATION
MEDICATIONS  (STANDING):  atorvastatin 20 milliGRAM(s) Oral at bedtime  cloZAPine 250 milliGRAM(s) Oral at bedtime  ergocalciferol 56396 Unit(s) Oral <User Schedule>  fludroCORTISONE 0.1 milliGRAM(s) Oral <User Schedule>  glimepiride. 4 milliGRAM(s) Oral with breakfast  insulin lispro (ADMELOG) corrective regimen sliding scale   SubCutaneous three times a day before meals  insulin lispro (ADMELOG) corrective regimen sliding scale   SubCutaneous at bedtime  linagliptin 5 milliGRAM(s) Oral daily  metFORMIN 500 milliGRAM(s) Oral two times a day  senna 2 Tablet(s) Oral at bedtime  sodium chloride 1 Gram(s) Oral <User Schedule>    MEDICATIONS  (PRN):  albuterol    90 MICROgram(s) HFA Inhaler 2 Puff(s) Inhalation every 6 hours PRN asthma  aluminum hydroxide/magnesium hydroxide/simethicone Suspension 30 milliLiter(s) Oral every 8 hours PRN Dyspepsia  dextrose Oral Gel 15 Gram(s) Oral once PRN Blood Glucose LESS THAN 70 milliGRAM(s)/deciliter  haloperidol     Tablet 5 milliGRAM(s) Oral every 6 hours PRN agitation  haloperidol    Injectable 5 milliGRAM(s) IntraMuscular once PRN combative behavior  LORazepam     Tablet 1 milliGRAM(s) Oral every 6 hours PRN Agitation  polyethylene glycol 3350 17 Gram(s) Oral daily PRN Constipation

## 2023-11-03 NOTE — BH INPATIENT PSYCHIATRY PROGRESS NOTE - NSBHCHARTREVIEWVS_PSY_A_CORE FT
Vital Signs Last 24 Hrs  T(C): 36.8 (11-02-23 @ 19:47), Max: 36.8 (11-02-23 @ 19:47)  T(F): 98.2 (11-02-23 @ 19:47), Max: 98.2 (11-02-23 @ 19:47)  HR: --  BP: --  BP(mean): --  RR: 18 (11-02-23 @ 19:47) (18 - 18)  SpO2: --    Orthostatic VS  11-02-23 @ 19:47  Lying BP: --/-- HR: --  Sitting BP: 134/62 HR: 85  Standing BP: 127/60 HR: 89  Site: --  Mode: --  Orthostatic VS  11-02-23 @ 08:06  Lying BP: 122/64 HR: 79  Sitting BP: 122/60 HR: 79  Standing BP: --/-- HR: --  Site: --  Mode: --  Orthostatic VS  11-01-23 @ 19:19  Lying BP: --/-- HR: --  Sitting BP: 125/61 HR: 85  Standing BP: 122/58 HR: 84  Site: --  Mode: --

## 2023-11-03 NOTE — BH INPATIENT PSYCHIATRY PROGRESS NOTE - NSBHFUPINTERVALHXFT_PSY_A_CORE
Med compliant. No prns overnight. Sleeping better. Patient denies complaints. Denies adverse effects of medications. Still needing sliding scale coverage. No significant paranoia. Denies AH. Again responds "I don't know" to a lot of questions.

## 2023-11-03 NOTE — BH INPATIENT PSYCHIATRY PROGRESS NOTE - NSBHMETABOLIC_PSY_ALL_CORE_FT
BMI: BMI (kg/m2): 21.3 (10-14-23 @ 15:59)  HbA1c: A1C with Estimated Average Glucose Result: 7.2 % (09-27-23 @ 19:00)    Glucose: POCT Blood Glucose.: 172 mg/dL (11-03-23 @ 08:06)    BP: --  Lipid Panel: Date/Time: 07-07-23 @ 05:52  Cholesterol, Serum: 153  Direct LDL: --  HDL Cholesterol, Serum: 28  Total Cholesterol/HDL Ration Measurement: --  Triglycerides, Serum: 273

## 2023-11-04 LAB
CLOZAPINE SERPL-MCNC: 1019 NG/ML — HIGH (ref 350–600)
CLOZAPINE SERPL-MCNC: 1019 NG/ML — HIGH (ref 350–600)
CLOZAPINE SERPL-MCNC: 1047 NG/ML — HIGH (ref 350–600)
CLOZAPINE SERPL-MCNC: 1047 NG/ML — HIGH (ref 350–600)
CLOZAPINE SPEC-SCNC: 1395 NG/ML — SIGNIFICANT CHANGE UP
CLOZAPINE SPEC-SCNC: 1395 NG/ML — SIGNIFICANT CHANGE UP
CLOZAPINE SPEC-SCNC: 1421 NG/ML — SIGNIFICANT CHANGE UP
CLOZAPINE SPEC-SCNC: 1421 NG/ML — SIGNIFICANT CHANGE UP
GLUCOSE BLDC GLUCOMTR-MCNC: 103 MG/DL — HIGH (ref 70–99)
GLUCOSE BLDC GLUCOMTR-MCNC: 103 MG/DL — HIGH (ref 70–99)
GLUCOSE BLDC GLUCOMTR-MCNC: 190 MG/DL — HIGH (ref 70–99)
GLUCOSE BLDC GLUCOMTR-MCNC: 190 MG/DL — HIGH (ref 70–99)
GLUCOSE BLDC GLUCOMTR-MCNC: 221 MG/DL — HIGH (ref 70–99)
GLUCOSE BLDC GLUCOMTR-MCNC: 221 MG/DL — HIGH (ref 70–99)
GLUCOSE BLDC GLUCOMTR-MCNC: 247 MG/DL — HIGH (ref 70–99)
GLUCOSE BLDC GLUCOMTR-MCNC: 247 MG/DL — HIGH (ref 70–99)
NORCLOZAPINE SERPL-MCNC: 374 NG/ML — SIGNIFICANT CHANGE UP
NORCLOZAPINE SERPL-MCNC: 374 NG/ML — SIGNIFICANT CHANGE UP
NORCLOZAPINE SERPL-MCNC: 376 NG/ML — SIGNIFICANT CHANGE UP
NORCLOZAPINE SERPL-MCNC: 376 NG/ML — SIGNIFICANT CHANGE UP

## 2023-11-04 RX ADMIN — Medication 2: at 12:23

## 2023-11-04 RX ADMIN — FLUDROCORTISONE ACETATE 0.1 MILLIGRAM(S): 0.1 TABLET ORAL at 12:26

## 2023-11-04 RX ADMIN — Medication 4 MILLIGRAM(S): at 08:25

## 2023-11-04 RX ADMIN — LINAGLIPTIN 5 MILLIGRAM(S): 5 TABLET, FILM COATED ORAL at 08:24

## 2023-11-04 RX ADMIN — SENNA PLUS 2 TABLET(S): 8.6 TABLET ORAL at 21:09

## 2023-11-04 RX ADMIN — Medication 1: at 08:22

## 2023-11-04 RX ADMIN — METFORMIN HYDROCHLORIDE 500 MILLIGRAM(S): 850 TABLET ORAL at 08:25

## 2023-11-04 RX ADMIN — METFORMIN HYDROCHLORIDE 500 MILLIGRAM(S): 850 TABLET ORAL at 21:08

## 2023-11-04 RX ADMIN — ATORVASTATIN CALCIUM 20 MILLIGRAM(S): 80 TABLET, FILM COATED ORAL at 21:08

## 2023-11-04 RX ADMIN — SODIUM CHLORIDE 1 GRAM(S): 9 INJECTION INTRAMUSCULAR; INTRAVENOUS; SUBCUTANEOUS at 12:26

## 2023-11-04 RX ADMIN — CLOZAPINE 250 MILLIGRAM(S): 150 TABLET, ORALLY DISINTEGRATING ORAL at 21:09

## 2023-11-05 LAB
GLUCOSE BLDC GLUCOMTR-MCNC: 119 MG/DL — HIGH (ref 70–99)
GLUCOSE BLDC GLUCOMTR-MCNC: 119 MG/DL — HIGH (ref 70–99)
GLUCOSE BLDC GLUCOMTR-MCNC: 154 MG/DL — HIGH (ref 70–99)
GLUCOSE BLDC GLUCOMTR-MCNC: 158 MG/DL — HIGH (ref 70–99)
GLUCOSE BLDC GLUCOMTR-MCNC: 158 MG/DL — HIGH (ref 70–99)

## 2023-11-05 RX ADMIN — Medication 1: at 08:31

## 2023-11-05 RX ADMIN — SODIUM CHLORIDE 1 GRAM(S): 9 INJECTION INTRAMUSCULAR; INTRAVENOUS; SUBCUTANEOUS at 12:44

## 2023-11-05 RX ADMIN — FLUDROCORTISONE ACETATE 0.1 MILLIGRAM(S): 0.1 TABLET ORAL at 12:46

## 2023-11-05 RX ADMIN — METFORMIN HYDROCHLORIDE 500 MILLIGRAM(S): 850 TABLET ORAL at 20:31

## 2023-11-05 RX ADMIN — METFORMIN HYDROCHLORIDE 500 MILLIGRAM(S): 850 TABLET ORAL at 08:33

## 2023-11-05 RX ADMIN — LINAGLIPTIN 5 MILLIGRAM(S): 5 TABLET, FILM COATED ORAL at 08:35

## 2023-11-05 RX ADMIN — CLOZAPINE 250 MILLIGRAM(S): 150 TABLET, ORALLY DISINTEGRATING ORAL at 20:31

## 2023-11-05 RX ADMIN — ATORVASTATIN CALCIUM 20 MILLIGRAM(S): 80 TABLET, FILM COATED ORAL at 20:31

## 2023-11-05 RX ADMIN — Medication 4 MILLIGRAM(S): at 08:34

## 2023-11-05 RX ADMIN — SENNA PLUS 2 TABLET(S): 8.6 TABLET ORAL at 20:31

## 2023-11-05 RX ADMIN — SODIUM CHLORIDE 1 GRAM(S): 9 INJECTION INTRAMUSCULAR; INTRAVENOUS; SUBCUTANEOUS at 20:33

## 2023-11-05 RX ADMIN — Medication 1: at 17:09

## 2023-11-06 LAB
GLUCOSE BLDC GLUCOMTR-MCNC: 132 MG/DL — HIGH (ref 70–99)
GLUCOSE BLDC GLUCOMTR-MCNC: 132 MG/DL — HIGH (ref 70–99)
GLUCOSE BLDC GLUCOMTR-MCNC: 217 MG/DL — HIGH (ref 70–99)
GLUCOSE BLDC GLUCOMTR-MCNC: 217 MG/DL — HIGH (ref 70–99)
GLUCOSE BLDC GLUCOMTR-MCNC: 229 MG/DL — HIGH (ref 70–99)
GLUCOSE BLDC GLUCOMTR-MCNC: 229 MG/DL — HIGH (ref 70–99)
GLUCOSE BLDC GLUCOMTR-MCNC: 95 MG/DL — SIGNIFICANT CHANGE UP (ref 70–99)
GLUCOSE BLDC GLUCOMTR-MCNC: 95 MG/DL — SIGNIFICANT CHANGE UP (ref 70–99)

## 2023-11-06 PROCEDURE — 99232 SBSQ HOSP IP/OBS MODERATE 35: CPT

## 2023-11-06 RX ADMIN — ATORVASTATIN CALCIUM 20 MILLIGRAM(S): 80 TABLET, FILM COATED ORAL at 21:10

## 2023-11-06 RX ADMIN — FLUDROCORTISONE ACETATE 0.1 MILLIGRAM(S): 0.1 TABLET ORAL at 13:04

## 2023-11-06 RX ADMIN — Medication 2: at 12:55

## 2023-11-06 RX ADMIN — SENNA PLUS 2 TABLET(S): 8.6 TABLET ORAL at 21:08

## 2023-11-06 RX ADMIN — Medication 4 MILLIGRAM(S): at 09:19

## 2023-11-06 RX ADMIN — SODIUM CHLORIDE 1 GRAM(S): 9 INJECTION INTRAMUSCULAR; INTRAVENOUS; SUBCUTANEOUS at 13:03

## 2023-11-06 RX ADMIN — METFORMIN HYDROCHLORIDE 500 MILLIGRAM(S): 850 TABLET ORAL at 09:18

## 2023-11-06 RX ADMIN — SODIUM CHLORIDE 1 GRAM(S): 9 INJECTION INTRAMUSCULAR; INTRAVENOUS; SUBCUTANEOUS at 21:10

## 2023-11-06 RX ADMIN — METFORMIN HYDROCHLORIDE 500 MILLIGRAM(S): 850 TABLET ORAL at 21:09

## 2023-11-06 RX ADMIN — LINAGLIPTIN 5 MILLIGRAM(S): 5 TABLET, FILM COATED ORAL at 09:18

## 2023-11-06 RX ADMIN — CLOZAPINE 250 MILLIGRAM(S): 150 TABLET, ORALLY DISINTEGRATING ORAL at 21:09

## 2023-11-06 NOTE — BH INPATIENT PSYCHIATRY PROGRESS NOTE - MSE UNSTRUCTURED FT
Adequate hygiene, intact grooming. Patient was oddly related, not intrusive but can have poor boundaries in conversation. Good eye contact. No agitation, psychomotor retardation or involuntary movements observed. The patient’s speech is fluent, normal in tone, and ranges from soft to normal volume.  Can be rambling not pressured. The patient’s mood is "good".  Her affect is neutral, stable, congruent to mood. The patient’s thought process remains somewhat disorganized and out of context.  She denies current auditory hallucinations. Denies SI/HI.  Insight is limited.  Judgment is impaired. Impulse control has been good on the unit.

## 2023-11-06 NOTE — BH INPATIENT PSYCHIATRY PROGRESS NOTE - NSBHCHARTREVIEWVS_PSY_A_CORE FT
Vital Signs Last 24 Hrs  T(C): 36.8 (11-06-23 @ 05:37), Max: 36.8 (11-06-23 @ 05:37)  T(F): 98.2 (11-06-23 @ 05:37), Max: 98.2 (11-06-23 @ 05:37)  HR: --  BP: --  BP(mean): --  RR: --  SpO2: 99% (11-05-23 @ 19:33) (99% - 99%)    Orthostatic VS  11-06-23 @ 05:37  Lying BP: 106/57 HR: 82  Sitting BP: 107/74 HR: 88  Standing BP: --/-- HR: --  Site: --  Mode: --  Orthostatic VS  11-05-23 @ 19:33  Lying BP: --/-- HR: --  Sitting BP: 132/81 HR: 89  Standing BP: 148/63 HR: 92  Site: --  Mode: --  Orthostatic VS  11-05-23 @ 08:18  Lying BP: --/-- HR: --  Sitting BP: 117/60 HR: 81  Standing BP: 121/60 HR: 89  Site: --  Mode: --  Orthostatic VS  11-04-23 @ 21:03  Lying BP: 121/72 HR: --  Sitting BP: --/-- HR: --  Standing BP: --/-- HR: --  Site: --  Mode: --  Orthostatic VS  11-04-23 @ 19:40  Lying BP: --/-- HR: --  Sitting BP: 158/67 HR: 100  Standing BP: 124/59 HR: 102  Site: --  Mode: --

## 2023-11-06 NOTE — BH INPATIENT PSYCHIATRY PROGRESS NOTE - CURRENT MEDICATION
MEDICATIONS  (STANDING):  atorvastatin 20 milliGRAM(s) Oral at bedtime  cloZAPine 250 milliGRAM(s) Oral at bedtime  ergocalciferol 22602 Unit(s) Oral <User Schedule>  fludroCORTISONE 0.1 milliGRAM(s) Oral <User Schedule>  glimepiride. 4 milliGRAM(s) Oral with breakfast  insulin lispro (ADMELOG) corrective regimen sliding scale   SubCutaneous at bedtime  insulin lispro (ADMELOG) corrective regimen sliding scale   SubCutaneous three times a day before meals  linagliptin 5 milliGRAM(s) Oral daily  metFORMIN 500 milliGRAM(s) Oral two times a day  senna 2 Tablet(s) Oral at bedtime  sodium chloride 1 Gram(s) Oral <User Schedule>    MEDICATIONS  (PRN):  albuterol    90 MICROgram(s) HFA Inhaler 2 Puff(s) Inhalation every 6 hours PRN asthma  aluminum hydroxide/magnesium hydroxide/simethicone Suspension 30 milliLiter(s) Oral every 8 hours PRN Dyspepsia  dextrose Oral Gel 15 Gram(s) Oral once PRN Blood Glucose LESS THAN 70 milliGRAM(s)/deciliter  haloperidol     Tablet 5 milliGRAM(s) Oral every 6 hours PRN agitation  haloperidol    Injectable 5 milliGRAM(s) IntraMuscular once PRN combative behavior  LORazepam     Tablet 1 milliGRAM(s) Oral every 6 hours PRN Agitation  polyethylene glycol 3350 17 Gram(s) Oral daily PRN Constipation

## 2023-11-06 NOTE — BH INPATIENT PSYCHIATRY PROGRESS NOTE - NSBHMETABOLIC_PSY_ALL_CORE_FT
BMI: BMI (kg/m2): 21.3 (10-14-23 @ 15:59)  HbA1c: A1C with Estimated Average Glucose Result: 7.2 % (09-27-23 @ 19:00)    Glucose: POCT Blood Glucose.: 95 mg/dL (11-06-23 @ 16:24)    BP: --  Lipid Panel: Date/Time: 07-07-23 @ 05:52  Cholesterol, Serum: 153  Direct LDL: --  HDL Cholesterol, Serum: 28  Total Cholesterol/HDL Ration Measurement: --  Triglycerides, Serum: 273

## 2023-11-06 NOTE — BH INPATIENT PSYCHIATRY PROGRESS NOTE - NSBHASSESSSUMMFT_PSY_ALL_CORE
59 y/o female, single, noncaregiver, disabled, lives with [facility], history of Schizophrenia vs Schizoaffectove d/o, multiple past admissions at [facility] (most recent 2020 for decompensated psychosis), and also was hospitalized medically for lactic acidosis and followed by CL team in July 2023, followed by psychiatrist Dr. Love at [facility] (pt has no h/o developmental disability), prescribed Clozaril 100mg qhs , no h/o self-injurious behavior or suicide attempts, no h/o violence or legal issues, PMH Type II DM, HLD, asthma, no h/o substance abuse, BIBEMS activated by rosalizy (Rahat) for disorganization.  The patient is grossly disorganized in thoughts on admission.    9/29: The patient continues to be disorganized, but is taking medications.  Will continue clozapine.  9/30: Psychotic disorganized cont meds encourage cooperation with proper vital signs  10/1: disorganized, but more verbal, cooperative, no SI/HI.   10/2: The patient is a little more organized, but still internally preoccupied.  Sedated from clozapine.  Will continue current dose, consider increase if psychosis continues.  10/3: Patient remains disorganized but behaviorally controlled on the unit. Records show that patient was previously well maintained on Clozaril 350mg. Will titrate Clozaril today and monitor for efficacy and side effects.  10/4: Patient shows slight improvement with organization but continues to be disorganized in thought process. Will continue current dose of clozapine with plans to slowly titrate while monitoring for efficacy and side effects.  10/5: Patient was irritable today and less cooperative than previous but continues to be disorganized during the day. Plan to continue titrating clozapine while monitoring for efficacy and side effects.  10/6: Patient shows improvement in mood and thought process and shows improvement in articulating auditory hallucinations. Will continue current dose of clozapine with plans to slowly titrate while monitoring for efficacy and side effects.  10/7: Patient remains disorganized, resistive with treatment, no reports of AH today, continue clozapine titration    10/8: Remains psychotic, suspicious, delusional about her identity, continue clozapine   10/9: Patient remains disorganized but denies auditory hallucinations overnight. BP soft with orthostatics (+), will continue current dose of clozapine with plans to slowly titrate while monitoring for efficacy and side effects.  10/10: Patient remains disorganized and paranoid on the unit but with improved linear thinking since admission, though overall remains disorganized with loose associations. BP remains soft, will encourage PO intake, compression stockings and will continue current dose of clozapine with plans to slowly titrate while monitoring for efficacy and side effects.  10/11: Patient remains disorganized on the unit and requires encouragement for PO intake. Refusing compression stockings. BP remains soft, will check CMP and start salt tablet prior to titrating clozapine.  10/12: Patient remains disorganized on the unit and requires encouragement for PO intake. CMP within normal limits and orthostatics negative, plan to titrate clozapine tonight and monitor for efficacy and side effects.  10/13: Remains disorganized  10/16: Patient remains disorganized and with soft BP. Requires encouragement for PO intake. Will continue current dose of clozapine with plans to slowly titrate while monitoring for efficacy and side effects.  10/17: Patient remains disorganized. BP stable, will titrate clozapine tonight and monitor for efficacy and side effects.  10/18: Patient remains disorganized. BP stable, tolerating clozapine titration well. Will continue current dose with plans to titrate if continuing to tolerate.  10/19: Tolerating meds well. Continue current dose. Found to have Vit D deficiency - will start supplementation  10/20: Patient remains disorganized but with improved mood, tolerating medication well. Will titrate clozapine.  10/23: Patient remains disorganized but continues to tolerate medication. Will titrate clozapine tonight  10/24: More visible, still disorganized, AH ongoing  10/25: Patient shows improved thought process but still endorses AH. Will titrate clozapine tonight.  10/26: Overall disorganized but improvement with AM sedation. Plan to titrate clozapine tomorrow.   10/27: Will hold off on clozaril titration since we have ordered crushed meds due to possibility of noncompliance on the unit given low clozaril level - no appearance of excessive sedation  10/30: Limited improvement since crushing medications. Will recheck clozaril level tomorrow; left msg with guardian  10/31: Clozapine level pending, some improvement in unit activity engagement, left msg for guardian to discuss baseline and possible discharge  11/1: Clozapine level went from <68 to 1201 on same dose of medications - only difference was it was given crushed. Will recheck tomorrow in case of lab error. Will hold dose for tonight, and start at 100mg po qhs tomorrow; message left with guardian to obtain more information regarding baseline  11/2: Clozapine level today <68 - contacted lab to review this discrepancy,  to return call; will restart clozaril at 250mg po qhs for now as per pharmacy, patient used to be on 300mg/day.   11/3: Clozapine level reported yesterday was lab error - level is more appropriate. Will continue clozaril 250mg po qhs and repeat level on Monday 11/6: No clinical changes; SW reached guardian to inform re discharge plan in a few days once clozaril level stabilizes    Plan:  No 1:1 needed, as the patient is calm, no SI.  -Continue clozapine 250mg po qhs (CBC is monthly per outpatient provider)  - Started Fludrocortisone 0.1mg daily with food for orthostatic hypotension; refusing AYAN stockings  - Continue NaCl 1g BID - aim to discontinue prior to discharge  -Continue Senna and Miralax prn for constipation  -DMII: continue metformin to 500mg po bid, continue glimepiride 4mg po daily and Linagliptin 5mg po daily  	Check fingersticks qid  -Lipitor for cholesterol  -Albuterol prn for asthma  - Vit D deficiency - continue supplementation  -Patient provided verbal consent to speak with Zara Giang and patient's outpatient provider.  - Spoke to [facility] agency - Irma Gonzales reportedly has been out, will be back Monday but they were able to provided contact info for outpatient psychiatric providers

## 2023-11-06 NOTE — BH INPATIENT PSYCHIATRY PROGRESS NOTE - NSBHFUPINTERVALHXFT_PSY_A_CORE
Chart reviewed and case discussed with treatment team. Staff report patient had no acute needs overnight. Patient continues to be disorganized but is calm, no distress. Patient makes repeated statements about writer's appearance. Denies SI or AH. Med compliant.

## 2023-11-07 LAB
CLOZAPINE SERPL-MCNC: 777 NG/ML — HIGH (ref 350–600)
CLOZAPINE SERPL-MCNC: 777 NG/ML — HIGH (ref 350–600)
GLUCOSE BLDC GLUCOMTR-MCNC: 126 MG/DL — HIGH (ref 70–99)
GLUCOSE BLDC GLUCOMTR-MCNC: 126 MG/DL — HIGH (ref 70–99)
GLUCOSE BLDC GLUCOMTR-MCNC: 141 MG/DL — HIGH (ref 70–99)
GLUCOSE BLDC GLUCOMTR-MCNC: 141 MG/DL — HIGH (ref 70–99)
GLUCOSE BLDC GLUCOMTR-MCNC: 171 MG/DL — HIGH (ref 70–99)
GLUCOSE BLDC GLUCOMTR-MCNC: 171 MG/DL — HIGH (ref 70–99)
GLUCOSE BLDC GLUCOMTR-MCNC: 182 MG/DL — HIGH (ref 70–99)
GLUCOSE BLDC GLUCOMTR-MCNC: 182 MG/DL — HIGH (ref 70–99)

## 2023-11-07 PROCEDURE — 99232 SBSQ HOSP IP/OBS MODERATE 35: CPT

## 2023-11-07 RX ADMIN — METFORMIN HYDROCHLORIDE 500 MILLIGRAM(S): 850 TABLET ORAL at 08:41

## 2023-11-07 RX ADMIN — FLUDROCORTISONE ACETATE 0.1 MILLIGRAM(S): 0.1 TABLET ORAL at 13:10

## 2023-11-07 RX ADMIN — Medication 4 MILLIGRAM(S): at 08:41

## 2023-11-07 RX ADMIN — SODIUM CHLORIDE 1 GRAM(S): 9 INJECTION INTRAMUSCULAR; INTRAVENOUS; SUBCUTANEOUS at 13:11

## 2023-11-07 RX ADMIN — LINAGLIPTIN 5 MILLIGRAM(S): 5 TABLET, FILM COATED ORAL at 08:41

## 2023-11-07 RX ADMIN — SODIUM CHLORIDE 1 GRAM(S): 9 INJECTION INTRAMUSCULAR; INTRAVENOUS; SUBCUTANEOUS at 20:38

## 2023-11-07 RX ADMIN — Medication 1: at 08:25

## 2023-11-07 RX ADMIN — SENNA PLUS 2 TABLET(S): 8.6 TABLET ORAL at 20:36

## 2023-11-07 RX ADMIN — ATORVASTATIN CALCIUM 20 MILLIGRAM(S): 80 TABLET, FILM COATED ORAL at 20:35

## 2023-11-07 RX ADMIN — CLOZAPINE 250 MILLIGRAM(S): 150 TABLET, ORALLY DISINTEGRATING ORAL at 20:36

## 2023-11-07 RX ADMIN — METFORMIN HYDROCHLORIDE 500 MILLIGRAM(S): 850 TABLET ORAL at 20:37

## 2023-11-07 NOTE — BH INPATIENT PSYCHIATRY PROGRESS NOTE - NSBHMETABOLIC_PSY_ALL_CORE_FT
BMI: BMI (kg/m2): 21.3 (10-14-23 @ 15:59)  HbA1c: A1C with Estimated Average Glucose Result: 7.2 % (09-27-23 @ 19:00)    Glucose: POCT Blood Glucose.: 126 mg/dL (11-07-23 @ 16:39)    BP: --  Lipid Panel: Date/Time: 07-07-23 @ 05:52  Cholesterol, Serum: 153  Direct LDL: --  HDL Cholesterol, Serum: 28  Total Cholesterol/HDL Ration Measurement: --  Triglycerides, Serum: 273

## 2023-11-07 NOTE — BH INPATIENT PSYCHIATRY PROGRESS NOTE - NSBHFUPINTERVALHXFT_PSY_A_CORE
Chart reviewed and case discussed with treatment team. Staff report patient has been calm, treatment compliant. Patient has been more visible and going to groups. Patient denies AH and no paranoia noted. Tolerating medications well. VSS.     Discussed case with outpatient psych provider GLADYS Aquino at [facility]. He states patient has no known h/o MRDD though this does not rule out an ASD. Patient reportedly was also on invega sustenna 156mg IM, last dose 9/8/23 but does not think she needs it.

## 2023-11-07 NOTE — BH INPATIENT PSYCHIATRY PROGRESS NOTE - MSE UNSTRUCTURED FT
Good hygiene, intact grooming. Patient is still oddly related, but has good eye contact. No agitation, psychomotor retardation or involuntary movements observed. The patient’s speech is fluent, normal in tone, and ranges from soft to normal volume.  Not rambling today. The patient’s mood is "good".  Her affect is neutral, stable, congruent to mood. The patient’s thought process remains somewhat disorganized and out of context.  She denies current auditory hallucinations. Denies SI/HI.  Insight is limited.  Judgment is improving. Impulse control has been good on the unit.

## 2023-11-07 NOTE — BH INPATIENT PSYCHIATRY PROGRESS NOTE - NSBHCHARTREVIEWVS_PSY_A_CORE FT
Vital Signs Last 24 Hrs  T(C): 36.7 (11-07-23 @ 05:44), Max: 36.8 (11-06-23 @ 18:28)  T(F): 98.1 (11-07-23 @ 05:44), Max: 98.2 (11-06-23 @ 18:28)  HR: --  BP: --  BP(mean): --  RR: --  SpO2: 98% (11-07-23 @ 05:44) (97% - 98%)    Orthostatic VS  11-07-23 @ 05:44  Lying BP: 123/54 HR: 75  Sitting BP: 123/57 HR: 74  Standing BP: --/-- HR: --  Site: --  Mode: --  Orthostatic VS  11-06-23 @ 18:28  Lying BP: --/-- HR: --  Sitting BP: 154/59 HR: 97  Standing BP: 140/65 HR: 99  Site: upper right arm  Mode: electronic  Orthostatic VS  11-06-23 @ 05:37  Lying BP: 106/57 HR: 82  Sitting BP: 107/74 HR: 88  Standing BP: --/-- HR: --  Site: --  Mode: --  Orthostatic VS  11-05-23 @ 19:33  Lying BP: --/-- HR: --  Sitting BP: 132/81 HR: 89  Standing BP: 148/63 HR: 92  Site: --  Mode: --

## 2023-11-07 NOTE — BH INPATIENT PSYCHIATRY PROGRESS NOTE - NSBHASSESSSUMMFT_PSY_ALL_CORE
57 y/o female, single, noncaregiver, disabled, lives with [facility], history of Schizophrenia vs Schizoaffectove d/o, multiple past admissions at [facility] (most recent 2020 for decompensated psychosis), and also was hospitalized medically for lactic acidosis and followed by CL team in July 2023, followed by psychiatrist Dr. Love at [facility] (pt has no h/o developmental disability), prescribed Clozaril 100mg qhs , no h/o self-injurious behavior or suicide attempts, no h/o violence or legal issues, PMH Type II DM, HLD, asthma, no h/o substance abuse, BIBEMS activated by rosalizy (Rahat) for disorganization.  The patient is grossly disorganized in thoughts on admission.    9/29: The patient continues to be disorganized, but is taking medications.  Will continue clozapine.  9/30: Psychotic disorganized cont meds encourage cooperation with proper vital signs  10/1: disorganized, but more verbal, cooperative, no SI/HI.   10/2: The patient is a little more organized, but still internally preoccupied.  Sedated from clozapine.  Will continue current dose, consider increase if psychosis continues.  10/3: Patient remains disorganized but behaviorally controlled on the unit. Records show that patient was previously well maintained on Clozaril 350mg. Will titrate Clozaril today and monitor for efficacy and side effects.  10/4: Patient shows slight improvement with organization but continues to be disorganized in thought process. Will continue current dose of clozapine with plans to slowly titrate while monitoring for efficacy and side effects.  10/5: Patient was irritable today and less cooperative than previous but continues to be disorganized during the day. Plan to continue titrating clozapine while monitoring for efficacy and side effects.  10/6: Patient shows improvement in mood and thought process and shows improvement in articulating auditory hallucinations. Will continue current dose of clozapine with plans to slowly titrate while monitoring for efficacy and side effects.  10/7: Patient remains disorganized, resistive with treatment, no reports of AH today, continue clozapine titration    10/8: Remains psychotic, suspicious, delusional about her identity, continue clozapine   10/9: Patient remains disorganized but denies auditory hallucinations overnight. BP soft with orthostatics (+), will continue current dose of clozapine with plans to slowly titrate while monitoring for efficacy and side effects.  10/10: Patient remains disorganized and paranoid on the unit but with improved linear thinking since admission, though overall remains disorganized with loose associations. BP remains soft, will encourage PO intake, compression stockings and will continue current dose of clozapine with plans to slowly titrate while monitoring for efficacy and side effects.  10/11: Patient remains disorganized on the unit and requires encouragement for PO intake. Refusing compression stockings. BP remains soft, will check CMP and start salt tablet prior to titrating clozapine.  10/12: Patient remains disorganized on the unit and requires encouragement for PO intake. CMP within normal limits and orthostatics negative, plan to titrate clozapine tonight and monitor for efficacy and side effects.  10/13: Remains disorganized  10/16: Patient remains disorganized and with soft BP. Requires encouragement for PO intake. Will continue current dose of clozapine with plans to slowly titrate while monitoring for efficacy and side effects.  10/17: Patient remains disorganized. BP stable, will titrate clozapine tonight and monitor for efficacy and side effects.  10/18: Patient remains disorganized. BP stable, tolerating clozapine titration well. Will continue current dose with plans to titrate if continuing to tolerate.  10/19: Tolerating meds well. Continue current dose. Found to have Vit D deficiency - will start supplementation  10/20: Patient remains disorganized but with improved mood, tolerating medication well. Will titrate clozapine.  10/23: Patient remains disorganized but continues to tolerate medication. Will titrate clozapine tonight  10/24: More visible, still disorganized, AH ongoing  10/25: Patient shows improved thought process but still endorses AH. Will titrate clozapine tonight.  10/26: Overall disorganized but improvement with AM sedation. Plan to titrate clozapine tomorrow.   10/27: Will hold off on clozaril titration since we have ordered crushed meds due to possibility of noncompliance on the unit given low clozaril level - no appearance of excessive sedation  10/30: Limited improvement since crushing medications. Will recheck clozaril level tomorrow; left msg with guardian  10/31: Clozapine level pending, some improvement in unit activity engagement, left msg for guardian to discuss baseline and possible discharge  11/1: Clozapine level went from <68 to 1201 on same dose of medications - only difference was it was given crushed. Will recheck tomorrow in case of lab error. Will hold dose for tonight, and start at 100mg po qhs tomorrow; message left with guardian to obtain more information regarding baseline  11/2: Clozapine level today <68 - contacted lab to review this discrepancy,  to return call; will restart clozaril at 250mg po qhs for now as per pharmacy, patient used to be on 300mg/day.   11/3: Clozapine level reported yesterday was lab error - level is more appropriate. Will continue clozaril 250mg po qhs and repeat level on Monday 11/6: No clinical changes; SW reached guardian to inform re discharge plan in a few days once clozaril level stabilizes  11/7: clozaril level in more safe range, continue current dose at 250mg po qhs, will recheck tomorrow but in evening to check trough    Plan:  No 1:1 needed, as the patient is calm, no SI.  -Continue clozapine 250mg po qhs (CBC is monthly per outpatient provider)  - Started Fludrocortisone 0.1mg daily with food for orthostatic hypotension; refusing AYAN stockings  - Continue NaCl 1g BID - aim to discontinue prior to discharge  -Continue Senna and Miralax prn for constipation  -DMII: continue metformin to 500mg po bid, continue glimepiride 4mg po daily and Linagliptin 5mg po daily  	Check fingersticks qid  -Lipitor for cholesterol  -Albuterol prn for asthma  - Vit D deficiency - continue supplementation  -Patient provided verbal consent to speak with Zara Giang and patient's outpatient provider.  - Spoke to [facility] agency - Irma Gonzales reportedly has been out, will be back Monday but they were able to provided contact info for outpatient psychiatric providers

## 2023-11-07 NOTE — BH INPATIENT PSYCHIATRY PROGRESS NOTE - PRN MEDS
MEDICATIONS  (PRN):  albuterol    90 MICROgram(s) HFA Inhaler 2 Puff(s) Inhalation every 6 hours PRN asthma  aluminum hydroxide/magnesium hydroxide/simethicone Suspension 30 milliLiter(s) Oral every 8 hours PRN Dyspepsia  dextrose Oral Gel 15 Gram(s) Oral once PRN Blood Glucose LESS THAN 70 milliGRAM(s)/deciliter  haloperidol     Tablet 5 milliGRAM(s) Oral every 6 hours PRN agitation  haloperidol    Injectable 5 milliGRAM(s) IntraMuscular once PRN combative behavior  polyethylene glycol 3350 17 Gram(s) Oral daily PRN Constipation

## 2023-11-07 NOTE — BH INPATIENT PSYCHIATRY PROGRESS NOTE - NSBHCHARTREVIEWLAB_PSY_A_CORE FT
Vit D low  18.5  B12 borderline - 400s
ANC 5900
Vit D low  18.5  B12 borderline - 400s
Vit D low  18.5  B12 borderline - 400s
Clozapine level corrected - 476 (spoke with lab yesterday and confirmed lab error when <68 reported initially)
Vit D low  18.5  B12 borderline - 400s
clozapine level yesterday 1201 - non trough level
Vit D low  18.5  B12 borderline - 400s
Non trough AM clozapine level 777

## 2023-11-07 NOTE — BH INPATIENT PSYCHIATRY PROGRESS NOTE - CURRENT MEDICATION
MEDICATIONS  (STANDING):  atorvastatin 20 milliGRAM(s) Oral at bedtime  cloZAPine 250 milliGRAM(s) Oral at bedtime  ergocalciferol 36387 Unit(s) Oral <User Schedule>  fludroCORTISONE 0.1 milliGRAM(s) Oral <User Schedule>  glimepiride. 4 milliGRAM(s) Oral with breakfast  insulin lispro (ADMELOG) corrective regimen sliding scale   SubCutaneous at bedtime  insulin lispro (ADMELOG) corrective regimen sliding scale   SubCutaneous three times a day before meals  linagliptin 5 milliGRAM(s) Oral daily  metFORMIN 500 milliGRAM(s) Oral two times a day  senna 2 Tablet(s) Oral at bedtime  sodium chloride 1 Gram(s) Oral <User Schedule>    MEDICATIONS  (PRN):  albuterol    90 MICROgram(s) HFA Inhaler 2 Puff(s) Inhalation every 6 hours PRN asthma  aluminum hydroxide/magnesium hydroxide/simethicone Suspension 30 milliLiter(s) Oral every 8 hours PRN Dyspepsia  dextrose Oral Gel 15 Gram(s) Oral once PRN Blood Glucose LESS THAN 70 milliGRAM(s)/deciliter  haloperidol     Tablet 5 milliGRAM(s) Oral every 6 hours PRN agitation  haloperidol    Injectable 5 milliGRAM(s) IntraMuscular once PRN combative behavior  polyethylene glycol 3350 17 Gram(s) Oral daily PRN Constipation

## 2023-11-08 LAB
GLUCOSE BLDC GLUCOMTR-MCNC: 108 MG/DL — HIGH (ref 70–99)
GLUCOSE BLDC GLUCOMTR-MCNC: 108 MG/DL — HIGH (ref 70–99)
GLUCOSE BLDC GLUCOMTR-MCNC: 118 MG/DL — HIGH (ref 70–99)
GLUCOSE BLDC GLUCOMTR-MCNC: 118 MG/DL — HIGH (ref 70–99)
GLUCOSE BLDC GLUCOMTR-MCNC: 163 MG/DL — HIGH (ref 70–99)
GLUCOSE BLDC GLUCOMTR-MCNC: 163 MG/DL — HIGH (ref 70–99)
GLUCOSE BLDC GLUCOMTR-MCNC: 178 MG/DL — HIGH (ref 70–99)
GLUCOSE BLDC GLUCOMTR-MCNC: 178 MG/DL — HIGH (ref 70–99)

## 2023-11-08 PROCEDURE — 99232 SBSQ HOSP IP/OBS MODERATE 35: CPT

## 2023-11-08 RX ADMIN — SODIUM CHLORIDE 1 GRAM(S): 9 INJECTION INTRAMUSCULAR; INTRAVENOUS; SUBCUTANEOUS at 12:27

## 2023-11-08 RX ADMIN — ATORVASTATIN CALCIUM 20 MILLIGRAM(S): 80 TABLET, FILM COATED ORAL at 20:16

## 2023-11-08 RX ADMIN — SENNA PLUS 2 TABLET(S): 8.6 TABLET ORAL at 20:17

## 2023-11-08 RX ADMIN — METFORMIN HYDROCHLORIDE 500 MILLIGRAM(S): 850 TABLET ORAL at 08:23

## 2023-11-08 RX ADMIN — FLUDROCORTISONE ACETATE 0.1 MILLIGRAM(S): 0.1 TABLET ORAL at 12:27

## 2023-11-08 RX ADMIN — Medication 1: at 12:20

## 2023-11-08 RX ADMIN — CLOZAPINE 250 MILLIGRAM(S): 150 TABLET, ORALLY DISINTEGRATING ORAL at 20:17

## 2023-11-08 RX ADMIN — LINAGLIPTIN 5 MILLIGRAM(S): 5 TABLET, FILM COATED ORAL at 08:23

## 2023-11-08 RX ADMIN — Medication 4 MILLIGRAM(S): at 08:23

## 2023-11-08 RX ADMIN — METFORMIN HYDROCHLORIDE 500 MILLIGRAM(S): 850 TABLET ORAL at 20:16

## 2023-11-08 NOTE — BH DISCHARGE NOTE NURSING/SOCIAL WORK/PSYCH REHAB - NSCDUDCCRISIS_PSY_A_CORE
[facility] Well  1 [phone]) [facility]-WELL ([phone])  Text "WELL" to 05047  Website: [email or web address] 1 ([phone] 621-HOPE (4513) Website: www.safehorizon.org/.  [facility] - [facility] – Crisis Care for Children, Adults and Families  [address] [place] [address]  Mobile Crisis Hotline – (161) 703-6723/.National Suicide Prevention Lifeline 7 (774) 103-6483/.  [facility]  1 (146) LIFENET (878-7632)/.  [facility]  (762) 293-3423/.  [facility] Behavioral Health Helpline / [facility] Mobile Crisis  (615) 514-FFQG (7555)/.  [facility] Response Crisis Hotline  (530) 406-7454  24 hour telephone crisis intervention and suicide prevention hotline concerned with all mental health issues/.  [facility] [place]  [address] [place] [address] [phone]   Hours:  Monday through Friday from 9 AM to 3 PM/988 Suicide and Crisis Lifeline

## 2023-11-08 NOTE — BH INPATIENT PSYCHIATRY PROGRESS NOTE - NSTXPSYCHODATEEST_PSY_ALL_CORE
26-Oct-2023
28-Sep-2023
27-Sep-2023
28-Sep-2023
28-Sep-2023
26-Oct-2023
28-Sep-2023
26-Oct-2023
26-Oct-2023
28-Sep-2023
26-Oct-2023
27-Sep-2023
26-Oct-2023
27-Sep-2023
28-Sep-2023
27-Sep-2023
28-Sep-2023
26-Oct-2023
27-Sep-2023
28-Sep-2023
26-Oct-2023
26-Oct-2023
28-Sep-2023
28-Sep-2023
27-Sep-2023
28-Sep-2023

## 2023-11-08 NOTE — BH INPATIENT PSYCHIATRY PROGRESS NOTE - NSTXPSYCHODATETRGT_PSY_ALL_CORE
15-Nov-2023
12-Oct-2023
18-Oct-2023
12-Oct-2023
02-Nov-2023
04-Oct-2023
25-Oct-2023
18-Oct-2023
04-Oct-2023
16-Nov-2023
16-Nov-2023
02-Nov-2023
18-Oct-2023
25-Oct-2023
01-Nov-2023
02-Nov-2023
02-Nov-2023
25-Oct-2023
18-Oct-2023
18-Oct-2023
12-Oct-2023
01-Nov-2023
04-Oct-2023
12-Oct-2023
16-Nov-2023
12-Oct-2023
04-Oct-2023
02-Nov-2023
04-Oct-2023
12-Oct-2023
02-Nov-2023
25-Oct-2023
12-Oct-2023
25-Oct-2023

## 2023-11-08 NOTE — BH DISCHARGE NOTE NURSING/SOCIAL WORK/PSYCH REHAB - NSDCPRGOAL_PSY_ALL_CORE
Writer met with patient to discuss patient’s discharge and progress towards rehabilitation goal. Patient was pleasant and cooperative to meet with writer. Patient appeared engaged in safety planning and was given a survey prior to discharge. Patient was admitted to [facility] on 9/27/2023 due to disorganized thoughts. Patient appeared to complete her psychiatric rehabilitation goal of demonstrating to ability to maintain a reality orientation and communicate clearly with others during two conversations with staff daily. Patient was able to express her thoughts and feelings regarding discharge. Patient reported her follow up plan. Due to the COVID-19 pandemic, unit structure and activities are re-evaluated on a consistent basis in effort to maintain the safety of patients and staff. Patient appeared to engage and participate in psychiatric rehabilitation groups. Patient attended coping skills, and creative art groups. Patient appeared to appropriately interact with peers and staff.

## 2023-11-08 NOTE — BH INPATIENT PSYCHIATRY PROGRESS NOTE - NSBHMETABOLIC_PSY_ALL_CORE_FT
BMI: BMI (kg/m2): 21.3 (10-14-23 @ 15:59)  HbA1c: A1C with Estimated Average Glucose Result: 7.2 % (09-27-23 @ 19:00)    Glucose: POCT Blood Glucose.: 178 mg/dL (11-08-23 @ 12:02)    BP: --  Lipid Panel: Date/Time: 07-07-23 @ 05:52  Cholesterol, Serum: 153  Direct LDL: --  HDL Cholesterol, Serum: 28  Total Cholesterol/HDL Ration Measurement: --  Triglycerides, Serum: 273

## 2023-11-08 NOTE — BH DISCHARGE NOTE NURSING/SOCIAL WORK/PSYCH REHAB - DISCHARGE INSTRUCTIONS AFTERCARE APPOINTMENTS
In order to check the location, date, or time of your aftercare appointment, please refer to your Discharge Instructions Document given to you upon leaving the hospital.  If you have lost the instructions please call [phone]

## 2023-11-08 NOTE — BH INPATIENT PSYCHIATRY PROGRESS NOTE - NSBHCONSULTIPREASON_PSY_A_CORE
danger to self; mental illness expected to respond to inpatient care

## 2023-11-08 NOTE — BH INPATIENT PSYCHIATRY PROGRESS NOTE - NSBHCHARTREVIEWVS_PSY_A_CORE FT
Vital Signs Last 24 Hrs  T(C): 36.3 (11-08-23 @ 06:51), Max: 37.1 (11-07-23 @ 19:48)  T(F): 97.3 (11-08-23 @ 06:51), Max: 98.7 (11-07-23 @ 19:48)  HR: --  BP: --  BP(mean): --  RR: --  SpO2: 99% (11-07-23 @ 19:48) (99% - 99%)    Orthostatic VS  11-08-23 @ 06:51  Lying BP: --/-- HR: --  Sitting BP: 154/64 HR: 100  Standing BP: 144/58 HR: 106  Site: --  Mode: --  Orthostatic VS  11-07-23 @ 19:48  Lying BP: --/-- HR: --  Sitting BP: 139/63 HR: 97  Standing BP: 141/50 HR: 97  Site: --  Mode: --  Orthostatic VS  11-07-23 @ 05:44  Lying BP: 123/54 HR: 75  Sitting BP: 123/57 HR: 74  Standing BP: --/-- HR: --  Site: --  Mode: --  Orthostatic VS  11-06-23 @ 18:28  Lying BP: --/-- HR: --  Sitting BP: 154/59 HR: 97  Standing BP: 140/65 HR: 99  Site: upper right arm  Mode: electronic

## 2023-11-08 NOTE — BH INPATIENT PSYCHIATRY PROGRESS NOTE - NSBHATTESTBILLING_PSY_A_CORE
52359-Muvwjwqoct OBS or IP - moderate complexity OR 35-49 mins
57489-Rhxldkmxxs OBS or IP - moderate complexity OR 35-49 mins
13458-Xihdvxegtu OBS or IP - moderate complexity OR 35-49 mins
25886-Pracnbefnk OBS or IP - low complexity OR 25-34 mins
90299-Eynxyavptm OBS or IP - moderate complexity OR 35-49 mins
06979-Svgeoozuoj OBS or IP - moderate complexity OR 35-49 mins
82933-Cmnmqhahiu OBS or IP - moderate complexity OR 35-49 mins
72783-Prgfduagyr OBS or IP - moderate complexity OR 35-49 mins
48608-Jdfcxnifhv OBS or IP - moderate complexity OR 35-49 mins
33049-Susenparvp OBS or IP - moderate complexity OR 35-49 mins
82471-Jftfhubhpi OBS or IP - moderate complexity OR 35-49 mins
00127-Sykdwlxcfx OBS or IP - moderate complexity OR 35-49 mins
05763-Auixtxspmr OBS or IP - low complexity OR 25-34 mins
27655-Mgrxdmdyrn OBS or IP - moderate complexity OR 35-49 mins
28898-Vsmeixlszt OBS or IP - moderate complexity OR 35-49 mins
89632-Vduisyhhyo OBS or IP - moderate complexity OR 35-49 mins
90670-Cpiwbdpzln OBS or IP - low complexity OR 25-34 mins
81569-Tvdkmhewqg OBS or IP - low complexity OR 25-34 mins
22267-Iporlnyjbo OBS or IP - moderate complexity OR 35-49 mins
83927-Tenfjpkheq OBS or IP - moderate complexity OR 35-49 mins
38982-Oxobhhsxoe OBS or IP - low complexity OR 25-34 mins
24186-Xmkgvrsmzo OBS or IP - moderate complexity OR 35-49 mins
78057-Xoumhihhbl OBS or IP - moderate complexity OR 35-49 mins
74336-Yuwcrolohz OBS or IP - moderate complexity OR 35-49 mins
97705-Nkrxqjeyvx OBS or IP - moderate complexity OR 35-49 mins
59764-Psysnvzqqs OBS or IP - moderate complexity OR 35-49 mins
78334-Yjzlrbylkl OBS or IP - moderate complexity OR 35-49 mins
29832-Vpsniqxpuq OBS or IP - moderate complexity OR 35-49 mins
39625-Dpsepdxoit OBS or IP - moderate complexity OR 35-49 mins
80357-Ajygztkhuh OBS or IP - moderate complexity OR 35-49 mins
02225-Zzgtgpbvod OBS or IP - moderate complexity OR 35-49 mins
51566-Ynalaofvqw OBS or IP - moderate complexity OR 35-49 mins

## 2023-11-08 NOTE — BH INPATIENT PSYCHIATRY PROGRESS NOTE - MSE OPTIONS
Unstructured MSE

## 2023-11-08 NOTE — BH INPATIENT PSYCHIATRY PROGRESS NOTE - NSTXDCSOCGOAL_PSY_ALL_CORE
Will accept referrals to support groups, clubhouse, senior centers, etc.

## 2023-11-08 NOTE — BH INPATIENT PSYCHIATRY PROGRESS NOTE - NSTXDCSOCINTERMD_PSY_ALL_CORE
Collaborate with SW when stable

## 2023-11-08 NOTE — BH INPATIENT PSYCHIATRY PROGRESS NOTE - NSBHATTESTTYPEVISIT_PSY_A_CORE
Attending Only
Attending with Resident/Fellow/Student
Attending Only
Attending Only
Attending with Resident/Fellow/Student
Attending Only
Attending Only
Attending with Resident/Fellow/Student
Attending Only
Attending Only
Attending with Resident/Fellow/Student

## 2023-11-08 NOTE — BH INPATIENT PSYCHIATRY PROGRESS NOTE - NSTXDCSOCDATETRGT_PSY_ALL_CORE
05-Oct-2023
09-Nov-2023
05-Oct-2023
19-Oct-2023
19-Oct-2023
12-Oct-2023
19-Oct-2023
12-Oct-2023
02-Nov-2023
02-Nov-2023
09-Nov-2023
12-Oct-2023
19-Oct-2023
09-Nov-2023
26-Oct-2023
26-Oct-2023
05-Oct-2023
12-Oct-2023
05-Oct-2023
02-Nov-2023
26-Oct-2023
12-Oct-2023
02-Nov-2023
05-Oct-2023
09-Nov-2023
19-Oct-2023
12-Oct-2023
12-Oct-2023
05-Oct-2023
02-Nov-2023
02-Nov-2023
09-Nov-2023
19-Oct-2023
05-Oct-2023

## 2023-11-08 NOTE — BH INPATIENT PSYCHIATRY PROGRESS NOTE - NSTXDCSOCPROGRES_PSY_ALL_CORE
Improving
No Change
Improving
No Change
Improving
No Change
Improving
No Change
Improving
Improving
No Change
Improving
No Change
Improving
No Change
No Change

## 2023-11-08 NOTE — BH INPATIENT PSYCHIATRY PROGRESS NOTE - NSTXDISORGDATETRGT_PSY_ALL_CORE
08-Nov-2023
11-Oct-2023
18-Oct-2023
05-Oct-2023
18-Oct-2023
16-Nov-2023
18-Oct-2023
02-Nov-2023
05-Oct-2023
05-Oct-2023
11-Oct-2023
25-Oct-2023
11-Oct-2023
16-Nov-2023
11-Oct-2023
16-Nov-2023
25-Oct-2023
02-Nov-2023
05-Oct-2023
18-Oct-2023
25-Oct-2023
02-Nov-2023
05-Oct-2023
02-Nov-2023
25-Oct-2023
18-Oct-2023
25-Oct-2023
05-Oct-2023
08-Nov-2023
15-Nov-2023
01-Nov-2023
11-Oct-2023

## 2023-11-08 NOTE — BH INPATIENT PSYCHIATRY PROGRESS NOTE - MSE UNSTRUCTURED FT
Good hygiene, intact grooming. Patient is remains oddly related but this is likely baseline. Has good eye contact and is friendly. No agitation, psychomotor retardation or involuntary movements observed. The patient’s speech is fluent, normal in tone, and ranges from soft to normal volume.  No longer rambling. The patient’s mood is "ok".  Affect is neutral, stable, reactive, congruent to mood. The patient’s thought process remains somewhat disorganized and out of context.  She denies current auditory hallucinations. Denies SI/HI.  Insight is limited.  Judgment is improved. Impulse control has been good on the unit.

## 2023-11-08 NOTE — BH INPATIENT PSYCHIATRY PROGRESS NOTE - NSTXDISORGDATEEST_PSY_ALL_CORE
28-Sep-2023
26-Oct-2023
28-Sep-2023
26-Oct-2023
28-Sep-2023
28-Sep-2023
26-Oct-2023
28-Sep-2023
26-Oct-2023
28-Sep-2023

## 2023-11-08 NOTE — BH DISCHARGE NOTE NURSING/SOCIAL WORK/PSYCH REHAB - PATIENT PORTAL LINK FT
You can access the FollowMyHealth Patient Portal offered by [facility] by registering at the following website: [email or web address]. By joining [facility]’s FollowMyHealth portal, you will also be able to view your health information using other applications (apps) compatible with our system.

## 2023-11-08 NOTE — BH DISCHARGE NOTE NURSING/SOCIAL WORK/PSYCH REHAB - NSDCPRRECOMMEND_PSY_ALL_CORE
Psychiatric Rehabilitation staff recommends that patient returns to treatment by following up with outpatient care at [facility]  to continue medication management, support, and psychotherapy. In addition, psych rehab recommends patient continue to demonstrate identify and utilizing coping skills.

## 2023-11-08 NOTE — BH INPATIENT PSYCHIATRY PROGRESS NOTE - CURRENT MEDICATION
MEDICATIONS  (STANDING):  atorvastatin 20 milliGRAM(s) Oral at bedtime  cloZAPine 250 milliGRAM(s) Oral at bedtime  ergocalciferol 34041 Unit(s) Oral <User Schedule>  fludroCORTISONE 0.1 milliGRAM(s) Oral <User Schedule>  glimepiride. 4 milliGRAM(s) Oral with breakfast  insulin lispro (ADMELOG) corrective regimen sliding scale   SubCutaneous three times a day before meals  insulin lispro (ADMELOG) corrective regimen sliding scale   SubCutaneous at bedtime  linagliptin 5 milliGRAM(s) Oral daily  metFORMIN 500 milliGRAM(s) Oral two times a day  senna 2 Tablet(s) Oral at bedtime  sodium chloride 1 Gram(s) Oral <User Schedule>    MEDICATIONS  (PRN):  albuterol    90 MICROgram(s) HFA Inhaler 2 Puff(s) Inhalation every 6 hours PRN asthma  aluminum hydroxide/magnesium hydroxide/simethicone Suspension 30 milliLiter(s) Oral every 8 hours PRN Dyspepsia  dextrose Oral Gel 15 Gram(s) Oral once PRN Blood Glucose LESS THAN 70 milliGRAM(s)/deciliter  haloperidol     Tablet 5 milliGRAM(s) Oral every 6 hours PRN agitation  haloperidol    Injectable 5 milliGRAM(s) IntraMuscular once PRN combative behavior  polyethylene glycol 3350 17 Gram(s) Oral daily PRN Constipation

## 2023-11-08 NOTE — BH INPATIENT PSYCHIATRY PROGRESS NOTE - NSTXPROBDCSOC_PSY_ALL_CORE
DISCHARGE ISSUE - POOR SOCIALIZATION IN COMMUNITY

## 2023-11-08 NOTE — BH INPATIENT PSYCHIATRY PROGRESS NOTE - NSTXPSYCHOPROGRES_PSY_ALL_CORE
Improving
No Change
Improving
Improving
No Change
Improving
No Change
Improving
No Change
Improving
No Change
Improving

## 2023-11-08 NOTE — BH INPATIENT PSYCHIATRY PROGRESS NOTE - NSBHFUPINTERVALHXFT_PSY_A_CORE
Chart reviewed and case discussed with treatment team. Staff report patient has been calm, cooperative, visible on the unit. Has had better PO intake. Takes showers with prompting. Med compliant. Tolerating milieu which has been loud and disruptive lately. Denies AH and no paranoia noted. Denies adverse effects of medications.

## 2023-11-08 NOTE — BH INPATIENT PSYCHIATRY PROGRESS NOTE - NSTXDISORGPROGRES_PSY_ALL_CORE
Improving
No Change
Improving
Improving
No Change
Improving
No Change
Improving
No Change
No Change
Improving
Improving
No Change
Improving
No Change
Improving
No Change
No Change

## 2023-11-08 NOTE — BH INPATIENT PSYCHIATRY PROGRESS NOTE - NSBHCONSDANGERSELF_PSY_A_CORE
unable to care for self

## 2023-11-08 NOTE — BH INPATIENT PSYCHIATRY PROGRESS NOTE - NSICDXBHPRIMARYDX_PSY_ALL_CORE
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  

## 2023-11-08 NOTE — BH INPATIENT PSYCHIATRY PROGRESS NOTE - NSTXPSYCHOGOAL_PSY_ALL_CORE
Will be able to report experiencing hallucinations to staff
Will identify 1 thought/feeling/behavior associated with hallucinations
Will be able to report experiencing hallucinations to staff
Will be able to report experiencing hallucinations to staff
Will identify 1 thought/feeling/behavior associated with hallucinations
Will be able to report experiencing hallucinations to staff
Will identify 1 thought/feeling/behavior associated with hallucinations
Will be able to report experiencing hallucinations to staff
Will engage in a 15 minute conversation with no irrational content
Will identify 1 thought/feeling/behavior associated with hallucinations
Will be able to report experiencing hallucinations to staff
Will engage in a 15 minute conversation with no irrational content
Will identify 1 thought/feeling/behavior associated with hallucinations
Will be able to report experiencing hallucinations to staff
Will be able to report experiencing hallucinations to staff
Will engage in a 15 minute conversation with no irrational content
Will be able to report experiencing hallucinations to staff

## 2023-11-08 NOTE — BH INPATIENT PSYCHIATRY PROGRESS NOTE - NSDCCRITERIA_PSY_ALL_CORE
Organized thoughts

## 2023-11-08 NOTE — BH INPATIENT PSYCHIATRY PROGRESS NOTE - NSTXDCSOCDATEEST_PSY_ALL_CORE
19-Oct-2023
12-Oct-2023
26-Oct-2023
02-Nov-2023
19-Oct-2023
26-Oct-2023
28-Sep-2023
28-Sep-2023
05-Oct-2023
12-Oct-2023
19-Oct-2023
05-Oct-2023
26-Oct-2023
02-Nov-2023
12-Oct-2023
28-Sep-2023
05-Oct-2023
28-Sep-2023
12-Oct-2023
02-Nov-2023
26-Oct-2023
28-Sep-2023
26-Oct-2023
05-Oct-2023
28-Sep-2023
12-Oct-2023
12-Oct-2023
05-Oct-2023
26-Oct-2023
05-Oct-2023
28-Sep-2023
05-Oct-2023

## 2023-11-08 NOTE — BH INPATIENT PSYCHIATRY PROGRESS NOTE - NSTXPROBDISORG_PSY_ALL_CORE
DISORGANIZATION OF THOUGHT/BEHAVIOR

## 2023-11-08 NOTE — BH INPATIENT PSYCHIATRY PROGRESS NOTE - NSTXPROBPSYCHO_PSY_ALL_CORE
PSYCHOTIC SYMPTOMS

## 2023-11-08 NOTE — BH INPATIENT PSYCHIATRY PROGRESS NOTE - NSBHFUPINTERVALCCFT_PSY_A_CORE
psychosis
psychosis, disorganization
'I am fine'
psychosis
disorganized thought process  
"I had a really scary nightmare last night."
psychosis, disorganization
"I'm good."
"good"
'Are you Dr. Carrizales?'
Psychosis
thought disorder
thought disorder
'I'm tired'
"I'm sleeping."
improving
disorganized speech
patient denies dizziness constipation
Patietn uncooperative with ROS
'I'm tired'
"My name is Maria Isabel Daugherty"
"I was finally able to sleep last night."
psychosis, disorganization
psychosis
disorganization of thought process
'I'm tired'
psychosis, disorganization
'I'm tired'
"good"
disorganized thought process  
'I'm tired'
psychosis
disorganized thought
improved mood

## 2023-11-09 VITALS — TEMPERATURE: 98 F | OXYGEN SATURATION: 97 %

## 2023-11-09 LAB
CLOZAPINE SERPL-MCNC: 539 NG/ML — SIGNIFICANT CHANGE UP (ref 350–600)
CLOZAPINE SERPL-MCNC: 539 NG/ML — SIGNIFICANT CHANGE UP (ref 350–600)
GLUCOSE BLDC GLUCOMTR-MCNC: 108 MG/DL — HIGH (ref 70–99)
GLUCOSE BLDC GLUCOMTR-MCNC: 108 MG/DL — HIGH (ref 70–99)

## 2023-11-09 RX ORDER — CLOZAPINE 150 MG/1
5 TABLET, ORALLY DISINTEGRATING ORAL
Qty: 150 | Refills: 0
Start: 2023-11-09 | End: 2023-12-08

## 2023-11-09 RX ORDER — CLOZAPINE 150 MG/1
1 TABLET, ORALLY DISINTEGRATING ORAL
Refills: 0 | DISCHARGE

## 2023-11-09 RX ORDER — METFORMIN HYDROCHLORIDE 850 MG/1
1 TABLET ORAL
Refills: 0 | DISCHARGE

## 2023-11-09 RX ORDER — SENNA PLUS 8.6 MG/1
2 TABLET ORAL
Qty: 60 | Refills: 0
Start: 2023-11-09 | End: 2023-12-08

## 2023-11-09 RX ORDER — PALIPERIDONE 1.5 MG/1
156 TABLET, EXTENDED RELEASE ORAL
Refills: 0 | DISCHARGE

## 2023-11-09 RX ORDER — SITAGLIPTIN 50 MG/1
1 TABLET, FILM COATED ORAL
Refills: 0 | DISCHARGE

## 2023-11-09 RX ORDER — ERGOCALCIFEROL 1.25 MG/1
1 CAPSULE ORAL
Qty: 4 | Refills: 0
Start: 2023-11-09 | End: 2023-12-08

## 2023-11-09 RX ORDER — GLIMEPIRIDE 1 MG
1 TABLET ORAL
Qty: 0 | Refills: 0 | DISCHARGE
Start: 2023-11-09

## 2023-11-09 RX ORDER — ICOSAPENT ETHYL 500 MG/1
2 CAPSULE, LIQUID FILLED ORAL
Refills: 0 | DISCHARGE

## 2023-11-09 RX ORDER — LOSARTAN POTASSIUM 100 MG/1
1 TABLET, FILM COATED ORAL
Refills: 0 | DISCHARGE

## 2023-11-09 RX ORDER — METFORMIN HYDROCHLORIDE 850 MG/1
1 TABLET ORAL
Qty: 60 | Refills: 0
Start: 2023-11-09 | End: 2023-12-08

## 2023-11-09 RX ORDER — ATORVASTATIN CALCIUM 80 MG/1
1 TABLET, FILM COATED ORAL
Qty: 0 | Refills: 0 | DISCHARGE
Start: 2023-11-09

## 2023-11-09 RX ADMIN — METFORMIN HYDROCHLORIDE 500 MILLIGRAM(S): 850 TABLET ORAL at 08:45

## 2023-11-09 RX ADMIN — Medication 4 MILLIGRAM(S): at 08:44

## 2023-11-09 RX ADMIN — LINAGLIPTIN 5 MILLIGRAM(S): 5 TABLET, FILM COATED ORAL at 08:47

## 2023-11-09 NOTE — BH INPATIENT PSYCHIATRY DISCHARGE NOTE - HOSPITAL COURSE
Clozaril was titrated starting at 100mg/day. Trough clozaril level was measured at 448. She was known to have been on 350mg/day when last discharged from our facility several months ago. Titration was a slow process due to initial poor PO intake and some low BP, at times orthostatic. Patient reached 275mg po qhs after which AM clozaril level was noted to be at 1019. Does was decreased to 250mg and a trough level was checked to be at 539. Patient was given fludrocortisone and salt tablets to mitigate orthostasis while PO intake was poor to which she responded well.     It was not known until later in the admission that she was getting invega sustenna 156mg IM [id number] as an outpatient and last dose was given 9/8. On discussion with outpatient provider prior to discharge, it was thought that she would not need it any longer as she had done well without it in the past.     Attempted to discuss treatment plan with legal guardian but was difficult to reach. We had been in touch with patient's fiance who agreed patient was at baseline.

## 2023-11-09 NOTE — BH INPATIENT PSYCHIATRY DISCHARGE NOTE - NSDCCPCAREPLAN_GEN_ALL_CORE_FT
PRINCIPAL DISCHARGE DIAGNOSIS  Diagnosis: Schizophrenia  Assessment and Plan of Treatment: clozaril

## 2023-11-09 NOTE — BH INPATIENT PSYCHIATRY DISCHARGE NOTE - NSDCMRMEDTOKEN_GEN_ALL_CORE_FT
atorvastatin 20 mg oral tablet: 1 tab(s) orally once a day (at bedtime)  cloZAPine 50 mg oral tablet: 5 tab(s) orally once a day (at bedtime)  ergocalciferol 1.25 mg (50,000 intl units) oral capsule: 1 cap(s) orally once a week  glimepiride 4 mg oral tablet: 1 tab(s) orally once a day (before a meal)  Januvia 100 mg oral tablet: 1 tab(s) orally once a day  metFORMIN 500 mg oral tablet: 1 tab(s) orally 2 times a day  senna leaf extract oral tablet: 2 tab(s) orally once a day (at bedtime)  Vascepa 1 g oral capsule: 2 cap(s) orally 2 times a day

## 2023-11-09 NOTE — BH INPATIENT PSYCHIATRY DISCHARGE NOTE - OTHER PAST PSYCHIATRIC HISTORY (INCLUDE DETAILS REGARDING ONSET, COURSE OF ILLNESS, INPATIENT/OUTPATIENT TREATMENT)
EMR reports "59 y/o female, single, noncaregiver, disabled, lives with [facility], history of Schizophrenia vs Schizoaffectove d/o, multiple past admissions at [facility] (most recent 2020 for decompensated psychosis), and also was hospitalized medically for lactic acidosis and followed by CL team in July 2023, followed by psychiatrist Dr. Love at [facility] (pt has no h/o developmental disability), prescribed Clozaril 100mg qhs , no h/o self-injurious behavior or suicide attempts, no h/o violence or legal issues, PMH Type II DM, HLD, asthma, no h/o substance abuse, BIB EMS activated by francisco (Rahat) for  disorganization.   Interview is limited significantly by  acute psychosis, pt is observed, laughing to herself , engaging in disorganised behaviors. "    On the unit, Pt continues to be disorganized and unable to engage in meaningful discussions.

## 2023-11-09 NOTE — BH INPATIENT PSYCHIATRY DISCHARGE NOTE - NSBHMETABOLIC_PSY_ALL_CORE_FT
BMI: BMI (kg/m2): 21.3 (10-14-23 @ 15:59)  HbA1c: A1C with Estimated Average Glucose Result: 7.2 % (09-27-23 @ 19:00)    Glucose: POCT Blood Glucose.: 108 mg/dL (11-09-23 @ 08:01)    BP: --  Lipid Panel: Date/Time: 07-07-23 @ 05:52  Cholesterol, Serum: 153  Direct LDL: --  HDL Cholesterol, Serum: 28  Total Cholesterol/HDL Ration Measurement: --  Triglycerides, Serum: 273

## 2023-11-09 NOTE — BH INPATIENT PSYCHIATRY DISCHARGE NOTE - MSE UNSTRUCTURED FT
Good hygiene, intact grooming. Patient is remains oddly related but this is likely baseline. Has good eye contact and is friendly. No agitation, psychomotor retardation or involuntary movements observed. The patient’s speech is fluent, normal in tone, and ranges from soft to normal volume.  No longer rambling. The patient’s mood is "ok".  Affect is neutral, stable, reactive, congruent to mood. The patient’s thought process remains somewhat disorganized and out of context.  She denies current auditory hallucinations. Denies SI/HI.  Insight is partially intact.  Judgment is improved. Impulse control has been good on the unit.

## 2023-11-09 NOTE — BH INPATIENT PSYCHIATRY DISCHARGE NOTE - HPI (INCLUDE ILLNESS QUALITY, SEVERITY, DURATION, TIMING, CONTEXT, MODIFYING FACTORS, ASSOCIATED SIGNS AND SYMPTOMS)
Patient seen for assessment of psychosis, chart reviewed, and case discussed with treatment team.  The patient was seen on the unit, noted to be grossly disorganized, quickly becoming tangential and loose in her thoughts when asked any questions.  She was unable to say what brought her to the hospital.  She was able to provide an answer to closed ended questions, but the accuracy of her responses are in doubts.  She states she did not eat breakfast, but staff reports she did.  She denies depression, denies SI, but admits she had not been sleeping well at home.  She denies hallucinations or delusions.  She states she has been compliant with medications, then later says she isn't sure, and doesn't know her doses.  When asked if the staff could reach out to collaterals, she reported not wanting staff to speak with anyone.  She denies substance use.    From ED assessment:  59 y/o female, single, noncaregiver, disabled, lives with [facility], history of Schizophrenia vs Schizoaffectove d/o, multiple past admissions at [facility] (most recent 2020 for decompensated psychosis), and also was hospitalized medically for lactic acidosis and followed by CL team in July 2023, followed by psychiatrist Dr. Love at [facility] (pt has no h/o developmental disability), prescribed Clozaril 100mg qhs , no h/o self-injurious behavior or suicide attempts, no h/o violence or legal issues, PMH Type II DM, HLD, asthma, no h/o substance abuse, BIBEMS activated by francisco (Rahat) for  disorganization.   Interview is limited significantly by  acute psychosis, pt is observed, laughing to herself , engaging in disorganised behaviors.  When asked about what brings to the hospital she replies"My daryn Destiny Salazareric called the ".  Pt then says "I am the police and I am here to get you on tape ", she then repeatedly asks "Can I have something to eat?", "if you don't given me food I will call  "ap"  unit".  She then makes nonsensical statements "Shirley from [place] , can you say that ", "Are you Sikh from [place]?" . When trying to redirect the pt she says "I am done with you ". She denies SI/HI, denies AH/VH. States she takes medications ..    Rahat Barnett, [phone] pt's fiance , reports pt lives with him and he reports over the past 2 weeks , pt has been not making sense, talking gibberish, talking to herself,  laughing hysterically, disorganized  . Patient knocked on the neighbor's door and not making sense. Patient has been making phone calls , but he is not sure who she is calling. Patient is talking fast . He does not believe she is having vh.  No si/hi verbalization.  Patient is sleeping ok,  appetite has been less than usual, not showering regularly.  Patient is compliant with medications,  as he administers the medications everyday .Clozapine 100mg qhs , followed by Dr. Vinnie Huddleston at [facility] .   Medical: Patient has DM and has been incontinent , Metformin 500 mg qhs ,  Januvia 100mg qdaily , and Glimepiride 4mg qdaily  , Vascepa 1 gm 2 caps 2xbid , Atorvastatin 20mg qdaily  Allergies: non known  Substance use: none known   No access to guns .

## 2023-11-09 NOTE — BH INPATIENT PSYCHIATRY DISCHARGE NOTE - NSBHFUPINTERVALHXFT_PSY_A_CORE
No acute events reported by staff overnight. Patient has been calm and cooperative. No recurrence of psychosis other than ongoing disorganized thought process that is a part of her baseline.

## 2023-11-09 NOTE — BH INPATIENT PSYCHIATRY DISCHARGE NOTE - NSBHASSESSSUMMFT_PSY_ALL_CORE
57 y/o female, has legal guardian, lives with fiance, PMH Type II DM, HLD, asthma, history of Schizophrenia vs Schizoaffectove d/o, multiple past admissions, including at [facility] (most recent 2020 for decompensated psychosis), no known h/o suicide attempts, violence, following with NP Volkerts at [facility], no documented h/o MRDD but cannot rule out given behavior, bib EMS activated by fiance who noticed patient was increasingly disorganized. Of note, patient's fiance may also be of limited functioning. Was taking Clozaril 100mg qhs at home though was stable on 350mg po qhs for several years per outpatient psych NP. It seems when patient was seen by CL team July 2023 while admitted for lactic acidosis, there was presumed noncompliance (though no clozapine level was checked) and clozapine was restarted at 25mg po qhs and titrated to 100mg po qhs. Outpatient psych NP seemed to have been unaware of this and continued lower dose of 100mg po qhs. Patient was grossly disorganized on admission though spot clozapine level check was in 400s. We have been retitrating from 100mg and at 275mg/day, level was <68, presumed cheeking, started crushing meds and nontrough level went to 1200. Dose needed readjusting for safety - now closer to safer level. Mental status has stabilized in the meantime. Patient now at baseline and can be discharged today.

## 2024-09-19 NOTE — ED ADULT NURSE NOTE - CAS EDN DISCHARGE ASSESSMENT
"Regarding: pharmacy issue / closes 30 mins  ----- Message from Aida PILLAI sent at 9/19/2024  7:33 PM EDT -----  \"My medications were sent to the wrong pharmacy and they close in 30 minutes and I'm in pain and they said the silvadene cream needs to be changed to an 85% because that's all they have\"    #I updated the pharmacy    " Awake

## 2024-09-20 NOTE — BH INPATIENT PSYCHIATRY ASSESSMENT NOTE - NSICDXBHSECONDARYDX_PSY_ALL_CORE
TSH is normal.  Awaiting ultrasound.
Diabetes   E11.9  High cholesterol   E78.00  Asthma   J45.909

## 2024-11-04 NOTE — BH INPATIENT PSYCHIATRY PROGRESS NOTE - NSBHCHARTREVIEWVS_PSY_A_CORE FT
Vital Signs Last 24 Hrs  T(C): 36.6 (21 Dec 2020 08:10), Max: 36.6 (20 Dec 2020 20:02)  T(F): 97.8 (21 Dec 2020 08:10), Max: 97.9 (20 Dec 2020 20:02)  HR: --  BP: --  BP(mean): --  RR: --  SpO2: --
the screen says the monitor is ready.  Press the start button. The cuff will inflate and deflate by itself.  Your blood pressure numbers will appear on the screen.  Wait one minute and take your blood pressure again.  If your monitor does not automatically save your numbers, write them in your log book, along with the date and time.          Advance Care Planning     Advance Care Planning opens a door to talk about and write down your wishes before a sudden accident or illness.  Make your goals, values, and preferences known.     This puts you in the ’s seat and helps others know what matters most to you so they won’t have to guess.      Where can you learn more?    Go to [email or web address]   to learn how to:    Name someone you trust to make healthcare decisions for you, only if you can’t. (Healthcare Power of )    Document your wishes for care if you were seriously ill and not expected to recover or are approaching end of life. (Advance Directive or Living Will)    The same page can be found using the QR code below.                Well Visit, Ages 18 to 65: Care Instructions  Well visits can help you stay healthy. Your doctor has checked your overall health and may have suggested ways to take good care of yourself. Your doctor also may have recommended tests. You can help prevent illness with healthy eating, good sleep, vaccinations, regular exercise, and other steps.    Get the tests that you and your doctor decide on. Depending on your age and risks, examples might include screening for diabetes; hepatitis C; HIV; and cervical, breast, lung, and colon cancer. Screening helps find diseases before any symptoms appear.   Eat healthy foods. Choose fruits, vegetables, whole grains, lean protein, and low-fat dairy foods. Limit saturated fat and reduce salt.     Limit alcohol. Men should have no more than 2 drinks a day. Women should have no more than 1. For some 
Vital Signs Last 24 Hrs  T(C): 36.6 (18 Dec 2020 08:50), Max: 36.7 (17 Dec 2020 19:42)  T(F): 97.9 (18 Dec 2020 08:50), Max: 98.1 (17 Dec 2020 19:42)  HR: 81 (18 Dec 2020 08:50) (81 - 81)  BP: 118/79 (18 Dec 2020 08:50) (118/79 - 118/79)  BP(mean): --  RR: --  SpO2: --
Vital Signs Last 24 Hrs  T(C): 36.5 (22 Dec 2020 08:25), Max: 36.5 (22 Dec 2020 08:25)  T(F): 97.7 (22 Dec 2020 08:25), Max: 97.7 (22 Dec 2020 08:25)  HR: 90 (22 Dec 2020 08:25) (90 - 90)  BP: --  BP(mean): --  RR: 18 (21 Dec 2020 18:57) (18 - 18)  SpO2: --
Vital Signs Last 24 Hrs  T(C): 35.7 (15 Dec 2020 08:08), Max: 37 (14 Dec 2020 19:33)  T(F): 96.3 (15 Dec 2020 08:08), Max: 98.6 (14 Dec 2020 19:33)  HR: --  BP: --  BP(mean): --  RR: --  SpO2: --
Vital Signs Last 24 Hrs  T(C): 36.1 (16 Dec 2020 08:29), Max: 36.1 (16 Dec 2020 08:29)  T(F): 96.9 (16 Dec 2020 08:29), Max: 96.9 (16 Dec 2020 08:29)  HR: 77 (16 Dec 2020 08:29) (77 - 94)  BP: 110/63 (16 Dec 2020 08:29) (110/63 - 122/76)  BP(mean): --  RR: --  SpO2: --
Vital Signs Last 24 Hrs  T(C): 36.3 (09 Dec 2020 07:58), Max: 36.8 (08 Dec 2020 21:25)  T(F): 97.3 (09 Dec 2020 07:58), Max: 98.2 (08 Dec 2020 21:25)  HR: --  BP: --  BP(mean): --  RR: --  SpO2: --
Vital Signs Last 24 Hrs  T(C): 36.4 (08 Dec 2020 08:13), Max: 36.4 (08 Dec 2020 08:13)  T(F): 97.5 (08 Dec 2020 08:13), Max: 97.5 (08 Dec 2020 08:13)  HR: --  BP: --  BP(mean): --  RR: --  SpO2: --
Vital Signs Last 24 Hrs  T(C): 37 (14 Dec 2020 08:11), Max: 37 (14 Dec 2020 08:11)  T(F): 98.6 (14 Dec 2020 08:11), Max: 98.6 (14 Dec 2020 08:11)  HR: 79 (13 Dec 2020 19:07) (79 - 79)  BP: --  BP(mean): --  RR: --  SpO2: --
Vital Signs Last 24 Hrs  T(C): 35.7 (31 Dec 2020 06:58), Max: 37.2 (30 Dec 2020 20:49)  T(F): 96.2 (31 Dec 2020 06:58), Max: 98.9 (30 Dec 2020 20:49)  HR: --  BP: --  BP(mean): --  RR: 18 (30 Dec 2020 20:49) (18 - 18)  SpO2: --
Vital Signs Last 24 Hrs  T(C): 36.4 (10 Dec 2020 08:49), Max: 37 (09 Dec 2020 20:01)  T(F): 97.6 (10 Dec 2020 08:49), Max: 98.6 (09 Dec 2020 20:01)  HR: 85 (10 Dec 2020 08:49) (85 - 85)  BP: 100/62 (10 Dec 2020 08:49) (100/62 - 100/62)  BP(mean): --  RR: --  SpO2: --
Vital Signs Last 24 Hrs  T(C): 36.4 (11 Dec 2020 08:13), Max: 37.2 (10 Dec 2020 20:20)  T(F): 97.5 (11 Dec 2020 08:13), Max: 99 (10 Dec 2020 20:20)  HR: --  BP: --  BP(mean): --  RR: 18 (11 Dec 2020 08:13) (18 - 18)  SpO2: --
Vital Signs Last 24 Hrs  T(C): 36.6 (22 Dec 2020 21:00), Max: 36.6 (22 Dec 2020 21:00)  T(F): 97.9 (22 Dec 2020 21:00), Max: 97.9 (22 Dec 2020 21:00)  HR: --  BP: --  BP(mean): --  RR: --  SpO2: --
Vital Signs Last 24 Hrs  T(C): 35.6 (29 Dec 2020 06:36), Max: 37.4 (28 Dec 2020 19:44)  T(F): 96 (29 Dec 2020 06:36), Max: 99.4 (28 Dec 2020 19:44)  HR: 79 (29 Dec 2020 07:38) (79 - 79)  BP: 104/64 (29 Dec 2020 07:38) (104/64 - 104/64)  BP(mean): --  RR: --  SpO2: --
Vital Signs Last 24 Hrs  T(C): 36.4 (27 Dec 2020 07:40), Max: 36.8 (26 Dec 2020 19:33)  T(F): 97.6 (27 Dec 2020 07:40), Max: 98.3 (26 Dec 2020 19:33)  HR: 80 (27 Dec 2020 07:40) (80 - 80)  BP: 100/57 (27 Dec 2020 07:40) (100/57 - 100/57)  BP(mean): --  RR: --  SpO2: --
Vital Signs Last 24 Hrs  T(C): 36.6 (30 Dec 2020 07:52), Max: 36.7 (29 Dec 2020 20:40)  T(F): 97.8 (30 Dec 2020 07:52), Max: 98 (29 Dec 2020 20:40)  HR: --  BP: --  BP(mean): --  RR: --  SpO2: --
Vital Signs Last 24 Hrs  T(C): 36.2 (28 Dec 2020 07:50), Max: 37 (27 Dec 2020 19:25)  T(F): 97.2 (28 Dec 2020 07:50), Max: 98.6 (27 Dec 2020 19:25)  HR: --  BP: --  BP(mean): --  RR: --  SpO2: --

## 2024-12-06 NOTE — ED ADULT NURSE NOTE - NSICDXFAMILYHX_GEN_ALL_CORE_FT
FAMILY HISTORY:  No pertinent family history in first degree relatives    
Alert and interactive, no focal deficits

## 2025-01-26 NOTE — BH PATIENT PROFILE - ARE SIGNIFICANT INDICATORS COMPLETE.
Occupational Therapy    Patient not seen in therapy.     Unavailable due to medical tests/procedures (HBO).      Re-attempt plan: later today or per established plan of care                           Documented in the chart in the following areas: Assessment/Plan.     No Yes

## 2025-02-15 NOTE — BH PATIENT PROFILE - FUNCTIONAL ASSESSMENT - DAILY ACTIVITY 1.
Regarding: [place], 53 yo, f , PT has heart burn in  mid chest and back, PCP told her it is caused by her  losartan  ----- Message from Js GARSIA sent at 2/14/2025  8:17 PM CST -----  Patient Name: Ana Johnson    Specialist or PCP Name: Josue Johns MD       Symptoms: PT has heart burn in  mid chest and back, PCP told her it is caused by her  losartan, PCP changed her scritp today to 125 mg , Should PT lake a 2nd 50 mg losartan, PT only took 50 mg losartan at 6 AM, can she take maureen seltzer    Pregnant (females aged 13-60. If Yes, how long?) : No    Call Back # : [phone]    Which State are you currently located in?: [place]    Name of Clinic Site / Acct# : [facility] [address]     Call arrived during: After Hours     4 = No assist / stand by assistance

## 2025-03-10 NOTE — ED BEHAVIORAL HEALTH ASSESSMENT NOTE - HYGIENE
[FreeTextEntry1] : # Colon polyps - Due for repeat colonoscopy as was recommended by Dr. Esquivel. Noted adequate only prep on 1 day prep. Discussed with patient who is amendable to 2 days prep. The risks, benefits and alternatives of the procedure were discussed with the patient in detail. Risks include bleeding, infection, bowel perforation, adverse reaction to sedation and missed lesions. All questions were answered. The patient expressed understanding of these risks and is agreeable to proceed.  Plan: - Colonoscopy - 2 day prep with Golytely - RTC PRN Poor

## 2025-05-15 NOTE — ED ADULT NURSE NOTE - NSFALLRSKASSESSTYPE_ED_ALL_ED
Pt was seen today in [facility] for a Phase II visit. Vital signs and session notes recorded in [employer] and will be scanned into Epic by HIM.    Initial (On Arrival)

## 2025-07-17 NOTE — BH PATIENT PROFILE - NSVRISKFUTURESTRESS_PSY_ALL_CORE
FAMILY HISTORY:  Sibling  Still living? Unknown  FH: coronary artery disease, Age at diagnosis: Age Unknown    
No